# Patient Record
Sex: FEMALE | Race: WHITE | NOT HISPANIC OR LATINO | Employment: OTHER | URBAN - NONMETROPOLITAN AREA
[De-identification: names, ages, dates, MRNs, and addresses within clinical notes are randomized per-mention and may not be internally consistent; named-entity substitution may affect disease eponyms.]

---

## 2017-01-03 ENCOUNTER — OFFICE VISIT (OUTPATIENT)
Dept: MEDICAL GROUP | Facility: PHYSICIAN GROUP | Age: 72
End: 2017-01-03
Payer: MEDICARE

## 2017-01-03 VITALS
DIASTOLIC BLOOD PRESSURE: 70 MMHG | OXYGEN SATURATION: 95 % | RESPIRATION RATE: 14 BRPM | SYSTOLIC BLOOD PRESSURE: 108 MMHG | HEART RATE: 80 BPM | TEMPERATURE: 98.4 F | WEIGHT: 127 LBS | BODY MASS INDEX: 23.98 KG/M2 | HEIGHT: 61 IN

## 2017-01-03 DIAGNOSIS — M51.37 DEGENERATION OF LUMBAR OR LUMBOSACRAL INTERVERTEBRAL DISC: ICD-10-CM

## 2017-01-03 DIAGNOSIS — E03.9 HYPOTHYROIDISM, UNSPECIFIED TYPE: ICD-10-CM

## 2017-01-03 PROCEDURE — 99213 OFFICE O/P EST LOW 20 MIN: CPT | Performed by: NURSE PRACTITIONER

## 2017-01-03 RX ORDER — INFLUENZA A VIRUS A/MICHIGAN/45/2015 X-275 (H1N1) ANTIGEN (FORMALDEHYDE INACTIVATED), INFLUENZA A VIRUS A/SINGAPORE/INFIMH-16-0019/2016 IVR-186 (H3N2) ANTIGEN (FORMALDEHYDE INACTIVATED), AND INFLUENZA B VIRUS B/MARYLAND/15/2016 BX-69A (A B/COLORADO/6/2017-LIKE VIRUS) ANTIGEN (FORMALDEHYDE INACTIVATED) 60; 60; 60 UG/.5ML; UG/.5ML; UG/.5ML
INJECTION, SUSPENSION INTRAMUSCULAR
COMMUNITY
Start: 2016-10-11 | End: 2018-07-25

## 2017-01-03 NOTE — PROGRESS NOTES
Chief Complaint   Patient presents with   • Results     labs       HISTORY OF PRESENT ILLNESS: Patient is a 71 y.o. female  Patient, who presents today to discuss:    Patient is here for follow-up, doesn't really remember why. Recently had her labs done in October that showed euthyroid. Today was just to catch up and review some of her concerns. She will however get lab work done in May 2017 and will follow up. Patient is extremely fit tries to get 10,000 steps and almost every day despite her orthopedic pain and problems. She also tries to eat healthy and maintain a decent weight.      Degeneration of lumbar or lumbosacral intervertebral disc  Patient continues to see ortho for pain relief. Having some difficulty with trigger point injections  Patient takes tramadol for pain as needed.  No falls. Patient continues to use fit bit.     Hypothyroidism  Patient is currently on 112 mcg daily. Feels good on this level. Will retest.         Allergies:Codeine and Vicodin    Current Outpatient Prescriptions Ordered in Liquid Grids   Medication Sig Dispense Refill   • omeprazole (PRILOSEC) 40 MG delayed-release capsule TAKE ONE CAPSULE BY MOUTH ONCE DAILY 90 Cap 0   • estradiol (ESTRACE) 1 MG Tab Take 1 Tab by mouth every day. 90 Tab 3   • levothyroxine (SYNTHROID) 112 MCG Tab Take 1 Tab by mouth Every morning on an empty stomach. 90 Tab 3   • hydrocodone-acetaminophen (NORCO) 5-325 MG Tab per tablet      • tramadol (ULTRAM) 50 MG Tab      • ibuprofen (MOTRIN) 800 MG Tab Take 1 Tab by mouth every 8 hours as needed for Mild Pain. 90 Tab 0   • amitriptyline (ELAVIL) 50 MG TABS Take 50 mg by mouth every evening.     • FLUZONE HIGH-DOSE 0.5 ML Suspension Prefilled Syringe injection      • cyclobenzaprine (FLEXERIL) 10 MG Tab Take 1 Tab by mouth 3 times a day as needed. 90 Tab 1   • PREVNAR 13 syringe      • cyclobenzaprine (FLEXERIL) 10 MG Tab TAKE ONE TABLET BY MOUTH THREE TIMES DAILY AS NEEDED FOR MILD PAIN OR MUSCLE SPASM 90 Tab 0    • erythromycin 5 MG/GM Ointment      • Bioflavonoid Products (VITAMIN C CR) 1000-100 MG Tab CR Take  by mouth 2 Times a Day.     • meclizine (ANTIVERT) 12.5 MG Tab      • levothyroxine (SYNTHROID) 125 MCG Tab Take 1 Tab by mouth every day. 90 Tab 3   • hydrocodone-acetaminophen (VICODIN) 5-500 MG TABS Take 1-2 Tabs by mouth every four hours as needed.     • Multiple Vitamins-Minerals (HAIR/SKIN/NAILS PO) Take 1 Tab by mouth every day.     • Calcium Carbonate-Vitamin D ([employer] CALCIUM/VIT D SOFT CHEWS PO) Take 2 Tabs by mouth every day.       No current [facility]-ordered facility-administered medications on file.       Past Medical History   Diagnosis Date   • Ear problems as child had surgery   • Postcoital bleeding      improved   • Tobacco use disorder    • Degeneration of lumbar or lumbosacral intervertebral disc      Dr. Cisneros   • Screening mammogram  11/2008=normal   • Periodic pelvic examination due   • Menopause 10/14/2009   • Ulcer of the stomach and intestine hx.     stable   • S/P colonoscopy 12/2011     repeat in 5 years- had polyp, initial 2005.    • Colon polyp 12/2011   • Hemorrhoid      internal   • Arthritis      hips, back and shoulders   • History of carpal tunnel repair 7/31/2013     Dr. Villa repaired both.   • Osteoarthritis of multiple joints 6/13/2014   • Rash 12/29/2014     Started about a week ago when she got news about surgery High stress Nothing new Back, stomach, chest, arms Itch No pain   • Unspecified cataract      bilateral IOL   • Unspecified hypothyroidism    • Graves' disease with exophthalmos 6/13/2014     Being followed by Dr. Han San   • Pain in joint, pelvic region and thigh 2015     bursitis and arthritis in hips-takes ibuprofen and darvacet   • Pain in joint, shoulder region 2015       Social History   Substance Use Topics   • Smoking status: Former Smoker -- 1.00 packs/day for 50 years     Types: Cigarettes     Quit date: 01/09/2014   • Smokeless tobacco: Never  "Used      Comment: doesn't think she will quit   • Alcohol Use: 0.6 oz/week     1 Glasses of wine per week      Comment:      0-1 per week                                     E cig use       Family Status   Relation Status Death Age   • Mother  69 yo     had a hx. of stomach problems   • Father       commited suicide   • Sister Alive      3 sisters with thyroid problems   • Sister Alive      has a hx. of breast cyst     Family History   Problem Relation Age of Onset   • Cancer Neg Hx    • Diabetes Neg Hx    • Hypertension Father    • Heart Disease Father      MI age 49 yo       ROS: As documented in my HPI      Exam:  Blood pressure 108/70, pulse 80, temperature 36.9 °C (98.4 °F), resp. rate 14, height 1.549 m (5' 0.98\"), weight 57.607 kg (127 lb), SpO2 95 %.  General:  Well nourished, well developed female in NAD  Head: No lesions, Non tender scalp  Neck: Supple. Symmetric Thyroid visualized during exam.   Pulmonary:  Normal effort. No rales, ronchi, or wheezing.  Cardiovascular: Regular rate and rhythm without murmur.   Extremities: no clubbing, cyanosis, or edema.  Psych: Alert and oriented x3. Normal mood and affect.   Neurological: No focal deficits    Please note that this dictation was created using voice recognition software. I have made every reasonable attempt to correct obvious errors, but I expect that there are errors of grammar and possibly content that I did not discover before finalizing the note.    Assessment/Plan:  1. Degeneration of lumbar or lumbosacral intervertebral disc  COMP METABOLIC PANEL    LIPID PANEL    TSH+FREE T4    VITAMIN D,25 HYDROXY   2. Hypothyroidism, unspecified type  COMP METABOLIC PANEL    LIPID PANEL    TSH+FREE T4    VITAMIN D,25 HYDROXY        Recheck labs in May 2017 and follow up.     "

## 2017-01-03 NOTE — MR AVS SNAPSHOT
"        Makenna Davis Palomo   1/3/2017 1:00 PM   Office Visit   MRN: [id number]    Department:  [facility]   Dept Phone:  [phone]    Description:  Female : 1945   Provider:  EDUARDO Alcantara           Reason for Visit     Results labs      Allergies as of 1/3/2017     Allergen Noted Reactions    Codeine 10/09/2009   Vomiting    Vicodin [Hydrocodone-Acetaminophen] 10/09/2009   Vomiting    OK if takes with phenergan       You were diagnosed with     Degeneration of lumbar or lumbosacral intervertebral disc   [722.52.ICD-9-CM]       Hypothyroidism, unspecified type   [6484723]         Vital Signs     Blood Pressure Pulse Temperature Respirations Height Weight    108/70 mmHg 80 36.9 °C (98.4 °F) 14 1.549 m (5' 0.98\") 57.607 kg (127 lb)    Body Mass Index Oxygen Saturation Smoking Status             24.01 kg/m2 95% Former Smoker         Basic Information     Date Of Birth Sex Race Ethnicity Preferred Language    1945 Female White Non- English      Problem List              ICD-10-CM Priority Class Noted - Resolved    Degeneration of lumbar or lumbosacral intervertebral disc M51.37 High  10/9/2009 - Present    Hypothyroidism E03.9 High  10/9/2009 - Present    Pain in joint, shoulder region M25.519 High  10/9/2009 - Present    History of peptic ulcer Z87.11 Low  2009 - Present    GERD (gastroesophageal reflux disease) K21.9 Low  2009 - Present    Hyperlipidemia E78.5 High  2011 - Present    Post menopausal syndrome Z78.0 Low  3/8/2012 - Present    Osteopenia M85.80 Low  2012 - Present    Osteoarthritis of multiple joints M15.9 High  2014 - Present    Graves' disease with exophthalmos E05.00 High  2014 - Present    Senile nuclear sclerosis H25.10   3/3/2015 - Present    Other and combined forms of senile cataract H25.89 Low  3/17/2015 - Present    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 High  2015 - Present    Abnormal WBC count D72.9 Low  2015 - Present   " Dermatochalasis of both upper eyelids H02.831, H02.834 Low  4/20/2016 - Present      Health Maintenance        Date Due Completion Dates    MAMMOGRAM 10/14/2016 10/14/2015, 9/9/2014, 7/31/2013 (Declined), 7/28/2011, 1/12/2010, 1/12/2010, 10/28/2008, 10/28/2008    Override on 7/31/2013: Patient Declined    IMM PNEUMOCOCCAL 65+ (ADULT) LOW/MEDIUM RISK SERIES (2 of 2 - PPSV23) 5/18/2017 5/18/2016    COLONOSCOPY 3/7/2020 3/7/2010 (Prv Comp)    Override on 3/7/2010: Previously completed (normal)    BONE DENSITY 10/14/2020 10/14/2015, 9/15/2011    IMM DTaP/Tdap/Td Vaccine (2 - Td) 7/31/2023 7/31/2013            Current Immunizations     13-VALENT PCV PREVNAR 5/18/2016    Influenza TIV (IM) 11/13/2013  4:34 PM    Influenza Vaccine Adult HD 10/12/2016, 9/23/2015    Influenza Vaccine Quad Inj (Preserved) 11/13/2014, 11/13/2014, 9/27/2012    SHINGLES VACCINE 8/4/2011    Tdap Vaccine 7/31/2013 11:04 AM      Below and/or attached are the medications your provider expects you to take. Review all of your home medications and newly ordered medications with your provider and/or pharmacist. Follow medication instructions as directed by your provider and/or pharmacist. Please keep your medication list with you and share with your provider. Update the information when medications are discontinued, doses are changed, or new medications (including over-the-counter products) are added; and carry medication information at all times in the event of emergency situations     Allergies:  CODEINE - Vomiting     VICODIN - Vomiting               Medications  Valid as of: January 03, 2017 -  1:48 PM    Generic Name Brand Name Tablet Size Instructions for use    Amitriptyline HCl (Tab) ELAVIL 50 MG Take 50 mg by mouth every evening.        Bioflavonoid Products (Tab CR) Vitamin C CR 1000-100 MG Take  by mouth 2 Times a Day.        Calcium Carbonate-Vitamin D   Take 2 Tabs by mouth every day.        Cyclobenzaprine HCl (Tab) FLEXERIL 10 MG TAKE ONE  TABLET BY MOUTH THREE TIMES DAILY AS NEEDED FOR MILD PAIN OR MUSCLE SPASM        Cyclobenzaprine HCl (Tab) FLEXERIL 10 MG Take 1 Tab by mouth 3 times a day as needed.        Erythromycin (Ointment) erythromycin 5 MG/GM         Estradiol (Tab) ESTRACE 1 MG Take 1 Tab by mouth every day.        Hydrocodone-Acetaminophen (Tab) VICODIN 5-500 MG Take 1-2 Tabs by mouth every four hours as needed.        Hydrocodone-Acetaminophen (Tab) NORCO 5-325 MG         Ibuprofen (Tab) MOTRIN 800 MG Take 1 Tab by mouth every 8 hours as needed for Mild Pain.        Influenza Vac Split High-Dose (Suspension Prefilled Syringe) FLUZONE HIGH-DOSE 0.5 ML         Levothyroxine Sodium (Tab) SYNTHROID 125 MCG Take 1 Tab by mouth every day.        Levothyroxine Sodium (Tab) SYNTHROID 112 MCG Take 1 Tab by mouth Every morning on an empty stomach.        Meclizine HCl (Tab) ANTIVERT 12.5 MG         Multiple Vitamins-Minerals   Take 1 Tab by mouth every day.        Omeprazole (CAPSULE DELAYED RELEASE) PRILOSEC 40 MG TAKE ONE CAPSULE BY MOUTH ONCE DAILY        Pneumococcal 13-Adelaide Conj Vacc (Suspension) PREVNAR 13          TraMADol HCl (Tab) ULTRAM 50 MG         .                 Medicines prescribed today were sent to:     [facility] [address] [place] [id number]    Phone: [phone] Fax: [phone]    Open 24 Hours?: No      Medication refill instructions:       If your prescription bottle indicates you have medication refills left, it is not necessary to call your provider’s office. Please contact your pharmacy and they will refill your medication.    If your prescription bottle indicates you do not have any refills left, you may request refills at any time through one of the following ways: The online [employer] system (except Urgent Care), by calling your provider’s office, or by asking your pharmacy to contact your provider’s office with a refill request. Medication refills  are processed only during regular business hours and may not be available until the next business day. Your provider may request additional information or to have a follow-up visit with you prior to refilling your medication.   *Please Note: Medication refills are assigned a new Rx number when refilled electronically. Your pharmacy may indicate that no refills were authorized even though a new prescription for the same medication is available at the pharmacy. Please request the medicine by name with the pharmacy before contacting your provider for a refill.        Your To Do List     Future Labs/Procedures Complete By Expires    COMP METABOLIC PANEL  As directed 1/3/2018    VITAMIN D,25 HYDROXY  As directed 1/3/2018      Other Notes About Your Plan     Ortho - trigger pointe injections. tramadol           MyChart Access Code: Activation code not generated  Current MyChart Status: Active

## 2017-01-03 NOTE — ASSESSMENT & PLAN NOTE
Patient continues to see ortho for pain relief. Having some difficulty with trigger point injections  Patient takes tramadol for pain as needed.  No falls. Patient continues to use fit bit.

## 2017-02-23 RX ORDER — CYCLOBENZAPRINE HCL 10 MG
TABLET ORAL
Qty: 90 TAB | Refills: 1 | Status: SHIPPED | OUTPATIENT
Start: 2017-02-23 | End: 2017-10-24 | Stop reason: SDUPTHER

## 2017-03-13 RX ORDER — OMEPRAZOLE 40 MG/1
CAPSULE, DELAYED RELEASE ORAL
Qty: 90 CAP | Refills: 0 | Status: SHIPPED | OUTPATIENT
Start: 2017-03-13 | End: 2017-06-18 | Stop reason: SDUPTHER

## 2017-03-14 ENCOUNTER — APPOINTMENT (OUTPATIENT)
Dept: RADIOLOGY | Facility: IMAGING CENTER | Age: 72
End: 2017-03-14
Attending: PHYSICIAN ASSISTANT
Payer: MEDICARE

## 2017-03-14 ENCOUNTER — OFFICE VISIT (OUTPATIENT)
Dept: URGENT CARE | Facility: PHYSICIAN GROUP | Age: 72
End: 2017-03-14
Payer: MEDICARE

## 2017-03-14 VITALS
TEMPERATURE: 99.6 F | BODY MASS INDEX: 24.55 KG/M2 | DIASTOLIC BLOOD PRESSURE: 74 MMHG | SYSTOLIC BLOOD PRESSURE: 112 MMHG | RESPIRATION RATE: 16 BRPM | HEIGHT: 61 IN | HEART RATE: 92 BPM | WEIGHT: 130 LBS | OXYGEN SATURATION: 96 %

## 2017-03-14 DIAGNOSIS — M25.561 ACUTE PAIN OF RIGHT KNEE: ICD-10-CM

## 2017-03-14 DIAGNOSIS — S80.01XA CONTUSION OF RIGHT KNEE, INITIAL ENCOUNTER: ICD-10-CM

## 2017-03-14 PROCEDURE — 1036F TOBACCO NON-USER: CPT | Performed by: PHYSICIAN ASSISTANT

## 2017-03-14 PROCEDURE — G8420 CALC BMI NORM PARAMETERS: HCPCS | Performed by: PHYSICIAN ASSISTANT

## 2017-03-14 PROCEDURE — 73562 X-RAY EXAM OF KNEE 3: CPT | Mod: TC,RT | Performed by: PHYSICIAN ASSISTANT

## 2017-03-14 PROCEDURE — 3014F SCREEN MAMMO DOC REV: CPT | Performed by: PHYSICIAN ASSISTANT

## 2017-03-14 PROCEDURE — G8432 DEP SCR NOT DOC, RNG: HCPCS | Performed by: PHYSICIAN ASSISTANT

## 2017-03-14 PROCEDURE — 4040F PNEUMOC VAC/ADMIN/RCVD: CPT | Performed by: PHYSICIAN ASSISTANT

## 2017-03-14 PROCEDURE — 1101F PT FALLS ASSESS-DOCD LE1/YR: CPT | Performed by: PHYSICIAN ASSISTANT

## 2017-03-14 PROCEDURE — 99214 OFFICE O/P EST MOD 30 MIN: CPT | Performed by: PHYSICIAN ASSISTANT

## 2017-03-14 PROCEDURE — 3017F COLORECTAL CA SCREEN DOC REV: CPT | Performed by: PHYSICIAN ASSISTANT

## 2017-03-14 PROCEDURE — G8482 FLU IMMUNIZE ORDER/ADMIN: HCPCS | Performed by: PHYSICIAN ASSISTANT

## 2017-03-14 RX ORDER — TRAMADOL HYDROCHLORIDE 50 MG/1
50-100 TABLET ORAL EVERY 6 HOURS PRN
Qty: 20 TAB | Refills: 0 | Status: SHIPPED | OUTPATIENT
Start: 2017-03-14 | End: 2017-10-12

## 2017-03-14 NOTE — MR AVS SNAPSHOT
"        Makenna Singletaryto   3/14/2017 3:10 PM   Office Visit   MRN: [id number]    Department:  [facility]   Dept Phone:  [phone]    Description:  Female : 1945   Provider:  Piero Castillo PA-C           Reason for Visit     Knee Pain fell on R knee on , now has shooting pain up the leg      Allergies as of 3/14/2017     Allergen Noted Reactions    Codeine 10/09/2009   Vomiting    Vicodin [Hydrocodone-Acetaminophen] 10/09/2009   Vomiting    OK if takes with phenergan       You were diagnosed with     Contusion of right knee, initial encounter   [[id number]]       Acute pain of right knee   [[id number]]         Vital Signs     Blood Pressure Pulse Temperature Respirations Height Weight    112/74 mmHg 92 37.6 °C (99.6 °F) 16 1.549 m (5' 1\") 58.968 kg (130 lb)    Body Mass Index Oxygen Saturation Breastfeeding? Smoking Status          24.58 kg/m2 96% No Former Smoker        Basic Information     Date Of Birth Sex Race Ethnicity Preferred Language    1945 Female White Non- English      Problem List              ICD-10-CM Priority Class Noted - Resolved    Degeneration of lumbar or lumbosacral intervertebral disc M51.37 High  10/9/2009 - Present    Hypothyroidism E03.9 High  10/9/2009 - Present    Pain in joint, shoulder region M25.519 High  10/9/2009 - Present    History of peptic ulcer Z87.11 Low  2009 - Present    GERD (gastroesophageal reflux disease) K21.9 Low  2009 - Present    Hyperlipidemia E78.5 High  2011 - Present    Post menopausal syndrome Z78.0 Low  3/8/2012 - Present    Osteopenia M85.80 Low  2012 - Present    Osteoarthritis of multiple joints M15.9 High  2014 - Present    Graves' disease with exophthalmos E05.00 High  2014 - Present    Senile nuclear sclerosis H25.10   3/3/2015 - Present    Other and combined forms of senile cataract H25.89 Low  3/17/2015 - Present    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 High  2015 - Present    Abnormal WBC count D72.9 " Low  8/21/2015 - Present    Dermatochalasis of both upper eyelids H02.831, H02.834 Low  4/20/2016 - Present      Health Maintenance        Date Due Completion Dates    MAMMOGRAM 10/14/2016 10/14/2015, 9/9/2014, 7/31/2013 (Declined), 7/28/2011, 1/12/2010, 1/12/2010, 10/28/2008, 10/28/2008    Override on 7/31/2013: Patient Declined    IMM PNEUMOCOCCAL 65+ (ADULT) LOW/MEDIUM RISK SERIES (2 of 2 - PPSV23) 5/18/2017 5/18/2016    COLONOSCOPY 3/7/2020 3/7/2010 (Prv Comp)    Override on 3/7/2010: Previously completed (normal)    BONE DENSITY 10/14/2020 10/14/2015, 9/15/2011    IMM DTaP/Tdap/Td Vaccine (2 - Td) 7/31/2023 7/31/2013            Current Immunizations     13-VALENT PCV PREVNAR 5/18/2016    Influenza TIV (IM) 11/13/2013  4:34 PM    Influenza Vaccine Adult HD 10/12/2016, 9/23/2015    Influenza Vaccine Quad Inj (Preserved) 11/13/2014, 11/13/2014, 9/27/2012    SHINGLES VACCINE 8/4/2011    Tdap Vaccine 7/31/2013 11:04 AM      Below and/or attached are the medications your provider expects you to take. Review all of your home medications and newly ordered medications with your provider and/or pharmacist. Follow medication instructions as directed by your provider and/or pharmacist. Please keep your medication list with you and share with your provider. Update the information when medications are discontinued, doses are changed, or new medications (including over-the-counter products) are added; and carry medication information at all times in the event of emergency situations     Allergies:  CODEINE - Vomiting     VICODIN - Vomiting               Medications  Valid as of: March 14, 2017 -  4:47 PM    Generic Name Brand Name Tablet Size Instructions for use    Amitriptyline HCl (Tab) ELAVIL 50 MG Take 50 mg by mouth every evening.        Bioflavonoid Products (Tab CR) Vitamin C CR 1000-100 MG Take  by mouth 2 Times a Day.        Calcium Carbonate-Vitamin D   Take 2 Tabs by mouth every day.        Cyclobenzaprine HCl  (Tab) FLEXERIL 10 MG TAKE ONE TABLET BY MOUTH THREE TIMES DAILY AS NEEDED FOR MILD PAIN OR MUSCLE SPASM        Cyclobenzaprine HCl (Tab) FLEXERIL 10 MG TAKE ONE TABLET BY MOUTH THREE TIMES DAILY AS NEEDED        Diclofenac Sodium (Gel) Diclofenac Sodium 1 % Apply 4 g to skin as directed 4 times a day.        Erythromycin (Ointment) erythromycin 5 MG/GM         Estradiol (Tab) ESTRACE 1 MG Take 1 Tab by mouth every day.        Hydrocodone-Acetaminophen (Tab) VICODIN 5-500 MG Take 1-2 Tabs by mouth every four hours as needed.        Hydrocodone-Acetaminophen (Tab) NORCO 5-325 MG         Ibuprofen (Tab) MOTRIN 800 MG Take 1 Tab by mouth every 8 hours as needed for Mild Pain.        Influenza Vac Split High-Dose (Suspension Prefilled Syringe) FLUZONE HIGH-DOSE 0.5 ML         Levothyroxine Sodium (Tab) SYNTHROID 125 MCG Take 1 Tab by mouth every day.        Levothyroxine Sodium (Tab) SYNTHROID 112 MCG Take 1 Tab by mouth Every morning on an empty stomach.        Meclizine HCl (Tab) ANTIVERT 12.5 MG         Multiple Vitamins-Minerals   Take 1 Tab by mouth every day.        Omeprazole (CAPSULE DELAYED RELEASE) PRILOSEC 40 MG TAKE ONE CAPSULE BY MOUTH ONCE DAILY        Pneumococcal 13-Adelaide Conj Vacc (Suspension) PREVNAR 13          TraMADol HCl (Tab) ULTRAM 50 MG         TraMADol HCl (Tab) ULTRAM 50 MG Take 1-2 Tabs by mouth every 6 hours as needed.        .                 Medicines prescribed today were sent to:     [facility] [address] [place] [address] [id number] [id number] [facility] [address]    Phone: [phone] Fax: [phone]    Open 24 Hours?: No      Medication refill instructions:       If your prescription bottle indicates you have medication refills left, it is not necessary to call your provider’s office. Please contact your pharmacy and they will refill your medication.    If your prescription bottle indicates you do not have any refills left, you may request refills at any time  through one of the following ways: The online [employer] system (except Urgent Care), by calling your provider’s office, or by asking your pharmacy to contact your provider’s office with a refill request. Medication refills are processed only during regular business hours and may not be available until the next business day. Your provider may request additional information or to have a follow-up visit with you prior to refilling your medication.   *Please Note: Medication refills are assigned a new Rx number when refilled electronically. Your pharmacy may indicate that no refills were authorized even though a new prescription for the same medication is available at the pharmacy. Please request the medicine by name with the pharmacy before contacting your provider for a refill.        Your To Do List     Future Labs/Procedures Complete By Expires    DX-KNEE 3 VIEWS RIGHT  As directed 3/14/2018      Other Notes About Your Plan     Ortho - trigger pointe injections. Tramadol  Epidural           Gr[employer]per[employer] Access Code: Activation code not generated  Current C2FO Status: Active

## 2017-03-14 NOTE — PROGRESS NOTES
Chief Complaint   Patient presents with   • Knee Pain     fell on R knee on 2/22, now has shooting pain up the leg       HISTORY OF PRESENT ILLNESS: Patient is a 71 y.o. female who presents today because she has reviewed pain. 3 weeks ago, she fell forward, landing squarely on her right knee and has had pain ever since then. It is a little bit better in the morning, but by evening. She has pain that radiates medially and distally from her lower patellar area. Denies any distal paresthesias. She has tried over-the-counter ibuprofen, Tylenol, different rubs and ointment without any improvement.    Patient Active Problem List    Diagnosis Date Noted   • Graves' ophthalmopathy 07/09/2015     Priority: High   • Osteoarthritis of multiple joints 06/13/2014     Priority: High   • Graves' disease with exophthalmos 06/13/2014     Priority: High   • Hyperlipidemia 06/22/2011     Priority: High   • Degeneration of lumbar or lumbosacral intervertebral disc 10/09/2009     Priority: High   • Hypothyroidism 10/09/2009     Priority: High   • Pain in joint, shoulder region 10/09/2009     Priority: High   • Dermatochalasis of both upper eyelids 04/20/2016     Priority: Low   • Abnormal WBC count 08/21/2015     Priority: Low   • Other and combined forms of senile cataract 03/17/2015     Priority: Low   • Osteopenia 05/09/2012     Priority: Low   • Post menopausal syndrome 03/08/2012     Priority: Low   • History of peptic ulcer 11/04/2009     Priority: Low   • GERD (gastroesophageal reflux disease) 11/04/2009     Priority: Low   • Senile nuclear sclerosis 03/03/2015       Allergies:Codeine and Vicodin    Current Outpatient Prescriptions Ordered in [facility]   Medication Sig Dispense Refill   • tramadol (ULTRAM) 50 MG Tab Take 1-2 Tabs by mouth every 6 hours as needed. 20 Tab 0   • Diclofenac Sodium (VOLTAREN) 1 % Gel Apply 4 g to skin as directed 4 times a day. 100 g 0   • omeprazole (PRILOSEC) 40 MG delayed-release capsule TAKE ONE  CAPSULE BY MOUTH ONCE DAILY 90 Cap 0   • estradiol (ESTRACE) 1 MG Tab Take 1 Tab by mouth every day. 90 Tab 3   • levothyroxine (SYNTHROID) 112 MCG Tab Take 1 Tab by mouth Every morning on an empty stomach. 90 Tab 3   • cyclobenzaprine (FLEXERIL) 10 MG Tab TAKE ONE TABLET BY MOUTH THREE TIMES DAILY AS NEEDED FOR MILD PAIN OR MUSCLE SPASM 90 Tab 0   • hydrocodone-acetaminophen (NORCO) 5-325 MG Tab per tablet      • tramadol (ULTRAM) 50 MG Tab      • Bioflavonoid Products (VITAMIN C CR) 1000-100 MG Tab CR Take  by mouth 2 Times a Day.     • ibuprofen (MOTRIN) 800 MG Tab Take 1 Tab by mouth every 8 hours as needed for Mild Pain. 90 Tab 0   • Multiple Vitamins-Minerals (HAIR/SKIN/NAILS PO) Take 1 Tab by mouth every day.     • Calcium Carbonate-Vitamin D (CVS CALCIUM/VIT D SOFT CHEWS PO) Take 2 Tabs by mouth every day.     • amitriptyline (ELAVIL) 50 MG TABS Take 50 mg by mouth every evening.     • cyclobenzaprine (FLEXERIL) 10 MG Tab TAKE ONE TABLET BY MOUTH THREE TIMES DAILY AS NEEDED 90 Tab 1   • FLUZONE HIGH-DOSE 0.5 ML Suspension Prefilled Syringe injection      • PREVNAR 13 syringe      • erythromycin 5 MG/GM Ointment      • meclizine (ANTIVERT) 12.5 MG Tab      • levothyroxine (SYNTHROID) 125 MCG Tab Take 1 Tab by mouth every day. 90 Tab 3   • hydrocodone-acetaminophen (VICODIN) 5-500 MG TABS Take 1-2 Tabs by mouth every four hours as needed.       No current Epic-ordered facility-administered medications on file.       Past Medical History   Diagnosis Date   • Ear problems as child had surgery   • Postcoital bleeding      improved   • Tobacco use disorder    • Degeneration of lumbar or lumbosacral intervertebral disc      Dr. Cisneros   • Screening mammogram  11/2008=normal   • Periodic pelvic examination due   • Menopause 10/14/2009   • Ulcer of the stomach and intestine hx.     stable   • S/P colonoscopy 12/2011     repeat in 5 years- had polyp, initial 2005.    • Colon polyp 12/2011   • Hemorrhoid       internal   • Arthritis      hips, back and shoulders   • History of carpal tunnel repair 2013     Dr. Villa repaired both.   • Osteoarthritis of multiple joints 2014   • Rash 2014     Started about a week ago when she got news about surgery High stress Nothing new Back, stomach, chest, arms Itch No pain   • Unspecified cataract      bilateral IOL   • Unspecified hypothyroidism    • Graves' disease with exophthalmos 2014     Being followed by Dr. Han San   • Pain in joint, pelvic region and thigh 2015     bursitis and arthritis in hips-takes ibuprofen and darvacet   • Pain in joint, shoulder region 2015       Social History   Substance Use Topics   • Smoking status: Former Smoker -- 1.00 packs/day for 50 years     Types: Cigarettes     Quit date: 2014   • Smokeless tobacco: Never Used      Comment: doesn't think she will quit   • Alcohol Use: 0.6 oz/week     1 Glasses of wine per week      Comment:      0-1 per week                                     E cig use       Family Status   Relation Status Death Age   • Mother  69 yo     had a hx. of stomach problems   • Father       commited suicide   • Sister Alive      3 sisters with thyroid problems   • Sister Alive      has a hx. of breast cyst     Family History   Problem Relation Age of Onset   • Cancer Neg Hx    • Diabetes Neg Hx    • Hypertension Father    • Heart Disease Father      MI age 47 yo       ROS:  Review of Systems   Constitutional: Negative for fever, chills, weight loss and malaise/fatigue.   HENT: Negative for ear pain, nosebleeds, congestion, sore throat and neck pain.    Eyes: Negative for blurred vision.   Respiratory: Negative for cough, sputum production, shortness of breath and wheezing.    Cardiovascular: Negative for chest pain, palpitations, orthopnea and leg swelling.   Gastrointestinal: Negative for heartburn, nausea, vomiting and abdominal pain.       Exam:  Blood pressure 112/74,  "pulse 92, temperature 37.6 °C (99.6 °F), resp. rate 16, height 1.549 m (5' 1\"), weight 58.968 kg (130 lb), SpO2 96 %, not currently breastfeeding.  General:  Well nourished, well developed female in NAD  Head:Normocephalic, atraumatic  Eyes: PERRLA, EOM within normal limits, no conjunctival injection, no scleral icterus, visual fields and acuity grossly intact.  Extremities: no clubbing, cyanosis, or edema. Right knee is without any visual deformity, erythema, edema or ecchymosis. She has tenderness to palpation to the anterior joint line./Infrapatellar tendon area, the popliteal space, as well as the superior portion of the patella. Distally she has good range of motion, strength, circulation and sensation.    Radiologist interprets Right knee x-ray:      No evidence of fracture or dislocation.         Please note that this dictation was created using voice recognition software. I have made every reasonable attempt to correct obvious errors, but I expect that there are errors of grammar and possibly content that I did not discover before finalizing the note.    Assessment/Plan:  1. Contusion of right knee, initial encounter  DX-KNEE 3 VIEWS RIGHT   2. Acute pain of right knee  tramadol (ULTRAM) 50 MG Tab    Diclofenac Sodium (VOLTAREN) 1 % Gel       Followup with primary care in the next 7-10 days if not significantly improving, return to the urgent care or go to the emergency room sooner for any worsening of symptoms.         "

## 2017-03-28 ENCOUNTER — PATIENT MESSAGE (OUTPATIENT)
Dept: MEDICAL GROUP | Facility: PHYSICIAN GROUP | Age: 72
End: 2017-03-28

## 2017-03-28 DIAGNOSIS — E03.9 HYPOTHYROIDISM, UNSPECIFIED TYPE: ICD-10-CM

## 2017-04-05 ENCOUNTER — HOSPITAL ENCOUNTER (OUTPATIENT)
Dept: LAB | Facility: MEDICAL CENTER | Age: 72
End: 2017-04-05
Attending: NURSE PRACTITIONER
Payer: MEDICARE

## 2017-04-05 DIAGNOSIS — E03.9 HYPOTHYROIDISM, UNSPECIFIED TYPE: ICD-10-CM

## 2017-04-05 DIAGNOSIS — M51.37 DEGENERATION OF LUMBAR OR LUMBOSACRAL INTERVERTEBRAL DISC: ICD-10-CM

## 2017-04-05 LAB
25(OH)D3 SERPL-MCNC: 49 NG/ML (ref 30–100)
ALBUMIN SERPL BCP-MCNC: 3.8 G/DL (ref 3.2–4.9)
ALBUMIN/GLOB SERPL: 1.7 G/DL
ALP SERPL-CCNC: 64 U/L (ref 30–99)
ALT SERPL-CCNC: 22 U/L (ref 2–50)
ANION GAP SERPL CALC-SCNC: 10 MMOL/L (ref 0–11.9)
AST SERPL-CCNC: 25 U/L (ref 12–45)
BILIRUB SERPL-MCNC: 0.3 MG/DL (ref 0.1–1.5)
BUN SERPL-MCNC: 11 MG/DL (ref 8–22)
CALCIUM SERPL-MCNC: 8.6 MG/DL (ref 8.5–10.5)
CHLORIDE SERPL-SCNC: 101 MMOL/L (ref 96–112)
CHOLEST SERPL-MCNC: 242 MG/DL (ref 100–199)
CO2 SERPL-SCNC: 27 MMOL/L (ref 20–33)
CREAT SERPL-MCNC: 0.9 MG/DL (ref 0.5–1.4)
GFR SERPL CREATININE-BSD FRML MDRD: >60 ML/MIN/1.73 M 2
GLOBULIN SER CALC-MCNC: 2.2 G/DL (ref 1.9–3.5)
GLUCOSE SERPL-MCNC: 85 MG/DL (ref 65–99)
HDLC SERPL-MCNC: 53 MG/DL
LDLC SERPL CALC-MCNC: 150 MG/DL
POTASSIUM SERPL-SCNC: 3.7 MMOL/L (ref 3.6–5.5)
PROT SERPL-MCNC: 6 G/DL (ref 6–8.2)
SODIUM SERPL-SCNC: 138 MMOL/L (ref 135–145)
T4 FREE SERPL-MCNC: 1.03 NG/DL (ref 0.53–1.43)
TRIGL SERPL-MCNC: 197 MG/DL (ref 0–149)
TSH SERPL DL<=0.005 MIU/L-ACNC: 2.51 UIU/ML (ref 0.3–3.7)

## 2017-04-05 PROCEDURE — 82306 VITAMIN D 25 HYDROXY: CPT

## 2017-04-05 PROCEDURE — 84439 ASSAY OF FREE THYROXINE: CPT

## 2017-04-05 PROCEDURE — 36415 COLL VENOUS BLD VENIPUNCTURE: CPT

## 2017-04-05 PROCEDURE — 80053 COMPREHEN METABOLIC PANEL: CPT

## 2017-04-05 PROCEDURE — 80061 LIPID PANEL: CPT

## 2017-04-05 PROCEDURE — 84443 ASSAY THYROID STIM HORMONE: CPT

## 2017-04-21 ENCOUNTER — OFFICE VISIT (OUTPATIENT)
Dept: MEDICAL GROUP | Facility: PHYSICIAN GROUP | Age: 72
End: 2017-04-21
Payer: MEDICARE

## 2017-04-21 VITALS
SYSTOLIC BLOOD PRESSURE: 110 MMHG | RESPIRATION RATE: 16 BRPM | WEIGHT: 130 LBS | TEMPERATURE: 99 F | DIASTOLIC BLOOD PRESSURE: 66 MMHG | OXYGEN SATURATION: 97 % | HEIGHT: 61 IN | HEART RATE: 92 BPM | BODY MASS INDEX: 24.55 KG/M2

## 2017-04-21 DIAGNOSIS — M15.9 PRIMARY OSTEOARTHRITIS INVOLVING MULTIPLE JOINTS: ICD-10-CM

## 2017-04-21 DIAGNOSIS — M25.561 RIGHT KNEE PAIN, UNSPECIFIED CHRONICITY: ICD-10-CM

## 2017-04-21 DIAGNOSIS — E78.2 MIXED HYPERLIPIDEMIA: ICD-10-CM

## 2017-04-21 PROCEDURE — 1101F PT FALLS ASSESS-DOCD LE1/YR: CPT | Performed by: NURSE PRACTITIONER

## 2017-04-21 PROCEDURE — 1036F TOBACCO NON-USER: CPT | Performed by: NURSE PRACTITIONER

## 2017-04-21 PROCEDURE — 4040F PNEUMOC VAC/ADMIN/RCVD: CPT | Performed by: NURSE PRACTITIONER

## 2017-04-21 PROCEDURE — 99214 OFFICE O/P EST MOD 30 MIN: CPT | Performed by: NURSE PRACTITIONER

## 2017-04-21 PROCEDURE — 3014F SCREEN MAMMO DOC REV: CPT | Performed by: NURSE PRACTITIONER

## 2017-04-21 PROCEDURE — G8432 DEP SCR NOT DOC, RNG: HCPCS | Performed by: NURSE PRACTITIONER

## 2017-04-21 PROCEDURE — G8420 CALC BMI NORM PARAMETERS: HCPCS | Performed by: NURSE PRACTITIONER

## 2017-04-21 RX ORDER — LIDOCAINE 50 MG/G
1 PATCH TOPICAL EVERY 24 HOURS
Qty: 10 PATCH | Refills: 3 | Status: SHIPPED | OUTPATIENT
Start: 2017-04-21 | End: 2017-10-12

## 2017-04-21 NOTE — PROGRESS NOTES
Chief Complaint   Patient presents with   • Results     labs       HISTORY OF PRESENT ILLNESS: Patient is a @ age 71 here  today to discuss:    Interval history:     Hospitalizations  NO     Injuries  YES right knee    Illness  NO         Hyperlipidemia  Patient here to discuss labs. Patient does not want to take statins. However she will make some lifestyle changes which include decreasing cholesterol in her diet. This is reviewed which would mean she would decrease animal protein and increased plant protein diet. She could also use various supplements with fish oil, co-Q10, or niacin..    Osteoarthritis of multiple joints  Patient went to ortho - received trigger injection and it went bad. Patient was unhappy with the provider who gave her the injection. She would like to have a prescription for lidocaine dermal patches which she will place on her hip    Right knee pain  Patient fell on to her right knee 2 months ago. Was evaluated at urgent care, x-ray which was negative, and eventually saw her back doctor who examined the right knee and felt that there was no major damage. Patient still has chronic pain some numbness in the lower portion of her patella and is concerned. She will return to her back doctor and take this up with him. I have offered to have her see a different orthopedist who specializes in knees. Currently patient will wait.        Allergies:Codeine and Vicodin    Current Outpatient Prescriptions Ordered in [facility]   Medication Sig Dispense Refill   • lidocaine (LIDODERM) 5 % Patch Apply 1 Patch to skin as directed every 24 hours. 10 Patch 3   • tramadol (ULTRAM) 50 MG Tab Take 1-2 Tabs by mouth every 6 hours as needed. 20 Tab 0   • omeprazole (PRILOSEC) 40 MG delayed-release capsule TAKE ONE CAPSULE BY MOUTH ONCE DAILY 90 Cap 0   • cyclobenzaprine (FLEXERIL) 10 MG Tab TAKE ONE TABLET BY MOUTH THREE TIMES DAILY AS NEEDED 90 Tab 1   • estradiol (ESTRACE) 1 MG Tab Take 1 Tab by mouth every day. 90 Tab 3    • ibuprofen (MOTRIN) 800 MG Tab Take 1 Tab by mouth every 8 hours as needed for Mild Pain. 90 Tab 0   • amitriptyline (ELAVIL) 50 MG TABS Take 50 mg by mouth every evening.     • Diclofenac Sodium (VOLTAREN) 1 % Gel Apply 4 g to skin as directed 4 times a day. 100 g 0   • FLUZONE HIGH-DOSE 0.5 ML Suspension Prefilled Syringe injection      • levothyroxine (SYNTHROID) 112 MCG Tab Take 1 Tab by mouth Every morning on an empty stomach. 90 Tab 3   • PREVNAR 13 syringe      • cyclobenzaprine (FLEXERIL) 10 MG Tab TAKE ONE TABLET BY MOUTH THREE TIMES DAILY AS NEEDED FOR MILD PAIN OR MUSCLE SPASM 90 Tab 0   • erythromycin 5 MG/GM Ointment      • hydrocodone-acetaminophen (NORCO) 5-325 MG Tab per tablet      • tramadol (ULTRAM) 50 MG Tab      • Bioflavonoid Products (VITAMIN C CR) 1000-100 MG Tab CR Take  by mouth 2 Times a Day.     • meclizine (ANTIVERT) 12.5 MG Tab      • levothyroxine (SYNTHROID) 125 MCG Tab Take 1 Tab by mouth every day. 90 Tab 3   • hydrocodone-acetaminophen (VICODIN) 5-500 MG TABS Take 1-2 Tabs by mouth every four hours as needed.     • Multiple Vitamins-Minerals (HAIR/SKIN/NAILS PO) Take 1 Tab by mouth every day.     • Calcium Carbonate-Vitamin D (CVS CALCIUM/VIT D SOFT CHEWS PO) Take 2 Tabs by mouth every day.       No current [facility]-ordered facility-administered medications on file.       Past Medical History   Diagnosis Date   • Ear problems as child had surgery   • Postcoital bleeding      improved   • Tobacco use disorder    • Degeneration of lumbar or lumbosacral intervertebral disc      Dr. Cisneros   • Screening mammogram  11/2008=normal   • Periodic pelvic examination due   • Menopause 10/14/2009   • Ulcer of the stomach and intestine hx.     stable   • S/P colonoscopy 12/2011     repeat in 5 years- had polyp, initial 2005.    • Colon polyp 12/2011   • Hemorrhoid      internal   • Arthritis      hips, back and shoulders   • History of carpal tunnel repair 7/31/2013     Dr. Villa  "repaired both.   • Osteoarthritis of multiple joints 2014   • Rash 2014     Started about a week ago when she got news about surgery High stress Nothing new Back, stomach, chest, arms Itch No pain   • Unspecified cataract      bilateral IOL   • Unspecified hypothyroidism    • Graves' disease with exophthalmos 2014     Being followed by Dr. Han San   • Pain in joint, pelvic region and thigh      bursitis and arthritis in hips-takes ibuprofen and darvacet   • Pain in joint, shoulder region        Social History   Substance Use Topics   • Smoking status: Former Smoker -- 1.00 packs/day for 50 years     Types: Cigarettes     Quit date: 2014   • Smokeless tobacco: Never Used      Comment: doesn't think she will quit   • Alcohol Use: 0.6 oz/week     1 Glasses of wine per week      Comment:      0-1 per week                                     E cig use       Family Status   Relation Status Death Age   • Mother  71 yo     had a hx. of stomach problems   • Father       commited suicide   • Sister Alive      3 sisters with thyroid problems   • Sister Alive      has a hx. of breast cyst     Family History   Problem Relation Age of Onset   • Cancer Neg Hx    • Diabetes Neg Hx    • Hypertension Father    • Heart Disease Father      MI age 47 yo       ROS: As documented in my HPI      Exam:  Blood pressure 110/66, pulse 92, temperature 37.2 °C (99 °F), resp. rate 16, height 1.549 m (5' 1\"), weight 58.968 kg (130 lb), SpO2 97 %.  General:  Well nourished, well developed female in NAD  Head: Nontender scalp. No lesions  Neck: Supple. Symmetric Thyroid palpated. No bruits  Pulmonary:  Normal effort. No rales, ronchi, or wheezing.  Cardiovascular: Regular rate and rhythm without murmur.   Abdomen: Soft nontender, normal bowel sounds  Extremities: normal gait. No swelling or erythema to lower joints  Psych: Alert and oriented x3.  Neurological: No focal deficits    Please note " that this dictation was created using voice recognition software. I have made every reasonable attempt to correct obvious errors, but I expect that there are errors of grammar and possibly content that I did not discover before finalizing the note.    Assessment/Plan:  1. Mixed hyperlipidemia  Not controlled, but patient will try different diet and supplements   2. Primary osteoarthritis involving multiple joints  Continue to see pain management    Current outpatient prescriptions:   •  lidocaine, 1 Patch, Transdermal, Q24HRS       3. Right knee pain, unspecified chronicity  Continues to have pain - recommended specialist. Patient will wait.

## 2017-04-21 NOTE — ASSESSMENT & PLAN NOTE
Patient here to discuss labs. Patient does not want to take statins. However she will make some lifestyle changes which include decreasing cholesterol in her diet. This is reviewed which would mean she would decrease animal protein and increased plant protein diet. She could also use various supplements with fish oil, co-Q10, or niacin..

## 2017-04-21 NOTE — ASSESSMENT & PLAN NOTE
Patient went to ortho - received trigger injection and it went bad. Patient was unhappy with the provider who gave her the injection. She would like to have a prescription for lidocaine dermal patches which she will place on her hip

## 2017-04-21 NOTE — MR AVS SNAPSHOT
"        Makenna Davis Palomo   2017 10:40 AM   Office Visit   MRN: [id number]    Department:  [facility]   Dept Phone:  [phone]    Description:  Female : 1945   Provider:  EDUARDO Alcantara           Reason for Visit     Results labs      Allergies as of 2017     Allergen Noted Reactions    Codeine 10/09/2009   Vomiting    Vicodin [Hydrocodone-Acetaminophen] 10/09/2009   Vomiting    OK if takes with phenergan       You were diagnosed with     Mixed hyperlipidemia   [272.2.ICD-9-CM]       Primary osteoarthritis involving multiple joints   [4740085]         Vital Signs     Blood Pressure Pulse Temperature Respirations Height Weight    110/66 mmHg 92 37.2 °C (99 °F) 16 1.549 m (5' 1\") 58.968 kg (130 lb)    Body Mass Index Oxygen Saturation Smoking Status             24.58 kg/m2 97% Former Smoker         Basic Information     Date Of Birth Sex Race Ethnicity Preferred Language    1945 Female White Non- English      Problem List              ICD-10-CM Priority Class Noted - Resolved    Degeneration of lumbar or lumbosacral intervertebral disc M51.37 High  10/9/2009 - Present    Hypothyroidism E03.9 High  10/9/2009 - Present    Pain in joint, shoulder region M25.519 High  10/9/2009 - Present    History of peptic ulcer Z87.11 Low  2009 - Present    GERD (gastroesophageal reflux disease) K21.9 Low  2009 - Present    Hyperlipidemia E78.5 High  2011 - Present    Post menopausal syndrome Z78.0 Low  3/8/2012 - Present    Osteopenia M85.80 Low  2012 - Present    Osteoarthritis of multiple joints M15.9 High  2014 - Present    Graves' disease with exophthalmos E05.00 High  2014 - Present    Senile nuclear sclerosis H25.10   3/3/2015 - Present    Other and combined forms of senile cataract H25.89 Low  3/17/2015 - Present    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 High  2015 - Present    Abnormal WBC count D72.9 Low  2015 - Present    Dermatochalasis of both " upper eyelids H02.831, H02.834 Low  4/20/2016 - Present      Health Maintenance        Date Due Completion Dates    MAMMOGRAM 10/14/2016 10/14/2015, 9/9/2014, 7/31/2013 (Declined), 7/28/2011, 1/12/2010, 1/12/2010, 10/28/2008, 10/28/2008    Override on 7/31/2013: Patient Declined    COLONOSCOPY 12/5/2016 12/5/2011, 3/7/2010 (Prv Comp)    Override on 3/7/2010: Previously completed (normal)    IMM PNEUMOCOCCAL 65+ (ADULT) LOW/MEDIUM RISK SERIES (2 of 2 - PPSV23) 5/18/2017 5/18/2016    BONE DENSITY 10/14/2020 10/14/2015, 9/15/2011    IMM DTaP/Tdap/Td Vaccine (2 - Td) 7/31/2023 7/31/2013            Current Immunizations     13-VALENT PCV PREVNAR 5/18/2016    Influenza TIV (IM) 11/13/2013  4:34 PM    Influenza Vaccine Adult HD 10/12/2016, 9/23/2015    Influenza Vaccine Quad Inj (Preserved) 11/13/2014, 11/13/2014, 9/27/2012    SHINGLES VACCINE 8/4/2011    Tdap Vaccine 7/31/2013 11:04 AM      Below and/or attached are the medications your provider expects you to take. Review all of your home medications and newly ordered medications with your provider and/or pharmacist. Follow medication instructions as directed by your provider and/or pharmacist. Please keep your medication list with you and share with your provider. Update the information when medications are discontinued, doses are changed, or new medications (including over-the-counter products) are added; and carry medication information at all times in the event of emergency situations     Allergies:  CODEINE - Vomiting     VICODIN - Vomiting               Medications  Valid as of: April 21, 2017 - 11:15 AM    Generic Name Brand Name Tablet Size Instructions for use    Amitriptyline HCl (Tab) ELAVIL 50 MG Take 50 mg by mouth every evening.        Bioflavonoid Products (Tab CR) Vitamin C CR 1000-100 MG Take  by mouth 2 Times a Day.        Calcium Carbonate-Vitamin D   Take 2 Tabs by mouth every day.        Cyclobenzaprine HCl (Tab) FLEXERIL 10 MG TAKE ONE TABLET BY  MOUTH THREE TIMES DAILY AS NEEDED FOR MILD PAIN OR MUSCLE SPASM        Cyclobenzaprine HCl (Tab) FLEXERIL 10 MG TAKE ONE TABLET BY MOUTH THREE TIMES DAILY AS NEEDED        Diclofenac Sodium (Gel) Diclofenac Sodium 1 % Apply 4 g to skin as directed 4 times a day.        Erythromycin (Ointment) erythromycin 5 MG/GM         Estradiol (Tab) ESTRACE 1 MG Take 1 Tab by mouth every day.        Hydrocodone-Acetaminophen (Tab) VICODIN 5-500 MG Take 1-2 Tabs by mouth every four hours as needed.        Hydrocodone-Acetaminophen (Tab) NORCO 5-325 MG         Ibuprofen (Tab) MOTRIN 800 MG Take 1 Tab by mouth every 8 hours as needed for Mild Pain.        Influenza Vac Split High-Dose (Suspension Prefilled Syringe) FLUZONE HIGH-DOSE 0.5 ML         Levothyroxine Sodium (Tab) SYNTHROID 125 MCG Take 1 Tab by mouth every day.        Levothyroxine Sodium (Tab) SYNTHROID 112 MCG Take 1 Tab by mouth Every morning on an empty stomach.        Lidocaine (Patch) LIDODERM 5 % Apply 1 Patch to skin as directed every 24 hours.        Meclizine HCl (Tab) ANTIVERT 12.5 MG         Multiple Vitamins-Minerals   Take 1 Tab by mouth every day.        Omeprazole (CAPSULE DELAYED RELEASE) PRILOSEC 40 MG TAKE ONE CAPSULE BY MOUTH ONCE DAILY        Pneumococcal 13-Adelaide Conj Vacc (Suspension) PREVNAR 13          TraMADol HCl (Tab) ULTRAM 50 MG         TraMADol HCl (Tab) ULTRAM 50 MG Take 1-2 Tabs by mouth every 6 hours as needed.        .                 Medicines prescribed today were sent to:     [facility] [address] [place] [id number]    Phone: [phone] Fax: [phone]    Open 24 Hours?: No      Medication refill instructions:       If your prescription bottle indicates you have medication refills left, it is not necessary to call your provider’s office. Please contact your pharmacy and they will refill your medication.    If your prescription bottle indicates you do not have any  refills left, you may request refills at any time through one of the following ways: The online [employer] system (except Urgent Care), by calling your provider’s office, or by asking your pharmacy to contact your provider’s office with a refill request. Medication refills are processed only during regular business hours and may not be available until the next business day. Your provider may request additional information or to have a follow-up visit with you prior to refilling your medication.   *Please Note: Medication refills are assigned a new Rx number when refilled electronically. Your pharmacy may indicate that no refills were authorized even though a new prescription for the same medication is available at the pharmacy. Please request the medicine by name with the pharmacy before contacting your provider for a refill.        Other Notes About Your Plan     Ortho - trigger pointe injections. Tramadol  Epidural           MyChart Access Code: Activation code not generated  Current [employer] Status: Active

## 2017-04-21 NOTE — ASSESSMENT & PLAN NOTE
Patient fell on to her right knee 2 months ago. Was evaluated at urgent care, x-ray which was negative, and eventually saw her back doctor who examined the right knee and felt that there was no major damage. Patient still has chronic pain some numbness in the lower portion of her patella and is concerned. She will return to her back doctor and take this up with him. I have offered to have her see a different orthopedist who specializes in knees. Currently patient will wait.

## 2017-04-25 ENCOUNTER — TELEPHONE (OUTPATIENT)
Dept: MEDICAL GROUP | Facility: PHYSICIAN GROUP | Age: 72
End: 2017-04-25

## 2017-04-25 DIAGNOSIS — M25.561 RIGHT KNEE PAIN, UNSPECIFIED CHRONICITY: ICD-10-CM

## 2017-06-18 RX ORDER — OMEPRAZOLE 40 MG/1
CAPSULE, DELAYED RELEASE ORAL
Qty: 90 CAP | Refills: 1 | Status: SHIPPED | OUTPATIENT
Start: 2017-06-18 | End: 2017-12-18 | Stop reason: SDUPTHER

## 2017-06-22 ENCOUNTER — HOSPITAL ENCOUNTER (OUTPATIENT)
Facility: MEDICAL CENTER | Age: 72
End: 2017-06-22
Attending: FAMILY MEDICINE
Payer: MEDICARE

## 2017-06-22 ENCOUNTER — OFFICE VISIT (OUTPATIENT)
Dept: URGENT CARE | Facility: PHYSICIAN GROUP | Age: 72
End: 2017-06-22
Payer: MEDICARE

## 2017-06-22 VITALS
DIASTOLIC BLOOD PRESSURE: 84 MMHG | OXYGEN SATURATION: 98 % | TEMPERATURE: 96.6 F | WEIGHT: 128 LBS | RESPIRATION RATE: 16 BRPM | BODY MASS INDEX: 24.2 KG/M2 | SYSTOLIC BLOOD PRESSURE: 112 MMHG | HEART RATE: 100 BPM

## 2017-06-22 DIAGNOSIS — R30.0 DYSURIA: ICD-10-CM

## 2017-06-22 LAB
APPEARANCE UR: CLEAR
BILIRUB UR STRIP-MCNC: NORMAL MG/DL
COLOR UR AUTO: YELLOW
GLUCOSE UR STRIP.AUTO-MCNC: NORMAL MG/DL
KETONES UR STRIP.AUTO-MCNC: NORMAL MG/DL
LEUKOCYTE ESTERASE UR QL STRIP.AUTO: NORMAL
NITRITE UR QL STRIP.AUTO: NORMAL
PH UR STRIP.AUTO: 7.5 [PH] (ref 5–8)
PROT UR QL STRIP: NORMAL MG/DL
RBC UR QL AUTO: NORMAL
SP GR UR STRIP.AUTO: 1
UROBILINOGEN UR STRIP-MCNC: NORMAL MG/DL

## 2017-06-22 PROCEDURE — 87086 URINE CULTURE/COLONY COUNT: CPT

## 2017-06-22 PROCEDURE — 99214 OFFICE O/P EST MOD 30 MIN: CPT | Performed by: FAMILY MEDICINE

## 2017-06-22 PROCEDURE — 81002 URINALYSIS NONAUTO W/O SCOPE: CPT | Performed by: FAMILY MEDICINE

## 2017-06-22 PROCEDURE — 87186 SC STD MICRODIL/AGAR DIL: CPT

## 2017-06-22 PROCEDURE — 87077 CULTURE AEROBIC IDENTIFY: CPT

## 2017-06-22 RX ORDER — NITROFURANTOIN 25; 75 MG/1; MG/1
100 CAPSULE ORAL 2 TIMES DAILY
Qty: 10 CAP | Refills: 0 | Status: SHIPPED | OUTPATIENT
Start: 2017-06-22 | End: 2017-06-27

## 2017-06-22 RX ORDER — PHENAZOPYRIDINE HYDROCHLORIDE 200 MG/1
200 TABLET, FILM COATED ORAL EVERY 12 HOURS PRN
Qty: 6 TAB | Refills: 0 | Status: SHIPPED | OUTPATIENT
Start: 2017-06-22 | End: 2018-06-13

## 2017-06-22 ASSESSMENT — ENCOUNTER SYMPTOMS
DIZZINESS: 0
FOCAL WEAKNESS: 0
ABDOMINAL PAIN: 0
NAUSEA: 0
CHILLS: 0
VOMITING: 0
FEVER: 0

## 2017-06-22 NOTE — PROGRESS NOTES
Subjective:      Makenna Culver is a 71 y.o. female who presents with Dysuria    Chief Complaint   Patient presents with   • Dysuria        - This is a very pleasant 71 y.o. female with complaints of urinary freq burning x 4 days. No NVFC          ALLERGIES:  Codeine and Vicodin     PMH:  Past Medical History   Diagnosis Date   • Ear problems as child had surgery   • Postcoital bleeding      improved   • Tobacco use disorder    • Degeneration of lumbar or lumbosacral intervertebral disc      Dr. Cisneros   • Screening mammogram  11/2008=normal   • Periodic pelvic examination due   • Menopause 10/14/2009   • Ulcer of the stomach and intestine hx.     stable   • S/P colonoscopy 12/2011     repeat in 5 years- had polyp, initial 2005.    • Colon polyp 12/2011   • Hemorrhoid      internal   • Arthritis      hips, back and shoulders   • History of carpal tunnel repair 7/31/2013     Dr. Villa repaired both.   • Osteoarthritis of multiple joints 6/13/2014   • Rash 12/29/2014     Started about a week ago when she got news about surgery High stress Nothing new Back, stomach, chest, arms Itch No pain   • Unspecified cataract      bilateral IOL   • Unspecified hypothyroidism    • Graves' disease with exophthalmos 6/13/2014     Being followed by Dr. Han San   • Pain in joint, pelvic region and thigh 2015     bursitis and arthritis in hips-takes ibuprofen and darvacet   • Pain in joint, shoulder region 2015        MEDS:    Current outpatient prescriptions:   •  nitrofurantoin monohydr macro (MACROBID) 100 MG Cap, Take 1 Cap by mouth 2 times a day for 5 days., Disp: 10 Cap, Rfl: 0  •  phenazopyridine (PYRIDIUM) 200 MG Tab, Take 1 Tab by mouth every 12 hours as needed., Disp: 6 Tab, Rfl: 0  •  omeprazole (PRILOSEC) 40 MG delayed-release capsule, TAKE ONE CAPSULE BY MOUTH ONCE DAILY, Disp: 90 Cap, Rfl: 1  •  lidocaine (LIDODERM) 5 % Patch, Apply 1 Patch to skin as directed every 24 hours., Disp: 10 Patch, Rfl: 3  •   tramadol (ULTRAM) 50 MG Tab, Take 1-2 Tabs by mouth every 6 hours as needed., Disp: 20 Tab, Rfl: 0  •  Diclofenac Sodium (VOLTAREN) 1 % Gel, Apply 4 g to skin as directed 4 times a day., Disp: 100 g, Rfl: 0  •  cyclobenzaprine (FLEXERIL) 10 MG Tab, TAKE ONE TABLET BY MOUTH THREE TIMES DAILY AS NEEDED, Disp: 90 Tab, Rfl: 1  •  estradiol (ESTRACE) 1 MG Tab, Take 1 Tab by mouth every day., Disp: 90 Tab, Rfl: 3  •  levothyroxine (SYNTHROID) 112 MCG Tab, Take 1 Tab by mouth Every morning on an empty stomach., Disp: 90 Tab, Rfl: 3  •  cyclobenzaprine (FLEXERIL) 10 MG Tab, TAKE ONE TABLET BY MOUTH THREE TIMES DAILY AS NEEDED FOR MILD PAIN OR MUSCLE SPASM, Disp: 90 Tab, Rfl: 0  •  tramadol (ULTRAM) 50 MG Tab, , Disp: , Rfl:   •  Bioflavonoid Products (VITAMIN C CR) 1000-100 MG Tab CR, Take  by mouth 2 Times a Day., Disp: , Rfl:   •  ibuprofen (MOTRIN) 800 MG Tab, Take 1 Tab by mouth every 8 hours as needed for Mild Pain., Disp: 90 Tab, Rfl: 0  •  Multiple Vitamins-Minerals (HAIR/SKIN/NAILS PO), Take 1 Tab by mouth every day., Disp: , Rfl:   •  Calcium Carbonate-Vitamin D ([employer] CALCIUM/VIT D SOFT CHEWS PO), Take 2 Tabs by mouth every day., Disp: , Rfl:   •  amitriptyline (ELAVIL) 50 MG TABS, Take 50 mg by mouth every evening., Disp: , Rfl:   •  FLUZONE HIGH-DOSE 0.5 ML Suspension Prefilled Syringe injection, , Disp: , Rfl:   •  PREVNAR 13 syringe, , Disp: , Rfl:   •  erythromycin 5 MG/GM Ointment, , Disp: , Rfl:   •  hydrocodone-acetaminophen (NORCO) 5-325 MG Tab per tablet, , Disp: , Rfl:   •  meclizine (ANTIVERT) 12.5 MG Tab, , Disp: , Rfl:   •  levothyroxine (SYNTHROID) 125 MCG Tab, Take 1 Tab by mouth every day., Disp: 90 Tab, Rfl: 3  •  hydrocodone-acetaminophen (VICODIN) 5-500 MG TABS, Take 1-2 Tabs by mouth every four hours as needed., Disp: , Rfl:     ** I have documented what I find to be significant in regards to past medical, social, family and surgical history  in my HPI or under PMH/PSH/FH review section,  otherwise it is contributory **             Dysuria   Associated symptoms include frequency. Pertinent negatives include no chills, nausea or vomiting.       Review of Systems   Constitutional: Negative for fever and chills.   Gastrointestinal: Negative for nausea, vomiting and abdominal pain.   Genitourinary: Positive for dysuria and frequency.   Neurological: Negative for dizziness and focal weakness.          Objective:     /84 mmHg  Pulse 100  Temp(Src) 35.9 °C (96.6 °F)  Resp 16  Wt 58.06 kg (128 lb)  SpO2 98%     Physical Exam   Constitutional: She appears well-developed. No distress.   HENT:   Head: Normocephalic and atraumatic.   Eyes: Conjunctivae are normal.   Neck: Neck supple.   Cardiovascular: Regular rhythm.    No murmur heard.  Pulmonary/Chest: Effort normal. No respiratory distress.   Abdominal: Soft. There is no tenderness. There is no rebound and no guarding.   Neurological: She is alert. She exhibits normal muscle tone.   Skin: Skin is warm and dry.   Psychiatric: She has a normal mood and affect. Judgment normal.   Nursing note and vitals reviewed.              Assessment/Plan:         1. Dysuria  POCT Urinalysis    URINE CULTURE(NEW)    nitrofurantoin monohydr macro (MACROBID) 100 MG Cap    phenazopyridine (PYRIDIUM) 200 MG Tab             Dx & d/c instructions discussed w/ patient and/or family members. Follow up w/ Prvt Dr or here in 3-4 days if not getting better, sooner if needed,  ER if worse and UC/PCP unavailable.        Possible side effects (i.e. Rash, GI upset/constipation, sedation, elevation of BP or sugars) of any medications given discussed.

## 2017-06-22 NOTE — MR AVS SNAPSHOT
Makenna Davis Palomo   2017 10:20 AM   Office Visit   MRN: [id number]    Department:  [facility]   Dept Phone:  [phone]    Description:  Female : 1945   Provider:  Leland Whittaker M.D.           Reason for Visit     Dysuria           Allergies as of 2017     Allergen Noted Reactions    Codeine 10/09/2009   Vomiting    Vicodin [Hydrocodone-Acetaminophen] 10/09/2009   Vomiting    OK if takes with phenergan       You were diagnosed with     Dysuria   [788.1.ICD-9-CM]         Vital Signs     Blood Pressure Pulse Temperature Respirations Weight Oxygen Saturation    112/84 mmHg 100 35.9 °C (96.6 °F) 16 58.06 kg (128 lb) 98%    Smoking Status                   Former Smoker           Basic Information     Date Of Birth Sex Race Ethnicity Preferred Language    1945 Female White Non- English      Problem List              ICD-10-CM Priority Class Noted - Resolved    Degeneration of lumbar or lumbosacral intervertebral disc M51.37 High  10/9/2009 - Present    Hypothyroidism E03.9 High  10/9/2009 - Present    Pain in joint, shoulder region M25.519 High  10/9/2009 - Present    History of peptic ulcer Z87.11 Low  2009 - Present    GERD (gastroesophageal reflux disease) K21.9 Low  2009 - Present    Hyperlipidemia E78.5 High  2011 - Present    Post menopausal syndrome N95.1 Low  3/8/2012 - Present    Osteopenia M85.80 Low  2012 - Present    Osteoarthritis of multiple joints M15.9 High  2014 - Present    Graves' disease with exophthalmos E05.00 High  2014 - Present    Senile nuclear sclerosis H25.10   3/3/2015 - Present    Other and combined forms of senile cataract H25.89 Low  3/17/2015 - Present    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 High  2015 - Present    Abnormal WBC count D72.9 Low  2015 - Present    Dermatochalasis of both upper eyelids H02.831, H02.834 Low  2016 - Present    Right knee pain M25.561   2017 - Present      Health  Maintenance        Date Due Completion Dates    MAMMOGRAM 10/14/2016 10/14/2015, 9/9/2014, 7/31/2013 (Declined), 7/28/2011, 1/12/2010, 1/12/2010, 10/28/2008, 10/28/2008    Override on 7/31/2013: Patient Declined    COLONOSCOPY 12/5/2016 12/5/2011, 3/7/2010 (Prv Comp)    Override on 3/7/2010: Previously completed (normal)    IMM PNEUMOCOCCAL 65+ (ADULT) LOW/MEDIUM RISK SERIES (2 of 2 - PPSV23) 5/18/2017 5/18/2016    BONE DENSITY 10/14/2020 10/14/2015, 9/15/2011    IMM DTaP/Tdap/Td Vaccine (2 - Td) 7/31/2023 7/31/2013            Results     POCT Urinalysis      Component Value Standard Range & Units    POC Color YELLOW Negative    POC Appearance CLEAR Negative    POC Leukocyte Esterase LARGE Negative    POC Nitrites NEG Negative    POC Urobiligen NEG Negative (0.2) mg/dL    POC Protein NEG Negative mg/dL    POC Urine PH 7.5 5.0 - 8.0    POC Blood TRACE Negative    POC Specific Gravity 1.005 <1.005 - >1.030    POC Ketones NEG Negative mg/dL    POC Biliruben NEG Negative mg/dL    POC Glucose NEG Negative mg/dL                        Current Immunizations     13-VALENT PCV PREVNAR 5/18/2016    Influenza TIV (IM) 11/13/2013  4:34 PM    Influenza Vaccine Adult HD 10/12/2016, 9/23/2015    Influenza Vaccine Quad Inj (Preserved) 11/13/2014, 11/13/2014, 9/27/2012    SHINGLES VACCINE 8/4/2011    Tdap Vaccine 7/31/2013 11:04 AM      Below and/or attached are the medications your provider expects you to take. Review all of your home medications and newly ordered medications with your provider and/or pharmacist. Follow medication instructions as directed by your provider and/or pharmacist. Please keep your medication list with you and share with your provider. Update the information when medications are discontinued, doses are changed, or new medications (including over-the-counter products) are added; and carry medication information at all times in the event of emergency situations     Allergies:  CODEINE - Vomiting     VICODIN  - Vomiting               Medications  Valid as of: June 22, 2017 - 11:52 AM    Generic Name Brand Name Tablet Size Instructions for use    Amitriptyline HCl (Tab) ELAVIL 50 MG Take 50 mg by mouth every evening.        Bioflavonoid Products (Tab CR) Vitamin C CR 1000-100 MG Take  by mouth 2 Times a Day.        Calcium Carbonate-Vitamin D   Take 2 Tabs by mouth every day.        Cyclobenzaprine HCl (Tab) FLEXERIL 10 MG TAKE ONE TABLET BY MOUTH THREE TIMES DAILY AS NEEDED FOR MILD PAIN OR MUSCLE SPASM        Cyclobenzaprine HCl (Tab) FLEXERIL 10 MG TAKE ONE TABLET BY MOUTH THREE TIMES DAILY AS NEEDED        Diclofenac Sodium (Gel) Diclofenac Sodium 1 % Apply 4 g to skin as directed 4 times a day.        Erythromycin (Ointment) erythromycin 5 MG/GM         Estradiol (Tab) ESTRACE 1 MG Take 1 Tab by mouth every day.        Hydrocodone-Acetaminophen (Tab) VICODIN 5-500 MG Take 1-2 Tabs by mouth every four hours as needed.        Hydrocodone-Acetaminophen (Tab) NORCO 5-325 MG         Ibuprofen (Tab) MOTRIN 800 MG Take 1 Tab by mouth every 8 hours as needed for Mild Pain.        Influenza Vac Split High-Dose (Suspension Prefilled Syringe) FLUZONE HIGH-DOSE 0.5 ML         Levothyroxine Sodium (Tab) SYNTHROID 125 MCG Take 1 Tab by mouth every day.        Levothyroxine Sodium (Tab) SYNTHROID 112 MCG Take 1 Tab by mouth Every morning on an empty stomach.        Lidocaine (Patch) LIDODERM 5 % Apply 1 Patch to skin as directed every 24 hours.        Meclizine HCl (Tab) ANTIVERT 12.5 MG         Multiple Vitamins-Minerals   Take 1 Tab by mouth every day.        Nitrofurantoin Monohyd Macro (Cap) MACROBID 100 MG Take 1 Cap by mouth 2 times a day for 5 days.        Omeprazole (CAPSULE DELAYED RELEASE) PRILOSEC 40 MG TAKE ONE CAPSULE BY MOUTH ONCE DAILY        Phenazopyridine HCl (Tab) PYRIDIUM 200 MG Take 1 Tab by mouth every 12 hours as needed.        Pneumococcal 13-Adelaide Conj Vacc (Suspension) PREVNAR 13          TraMADol HCl (Tab)  ULTRAM 50 MG         TraMADol HCl (Tab) ULTRAM 50 MG Take 1-2 Tabs by mouth every 6 hours as needed.        .                 Medicines prescribed today were sent to:     [facility] [place], [place] - [address]    [address] [place] [address]    Phone: [phone] Fax: [phone]    Open 24 Hours?: No      Medication refill instructions:       If your prescription bottle indicates you have medication refills left, it is not necessary to call your provider’s office. Please contact your pharmacy and they will refill your medication.    If your prescription bottle indicates you do not have any refills left, you may request refills at any time through one of the following ways: The online [employer] system (except Urgent Care), by calling your provider’s office, or by asking your pharmacy to contact your provider’s office with a refill request. Medication refills are processed only during regular business hours and may not be available until the next business day. Your provider may request additional information or to have a follow-up visit with you prior to refilling your medication.   *Please Note: Medication refills are assigned a new Rx number when refilled electronically. Your pharmacy may indicate that no refills were authorized even though a new prescription for the same medication is available at the pharmacy. Please request the medicine by name with the pharmacy before contacting your provider for a refill.        Your To Do List     Future Labs/Procedures Complete By Expires    URINE CULTURE(NEW)  As directed 6/22/2018      Other Notes About Your Plan     Ortho - trigger pointe injections. Tramadol  Epidural           [employer] Access Code: Activation code not generated  Current [employer] Status: Active

## 2017-06-24 LAB
BACTERIA UR CULT: ABNORMAL
SIGNIFICANT IND 70042: ABNORMAL
SOURCE SOURCE: ABNORMAL

## 2017-09-26 DIAGNOSIS — E03.9 HYPOTHYROIDISM: ICD-10-CM

## 2017-09-27 NOTE — TELEPHONE ENCOUNTER
Was the patient seen in the last year in this department? Yes     Does patient have an active prescription for medications requested? No     Received Request Via: Patient      Pt met protocol?: Yes, OV and TSH chekced 4/17 (within range)

## 2017-09-28 RX ORDER — LEVOTHYROXINE SODIUM 112 UG/1
TABLET ORAL
Qty: 90 TAB | Refills: 1 | Status: SHIPPED | OUTPATIENT
Start: 2017-09-28 | End: 2018-04-20

## 2017-10-05 ENCOUNTER — TELEPHONE (OUTPATIENT)
Dept: MEDICAL GROUP | Facility: PHYSICIAN GROUP | Age: 72
End: 2017-10-05

## 2017-10-05 DIAGNOSIS — Z79.890 POSTMENOPAUSAL HRT (HORMONE REPLACEMENT THERAPY): ICD-10-CM

## 2017-10-05 RX ORDER — ESTRADIOL 1 MG/1
1 TABLET ORAL DAILY
Qty: 90 TAB | Refills: 0 | Status: SHIPPED | OUTPATIENT
Start: 2017-10-05 | End: 2017-12-30 | Stop reason: SDUPTHER

## 2017-10-05 NOTE — TELEPHONE ENCOUNTER
Refilled x 3 months. Please advise patient to schedule follow-up for future fills. Pt is also due for a mammogram.

## 2017-10-10 ENCOUNTER — PATIENT MESSAGE (OUTPATIENT)
Dept: MEDICAL GROUP | Facility: CLINIC | Age: 72
End: 2017-10-10

## 2017-10-10 DIAGNOSIS — Z12.11 ENCOUNTER FOR FECAL IMMUNOCHEMICAL TEST SCREENING: ICD-10-CM

## 2017-10-10 NOTE — TELEPHONE ENCOUNTER
From: Makenna Culver  To: Antonia Au  Sent: 10/9/2017 8:11 PM PDT  Subject: RE:Medication Refill    thank you very much, can i do a colon kit that you mail in? makenna culver  ----- Message -----  From: Antonia Au  Sent: 10/9/2017 2:34 PM PDT  To: Makenna Culver  Subject: Medication Refill  Makenna,    Your Estradiol has been filled and sent to your pharmacy. Refilled for 3 months. You will need to have a follow up appointment for any additional refills. To schedule an appointment please call [phone].    It also shows that your are due for a mammogram, would you like to have an order for this?    Thank you,  Antonia

## 2017-10-10 NOTE — TELEPHONE ENCOUNTER
Maria Elena,    Patient would like to do a fit test. She is due for a screening.    Please place order and patient will come grab stuff from clinic.     Thank you,  Antonia

## 2017-10-11 ENCOUNTER — PATIENT MESSAGE (OUTPATIENT)
Dept: MEDICAL GROUP | Facility: PHYSICIAN GROUP | Age: 72
End: 2017-10-11

## 2017-10-11 DIAGNOSIS — Z12.39 SCREENING FOR BREAST CANCER: ICD-10-CM

## 2017-10-11 NOTE — TELEPHONE ENCOUNTER
From: Makenna Culver  To: EDUARDO Alcantara  Sent: 10/10/2017 8:49 PM PDT  Subject: RE:Medication Refill    do i  the kit and order sheet from you or pharmacy? yes on the mammogram , have them call me for mckinleytDick gardner and i will get our shots this week end at [facility].  ----- Message -----  From: EDUARDO Alcantara  Sent: 10/10/2017 4:27 PM PDT  To: Makenna Culver  Subject: RE:Medication Refill    Order is placed. You'll need to  the kitand the order sheet. .      ----- Message -----   From: Makenna Culver   Sent: 10/9/2017 8:11 PM PDT   To: Antonia Au  Subject: RE:Medication Refill    thank you very much, can i do a colon kit that you mail in? makenna cuvler  ----- Message -----  From: Antonia Au  Sent: 10/9/2017 2:34 PM PDT  To: Makenna Culver  Subject: Medication Refill  Makenna,    Your Estradiol has been filled and sent to your pharmacy. Refilled for 3 months. You will need to have a follow up appointment for any additional refills. To schedule an appointment please call [phone].    It also shows that your are due for a mammogram, would you like to have an order for this?    Thank you,  Antonia

## 2017-10-16 ENCOUNTER — HOSPITAL ENCOUNTER (OUTPATIENT)
Facility: MEDICAL CENTER | Age: 72
End: 2017-10-16
Attending: NURSE PRACTITIONER
Payer: MEDICARE

## 2017-10-16 PROCEDURE — 82274 ASSAY TEST FOR BLOOD FECAL: CPT

## 2017-10-17 DIAGNOSIS — Z12.11 ENCOUNTER FOR FECAL IMMUNOCHEMICAL TEST SCREENING: ICD-10-CM

## 2017-10-17 LAB — HEMOCCULT STL QL IA: NEGATIVE

## 2017-10-23 ENCOUNTER — HOSPITAL ENCOUNTER (OUTPATIENT)
Dept: LAB | Facility: MEDICAL CENTER | Age: 72
End: 2017-10-23
Attending: PSYCHIATRY & NEUROLOGY
Payer: MEDICARE

## 2017-10-23 LAB
ALBUMIN SERPL BCP-MCNC: 3.7 G/DL (ref 3.2–4.9)
ALBUMIN/GLOB SERPL: 1.3 G/DL
ALP SERPL-CCNC: 78 U/L (ref 30–99)
ALT SERPL-CCNC: 18 U/L (ref 2–50)
ANION GAP SERPL CALC-SCNC: 7 MMOL/L (ref 0–11.9)
AST SERPL-CCNC: 21 U/L (ref 12–45)
BILIRUB SERPL-MCNC: 0.3 MG/DL (ref 0.1–1.5)
BUN SERPL-MCNC: 6 MG/DL (ref 8–22)
CALCIUM SERPL-MCNC: 9 MG/DL (ref 8.5–10.5)
CHLORIDE SERPL-SCNC: 102 MMOL/L (ref 96–112)
CO2 SERPL-SCNC: 26 MMOL/L (ref 20–33)
CREAT SERPL-MCNC: 0.63 MG/DL (ref 0.5–1.4)
GFR SERPL CREATININE-BSD FRML MDRD: >60 ML/MIN/1.73 M 2
GLOBULIN SER CALC-MCNC: 2.8 G/DL (ref 1.9–3.5)
GLUCOSE SERPL-MCNC: 93 MG/DL (ref 65–99)
POTASSIUM SERPL-SCNC: 4.2 MMOL/L (ref 3.6–5.5)
PROT SERPL-MCNC: 6.5 G/DL (ref 6–8.2)
SODIUM SERPL-SCNC: 135 MMOL/L (ref 135–145)

## 2017-10-23 PROCEDURE — 80053 COMPREHEN METABOLIC PANEL: CPT

## 2017-10-23 PROCEDURE — 36415 COLL VENOUS BLD VENIPUNCTURE: CPT

## 2017-10-29 RX ORDER — CYCLOBENZAPRINE HCL 10 MG
TABLET ORAL
Qty: 90 TAB | Refills: 0 | Status: SHIPPED | OUTPATIENT
Start: 2017-10-29 | End: 2017-12-31 | Stop reason: SDUPTHER

## 2017-10-31 ENCOUNTER — HOSPITAL ENCOUNTER (OUTPATIENT)
Dept: RADIOLOGY | Facility: MEDICAL CENTER | Age: 72
End: 2017-10-31
Attending: NURSE PRACTITIONER
Payer: MEDICARE

## 2017-10-31 DIAGNOSIS — Z12.39 SCREENING FOR BREAST CANCER: ICD-10-CM

## 2017-10-31 PROCEDURE — 77063 BREAST TOMOSYNTHESIS BI: CPT

## 2017-11-02 ENCOUNTER — PATIENT MESSAGE (OUTPATIENT)
Dept: MEDICAL GROUP | Facility: PHYSICIAN GROUP | Age: 72
End: 2017-11-02

## 2017-11-06 ENCOUNTER — PATIENT MESSAGE (OUTPATIENT)
Dept: MEDICAL GROUP | Facility: PHYSICIAN GROUP | Age: 72
End: 2017-11-06

## 2017-11-06 ENCOUNTER — HOSPITAL ENCOUNTER (OUTPATIENT)
Dept: CARDIOLOGY | Facility: MEDICAL CENTER | Age: 72
End: 2017-11-06
Attending: PSYCHIATRY & NEUROLOGY
Payer: MEDICARE

## 2017-11-06 LAB — EKG IMPRESSION: NORMAL

## 2017-11-06 PROCEDURE — 93041 RHYTHM ECG TRACING: CPT | Mod: XU

## 2017-11-06 PROCEDURE — 93010 ELECTROCARDIOGRAM REPORT: CPT | Performed by: INTERNAL MEDICINE

## 2017-11-06 PROCEDURE — 93005 ELECTROCARDIOGRAM TRACING: CPT | Performed by: NURSE PRACTITIONER

## 2017-11-08 ENCOUNTER — PATIENT MESSAGE (OUTPATIENT)
Dept: MEDICAL GROUP | Facility: PHYSICIAN GROUP | Age: 72
End: 2017-11-08

## 2017-11-09 ENCOUNTER — PATIENT MESSAGE (OUTPATIENT)
Dept: MEDICAL GROUP | Facility: PHYSICIAN GROUP | Age: 72
End: 2017-11-09

## 2017-11-09 NOTE — TELEPHONE ENCOUNTER
From: Makenna Culver  To: EDUARDO Alcantara  Sent: 11/8/2017 7:51 PM PST  Subject: Non-Urgent Medical Question    i noticed that you do my TSH, free thyroxine, lipid profile, and comp metabolic panel every 6 months , do you think i need it again ? want to keep up on them please amadeo lion

## 2017-11-10 ENCOUNTER — PATIENT MESSAGE (OUTPATIENT)
Dept: MEDICAL GROUP | Facility: PHYSICIAN GROUP | Age: 72
End: 2017-11-10

## 2017-11-10 NOTE — TELEPHONE ENCOUNTER
From: Makenna Culver  To: EDUARDO Alcantara  Sent: 11/9/2017 7:25 PM PST  Subject: Non-Urgent Medical Question    that is great. amadeo on mikes prescription the ENANAPRIL is the one that we are paying for triple from the other prescription , can you check if we can get him the same kind of prescription but under a different tier. should be paying $16.00 for 90 pills not $35.00 can you please check into it for us. amadeo culver  ----- Message -----  From: EDUARDO Alcantara  Sent: 11/9/2017 1:01 PM PST  To: Makenna Culver  Subject: RE: Non-Urgent Medical Question  I think as long as you are stable for some time. You can recheck this once a year.  Maria Elena Hewitt      ----- Message -----   From: Makenna Culver   Sent: 11/8/2017 7:51 PM PST   To: EDUARDO Alcantara  Subject: Non-Urgent Medical Question    i noticed that you do my TSH, free thyroxine, lipid profile, and comp metabolic panel every 6 months , do you think i need it again ? want to keep up on them please amadeo lion

## 2017-11-13 NOTE — TELEPHONE ENCOUNTER
From: Makenna Culver  To: EDUARDO Alcantara  Sent: 11/10/2017 7:51 PM PST  Subject: Non-Urgent Medical Question    he takes enalapril 20mg, lisinopril 20 mg, and levothyroxin 20mg, after he finishes his new refilled enalapril then you can change his lisinopril 2o mg to take two a day. oh he takes baby asprin too! he doesn't take hydrouril at all. so on his new prescription you can state to take 2 a day and we can get rid of enalapril altogether . thank you so much makenna culver  ----- Message -----  From: EDUARDO Alcantara  Sent: 11/10/2017 8:00 AM PST  To: Makenna Culver  Subject: RE: Non-Urgent Medical Question  I will try to address this and be very specific.  VASOTEC which is enalapril is the same class as Lisinopril.  Basically he can take (2) Lisinopril and achieve the same outcome.   I need to know if he takes a third - Hydrouril?    ----- Message -----   From: Makenna Culver   Sent: 11/9/2017 7:25 PM PST   To: EDUARDO Alcantara  Subject: Non-Urgent Medical Question    that is great. ty on mikes prescription the ENANAPRIL is the one that we are paying for triple from the other prescription , can you check if we can get him the same kind of prescription but under a different tier. should be paying $16.00 for 90 pills not $35.00 can you please check into it for us. amadeo culver  ----- Message -----  From: EDUARDO Alcantara  Sent: 11/9/2017 1:01 PM PST  To: Makenna Culver  Subject: RE: Non-Urgent Medical Question  I think as long as you are stable for some time. You can recheck this once a year.  Maria Elena Hewitt      ----- Message -----   From: Makenna Culver   Sent: 11/8/2017 7:51 PM PST   To: Maria Elena A Hewitt, A.P.N.  Subject: Non-Urgent Medical Question    i noticed that you do my TSH, free thyroxine, lipid profile, and comp metabolic panel every 6 months , do you think i need it again ? want to keep up on them please amadeo lion

## 2017-11-17 ENCOUNTER — HOSPITAL ENCOUNTER (OUTPATIENT)
Dept: RADIOLOGY | Facility: MEDICAL CENTER | Age: 72
End: 2017-11-17
Attending: PSYCHIATRY & NEUROLOGY
Payer: MEDICARE

## 2017-11-17 VITALS
DIASTOLIC BLOOD PRESSURE: 68 MMHG | HEIGHT: 61 IN | OXYGEN SATURATION: 97 % | HEART RATE: 82 BPM | WEIGHT: 127 LBS | TEMPERATURE: 97.3 F | RESPIRATION RATE: 16 BRPM | SYSTOLIC BLOOD PRESSURE: 104 MMHG | BODY MASS INDEX: 23.98 KG/M2

## 2017-11-17 DIAGNOSIS — H46.9 OPTIC NEUROPATHY: ICD-10-CM

## 2017-11-17 PROCEDURE — 700117 HCHG RX CONTRAST REV CODE 255: Performed by: PSYCHIATRY & NEUROLOGY

## 2017-11-17 PROCEDURE — 700111 HCHG RX REV CODE 636 W/ 250 OVERRIDE (IP)

## 2017-11-17 PROCEDURE — A9579 GAD-BASE MR CONTRAST NOS,1ML: HCPCS | Performed by: PSYCHIATRY & NEUROLOGY

## 2017-11-17 PROCEDURE — 70543 MRI ORBT/FAC/NCK W/O &W/DYE: CPT

## 2017-11-17 RX ORDER — MIDAZOLAM HYDROCHLORIDE 1 MG/ML
INJECTION INTRAMUSCULAR; INTRAVENOUS
Status: DISPENSED
Start: 2017-11-17 | End: 2017-11-18

## 2017-11-17 RX ADMIN — GADODIAMIDE 12 ML: 287 INJECTION INTRAVENOUS at 14:47

## 2017-11-17 NOTE — DISCHARGE INSTRUCTIONS
MRI ADULT DISCHARGE INSTRUCTIONS    You have been medicated today for your scan. Please follow the instructions below to ensure your safe recovery. If you have any questions or problems, feel free to call us at [phone] or [phone].     1.   Have someone stay with you to assist you as needed.    2.   Do not drive or operate any mechanical devices.    3.   Do not perform any activity that requires concentration. Make no major decisions over the next 24 hours.     4.   Be careful changing positions from laying down to sitting or standing, as you may become dizzy.     5.   Do not drink alcohol for 48 hours.    6.   There are no restrictions for eating your normal meals. Drink fluids.    7.   You may continue your usual medications for pain, tranquilizers, muscle relaxants or sedatives when awake.     8.   Tomorrow, you may continue your normal daily activities.     9.   Pressure dressing on 10 - 15 minutes. If swelling or bleeding occurs when removed, continue placing direct pressure on injection site for another 5 minutes, or until bleeding stops.     Midazolam (VERSED)  What is this medicine?  You were given MIDAZOLAM (ANTONIO wong) for your procedure today. This medication is a benzodiazepine. It is used to cause relaxation or sleep before surgery and to block the memory of the procedure.  This medicine may be used for other purposes; ask your health care provider or pharmacist if you have questions.  What side effects may I notice from receiving this medicine?  Side effects that you should report to your doctor or health care professional as soon as possible:  • allergic reactions like skin rash, itching or hives, swelling of the face, lips, or tongue  • breathing problems  • confusion  • dizziness or lightheadedness  • fast, irregular heartbeat  • halluninations during recovery  • numbness or tingling in the hands or feet  • pain, redness, or swelling at site where injected  • seizures  Side effects that usually  do not require medical attention (report to your doctor or health care professional if they continue or are bothersome):  • coughing  • headache  • hiccups  • involuntary eye and muscle movements  • loss of memory of events just before, during, and after use  • nausea, vomiting  • speech problems  • tiredness  • trouble sleeping or nightmares  This list may not describe all possible side effects. Call your doctor for medical advice about side effects. You may report side effects to FDA at 2-902-TVU-6144.      I have been informed of and understand the above discharge instructions.

## 2017-12-19 RX ORDER — OMEPRAZOLE 40 MG/1
CAPSULE, DELAYED RELEASE ORAL
Qty: 90 CAP | Refills: 1 | Status: SHIPPED | OUTPATIENT
Start: 2017-12-19 | End: 2018-06-19 | Stop reason: SDUPTHER

## 2017-12-19 NOTE — TELEPHONE ENCOUNTER
Was the patient seen in the last year in this department? Yes     Does patient have an active prescription for medications requested? No     Received Request Via: Pharmacy      Pt met protocol?: Yes, OV 4/17

## 2018-01-29 DIAGNOSIS — Z79.890 POSTMENOPAUSAL HRT (HORMONE REPLACEMENT THERAPY): ICD-10-CM

## 2018-01-30 NOTE — TELEPHONE ENCOUNTER
Was the patient seen in the last year in this department? Yes     Does patient have an active prescription for medications requested? No     Received Request Via: Pharmacy      Pt met protocol?: Yes, OV 4/17,  requested at last refill (1/2/18) pt to make appt for future fills, has not done so.

## 2018-01-31 ENCOUNTER — PATIENT OUTREACH (OUTPATIENT)
Dept: HEALTH INFORMATION MANAGEMENT | Facility: OTHER | Age: 73
End: 2018-01-31

## 2018-01-31 RX ORDER — ESTRADIOL 1 MG/1
TABLET ORAL
Refills: 0 | OUTPATIENT
Start: 2018-01-31

## 2018-01-31 NOTE — PROGRESS NOTES
Outcome: Left Message    Please transfer to Patient Outreach Team at [phone] when patient returns call.        Attempt # 1

## 2018-02-02 ENCOUNTER — OFFICE VISIT (OUTPATIENT)
Dept: MEDICAL GROUP | Facility: PHYSICIAN GROUP | Age: 73
End: 2018-02-02
Payer: MEDICARE

## 2018-02-02 VITALS
OXYGEN SATURATION: 99 % | DIASTOLIC BLOOD PRESSURE: 72 MMHG | SYSTOLIC BLOOD PRESSURE: 118 MMHG | HEART RATE: 95 BPM | RESPIRATION RATE: 16 BRPM | HEIGHT: 61 IN | BODY MASS INDEX: 25.11 KG/M2 | WEIGHT: 133 LBS

## 2018-02-02 DIAGNOSIS — Z79.890 POSTMENOPAUSAL HRT (HORMONE REPLACEMENT THERAPY): ICD-10-CM

## 2018-02-02 DIAGNOSIS — E03.9 HYPOTHYROIDISM, UNSPECIFIED TYPE: ICD-10-CM

## 2018-02-02 DIAGNOSIS — M15.9 PRIMARY OSTEOARTHRITIS INVOLVING MULTIPLE JOINTS: ICD-10-CM

## 2018-02-02 DIAGNOSIS — N95.1 POST MENOPAUSAL SYNDROME: ICD-10-CM

## 2018-02-02 DIAGNOSIS — K21.9 GASTROESOPHAGEAL REFLUX DISEASE WITHOUT ESOPHAGITIS: ICD-10-CM

## 2018-02-02 DIAGNOSIS — E78.2 MIXED HYPERLIPIDEMIA: ICD-10-CM

## 2018-02-02 PROCEDURE — 99214 OFFICE O/P EST MOD 30 MIN: CPT | Performed by: NURSE PRACTITIONER

## 2018-02-02 RX ORDER — TRAMADOL HYDROCHLORIDE 50 MG/1
50 TABLET ORAL EVERY 6 HOURS PRN
COMMUNITY
Start: 2018-01-19 | End: 2021-07-29

## 2018-02-02 RX ORDER — PROMETHAZINE HYDROCHLORIDE 12.5 MG/1
TABLET ORAL
COMMUNITY
Start: 2018-01-19 | End: 2018-07-25

## 2018-02-02 RX ORDER — ESTRADIOL 1 MG/1
TABLET ORAL
Qty: 90 TAB | Refills: 3 | Status: SHIPPED | OUTPATIENT
Start: 2018-02-02 | End: 2018-06-18 | Stop reason: SDUPTHER

## 2018-02-02 ASSESSMENT — PATIENT HEALTH QUESTIONNAIRE - PHQ9: CLINICAL INTERPRETATION OF PHQ2 SCORE: 0

## 2018-02-02 NOTE — PROGRESS NOTES
Chief Complaint   Patient presents with   • Thyroid Problem   • Medication Refill       HISTORY OF PRESENT ILLNESS: Patient is a @ age 72 here  today to discuss her HRT prescription and gaining weight.     Interval history:     Hospitalizations  No     Injuries  No     Illness  No         Hyperlipidemia  Patient had lipid panel last year. She does not take Statin. - She takes supplements.   Results for ALBINO FENTON (MRN [id number]) as of 2/2/2018 07:34   Ref. Range 4/6/2016 10:05 4/5/2017 09:50   Cholesterol,Tot Latest Ref Range: 100 - 199 mg/dL  242 (H)   Triglycerides Latest Ref Range: 0 - 149 mg/dL  197 (H)   HDL Latest Ref Range: >=40 mg/dL  53   LDL Latest Ref Range: <100 mg/dL  150 (H)       Hypothyroidism  Patient is gaining weight despite every thing is normally the same. She works out everyday. Her diet is unchanged and quite healthy    Osteoarthritis of multiple joints  Patient continue to have soreness to the hip joints. Exercises and ice packs. No interventions.     Post menopausal syndrome  Patient continues to take estradiol daily. This medication is prescribed for hot flashes and mood. Patient understands that the HRT increases her risk for MI, stroke, clots and breast cancer. She is willing to take on these risks despite . The quality of life is affected.     GERD (gastroesophageal reflux disease)  Patient continues to take her prilosec.         Allergies:Codeine; Morphine; and Vicodin [hydrocodone-acetaminophen]    Current Outpatient Prescriptions Ordered in [facility]   Medication Sig Dispense Refill   • tramadol (ULTRAM) 50 MG Tab      • estradiol (ESTRACE) 1 MG Tab TAKE ONE TABLET BY MOUTH ONCE DAILY 90 Tab 3   • cyclobenzaprine (FLEXERIL) 10 MG Tab TAKE ONE TABLET BY MOUTH THREE TIMES DAILY AS NEEDED 90 Tab 0   • omeprazole (PRILOSEC) 40 MG delayed-release capsule TAKE ONE CAPSULE BY MOUTH ONCE DAILY 90 Cap 1   • levothyroxine (SYNTHROID) 112 MCG Tab TAKE ONE TABLET BY MOUTH ONCE DAILY IN THE  MORNING ON  AN  EMPTY  STOMACH 90 Tab 1   • Bioflavonoid Products (VITAMIN C CR) 1000-100 MG Tab CR Take  by mouth 2 Times a Day.     • meclizine (ANTIVERT) 12.5 MG Tab      • ibuprofen (MOTRIN) 800 MG Tab Take 1 Tab by mouth every 8 hours as needed for Mild Pain. 90 Tab 0   • Multiple Vitamins-Minerals (HAIR/SKIN/NAILS PO) Take 1 Tab by mouth every day.     • Calcium Carbonate-Vitamin D ([employer] CALCIUM/VIT D SOFT CHEWS PO) Take 2 Tabs by mouth every day.     • amitriptyline (ELAVIL) 50 MG TABS Take 50 mg by mouth every evening.     • promethazine (PHENERGAN) 12.5 MG tablet      • phenazopyridine (PYRIDIUM) 200 MG Tab Take 1 Tab by mouth every 12 hours as needed. 6 Tab 0   • FLUZONE HIGH-DOSE 0.5 ML Suspension Prefilled Syringe injection      • PREVNAR 13 syringe      • erythromycin 5 MG/GM Ointment        No current Epic-ordered facility-administered medications on file.        Past Medical History:   Diagnosis Date   • Arthritis     hips, back and shoulders   • Colon polyp 12/2011   • Degeneration of lumbar or lumbosacral intervertebral disc     Dr. Cisneros   • Ear problems as child had surgery   • Graves' disease with exophthalmos 6/13/2014    Being followed by Dr. Han San   • Hemorrhoid     internal   • History of carpal tunnel repair 7/31/2013    Dr. Villa repaired both.   • Menopause 10/14/2009   • Osteoarthritis of multiple joints 6/13/2014   • Pain in joint, pelvic region and thigh 2015    bursitis and arthritis in hips-takes ibuprofen and darvacet   • Pain in joint, shoulder region 2015   • Periodic pelvic examination due   • Postcoital bleeding     improved   • Rash 12/29/2014    Started about a week ago when she got news about surgery High stress Nothing new Back, stomach, chest, arms Itch No pain   • S/P colonoscopy 12/2011    repeat in 5 years- had polyp, initial 2005.    • Screening mammogram  11/2008=normal   • Tobacco use disorder    • Ulcer of the stomach and intestine hx.    stable   •  "Unspecified cataract     bilateral IOL   • Unspecified hypothyroidism        Social History   Substance Use Topics   • Smoking status: Former Smoker     Packs/day: 1.00     Years: 50.00     Types: Cigarettes     Quit date: 2014   • Smokeless tobacco: Never Used      Comment: doesn't think she will quit   • Alcohol use 0.6 oz/week     1 Glasses of wine per week      Comment:      0-1 per week                                     E cig use       Family Status   Relation Status   • Mother  at age 71 yo    had a hx. of stomach problems   • Father     commited suicide   • Sister Alive    3 sisters with thyroid problems   • Sister Alive    has a hx. of breast cyst   • Neg Hx      Family History   Problem Relation Age of Onset   • Hypertension Father    • Heart Disease Father      MI age 49 yo   • Cancer Neg Hx    • Diabetes Neg Hx        ROS: As documented in my HPI      Exam:  Blood pressure 118/72, pulse 95, resp. rate 16, height 1.549 m (5' 1\"), weight 60.3 kg (133 lb), SpO2 99 %.  General:  Well nourished, well developed female in NAD  Head: Nontender scalp. No lesions  Neck: Supple.   Pulmonary:  Normal effort. No rales, ronchi, or wheezing.  Cardiovascular: Regular rate and rhythm without murmur.   Abdomen: Soft nontender, normal bowel sounds. No masses > liver border normal   Extremities: no clubbing, cyanosis, or edema.  Psych: Alert and oriented x3.    Neurological: No focal deficits    Please note that this dictation was created using voice recognition software. I have made every reasonable attempt to correct obvious errors, but I expect that there are errors of grammar and possibly content that I did not discover before finalizing the note.    Assessment/Plan:  1. Mixed hyperlipidemia  TSH+FREE T4    COMP METABOLIC PANEL    LIPID PROFILE    VITAMIN D,25 HYDROXY    CBC WITH DIFFERENTIAL   2. Hypothyroidism, unspecified type  TSH+FREE T4    COMP METABOLIC PANEL    LIPID PROFILE    VITAMIN D,25 " HYDROXY    CBC WITH DIFFERENTIAL   3. Primary osteoarthritis involving multiple joints  TSH+FREE T4    COMP METABOLIC PANEL    LIPID PROFILE    VITAMIN D,25 HYDROXY    CBC WITH DIFFERENTIAL   4. Post menopausal syndrome  TSH+FREE T4    COMP METABOLIC PANEL    LIPID PROFILE    VITAMIN D,25 HYDROXY    CBC WITH DIFFERENTIAL   5. Gastroesophageal reflux disease without esophagitis  TSH+FREE T4    COMP METABOLIC PANEL    LIPID PROFILE    VITAMIN D,25 HYDROXY    CBC WITH DIFFERENTIAL   6. Postmenopausal HRT (hormone replacement therapy)  estradiol (ESTRACE) 1 MG Tab          Patient to return in 3-6 months. Her mammogram is UTD.

## 2018-02-02 NOTE — ASSESSMENT & PLAN NOTE
Patient had lipid panel last year. She does not take Statin.   Results for ALBINO FENTON (MRN [id number]) as of 2/2/2018 07:34   Ref. Range 4/6/2016 10:05 4/5/2017 09:50   Cholesterol,Tot Latest Ref Range: 100 - 199 mg/dL  242 (H)   Triglycerides Latest Ref Range: 0 - 149 mg/dL  197 (H)   HDL Latest Ref Range: >=40 mg/dL  53   LDL Latest Ref Range: <100 mg/dL  150 (H)

## 2018-02-07 ENCOUNTER — HOSPITAL ENCOUNTER (OUTPATIENT)
Dept: LAB | Facility: MEDICAL CENTER | Age: 73
End: 2018-02-07
Attending: NURSE PRACTITIONER
Payer: MEDICARE

## 2018-02-07 DIAGNOSIS — M15.9 PRIMARY OSTEOARTHRITIS INVOLVING MULTIPLE JOINTS: ICD-10-CM

## 2018-02-07 DIAGNOSIS — E03.9 HYPOTHYROIDISM, UNSPECIFIED TYPE: ICD-10-CM

## 2018-02-07 DIAGNOSIS — N95.1 POST MENOPAUSAL SYNDROME: ICD-10-CM

## 2018-02-07 DIAGNOSIS — K21.9 GASTROESOPHAGEAL REFLUX DISEASE WITHOUT ESOPHAGITIS: ICD-10-CM

## 2018-02-07 DIAGNOSIS — E78.2 MIXED HYPERLIPIDEMIA: ICD-10-CM

## 2018-02-07 LAB
25(OH)D3 SERPL-MCNC: 28 NG/ML (ref 30–100)
ALBUMIN SERPL BCP-MCNC: 4.2 G/DL (ref 3.2–4.9)
ALBUMIN/GLOB SERPL: 1.6 G/DL
ALP SERPL-CCNC: 64 U/L (ref 30–99)
ALT SERPL-CCNC: 23 U/L (ref 2–50)
ANION GAP SERPL CALC-SCNC: 8 MMOL/L (ref 0–11.9)
AST SERPL-CCNC: 22 U/L (ref 12–45)
BASOPHILS # BLD AUTO: 0.8 % (ref 0–1.8)
BASOPHILS # BLD: 0.04 K/UL (ref 0–0.12)
BILIRUB SERPL-MCNC: 0.3 MG/DL (ref 0.1–1.5)
BUN SERPL-MCNC: 11 MG/DL (ref 8–22)
CALCIUM SERPL-MCNC: 9 MG/DL (ref 8.5–10.5)
CHLORIDE SERPL-SCNC: 101 MMOL/L (ref 96–112)
CHOLEST SERPL-MCNC: 280 MG/DL (ref 100–199)
CO2 SERPL-SCNC: 27 MMOL/L (ref 20–33)
CREAT SERPL-MCNC: 0.8 MG/DL (ref 0.5–1.4)
EOSINOPHIL # BLD AUTO: 0.09 K/UL (ref 0–0.51)
EOSINOPHIL NFR BLD: 1.8 % (ref 0–6.9)
ERYTHROCYTE [DISTWIDTH] IN BLOOD BY AUTOMATED COUNT: 44.3 FL (ref 35.9–50)
GLOBULIN SER CALC-MCNC: 2.6 G/DL (ref 1.9–3.5)
GLUCOSE SERPL-MCNC: 87 MG/DL (ref 65–99)
HCT VFR BLD AUTO: 39.1 % (ref 37–47)
HDLC SERPL-MCNC: 66 MG/DL
HGB BLD-MCNC: 12.5 G/DL (ref 12–16)
IMM GRANULOCYTES # BLD AUTO: 0.01 K/UL (ref 0–0.11)
IMM GRANULOCYTES NFR BLD AUTO: 0.2 % (ref 0–0.9)
LDLC SERPL CALC-MCNC: 153 MG/DL
LYMPHOCYTES # BLD AUTO: 1.61 K/UL (ref 1–4.8)
LYMPHOCYTES NFR BLD: 31.8 % (ref 22–41)
MCH RBC QN AUTO: 28.8 PG (ref 27–33)
MCHC RBC AUTO-ENTMCNC: 32 G/DL (ref 33.6–35)
MCV RBC AUTO: 90.1 FL (ref 81.4–97.8)
MONOCYTES # BLD AUTO: 0.41 K/UL (ref 0–0.85)
MONOCYTES NFR BLD AUTO: 8.1 % (ref 0–13.4)
NEUTROPHILS # BLD AUTO: 2.91 K/UL (ref 2–7.15)
NEUTROPHILS NFR BLD: 57.3 % (ref 44–72)
NRBC # BLD AUTO: 0 K/UL
NRBC BLD-RTO: 0 /100 WBC
PLATELET # BLD AUTO: 397 K/UL (ref 164–446)
PMV BLD AUTO: 9.4 FL (ref 9–12.9)
POTASSIUM SERPL-SCNC: 3.9 MMOL/L (ref 3.6–5.5)
PROT SERPL-MCNC: 6.8 G/DL (ref 6–8.2)
RBC # BLD AUTO: 4.34 M/UL (ref 4.2–5.4)
SODIUM SERPL-SCNC: 136 MMOL/L (ref 135–145)
T4 FREE SERPL-MCNC: 0.84 NG/DL (ref 0.53–1.43)
TRIGL SERPL-MCNC: 306 MG/DL (ref 0–149)
TSH SERPL DL<=0.005 MIU/L-ACNC: 0.62 UIU/ML (ref 0.38–5.33)
WBC # BLD AUTO: 5.1 K/UL (ref 4.8–10.8)

## 2018-02-07 PROCEDURE — 80061 LIPID PANEL: CPT

## 2018-02-07 PROCEDURE — 85025 COMPLETE CBC W/AUTO DIFF WBC: CPT

## 2018-02-07 PROCEDURE — 80053 COMPREHEN METABOLIC PANEL: CPT

## 2018-02-07 PROCEDURE — 36415 COLL VENOUS BLD VENIPUNCTURE: CPT

## 2018-02-07 PROCEDURE — 82306 VITAMIN D 25 HYDROXY: CPT

## 2018-02-07 PROCEDURE — 84443 ASSAY THYROID STIM HORMONE: CPT

## 2018-02-07 PROCEDURE — 84439 ASSAY OF FREE THYROXINE: CPT

## 2018-02-14 ENCOUNTER — TELEPHONE (OUTPATIENT)
Dept: MEDICAL GROUP | Facility: PHYSICIAN GROUP | Age: 73
End: 2018-02-14

## 2018-02-14 ENCOUNTER — PATIENT MESSAGE (OUTPATIENT)
Dept: MEDICAL GROUP | Facility: PHYSICIAN GROUP | Age: 73
End: 2018-02-14

## 2018-02-14 DIAGNOSIS — E03.9 HYPOTHYROIDISM, UNSPECIFIED TYPE: ICD-10-CM

## 2018-02-14 RX ORDER — LEVOTHYROXINE SODIUM 0.12 MG/1
125 TABLET ORAL
Qty: 60 TAB | Refills: 0 | Status: SHIPPED | OUTPATIENT
Start: 2018-02-14 | End: 2018-03-07 | Stop reason: SDUPTHER

## 2018-02-15 NOTE — TELEPHONE ENCOUNTER
"From: Makenna Culver  To: EDUARDO Alcantara  Sent: 2/14/2018 2:33 PM PST  Subject: RE:thyroid    then can we lower it and see what happens, only for a couple months? I will work on my diet and intake of calories, thank you so much for replying . Makenna culver  ----- Message -----  From: EDUARDO Alcantara  Sent: 2/14/2018 1:11 PM PST  To: Makenna Culver  Subject: thyroid  PLEASE DISREGARD THE LAST MESSAGE!!  I have made a mistake. When a individual thyroid or TSH is low, this is a inverse relationship. This means that your thyroid is almost overstimulated with thyroid it does not mean that the thyroid is \"low\" and would need more thyroid hormone. You are slowly entering a level that is close to hyper thyroidism. In other words if the patient had a TSH of over 4 in the thyroid is star and needs thyroid medication.    Furthermore, giving you more thyroid medicine could affect her heart, heart rate, circulation.    Please forgive my mistake for suggesting that you would need more thyroid I had to reread your levels. If you are highly concerned I can send you to an endocrinologist for further information.  "

## 2018-03-24 ENCOUNTER — PATIENT OUTREACH (OUTPATIENT)
Dept: HEALTH INFORMATION MANAGEMENT | Facility: OTHER | Age: 73
End: 2018-03-24

## 2018-03-24 NOTE — PROGRESS NOTES
Attempt #:  [facility] Verified: yes  Verify PCP: yes    Communication Preference Obtained: yes     Review Care Team: no // Pt was busy    Annual Wellness Visit Scheduling  1. Scheduling Status:Scheduled   Care Gap Scheduling (Attempt to Schedule EACH Overdue Care Gap!)  Health Maintenance Due   Topic Date Due   • Annual Wellness Visit  08/31/2017       MyChart Activation: already active

## 2018-04-02 RX ORDER — LEVOTHYROXINE SODIUM 112 UG/1
TABLET ORAL
Refills: 1 | OUTPATIENT
Start: 2018-04-02

## 2018-04-19 RX ORDER — GABAPENTIN 100 MG/1
CAPSULE ORAL
COMMUNITY
Start: 2018-02-20 | End: 2018-06-18 | Stop reason: SDUPTHER

## 2018-04-19 NOTE — ASSESSMENT & PLAN NOTE
Patient has a history of hyperlipidemia. Patient has been reluctant to go on statin.  Cholesterol,Tot 280   100 - 199 mg/dL Final   Triglycerides 306   0 - 149 mg/dL Final   HDL 66  >=40 mg/dL Final

## 2018-04-19 NOTE — ASSESSMENT & PLAN NOTE
Patient has identified known arthritis to multiple joints. She manages with gabapentin. And supplements as well as anti-inflammatories.

## 2018-04-19 NOTE — ASSESSMENT & PLAN NOTE
Patient has menopausal syndrome with hot flashes. And currently takes estradiol 1 mg. Last mammogram: October 2017.

## 2018-04-20 ENCOUNTER — OFFICE VISIT (OUTPATIENT)
Dept: MEDICAL GROUP | Facility: PHYSICIAN GROUP | Age: 73
End: 2018-04-20
Payer: MEDICARE

## 2018-04-20 VITALS
DIASTOLIC BLOOD PRESSURE: 74 MMHG | HEART RATE: 76 BPM | OXYGEN SATURATION: 97 % | SYSTOLIC BLOOD PRESSURE: 120 MMHG | BODY MASS INDEX: 23.98 KG/M2 | WEIGHT: 127 LBS | HEIGHT: 61 IN | RESPIRATION RATE: 16 BRPM | TEMPERATURE: 98 F

## 2018-04-20 DIAGNOSIS — M85.89 OSTEOPENIA OF MULTIPLE SITES: ICD-10-CM

## 2018-04-20 DIAGNOSIS — E05.00 GRAVES' DISEASE WITH EXOPHTHALMOS: ICD-10-CM

## 2018-04-20 DIAGNOSIS — K21.9 GASTROESOPHAGEAL REFLUX DISEASE WITHOUT ESOPHAGITIS: ICD-10-CM

## 2018-04-20 DIAGNOSIS — N95.1 POST MENOPAUSAL SYNDROME: ICD-10-CM

## 2018-04-20 DIAGNOSIS — M15.9 PRIMARY OSTEOARTHRITIS INVOLVING MULTIPLE JOINTS: ICD-10-CM

## 2018-04-20 DIAGNOSIS — E78.2 MIXED HYPERLIPIDEMIA: ICD-10-CM

## 2018-04-20 DIAGNOSIS — D72.9 ABNORMAL WBC COUNT: ICD-10-CM

## 2018-04-20 PROCEDURE — G0439 PPPS, SUBSEQ VISIT: HCPCS | Performed by: NURSE PRACTITIONER

## 2018-04-20 ASSESSMENT — ACTIVITIES OF DAILY LIVING (ADL): BATHING_REQUIRES_ASSISTANCE: 0

## 2018-04-20 ASSESSMENT — PATIENT HEALTH QUESTIONNAIRE - PHQ9: CLINICAL INTERPRETATION OF PHQ2 SCORE: 0

## 2018-04-20 NOTE — PROGRESS NOTES
Chief Complaint   Patient presents with   • Annual Exam     Annual wellness          HPI:  Makenna Culver is a 72 y.o. here for Medicare Annual Wellness Visit     Osteoarthritis of multiple joints  Patient has identified known arthritis to multiple joints. She manages with gabapentin. And supplements as well as anti-inflammatories.    Graves' disease with exophthalmos  Patient continues to have hypothyroidism with exophthalmos. Planning to see neuro ophthalmologist in June    Hyperlipidemia  Patient has a history of hyperlipidemia. Patient has been reluctant to go on statin.  Cholesterol,Tot 280   100 - 199 mg/dL Final   Triglycerides 306   0 - 149 mg/dL Final   HDL 66  >=40 mg/dL Final               Post menopausal syndrome  Patient has menopausal syndrome with hot flashes. And currently takes estradiol 1 mg. Last mammogram: October 2017.    Abnormal WBC count  Patient recently had CBC which showed a normal white count.    GERD (gastroesophageal reflux disease)  Patient has a chronic control condition of GERD. She however takes omeprazole 40 mg daily.    Osteopenia  Patient known to have osteopenia identified by DEXA scan in 2015.        Patient Active Problem List    Diagnosis Date Noted   • Graves' ophthalmopathy 07/09/2015     Priority: High   • Osteoarthritis of multiple joints 06/13/2014     Priority: High   • Graves' disease with exophthalmos 06/13/2014     Priority: High   • Hyperlipidemia 06/22/2011     Priority: High   • Degeneration of lumbar or lumbosacral intervertebral disc 10/09/2009     Priority: High   • Hypothyroidism 10/09/2009     Priority: High   • Pain in joint, shoulder region 10/09/2009     Priority: High   • Dermatochalasis of both upper eyelids 04/20/2016     Priority: Low   • Abnormal WBC count 08/21/2015     Priority: Low   • Other and combined forms of senile cataract 03/17/2015     Priority: Low   • Osteopenia 05/09/2012     Priority: Low   • Post menopausal syndrome 03/08/2012      Priority: Low   • History of peptic ulcer 11/04/2009     Priority: Low   • GERD (gastroesophageal reflux disease) 11/04/2009     Priority: Low   • Right knee pain 04/21/2017   • Senile nuclear sclerosis 03/03/2015       Current Outpatient Prescriptions   Medication Sig Dispense Refill   • gabapentin (NEURONTIN) 100 MG Cap      • cyclobenzaprine (FLEXERIL) 10 MG Tab Take 1 Tab by mouth 3 times a day as needed. 90 Tab 0   • levothyroxine (SYNTHROID) 125 MCG Tab Take 1 Tab by mouth every day. 90 Tab 0   • promethazine (PHENERGAN) 12.5 MG tablet      • tramadol (ULTRAM) 50 MG Tab      • estradiol (ESTRACE) 1 MG Tab TAKE ONE TABLET BY MOUTH ONCE DAILY 90 Tab 3   • omeprazole (PRILOSEC) 40 MG delayed-release capsule TAKE ONE CAPSULE BY MOUTH ONCE DAILY 90 Cap 1   • levothyroxine (SYNTHROID) 112 MCG Tab TAKE ONE TABLET BY MOUTH ONCE DAILY IN THE MORNING ON  AN  EMPTY  STOMACH 90 Tab 1   • phenazopyridine (PYRIDIUM) 200 MG Tab Take 1 Tab by mouth every 12 hours as needed. 6 Tab 0   • FLUZONE HIGH-DOSE 0.5 ML Suspension Prefilled Syringe injection      • PREVNAR 13 syringe      • erythromycin 5 MG/GM Ointment      • Bioflavonoid Products (VITAMIN C CR) 1000-100 MG Tab CR Take  by mouth 2 Times a Day.     • meclizine (ANTIVERT) 12.5 MG Tab      • ibuprofen (MOTRIN) 800 MG Tab Take 1 Tab by mouth every 8 hours as needed for Mild Pain. 90 Tab 0   • Multiple Vitamins-Minerals (HAIR/SKIN/NAILS PO) Take 1 Tab by mouth every day.     • Calcium Carbonate-Vitamin D ([employer]S CALCIUM/VIT D SOFT CHEWS PO) Take 2 Tabs by mouth every day.     • amitriptyline (ELAVIL) 50 MG TABS Take 50 mg by mouth every evening.       No current facility-administered medications for this visit.             Current supplements as per medication list.       Allergies: Codeine; Morphine; and Vicodin [hydrocodone-acetaminophen]    Current social contact/activities: None    Patient's perception of their health: good    She  reports that she quit smoking about  4 years ago. Her smoking use included Cigarettes. She has a 50.00 pack-year smoking history. She has never used smokeless tobacco. She reports that she drinks about 0.6 oz of alcohol per week . She reports that she does not use drugs.  Counseling given: Not Answered      DPA/Advanced Directive:  Patient does not have an Advanced Directive.  A packet and workshop information was given on Advanced Directives.    ROS:    Gait: Uses no assistive device  Ostomy: No  Other tubes: No  Amputations: No  Chronic oxygen use: No  Last eye exam: 2017  Wears hearing aids: No   : Denies any urinary leakage during the last 6 months    Screening:    Depression Screening    Little interest or pleasure in doing things?  0 - not at all  Feeling down, depressed , or hopeless? 0 - not at all  Patient Health Questionnaire Score: 0     If depressive symptoms identified deferred to follow up visit unless specifically addressed in assessment and plan.    Interpretation of PHQ-9 Total Score   Score Severity   1-4 No Depression   5-9 Mild Depression   10-14 Moderate Depression   15-19 Moderately Severe Depression   20-27 Severe Depression    Screening for Cognitive Impairment    Three Minute Recall (apple, watch, avril)  3/3    Draw clock face with all 12 numbers set to the hand to show 10 minutes past 11 o'clock  1    Cognitive concerns identified deferred for follow up unless specifically addressed in assessment and plan.    Fall Risk Assessment    Has the patient had two or more falls in the last year or any fall with injury in the last year?  No    Safety Assessment    Throw rugs on floor.  No  Handrails on all stairs.  No  Good lighting in all hallways.  Yes  Difficulty hearing.  No  Patient counseled about all safety risks that were identified.    Functional Assessment ADLs    Are there any barriers preventing you from cooking for yourself or meeting nutritional needs?  No.    Are there any barriers preventing you from driving safely  or obtaining transportation?  No.    Are there any barriers preventing you from using a telephone or calling for help?  No.    Are there any barriers preventing you from shopping?  No.    Are there any barriers preventing you from taking care of your own finances?  No.    Are there any barriers preventing you from managing your medications?  No.    Are there any barriers preventing you from showering, bathing or dressing yourself?  No.    Are currently engaging any exercise or physical activity?  Yes.  Exercise, Gardening     Health Maintenance Summary                Annual Wellness Visit Overdue 8/31/2017      Done 8/30/2016 Visit Dx: Medicare annual wellness visit, subsequent     Patient has more history with this topic...    COLON CANCER SCREENING ANNUAL FIT Next Due 10/17/2018      Done 10/17/2017      Patient has more history with this topic...    IMM PNEUMOCOCCAL 65+ (ADULT) LOW/MEDIUM RISK SERIES Next Due 10/20/2018      Done 10/20/2017 Imm Admin: Pneumococcal Conjugate Vaccine (Prevnar/PCV-13)     Patient has more history with this topic...    MAMMOGRAM Next Due 10/31/2018      Done 10/31/2017 MA-MAMMO SCREENING BILAT W/TOMOSYNTHESIS W/CAD     Patient has more history with this topic...    BONE DENSITY Next Due 10/14/2020      Done 10/14/2015 DS-BONE DENSITY STUDY (DEXA)     Patient has more history with this topic...    IMM DTaP/Tdap/Td Vaccine Next Due 7/31/2023      Done 7/31/2013 Imm Admin: Tdap Vaccine          Patient Care Team:  EDUARDO Alcantara as PCP - General (Family Medicine)  Mahesh De Leon M.D. as Consulting Physician (Phys Med and Rehab)  Han San D.O. as Consulting Physician (Neurology)        Social History   Substance Use Topics   • Smoking status: Former Smoker     Packs/day: 1.00     Years: 50.00     Types: Cigarettes     Quit date: 1/9/2014   • Smokeless tobacco: Never Used      Comment: use E cig   • Alcohol use 0.6 oz/week     1 Glasses of wine per week      Comment:       0-1 per month                            Family History   Problem Relation Age of Onset   • Hypertension Father    • Heart Disease Father      MI age 47 yo   • Cancer Neg Hx    • Diabetes Neg Hx      She  has a past medical history of Arthritis; Colon polyp (12/2011); Degeneration of lumbar or lumbosacral intervertebral disc; Ear problems (as child had surgery); Graves' disease with exophthalmos (6/13/2014); Hemorrhoid; History of carpal tunnel repair (7/31/2013); Menopause (10/14/2009); Osteoarthritis of multiple joints (6/13/2014); Pain in joint, pelvic region and thigh (2015); Pain in joint, shoulder region (2015); Periodic pelvic examination (due); Postcoital bleeding; Rash (12/29/2014); S/P colonoscopy (12/2011); Screening mammogram ( 11/2008=normal); Tobacco use disorder; Ulcer of the stomach and intestine (hx.); Unspecified cataract; and Unspecified hypothyroidism.   Past Surgical History:   Procedure Laterality Date   • BLEPHAROPLASTY Bilateral 4/20/2016    Procedure: BLEPHAROPLASTY - UPPER;  Surgeon: Moshe Sena M.D.;  Location: SURGERY [facility];  Service:    • RECTUS REPAIR Bilateral 7/9/2015    Procedure: RECTUS REPAIR FOR LT SUPERIOR RECESSION & RT INFERIOR RECESSION ;  Surgeon: Leila Villegas M.D.;  Location: SURGERY SAME DAY [facility];  Service:    • CATARACT PHACO WITH IOL  3/17/2015    Performed by Derrick Gupta M.D. at [facility]   • CATARACT PHACO WITH IOL  3/3/2015    Performed by Derrick Gupta M.D. at [facility]   • TENSILON TEST  11/20/2014    Performed by Maynor San D.O. at ENDOSCOPY [facility] ORS   • CARPAL TUNNEL ENDOSCOPIC  2/8/2013    Performed by Pancho Palomino M.D. at SURGERY SAME DAY [facility] ORS   • CARPAL TUNNEL ENDOSCOPIC  11/17/2012    Performed by Pancoh Palomino M.D. at SURGERY [facility] ORS   • ABDOMINAL HYSTERECTOMY TOTAL  partial    fibroid tumors, has ovary and tube left   • OTHER SURGICAL PROCEDURE   "ear surgery as child   • THYROIDECTOMY  thyroid ablation with iodine therapy   • TONSILLECTOMY AND ADENOIDECTOMY         Exam:   Blood pressure 120/74, pulse 76, temperature 36.7 °C (98 °F), resp. rate 16, height 1.549 m (5' 1\"), weight 57.6 kg (127 lb), SpO2 97 %. Body mass index is 24 kg/m².    Hearing good.    Dentition upper dentures  Alert, oriented in no acute distress.  Eye contact is good, speech goal directed, affect calm    Assessment and Plan. The following treatment and monitoring plan is recommended:    1. Primary osteoarthritis involving multiple joints     2. Graves' disease with exophthalmos     3. Mixed hyperlipidemia     4. Post menopausal syndrome     5. Abnormal WBC count     6. Gastroesophageal reflux disease without esophagitis     7. Osteopenia of multiple sites           Services suggested: No services needed at this time  Health Care Screening: Age-appropriate preventive services recommended by USPTF and ACIP covered by Medicare were discussed today. Services ordered if indicated and agreed upon by the patient.  Referrals offered: Community-based lifestyle interventions to reduce health risks and promote self-management and wellness, fall prevention, nutrition, physical activity, tobacco-use cessation, weight loss, and mental health services as per orders if indicated.    Discussion today about general wellness and lifestyle habits:    · Prevent falls and reduce trip hazards; Cautioned about securing or removing rugs.  · Have a working fire alarm and carbon monoxide detector;   · Engage in regular physical activity and social activities         "

## 2018-05-01 ENCOUNTER — PATIENT MESSAGE (OUTPATIENT)
Dept: MEDICAL GROUP | Facility: PHYSICIAN GROUP | Age: 73
End: 2018-05-01

## 2018-05-01 DIAGNOSIS — E03.9 HYPOTHYROIDISM, UNSPECIFIED TYPE: ICD-10-CM

## 2018-05-03 ENCOUNTER — PATIENT MESSAGE (OUTPATIENT)
Dept: MEDICAL GROUP | Facility: PHYSICIAN GROUP | Age: 73
End: 2018-05-03

## 2018-05-03 RX ORDER — LEVOTHYROXINE SODIUM 112 UG/1
112 TABLET ORAL
Qty: 30 TAB | Refills: 3 | Status: SHIPPED | OUTPATIENT
Start: 2018-05-03 | End: 2018-06-05 | Stop reason: SDUPTHER

## 2018-05-04 NOTE — TELEPHONE ENCOUNTER
From: Makenna Ghotra  To: EDUARDO Alcantara  Sent: 5/3/2018 9:40 PM PDT  Subject: Prescription Question    I was on 110 you said you made a mistake o saving you would put me on 125. but that is what you refilled when I call in my 110 there wasn't any refills left. but you refill the wrong one, they refilled 125 and you said you didn't want me on them. please read your message you sent me, you said you would keep me on 110 and not the 125. when I had the blood test I was on 110 not 125. I have been on 125's for only one month and they refilled it again . so I need a new prescription called in for m 110. will be home on the 10th of this month, i'm in [place]. will p/u them the 11th of may. thank you for the response , Makenna   ----- Message -----  From: EDUARDO Alcantara  Sent: 5/3/2018 1:12 PM PDT  To: Makenna Ghotra  Subject: RE: Prescription Question  I can change you back, but you need to recheck this level in two months. Your last TSH was in February and normal level on 125 mcg.  I will send new Rx and lab request. You will need to follow up with new provider.  Maria Elena Hewitt      ----- Message -----   From: Makenna Ghotra   Sent: 5/1/2018 6:55 PM PDT   To: EDUARDO Alcantara  Subject: Prescription Question    can you change me back to my 110 thyroid medication . the 125 are making me hair fall out a lot, and skin is very dry. will have to start them the 11th, will be gone from the 3rd to the 10th. can I break the 125's down to 110's? thank you very much. if this is someone else, please look up my THS test and see my results. thank you Makenna ghotra. will take my laptop with me on vacation.

## 2018-05-05 ENCOUNTER — PATIENT MESSAGE (OUTPATIENT)
Dept: MEDICAL GROUP | Facility: PHYSICIAN GROUP | Age: 73
End: 2018-05-05

## 2018-05-07 NOTE — TELEPHONE ENCOUNTER
From: Makenna Culver  To: EDUARDO Alcantara  Sent: 5/5/2018 8:24 PM PDT  Subject: Prescription Question    can you change me back, but need to have to fill a prescription for 112 please. will be home the 10th from [place]. thank you so much. can you put in for a blood test in a couple of months then? [facility] and i do appreciate you always getting back to me makenna   ----- Message -----  From: EDUARDO Alcantaar  Sent: 5/4/2018 10:36 AM PDT  To: Makenna Culver  Subject: RE: Prescription Question  Makenna,  You were on 112 mcg last fall. I refilled this RX.   Such are Emails ! Again, I am so sorry at the confusion.   In reviewing your medications. You were on 100 mcg and then 112 mcg. Unfortunately, the 125 mcg most recently.  Take care.  Maria Elena     ----- Message -----   From: Makenna Cluver   Sent: 5/3/2018 9:40 PM PDT   To: EDUARDO Alcantara  Subject: Prescription Question    I was on 110 you said you made a mistake o saving you would put me on 125. but that is what you refilled when I call in my 110 there wasn't any refills left. but you refill the wrong one, they refilled 125 and you said you didn't want me on them. please read your message you sent me, you said you would keep me on 110 and not the 125. when I had the blood test I was on 110 not 125. I have been on 125's for only one month and they refilled it again . so I need a new prescription called in for m 110. will be home on the 10th of this month, i'm in [place]. will p/u them the 11th of may. thank you for the response , Makenna   ----- Message -----  From: EDUARDO Alcantara  Sent: 5/3/2018 1:12 PM PDT  To: Makenna Culver  Subject: RE: Prescription Question  I can change you back, but you need to recheck this level in two months. Your last TSH was in February and normal level on 125 mcg.  I will send new Rx and lab request. You will need to follow up with new provider.  Maria Elena Hewitt      ----- Message -----   From: Makenna Culver   Sent:  5/1/2018 6:55 PM PDT   To: EDUARDO Alcantara  Subject: Prescription Question    can you change me back to my 110 thyroid medication . the 125 are making me hair fall out a lot, and skin is very dry. will have to start them the 11th, will be gone from the 3rd to the 10th. can I break the 125's down to 110's? thank you very much. if this is someone else, please look up my THS test and see my results. thank you Makenna ghotra. will take my laptop with me on vacation.

## 2018-05-15 ENCOUNTER — PATIENT MESSAGE (OUTPATIENT)
Dept: MEDICAL GROUP | Facility: PHYSICIAN GROUP | Age: 73
End: 2018-05-15

## 2018-05-16 ENCOUNTER — PATIENT MESSAGE (OUTPATIENT)
Dept: MEDICAL GROUP | Facility: PHYSICIAN GROUP | Age: 73
End: 2018-05-16

## 2018-05-17 ENCOUNTER — PATIENT MESSAGE (OUTPATIENT)
Dept: MEDICAL GROUP | Facility: PHYSICIAN GROUP | Age: 73
End: 2018-05-17

## 2018-05-17 NOTE — TELEPHONE ENCOUNTER
From: Makenna Culver  To: EDUARDO Alcantara  Sent: 5/16/2018 7:36 PM PDT  Subject: Prescription Question    i will let you know if it starts up again. how do i request a new pcp or can you assign a new dr. there for us. if so, please do that for us. i called in [facility] prescription for [id number] and received only one time a day. you put him on twice a day. it was only filled for one a day. you took him off the other pill because it was spendy. it has worked for him with no changes. i need for you to fix this prescription to read twice a day. and need a refill for 90 more pills for the three month. right now he has only 90 pills for 3 months. [facility] for all your help and let me know if you can sign someone to us or if i have to call and request one.   ----- Message -----  From: EDUARDO Alcantara  Sent: 5/16/2018 9:50 AM PDT  To: Makenna Culver  Subject: RE: Prescription Question  This is concerning, and if it starts up again, you need to see someone.  Don't forget to obtain a new PCP for your care.  Soon, I will be cut off from [employer] emails.  Maria Elena    ----- Message -----   From: Makenna Culver   Sent: 5/15/2018 6:31 PM PDT   To: EDUARDO Alcantara  Subject: Prescription Question    I picked up my thyroid meds tyvm. when I was on vacation I had a problem. my left foot swelled up really bad, couldn't see my ankle at all, after I went to bed it started to go down, when I got up it started to do it again. I was sitting on high chairs and walking a lot. dr. fajardo told me to let you know. I drank a lot of water and took diurex max water pills and they didn't do a thing. after I got home that swelling has almost gone away. amadeo culver

## 2018-05-17 NOTE — TELEPHONE ENCOUNTER
From: Makenna Culver  To: EDUARDO Alcantara  Sent: 5/17/2018 12:26 PM PDT  Subject: Prescription Question    LISINOPRIL 20MG by mouth twice a day . will call and sent up PCP  ----- Message -----  From: EDUARDO Alcantara  Sent: 5/17/2018 9:07 AM PDT  To: Makenna Culver  Subject: RE: Prescription Question  I need the name of the RX for Get - exactly as I don't have reference numbers just to be sure.    ----- Message -----   From: Makenna Culver   Sent: 5/16/2018 7:36 PM PDT   To: EDUARDO Alcantara  Subject: Prescription Question    i will let you know if it starts up again. how do i request a new pcp or can you assign a new dr. there for us. if so, please do that for us. i called in [place] prescription for [id number] and received only one time a day. you put him on twice a day. it was only filled for one a day. you took him off the other pill because it was spendy. it has worked for him with no changes. i need for you to fix this prescription to read twice a day. and need a refill for 90 more pills for the three month. right now he has only 90 pills for 3 months. [facility] for all your help and let me know if you can sign someone to us or if i have to call and request one.   ----- Message -----  From: EDUARDO Alcantara  Sent: 5/16/2018 9:50 AM PDT  To: Makenna Culver  Subject: RE: Prescription Question  This is concerning, and if it starts up again, you need to see someone.  Don't forget to obtain a new PCP for your care.  Soon, I will be cut off from [employer] emails.  Maria Elena    ----- Message -----   From: Makenna Culver   Sent: 5/15/2018 6:31 PM PDT   To: EDUARDO Alcantara  Subject: Prescription Question    I picked up my thyroid meds [facility]. when I was on vacation I had a problem. my left foot swelled up really bad, couldn't see my ankle at all, after I went to bed it started to go down, when I got up it started to do it again. I was sitting on high chairs and walking a lot. dr. fajardo told me to let  you know. I drank a lot of water and took diurex max water pills and they didn't do a thing. after I got home that swelling has almost gone away. amadeo ghotra

## 2018-06-05 DIAGNOSIS — E03.9 ACQUIRED HYPOTHYROIDISM: ICD-10-CM

## 2018-06-05 DIAGNOSIS — E03.9 HYPOTHYROIDISM, UNSPECIFIED TYPE: ICD-10-CM

## 2018-06-05 RX ORDER — LEVOTHYROXINE SODIUM 112 UG/1
112 TABLET ORAL
Qty: 90 TAB | Refills: 3 | Status: SHIPPED | OUTPATIENT
Start: 2018-06-05 | End: 2019-05-23 | Stop reason: SDUPTHER

## 2018-06-13 ENCOUNTER — HOSPITAL ENCOUNTER (OUTPATIENT)
Dept: LAB | Facility: MEDICAL CENTER | Age: 73
End: 2018-06-13
Attending: PSYCHIATRY & NEUROLOGY
Payer: MEDICARE

## 2018-06-13 ENCOUNTER — HOSPITAL ENCOUNTER (OUTPATIENT)
Dept: LAB | Facility: MEDICAL CENTER | Age: 73
End: 2018-06-13
Attending: FAMILY MEDICINE
Payer: MEDICARE

## 2018-06-13 ENCOUNTER — OFFICE VISIT (OUTPATIENT)
Dept: URGENT CARE | Facility: PHYSICIAN GROUP | Age: 73
End: 2018-06-13
Payer: MEDICARE

## 2018-06-13 VITALS
WEIGHT: 127 LBS | BODY MASS INDEX: 23.98 KG/M2 | HEART RATE: 86 BPM | DIASTOLIC BLOOD PRESSURE: 80 MMHG | RESPIRATION RATE: 18 BRPM | TEMPERATURE: 98.7 F | SYSTOLIC BLOOD PRESSURE: 130 MMHG | HEIGHT: 61 IN | OXYGEN SATURATION: 90 %

## 2018-06-13 DIAGNOSIS — E03.9 ACQUIRED HYPOTHYROIDISM: ICD-10-CM

## 2018-06-13 DIAGNOSIS — J22 ACUTE RESPIRATORY INFECTION: ICD-10-CM

## 2018-06-13 LAB
T4 FREE SERPL-MCNC: 1.29 NG/DL (ref 0.53–1.43)
TSH SERPL DL<=0.005 MIU/L-ACNC: 0.05 UIU/ML (ref 0.38–5.33)

## 2018-06-13 PROCEDURE — 84439 ASSAY OF FREE THYROXINE: CPT

## 2018-06-13 PROCEDURE — 83519 RIA NONANTIBODY: CPT

## 2018-06-13 PROCEDURE — 83516 IMMUNOASSAY NONANTIBODY: CPT | Mod: XU

## 2018-06-13 PROCEDURE — 99214 OFFICE O/P EST MOD 30 MIN: CPT | Performed by: FAMILY MEDICINE

## 2018-06-13 PROCEDURE — 36415 COLL VENOUS BLD VENIPUNCTURE: CPT

## 2018-06-13 PROCEDURE — 84443 ASSAY THYROID STIM HORMONE: CPT

## 2018-06-13 RX ORDER — DOXYCYCLINE HYCLATE 100 MG/1
CAPSULE ORAL
Qty: 20 CAP | Refills: 0 | Status: SHIPPED | OUTPATIENT
Start: 2018-06-13 | End: 2018-06-21

## 2018-06-13 RX ORDER — PREDNISONE 20 MG/1
TABLET ORAL
Qty: 10 TAB | Refills: 0 | Status: SHIPPED | OUTPATIENT
Start: 2018-06-13 | End: 2018-07-25

## 2018-06-13 NOTE — PROGRESS NOTES
Chief Complaint:    Chief Complaint   Patient presents with   • Pharyngitis     SOB, Cough, Difficulty breathing when laying down, loss ofvoice, throat hurts when coughing       History of Present Illness:    This is a new problem. Symptoms since yesterday. She has nasal congestion, lower sore throat and feels like it is swollen in lower throat causing difficulty breathing, and cough productive of purulent mucus. No fever.  has had similar symptoms x 4 days and not getting better (not being treated with antibiotic). Her grandchild is coming to visit soon. Doxycycline and Prednisone have worked well/been tolerated in past for similar previous symptoms.      Review of Systems:    Constitutional: Negative for fever, chills, and diaphoresis.   Eyes: Negative for change in vision, photophobia, pain, redness, and discharge.  ENT: See HPI.    Respiratory: See HPI.   Cardiovascular: Negative for chest pain, palpitations, orthopnea, claudication, leg swelling, and PND.   Gastrointestinal: Negative for abdominal pain, nausea, vomiting, diarrhea, constipation, blood in stool, and melena.   Genitourinary: Negative for dysuria, urinary urgency, urinary frequency, hematuria, and flank pain.   Musculoskeletal: No new symptoms.  Skin: Negative for rash and itching.   Neurological: Negative for dizziness, tingling, tremors, sensory change, speech change, focal weakness, seizures, loss of consciousness, and headaches.   Endo: Hypothyroid, on medication.   Heme: Does not bruise/bleed easily.   Psychiatric/Behavioral: Negative for depression, suicidal ideas, hallucinations, memory loss and substance abuse. The patient is not nervous/anxious and does not have insomnia.        Past Medical History:    Past Medical History:   Diagnosis Date   • Arthritis     hips, back and shoulders   • Colon polyp 12/2011   • Degeneration of lumbar or lumbosacral intervertebral disc     Dr. Cisneros   • Ear problems as child had surgery   •  Graves' disease with exophthalmos 6/13/2014    Being followed by Dr. Han San   • Hemorrhoid     internal   • History of carpal tunnel repair 7/31/2013    Dr. Villa repaired both.   • Menopause 10/14/2009   • Osteoarthritis of multiple joints 6/13/2014   • Pain in joint, pelvic region and thigh 2015    bursitis and arthritis in hips-takes ibuprofen and darvacet   • Pain in joint, shoulder region 2015   • Periodic pelvic examination due   • Postcoital bleeding     improved   • Rash 12/29/2014    Started about a week ago when she got news about surgery High stress Nothing new Back, stomach, chest, arms Itch No pain   • S/P colonoscopy 12/2011    repeat in 5 years- had polyp, initial 2005.    • Screening mammogram  11/2008=normal   • Tobacco use disorder    • Ulcer of the stomach and intestine hx.    stable   • Unspecified cataract     bilateral IOL   • Unspecified hypothyroidism      Past Surgical History:    Past Surgical History:   Procedure Laterality Date   • BLEPHAROPLASTY Bilateral 4/20/2016    Procedure: BLEPHAROPLASTY - UPPER;  Surgeon: Moshe Sena M.D.;  Location: SURGERY [facility] ORS;  Service:    • RECTUS REPAIR Bilateral 7/9/2015    Procedure: RECTUS REPAIR FOR LT SUPERIOR RECESSION & RT INFERIOR RECESSION ;  Surgeon: Leila Villegas M.D.;  Location: SURGERY SAME DAY [facility] ORS;  Service:    • CATARACT PHACO WITH IOL  3/17/2015    Performed by Derrick Gupta M.D. at SURGERY [facility] ORS   • CATARACT PHACO WITH IOL  3/3/2015    Performed by Derrick Gupta M.D. at [facility] ORS   • TENSILON TEST  11/20/2014    Performed by Maynor San D.O. at ENDOSCOPY [facility] ORS   • CARPAL TUNNEL ENDOSCOPIC  2/8/2013    Performed by Pancho Palomino M.D. at SURGERY SAME DAY [facility] ORS   • CARPAL TUNNEL ENDOSCOPIC  11/17/2012    Performed by Pancho Palomino M.D. at SURGERY [facility] ORS   • ABDOMINAL HYSTERECTOMY TOTAL  partial    fibroid tumors, has  ovary and tube left   • OTHER SURGICAL PROCEDURE  ear surgery as child   • THYROIDECTOMY  thyroid ablation with iodine therapy   • TONSILLECTOMY AND ADENOIDECTOMY       Social History:    Social History     Social History   • Marital status:      Spouse name: N/A   • Number of children: N/A   • Years of education: N/A     Occupational History   • on social security disability for her back Other     Social History Main Topics   • Smoking status: Former Smoker     Packs/day: 1.00     Years: 50.00     Types: Cigarettes     Quit date: 1/9/2014   • Smokeless tobacco: Never Used      Comment: use E cig   • Alcohol use 0.6 oz/week     1 Glasses of wine per week      Comment:      0-1 per month                          • Drug use: No   • Sexual activity: Yes     Partners: Male      Comment:      Other Topics Concern   • Not on file     Social History Narrative    - 3 children     Family History:    Family History   Problem Relation Age of Onset   • Hypertension Father    • Heart Disease Father      MI age 47 yo   • Cancer Neg Hx    • Diabetes Neg Hx        Medications:    Current Outpatient Prescriptions on File Prior to Visit   Medication Sig Dispense Refill   • gabapentin (NEURONTIN) 100 MG Cap      • cyclobenzaprine (FLEXERIL) 10 MG Tab Take 1 Tab by mouth 3 times a day as needed. 90 Tab 0   • tramadol (ULTRAM) 50 MG Tab      • estradiol (ESTRACE) 1 MG Tab TAKE ONE TABLET BY MOUTH ONCE DAILY 90 Tab 3   • omeprazole (PRILOSEC) 40 MG delayed-release capsule TAKE ONE CAPSULE BY MOUTH ONCE DAILY 90 Cap 1   • Bioflavonoid Products (VITAMIN C CR) 1000-100 MG Tab CR Take  by mouth 2 Times a Day.     • ibuprofen (MOTRIN) 800 MG Tab Take 1 Tab by mouth every 8 hours as needed for Mild Pain. 90 Tab 0   • Calcium Carbonate-Vitamin D ([employer] CALCIUM/VIT D SOFT CHEWS PO) Take 2 Tabs by mouth every day.     • levothyroxine (SYNTHROID) 112 MCG Tab Take 1 Tab by mouth Every morning on an empty stomach. 90 Tab 3   •  "promethazine (PHENERGAN) 12.5 MG tablet      • FLUZONE HIGH-DOSE 0.5 ML Suspension Prefilled Syringe injection      • PREVNAR 13 syringe      • meclizine (ANTIVERT) 12.5 MG Tab      • Multiple Vitamins-Minerals (HAIR/SKIN/NAILS PO) Take 1 Tab by mouth every day.       No current facility-administered medications on file prior to visit.      Allergies:    Allergies   Allergen Reactions   • Codeine Vomiting   • Morphine    • Vicodin [Hydrocodone-Acetaminophen] Vomiting     OK if takes with phenergan        Vitals:    Vitals:    06/13/18 0927   BP: 130/80   Pulse: 86   Resp: 18   Temp: 37.1 °C (98.7 °F)   SpO2: 90%   Weight: 57.6 kg (127 lb)   Height: 1.549 m (5' 1\")       Physical Exam:    Constitutional: Vital signs reviewed. Appears well-developed and well-nourished. Hoarse. No acute distress.   Eyes: Sclera white, conjunctivae clear.   ENT: External ears normal. External auditory canals normal without discharge. TMs translucent and non-bulging. Hearing normal. Nasal mucosa pink. Lips/teeth are normal. Oral mucosa pink and moist. Posterior pharynx: WNL. Tender to palpation lower throat midline region.  Neck: Neck supple.   Cardiovascular: Regular rate and rhythm. No murmur.  Pulmonary/Chest: Respirations non-labored. Clear to auscultation bilaterally.  Lymph: Cervical nodes without tenderness or enlargement.  Musculoskeletal: Normal gait. No muscular atrophy or weakness.  Neurological: Alert and oriented to person, place, and time. Muscle tone normal. Coordination normal.   Skin: No rashes or lesions. Warm, dry, normal turgor.  Psychiatric: Normal mood and affect. Behavior is normal. Judgment and thought content normal.       Assessment / Plan:    1. Acute respiratory infection  - predniSONE (DELTASONE) 20 MG Tab; 2 TABS ONCE A DAY X 5 DAYS. TAKE WITH FOOD.  Dispense: 10 Tab; Refill: 0  - doxycycline (VIBRAMYCIN) 100 MG Cap; 1 CAP TWICE A DAY X 7-10 DAYS.  Dispense: 20 Cap; Refill: 0      Discussed with her DDX and " management options.    Agreeable to medications prescribed.    Follow-up with PCP or urgent care if getting worse or not better with above.

## 2018-06-15 LAB — ACHR BIND AB SER-SCNC: 0 NMOL/L (ref 0–0.4)

## 2018-06-16 LAB — ACHR BLOCK AB/ACHR TOTAL SFR SER: 0 % (ref 0–26)

## 2018-06-18 DIAGNOSIS — Z79.890 POSTMENOPAUSAL HRT (HORMONE REPLACEMENT THERAPY): ICD-10-CM

## 2018-06-18 NOTE — TELEPHONE ENCOUNTER
Was the patient seen in the last year in this department? Yes     Does patient have an active prescription for medications requested? No     Received Request Via: Pharmacy      Pt met protocol?: Yes, OV 4/18

## 2018-06-19 RX ORDER — GABAPENTIN 100 MG/1
100 CAPSULE ORAL 3 TIMES DAILY
Qty: 90 CAP | Refills: 11 | Status: SHIPPED | OUTPATIENT
Start: 2018-06-19 | End: 2018-06-21

## 2018-06-19 RX ORDER — CYCLOBENZAPRINE HCL 10 MG
10 TABLET ORAL 3 TIMES DAILY PRN
Qty: 90 TAB | Refills: 0 | Status: SHIPPED | OUTPATIENT
Start: 2018-06-19 | End: 2019-02-08 | Stop reason: SDUPTHER

## 2018-06-19 RX ORDER — ESTRADIOL 1 MG/1
TABLET ORAL
Qty: 90 TAB | Refills: 0 | Status: SHIPPED | OUTPATIENT
Start: 2018-06-19 | End: 2019-04-12

## 2018-06-19 NOTE — TELEPHONE ENCOUNTER
Dr Laboy- Gabapentin from pt's med list has not directions listed. Please refill as you see fit.  -Has upcoming appt w/ PCP. Will send 3 months of fills to pharmacy.

## 2018-06-20 RX ORDER — OMEPRAZOLE 40 MG/1
CAPSULE, DELAYED RELEASE ORAL
Qty: 90 CAP | Refills: 0 | Status: SHIPPED | OUTPATIENT
Start: 2018-06-20 | End: 2018-09-19 | Stop reason: SDUPTHER

## 2018-06-20 NOTE — TELEPHONE ENCOUNTER
Was the patient seen in the last year in this department? Yes     Does patient have an active prescription for medications requested? No     Received Request Via: Patient      Pt met protocol?: Yes, last ov 4/18

## 2018-06-21 ENCOUNTER — TELEPHONE (OUTPATIENT)
Dept: MEDICAL GROUP | Facility: PHYSICIAN GROUP | Age: 73
End: 2018-06-21

## 2018-06-21 DIAGNOSIS — R79.89 ABNORMAL TSH: ICD-10-CM

## 2018-07-25 ENCOUNTER — OFFICE VISIT (OUTPATIENT)
Dept: MEDICAL GROUP | Facility: PHYSICIAN GROUP | Age: 73
End: 2018-07-25
Payer: MEDICARE

## 2018-07-25 VITALS
TEMPERATURE: 98.3 F | HEIGHT: 61 IN | OXYGEN SATURATION: 97 % | BODY MASS INDEX: 23.79 KG/M2 | HEART RATE: 76 BPM | DIASTOLIC BLOOD PRESSURE: 74 MMHG | SYSTOLIC BLOOD PRESSURE: 126 MMHG | WEIGHT: 126 LBS | RESPIRATION RATE: 16 BRPM

## 2018-07-25 DIAGNOSIS — E03.9 HYPOTHYROIDISM, UNSPECIFIED TYPE: ICD-10-CM

## 2018-07-25 DIAGNOSIS — M46.1 SACROILIITIS, NOT ELSEWHERE CLASSIFIED (HCC): ICD-10-CM

## 2018-07-25 DIAGNOSIS — E78.2 MIXED HYPERLIPIDEMIA: ICD-10-CM

## 2018-07-25 PROCEDURE — 99214 OFFICE O/P EST MOD 30 MIN: CPT | Performed by: FAMILY MEDICINE

## 2018-07-25 RX ORDER — AMITRIPTYLINE HYDROCHLORIDE 50 MG/1
50 TABLET, FILM COATED ORAL
COMMUNITY
End: 2019-11-27

## 2018-07-25 RX ORDER — LANOLIN ALCOHOL/MO/W.PET/CERES
1000 CREAM (GRAM) TOPICAL EVERY MORNING
COMMUNITY
End: 2019-04-15

## 2018-07-25 ASSESSMENT — PATIENT HEALTH QUESTIONNAIRE - PHQ9: CLINICAL INTERPRETATION OF PHQ2 SCORE: 0

## 2018-07-25 NOTE — ASSESSMENT & PLAN NOTE
Chronic condition, has chronic bilateral hip pain, been treated by ortho with injections in the past.   No complaints of pain today.

## 2018-07-25 NOTE — ASSESSMENT & PLAN NOTE
Results for ALBINO FENTON (MRN [id number]) as of 7/25/2018 08:52   Ref. Range 2/7/2018 09:10 6/13/2018 09:56   TSH Latest Ref Range: 0.380 - 5.330 uIU/mL 0.620 0.050 (L)   Free T-4 Latest Ref Range: 0.53 - 1.43 ng/dL 0.84 1.29   Is on levothyroxine 112 mcg

## 2018-07-25 NOTE — ASSESSMENT & PLAN NOTE
Results for ALBINO FENTON (MRN [id number]) as of 7/25/2018 08:52   Ref. Range 2/7/2018 09:10   Cholesterol,Tot Latest Ref Range: 100 - 199 mg/dL 280 (H)   Triglycerides Latest Ref Range: 0 - 149 mg/dL 306 (H)   HDL Latest Ref Range: >=40 mg/dL 66   LDL Latest Ref Range: <100 mg/dL 153 (H)   Reluctant to start statin. Is a light smoker, father had heart attack. I explained that with risk factors best to start on a statin, she wants to repeat blood tests and if still not improving, then start statin.

## 2018-07-25 NOTE — PROGRESS NOTES
Subjective:   Albino Culver is a 73 y.o. female here today for evaluation and management of:     Hyperlipidemia  Results for ALBINO CULVER (MRN [id number]) as of 7/25/2018 08:52   Ref. Range 2/7/2018 09:10   Cholesterol,Tot Latest Ref Range: 100 - 199 mg/dL 280 (H)   Triglycerides Latest Ref Range: 0 - 149 mg/dL 306 (H)   HDL Latest Ref Range: >=40 mg/dL 66   LDL Latest Ref Range: <100 mg/dL 153 (H)   Reluctant to start statin. Is a light smoker, father had heart attack. I explained that with risk factors best to start on a statin, she wants to repeat blood tests and if still not improving, then start statin.     Hypothyroidism  Results for ALBINO CULVER (MRN [id number]) as of 7/25/2018 08:52   Ref. Range 2/7/2018 09:10 6/13/2018 09:56   TSH Latest Ref Range: 0.380 - 5.330 uIU/mL 0.620 0.050 (L)   Free T-4 Latest Ref Range: 0.53 - 1.43 ng/dL 0.84 1.29   Is on levothyroxine 112 mcg         Current medicines (including changes today)  Current Outpatient Prescriptions   Medication Sig Dispense Refill   • Cyanocobalamin (VITAMIN B-12) 1000 MCG Tab Take 1,000 mcg by mouth every day.     • amitriptyline (ELAVIL) 50 MG Tab Take 50 mg by mouth every evening.     • Cholecalciferol 4000 units Cap Take  by mouth.     • omeprazole (PRILOSEC) 40 MG delayed-release capsule TAKE ONE CAPSULE BY MOUTH ONCE DAILY 90 Cap 0   • estradiol (ESTRACE) 1 MG Tab TAKE ONE TABLET BY MOUTH ONCE DAILY 90 Tab 0   • cyclobenzaprine (FLEXERIL) 10 MG Tab Take 1 Tab by mouth 3 times a day as needed. 90 Tab 0   • levothyroxine (SYNTHROID) 112 MCG Tab Take 1 Tab by mouth Every morning on an empty stomach. 90 Tab 3   • tramadol (ULTRAM) 50 MG Tab      • ibuprofen (MOTRIN) 800 MG Tab Take 1 Tab by mouth every 8 hours as needed for Mild Pain. 90 Tab 0   • Calcium Carbonate-Vitamin D ([employer]S CALCIUM/VIT D SOFT CHEWS PO) Take 2 Tabs by mouth every day.     • meclizine (ANTIVERT) 12.5 MG Tab      • Multiple Vitamins-Minerals (HAIR/SKIN/NAILS PO) Take 1 Tab by  "mouth every day.       No current facility-administered medications for this visit.      She  has a past medical history of Arthritis; Colon polyp (12/2011); Degeneration of lumbar or lumbosacral intervertebral disc; Ear problems (as child had surgery); Graves' disease with exophthalmos (6/13/2014); Hemorrhoid; History of carpal tunnel repair (7/31/2013); Menopause (10/14/2009); Osteoarthritis of multiple joints (6/13/2014); Pain in joint, pelvic region and thigh (2015); Pain in joint, shoulder region (2015); Periodic pelvic examination (due); Postcoital bleeding; Rash (12/29/2014); S/P colonoscopy (12/2011); Screening mammogram ( 11/2008=normal); Tobacco use disorder; Ulcer of the stomach and intestine (hx.); Unspecified cataract; and Unspecified hypothyroidism.    ROS  No chest pain, no shortness of breath, no abdominal pain       Objective:     Blood pressure 126/74, pulse 76, temperature 36.8 °C (98.3 °F), resp. rate 16, height 1.549 m (5' 1\"), weight 57.2 kg (126 lb), SpO2 97 %. Body mass index is 23.81 kg/m².   Physical Exam:  Constitutional: Alert, no distress.  Skin: Warm, dry, good turgor, no rashes in visible areas.  Eye: Equal, round and reactive, conjunctiva clear, lids normal.  ENMT: Lips without lesions, good dentition, oropharynx clear.  Neck: Trachea midline, no masses, no thyromegaly. No cervical or supraclavicular lymphadenopathy  Respiratory: Unlabored respiratory effort, lungs clear to auscultation, no wheezes, no ronchi.  Cardiovascular: Normal S1, S2, no murmur, no edema.  Abdomen: Soft, non-tender, no masses, no hepatosplenomegaly.  Psych: Alert and oriented x3, normal affect and mood.        Assessment and Plan:   The following treatment plan was discussed    1. Mixed hyperlipidemia  Recheck labs, if elevated start statin  - LIPID PROFILE; Future    2. Hypothyroidism, unspecified type  Recheck labs. Continue current dose of levothyroxine.         Followup: No Follow-up on file.         "

## 2018-08-15 ENCOUNTER — OFFICE VISIT (OUTPATIENT)
Dept: URGENT CARE | Facility: PHYSICIAN GROUP | Age: 73
End: 2018-08-15
Payer: MEDICARE

## 2018-08-15 VITALS
DIASTOLIC BLOOD PRESSURE: 70 MMHG | SYSTOLIC BLOOD PRESSURE: 130 MMHG | HEART RATE: 90 BPM | WEIGHT: 128.8 LBS | OXYGEN SATURATION: 93 % | HEIGHT: 61 IN | TEMPERATURE: 98.8 F | RESPIRATION RATE: 16 BRPM | BODY MASS INDEX: 24.32 KG/M2

## 2018-08-15 DIAGNOSIS — S39.012A ACUTE MYOFASCIAL STRAIN OF LUMBAR REGION, INITIAL ENCOUNTER: ICD-10-CM

## 2018-08-15 PROCEDURE — 99214 OFFICE O/P EST MOD 30 MIN: CPT | Performed by: PHYSICIAN ASSISTANT

## 2018-08-15 RX ORDER — TRAMADOL HYDROCHLORIDE 50 MG/1
50 TABLET ORAL EVERY 8 HOURS PRN
Qty: 30 TAB | Refills: 0 | Status: SHIPPED | OUTPATIENT
Start: 2018-08-15 | End: 2018-08-25

## 2018-08-15 RX ORDER — TIZANIDINE 4 MG/1
4 TABLET ORAL EVERY 6 HOURS PRN
Qty: 30 TAB | Refills: 3 | Status: SHIPPED | OUTPATIENT
Start: 2018-08-15 | End: 2019-04-12

## 2018-08-15 NOTE — PROGRESS NOTES
Chief Complaint   Patient presents with   • UTI   • Abdominal Pain     rt lower       HISTORY OF PRESENT ILLNESS: Patient is a 73 y.o. female who presents today for the following:    Patient comes in for evaluation of right-sided low back pain that radiates around to the right lower quadrant of her abdomen.  She has a history of the same but she is unable to control this pain with her tramadol.  She uses tramadol intermittently as needed for pain but typically only uses one half tab.  She states that this has not helped but she has not taken a full tab because it makes her fall asleep.  She has Flexeril at home which has not helped.  Ibuprofen 800 also has not diminished her pain.  She denies radiating pain down the leg, saddle anesthesia, bowel/bladder incontinence, and lower extremity weakness.  She has worsening pain with any right lower extremity movement, flexion at the waist, or trying to rollover in bed.  She has follow-up with her back specialist next week for an injection.    Patient Active Problem List    Diagnosis Date Noted   • Graves' ophthalmopathy 07/09/2015     Priority: High   • Osteoarthritis of multiple joints 06/13/2014     Priority: High   • Graves' disease with exophthalmos 06/13/2014     Priority: High   • Hyperlipidemia 06/22/2011     Priority: High   • Degeneration of lumbar or lumbosacral intervertebral disc 10/09/2009     Priority: High   • Hypothyroidism 10/09/2009     Priority: High   • Pain in joint, shoulder region 10/09/2009     Priority: High   • Dermatochalasis of both upper eyelids 04/20/2016     Priority: Low   • Abnormal WBC count 08/21/2015     Priority: Low   • Other and combined forms of senile cataract 03/17/2015     Priority: Low   • Osteopenia 05/09/2012     Priority: Low   • Post menopausal syndrome 03/08/2012     Priority: Low   • History of peptic ulcer 11/04/2009     Priority: Low   • GERD (gastroesophageal reflux disease) 11/04/2009     Priority: Low   • Sacroiliitis,  not elsewhere classified (HCC) 07/25/2018   • Right knee pain 04/21/2017   • Senile nuclear sclerosis 03/03/2015       Allergies:Codeine; Morphine; and Vicodin [hydrocodone-acetaminophen]    Current Outpatient Prescriptions Ordered in [facility]   Medication Sig Dispense Refill   • tramadol (ULTRAM) 50 MG Tab Take 1 Tab by mouth every 8 hours as needed for Mild Pain for up to 10 days. 30 Tab 0   • tizanidine (ZANAFLEX) 4 MG Tab Take 1 Tab by mouth every 6 hours as needed (pain). Do not use with flexeril 30 Tab 3   • Cyanocobalamin (VITAMIN B-12) 1000 MCG Tab Take 1,000 mcg by mouth every day.     • amitriptyline (ELAVIL) 50 MG Tab Take 50 mg by mouth every evening.     • Cholecalciferol 4000 units Cap Take  by mouth.     • omeprazole (PRILOSEC) 40 MG delayed-release capsule TAKE ONE CAPSULE BY MOUTH ONCE DAILY 90 Cap 0   • estradiol (ESTRACE) 1 MG Tab TAKE ONE TABLET BY MOUTH ONCE DAILY 90 Tab 0   • cyclobenzaprine (FLEXERIL) 10 MG Tab Take 1 Tab by mouth 3 times a day as needed. 90 Tab 0   • levothyroxine (SYNTHROID) 112 MCG Tab Take 1 Tab by mouth Every morning on an empty stomach. 90 Tab 3   • tramadol (ULTRAM) 50 MG Tab      • ibuprofen (MOTRIN) 800 MG Tab Take 1 Tab by mouth every 8 hours as needed for Mild Pain. 90 Tab 0   • Calcium Carbonate-Vitamin D ([employer] CALCIUM/VIT D SOFT CHEWS PO) Take 2 Tabs by mouth every day.     • meclizine (ANTIVERT) 12.5 MG Tab      • Multiple Vitamins-Minerals (HAIR/SKIN/NAILS PO) Take 1 Tab by mouth every day.       No current [facility]-ordered facility-administered medications on file.        Past Medical History:   Diagnosis Date   • Arthritis     hips, back and shoulders   • Colon polyp 12/2011   • Degeneration of lumbar or lumbosacral intervertebral disc     Dr. Cisneros   • Ear problems as child had surgery   • Graves' disease with exophthalmos 6/13/2014    Being followed by Dr. Han San   • Hemorrhoid     internal   • History of carpal tunnel repair 7/31/2013    Dr. Villa  repaired both.   • Menopause 10/14/2009   • Osteoarthritis of multiple joints 2014   • Pain in joint, pelvic region and thigh     bursitis and arthritis in hips-takes ibuprofen and darvacet   • Pain in joint, shoulder region    • Periodic pelvic examination due   • Postcoital bleeding     improved   • Rash 2014    Started about a week ago when she got news about surgery High stress Nothing new Back, stomach, chest, arms Itch No pain   • S/P colonoscopy 2011    repeat in 5 years- had polyp, initial .    • Screening mammogram  2008=normal   • Tobacco use disorder    • Ulcer of the stomach and intestine hx.    stable   • Unspecified cataract     bilateral IOL   • Unspecified hypothyroidism        Social History   Substance Use Topics   • Smoking status: Former Smoker     Packs/day: 1.00     Years: 50.00     Types: Cigarettes     Quit date: 2014   • Smokeless tobacco: Never Used      Comment: use E cig   • Alcohol use 0.6 oz/week     1 Glasses of wine per week      Comment:      0-1 per month                              Family Status   Relation Status   • Mo  at age 69 yo        had a hx. of stomach problems   • Fa         commited suicide   • Sis Alive        3 sisters with thyroid problems   • Sis Alive        has a hx. of breast cyst   • Neg Hx (Not Specified)     Family History   Problem Relation Age of Onset   • Hypertension Father    • Heart Disease Father         MI age 47 yo   • Cancer Neg Hx    • Diabetes Neg Hx        Review of Systems:    Constitutional ROS: No unexpected change in weight, No weakness, No fatigue  Eye ROS: No recent significant change in vision, No eye pain, redness, discharge  Ear ROS: No drainage, No tinnitus or vertigo, No recent change in hearing  Mouth/Throat ROS: No teeth or gum problems, No bleeding gums, No tongue complaints  Neck ROS: No swollen glands, No significant pain in neck  Pulmonary ROS: No chronic cough, sputum, or  "hemoptysis, No dyspnea on exertion, No wheezing  Cardiovascular ROS: No diaphoresis, No edema, No palpitations  Gastrointestinal ROS: No change in bowel habits, No significant change in appetite, No nausea, vomiting, diarrhea, or constipation  Hematologic/Lymphatic ROS: No chills, No night sweats, No weight loss  Skin/Integumentary ROS: No edema, No evidence of rash, No itching      Exam:  Blood pressure 130/70, pulse 90, temperature 37.1 °C (98.8 °F), resp. rate 16, height 1.549 m (5' 1\"), weight 58.4 kg (128 lb 12.8 oz), SpO2 93 %.  General: Well developed, well nourished. No distress.  HEENT: Head is grossly normal.  Pulmonary: Unlabored respiratory effort. Lungs clear to auscultation, no wheezes, no rhonchi.  Cardiovascular: Regular rate and rhythm without murmur.  Pedal pulses are strong and equal bilaterally.  Abdomen: Soft, nondistended.  No right lower quadrant tenderness noted on palpation.  Back: No localized tenderness noted but patient points to the right lower back with any movement.  Decreased range of motion in all planes due to pain.  Extremities: No motor or sensory deficit noted.  Prepatellar DTRs are strong and equal bilaterally.  Worsening right low back pain with any flexion or extension of the right hip.  Skin: Warm, dry, good turgor. No rashes in visible areas.   Psych: Normal mood. Alert and oriented x3. Judgment and insight is normal.    Assessment/Plan:  Refill tramadol.  Will change Flexeril to tizanidine to see if this offers additional relief.  Continue heat and over-the-counter muscle rubs.  Follow-up for worsening or persistent symptoms.  Follow-up with her back specialist next week as scheduled.    Prescription Monitoring Program report was reviewed. No evidence of medication abuse or misuse. Discussed the Controlled Substance Use Informed Consent which includes risks and benefits, proper use, storage and disposal, refills, minors, opioids and narcotics, and alternatives. I have " assessed the patient’s risk for abuse, dependency, and addiction using the validated Opioid Risk Tool available at https://www.mdcalc.com/baobww-ogxn-biut-ort-narcotic-abuse. All questions answered.   1. Acute myofascial strain of lumbar region, initial encounter  tramadol (ULTRAM) 50 MG Tab    CONSENT FOR OPIATE PRESCRIPTION    tizanidine (ZANAFLEX) 4 MG Tab

## 2018-09-18 ENCOUNTER — HOSPITAL ENCOUNTER (OUTPATIENT)
Dept: LAB | Facility: MEDICAL CENTER | Age: 73
End: 2018-09-18
Attending: FAMILY MEDICINE
Payer: MEDICARE

## 2018-09-18 DIAGNOSIS — R79.89 ABNORMAL TSH: ICD-10-CM

## 2018-09-18 LAB
T4 FREE SERPL-MCNC: 1.11 NG/DL (ref 0.53–1.43)
THYROPEROXIDASE AB SERPL-ACNC: 1.3 IU/ML (ref 0–9)
TSH SERPL DL<=0.005 MIU/L-ACNC: 0.05 UIU/ML (ref 0.38–5.33)

## 2018-09-18 PROCEDURE — 86800 THYROGLOBULIN ANTIBODY: CPT

## 2018-09-18 PROCEDURE — 84443 ASSAY THYROID STIM HORMONE: CPT

## 2018-09-18 PROCEDURE — 84439 ASSAY OF FREE THYROXINE: CPT

## 2018-09-18 PROCEDURE — 36415 COLL VENOUS BLD VENIPUNCTURE: CPT

## 2018-09-18 PROCEDURE — 86376 MICROSOMAL ANTIBODY EACH: CPT

## 2018-09-18 PROCEDURE — 80061 LIPID PANEL: CPT

## 2018-09-19 LAB
CHOLEST SERPL-MCNC: 267 MG/DL (ref 100–199)
FASTING STATUS PATIENT QL REPORTED: NORMAL
HDLC SERPL-MCNC: 58 MG/DL
LDLC SERPL CALC-MCNC: 130 MG/DL
TRIGL SERPL-MCNC: 395 MG/DL (ref 0–149)

## 2018-09-19 NOTE — PROGRESS NOTES
Makenna,  Your cholesterol labs have improved! Your triglycerides are high though (free fats) try to avoid sugary and processed foods, eat more vegetables, some fruit.  Your Thyroid labs are stable.   Frances Laboy M.D.

## 2018-09-20 LAB — THYROGLOB AB SERPL-ACNC: <0.9 IU/ML (ref 0–4)

## 2018-09-21 RX ORDER — OMEPRAZOLE 40 MG/1
CAPSULE, DELAYED RELEASE ORAL
Qty: 90 CAP | Refills: 1 | Status: SHIPPED | OUTPATIENT
Start: 2018-09-21 | End: 2019-03-19 | Stop reason: SDUPTHER

## 2018-10-08 ENCOUNTER — HOSPITAL ENCOUNTER (OUTPATIENT)
Dept: RADIOLOGY | Facility: MEDICAL CENTER | Age: 73
End: 2018-10-08
Attending: PHYSICAL MEDICINE & REHABILITATION
Payer: MEDICARE

## 2018-10-08 VITALS
DIASTOLIC BLOOD PRESSURE: 61 MMHG | HEART RATE: 75 BPM | SYSTOLIC BLOOD PRESSURE: 115 MMHG | RESPIRATION RATE: 18 BRPM | OXYGEN SATURATION: 96 % | TEMPERATURE: 98.2 F | BODY MASS INDEX: 23.41 KG/M2 | HEIGHT: 61 IN | WEIGHT: 124 LBS

## 2018-10-08 DIAGNOSIS — M25.551 RIGHT HIP PAIN: ICD-10-CM

## 2018-10-08 PROCEDURE — 99153 MOD SED SAME PHYS/QHP EA: CPT

## 2018-10-08 PROCEDURE — 73721 MRI JNT OF LWR EXTRE W/O DYE: CPT | Mod: RT

## 2018-10-08 RX ORDER — SODIUM CHLORIDE, SODIUM LACTATE, POTASSIUM CHLORIDE, CALCIUM CHLORIDE 600; 310; 30; 20 MG/100ML; MG/100ML; MG/100ML; MG/100ML
INJECTION, SOLUTION INTRAVENOUS CONTINUOUS
Status: DISCONTINUED | OUTPATIENT
Start: 2018-10-08 | End: 2018-10-09 | Stop reason: HOSPADM

## 2018-10-08 RX ORDER — MIDAZOLAM HYDROCHLORIDE 1 MG/ML
INJECTION INTRAMUSCULAR; INTRAVENOUS
Status: DISPENSED
Start: 2018-10-08 | End: 2018-10-08

## 2018-10-08 RX ORDER — ONDANSETRON 2 MG/ML
4 INJECTION INTRAMUSCULAR; INTRAVENOUS
Status: DISCONTINUED | OUTPATIENT
Start: 2018-10-08 | End: 2018-10-09 | Stop reason: HOSPADM

## 2018-10-08 ASSESSMENT — PAIN SCALES - GENERAL
PAINLEVEL_OUTOF10: 0

## 2018-12-13 ENCOUNTER — HOSPITAL ENCOUNTER (OUTPATIENT)
Facility: MEDICAL CENTER | Age: 73
End: 2018-12-13
Payer: MEDICARE

## 2018-12-13 PROCEDURE — 82274 ASSAY TEST FOR BLOOD FECAL: CPT

## 2018-12-18 LAB — HEMOCCULT STL QL IA: NEGATIVE

## 2019-01-16 ENCOUNTER — OFFICE VISIT (OUTPATIENT)
Dept: MEDICAL GROUP | Facility: PHYSICIAN GROUP | Age: 74
End: 2019-01-16
Payer: MEDICARE

## 2019-01-16 VITALS
TEMPERATURE: 97.8 F | WEIGHT: 130 LBS | HEIGHT: 61 IN | HEART RATE: 62 BPM | RESPIRATION RATE: 12 BRPM | BODY MASS INDEX: 24.55 KG/M2 | SYSTOLIC BLOOD PRESSURE: 114 MMHG | OXYGEN SATURATION: 93 % | DIASTOLIC BLOOD PRESSURE: 68 MMHG

## 2019-01-16 DIAGNOSIS — Z12.39 BREAST CANCER SCREENING: ICD-10-CM

## 2019-01-16 DIAGNOSIS — M46.1 SACROILIITIS, NOT ELSEWHERE CLASSIFIED (HCC): ICD-10-CM

## 2019-01-16 DIAGNOSIS — Z23 NEED FOR VACCINATION: ICD-10-CM

## 2019-01-16 DIAGNOSIS — E78.2 MIXED HYPERLIPIDEMIA: ICD-10-CM

## 2019-01-16 DIAGNOSIS — E03.9 HYPOTHYROIDISM, UNSPECIFIED TYPE: ICD-10-CM

## 2019-01-16 PROCEDURE — 99214 OFFICE O/P EST MOD 30 MIN: CPT | Mod: 25 | Performed by: FAMILY MEDICINE

## 2019-01-16 PROCEDURE — G0009 ADMIN PNEUMOCOCCAL VACCINE: HCPCS | Performed by: FAMILY MEDICINE

## 2019-01-16 PROCEDURE — 90732 PPSV23 VACC 2 YRS+ SUBQ/IM: CPT | Performed by: FAMILY MEDICINE

## 2019-01-16 RX ORDER — BENZONATATE 100 MG/1
100 CAPSULE ORAL 3 TIMES DAILY PRN
Qty: 60 CAP | Refills: 0 | Status: SHIPPED | OUTPATIENT
Start: 2019-01-16 | End: 2019-04-12

## 2019-01-16 RX ORDER — INFLUENZA A VIRUS A/MICHIGAN/45/2015 X-275 (H1N1) ANTIGEN (FORMALDEHYDE INACTIVATED), INFLUENZA A VIRUS A/SINGAPORE/INFIMH-16-0019/2016 IVR-186 (H3N2) ANTIGEN (FORMALDEHYDE INACTIVATED), AND INFLUENZA B VIRUS B/MARYLAND/15/2016 BX-69A (A B/COLORADO/6/2017-LIKE VIRUS) ANTIGEN (FORMALDEHYDE INACTIVATED) 60; 60; 60 UG/.5ML; UG/.5ML; UG/.5ML
INJECTION, SUSPENSION INTRAMUSCULAR
COMMUNITY
Start: 2018-12-26 | End: 2019-04-12

## 2019-01-16 NOTE — ASSESSMENT & PLAN NOTE
Chronic condition, worse due to scoliosis. She has epidural injections done which help with this.

## 2019-01-16 NOTE — ASSESSMENT & PLAN NOTE
tsh still slightly suppressed. Pt's symptoms though are weight gain, dry skin, hair loss, she has no diarrhea or palpitations  Advised she can skip one day of the week of levothyroxine 112 mcg current dose and continue it remaining 6 days of the week.   Will recheck TSH and T4 in 3 months.

## 2019-01-16 NOTE — PROGRESS NOTES
Subjective:   Makenna Culver is a 73 y.o. female here today for evaluation and management of:     Sacroiliitis, not elsewhere classified (HCC)  Chronic condition, worse due to scoliosis. She has epidural injections done which help with this.       Hypothyroidism  tsh still slightly suppressed. Pt's symptoms though are weight gain, dry skin, hair loss, she has no diarrhea or palpitations  Advised she can skip one day of the week of levothyroxine 112 mcg current dose and continue it remaining 6 days of the week.   Will recheck TSH and T4 in 3 months.     Hyperlipidemia  Declines statin, LDL has improved.   TG elevated so advised on diet changes.   Recheck labs in 6 months.          Current medicines (including changes today)  Current Outpatient Prescriptions   Medication Sig Dispense Refill   • benzonatate (TESSALON) 100 MG Cap Take 1 Cap by mouth 3 times a day as needed for Cough. 60 Cap 0   • omeprazole (PRILOSEC) 40 MG delayed-release capsule TAKE 1 CAPSULE BY MOUTH ONCE DAILY 90 Cap 1   • tizanidine (ZANAFLEX) 4 MG Tab Take 1 Tab by mouth every 6 hours as needed (pain). Do not use with flexeril 30 Tab 3   • Cyanocobalamin (VITAMIN B-12) 1000 MCG Tab Take 1,000 mcg by mouth every day.     • amitriptyline (ELAVIL) 50 MG Tab Take 50 mg by mouth every evening.     • Cholecalciferol 4000 units Cap Take  by mouth.     • estradiol (ESTRACE) 1 MG Tab TAKE ONE TABLET BY MOUTH ONCE DAILY 90 Tab 0   • cyclobenzaprine (FLEXERIL) 10 MG Tab Take 1 Tab by mouth 3 times a day as needed. 90 Tab 0   • levothyroxine (SYNTHROID) 112 MCG Tab Take 1 Tab by mouth Every morning on an empty stomach. 90 Tab 3   • tramadol (ULTRAM) 50 MG Tab      • meclizine (ANTIVERT) 12.5 MG Tab      • ibuprofen (MOTRIN) 800 MG Tab Take 1 Tab by mouth every 8 hours as needed for Mild Pain. 90 Tab 0   • Calcium Carbonate-Vitamin D ([employer]S CALCIUM/VIT D SOFT CHEWS PO) Take 2 Tabs by mouth every day.     • FLUZONE HIGH-DOSE 0.5 ML Suspension Prefilled Syringe  "injection      • Multiple Vitamins-Minerals (HAIR/SKIN/NAILS PO) Take 1 Tab by mouth every day.       No current facility-administered medications for this visit.      She  has a past medical history of Arthritis; Colon polyp (12/2011); Degeneration of lumbar or lumbosacral intervertebral disc; Ear problems (as child had surgery); Graves' disease with exophthalmos (6/13/2014); Hemorrhoid; History of carpal tunnel repair (7/31/2013); Menopause (10/14/2009); Osteoarthritis of multiple joints (6/13/2014); Pain in joint, pelvic region and thigh (2015); Pain in joint, shoulder region (2015); Periodic pelvic examination (due); Postcoital bleeding; Rash (12/29/2014); S/P colonoscopy (12/2011); Screening mammogram ( 11/2008=normal); Tobacco use disorder; Ulcer of the stomach and intestine (hx.); Unspecified cataract; and Unspecified hypothyroidism.    ROS  No chest pain, no shortness of breath, no abdominal pain       Objective:     Blood pressure 114/68, pulse 62, temperature 36.6 °C (97.8 °F), temperature source Temporal, resp. rate 12, height 1.549 m (5' 1\"), weight 59 kg (130 lb), SpO2 93 %, not currently breastfeeding. Body mass index is 24.56 kg/m².   Physical Exam:  Constitutional: Alert, no distress.  Skin: Warm, dry, good turgor, no rashes in visible areas.  Eye: Equal, round and reactive, conjunctiva clear, lids normal.  ENMT: Lips without lesions, good dentition, oropharynx clear.  Neck: Trachea midline, no masses, no thyromegaly. No cervical or supraclavicular lymphadenopathy  Respiratory: Unlabored respiratory effort, lungs clear to auscultation, no wheezes, no ronchi.  Cardiovascular: Normal S1, S2, no murmur, no edema.  Abdomen: Soft, non-tender, no masses, no hepatosplenomegaly.  Psych: Alert and oriented x3, normal affect and mood.        Assessment and Plan:   The following treatment plan was discussed    1. Sacroiliitis, not elsewhere classified (HCC)  No acute changes.       2. Hypothyroidism, " unspecified type  Advised to reduce dose: will skip one day of the week.   Recheck labs in 3-6 months.   - TSH WITH REFLEX TO FT4; Future    3. Mixed hyperlipidemia  Improving  Declines statin  Recheck labs.   - Lipid Profile; Future    4. Need for vaccination  - PneumoVax PPV23 =>1yo    5. Breast cancer screening  - MA-SCREEN MAMMO W/CAD-BILAT; Future      Followup: Return in about 6 months (around 7/16/2019) for lab follow up thyroid and cholesterol.

## 2019-01-23 ENCOUNTER — HOSPITAL ENCOUNTER (OUTPATIENT)
Facility: MEDICAL CENTER | Age: 74
End: 2019-01-23
Attending: OPHTHALMOLOGY | Admitting: OPHTHALMOLOGY
Payer: MEDICARE

## 2019-02-02 NOTE — ASSESSMENT & PLAN NOTE
Declines statin, LDL has improved.   TG elevated so advised on diet changes.   Recheck labs in 6 months.    <<-----Click on this checkbox to enter Post-Op Dx

## 2019-02-06 ENCOUNTER — TELEPHONE (OUTPATIENT)
Dept: MEDICAL GROUP | Facility: PHYSICIAN GROUP | Age: 74
End: 2019-02-06

## 2019-02-07 RX ORDER — CYCLOBENZAPRINE HCL 10 MG
10 TABLET ORAL 3 TIMES DAILY PRN
Qty: 90 TAB | Refills: 0 | Status: CANCELLED | OUTPATIENT
Start: 2019-02-07

## 2019-02-08 ENCOUNTER — TELEPHONE (OUTPATIENT)
Dept: MEDICAL GROUP | Facility: PHYSICIAN GROUP | Age: 74
End: 2019-02-08

## 2019-02-08 RX ORDER — CYCLOBENZAPRINE HCL 10 MG
10 TABLET ORAL 3 TIMES DAILY PRN
Qty: 90 TAB | Refills: 0 | Status: SHIPPED | OUTPATIENT
Start: 2019-02-08 | End: 2019-04-15

## 2019-02-08 NOTE — TELEPHONE ENCOUNTER
From: Makenna Culver  Sent: 2/6/2019 6:54 PM PST  Subject: Medication Renewal Request    Makenna Culver would like a refill of the following medications:     cyclobenzaprine (FLEXERIL) 10 MG Tab [Frances Laboy M.D.]   Patient Comment: i asked for a refill and it wasn't called in.     Preferred pharmacy: [facility] [address] [place] [address]      
Last seen by PCP 1/19. Will send 1 month fill to pharmacy.  
Last seen by PCP 1/19. Will send 1 month fill to pharmacy.  
Phone Number Called: [phone]     Message: patient notified    Left Message for patient to call back: N\A        
Was the patient seen in the last year in this department? Yes    Does patient have an active prescription for medications requested? No     Received Request Via: Pharmacy      Pt met protocol?: Yes    LAST OV 01/16/2019     
Was the patient seen in the last year in this department? Yes    Does patient have an active prescription for medications requested? Yes    Received Request Via: Patient    
Why can't this rx be renewed?  
Endometriosis  Stage 4  Hashimoto's disease    Hypothyroid    Migraine

## 2019-02-08 NOTE — TELEPHONE ENCOUNTER
----- Message from Makenna Culver sent at 2/7/2019  7:10 PM PST -----  Regarding: Non-Urgent Medical Question  Contact: [phone]  why is my cyclobenzaprine not renewed? you told me not to take it with my other flexall and i don't. it is a little strong and it works when i need it.  makenna culver

## 2019-03-13 ENCOUNTER — HOSPITAL ENCOUNTER (OUTPATIENT)
Dept: RADIOLOGY | Facility: MEDICAL CENTER | Age: 74
End: 2019-03-13
Attending: FAMILY MEDICINE
Payer: MEDICARE

## 2019-03-13 DIAGNOSIS — Z12.39 BREAST CANCER SCREENING: ICD-10-CM

## 2019-03-13 PROCEDURE — 77063 BREAST TOMOSYNTHESIS BI: CPT

## 2019-03-20 RX ORDER — OMEPRAZOLE 40 MG/1
CAPSULE, DELAYED RELEASE ORAL
Qty: 90 CAP | Refills: 1 | Status: SHIPPED | OUTPATIENT
Start: 2019-03-20 | End: 2019-04-12

## 2019-03-20 NOTE — TELEPHONE ENCOUNTER
Was the patient seen in the last year in this department? Yes    Does patient have an active prescription for medications requested? No    Received Request Via: Pharmacy    Pt met protocol?: Yes     Last OV 01/2019

## 2019-04-03 ENCOUNTER — HOSPITAL ENCOUNTER (OUTPATIENT)
Dept: LAB | Facility: MEDICAL CENTER | Age: 74
End: 2019-04-03
Attending: FAMILY MEDICINE
Payer: MEDICARE

## 2019-04-03 DIAGNOSIS — E03.9 HYPOTHYROIDISM, UNSPECIFIED TYPE: ICD-10-CM

## 2019-04-03 DIAGNOSIS — E78.2 MIXED HYPERLIPIDEMIA: ICD-10-CM

## 2019-04-03 LAB
CHOLEST SERPL-MCNC: 257 MG/DL (ref 100–199)
FASTING STATUS PATIENT QL REPORTED: NORMAL
HDLC SERPL-MCNC: 75 MG/DL
LDLC SERPL CALC-MCNC: 148 MG/DL
T4 FREE SERPL-MCNC: 1.03 NG/DL (ref 0.53–1.43)
TRIGL SERPL-MCNC: 169 MG/DL (ref 0–149)
TSH SERPL DL<=0.005 MIU/L-ACNC: 0.17 UIU/ML (ref 0.38–5.33)

## 2019-04-03 PROCEDURE — 36415 COLL VENOUS BLD VENIPUNCTURE: CPT

## 2019-04-03 PROCEDURE — 84439 ASSAY OF FREE THYROXINE: CPT

## 2019-04-03 PROCEDURE — 80061 LIPID PANEL: CPT

## 2019-04-03 PROCEDURE — 84443 ASSAY THYROID STIM HORMONE: CPT

## 2019-04-12 ENCOUNTER — APPOINTMENT (OUTPATIENT)
Dept: ADMISSIONS | Facility: MEDICAL CENTER | Age: 74
End: 2019-04-12
Attending: OPHTHALMOLOGY
Payer: MEDICARE

## 2019-04-12 ENCOUNTER — HOSPITAL ENCOUNTER (OUTPATIENT)
Dept: LAB | Facility: MEDICAL CENTER | Age: 74
End: 2019-04-12
Attending: OPHTHALMOLOGY
Payer: MEDICARE

## 2019-04-12 DIAGNOSIS — Z01.812 PRE-OPERATIVE LABORATORY EXAMINATION: ICD-10-CM

## 2019-04-12 LAB
ANION GAP SERPL CALC-SCNC: 10 MMOL/L (ref 0–11.9)
BASOPHILS # BLD AUTO: 0.3 % (ref 0–1.8)
BASOPHILS # BLD: 0.02 K/UL (ref 0–0.12)
BUN SERPL-MCNC: 15 MG/DL (ref 8–22)
CALCIUM SERPL-MCNC: 8.8 MG/DL (ref 8.5–10.5)
CHLORIDE SERPL-SCNC: 101 MMOL/L (ref 96–112)
CO2 SERPL-SCNC: 25 MMOL/L (ref 20–33)
CREAT SERPL-MCNC: 0.92 MG/DL (ref 0.5–1.4)
EOSINOPHIL # BLD AUTO: 0.02 K/UL (ref 0–0.51)
EOSINOPHIL NFR BLD: 0.3 % (ref 0–6.9)
ERYTHROCYTE [DISTWIDTH] IN BLOOD BY AUTOMATED COUNT: 46.5 FL (ref 35.9–50)
GLUCOSE SERPL-MCNC: 104 MG/DL (ref 65–99)
HCT VFR BLD AUTO: 39.8 % (ref 37–47)
HGB BLD-MCNC: 12.8 G/DL (ref 12–16)
IMM GRANULOCYTES # BLD AUTO: 0.01 K/UL (ref 0–0.11)
IMM GRANULOCYTES NFR BLD AUTO: 0.1 % (ref 0–0.9)
LYMPHOCYTES # BLD AUTO: 2.55 K/UL (ref 1–4.8)
LYMPHOCYTES NFR BLD: 34 % (ref 22–41)
MCH RBC QN AUTO: 28.8 PG (ref 27–33)
MCHC RBC AUTO-ENTMCNC: 32.2 G/DL (ref 33.6–35)
MCV RBC AUTO: 89.6 FL (ref 81.4–97.8)
MONOCYTES # BLD AUTO: 0.53 K/UL (ref 0–0.85)
MONOCYTES NFR BLD AUTO: 7.1 % (ref 0–13.4)
NEUTROPHILS # BLD AUTO: 4.37 K/UL (ref 2–7.15)
NEUTROPHILS NFR BLD: 58.2 % (ref 44–72)
NRBC # BLD AUTO: 0 K/UL
NRBC BLD-RTO: 0 /100 WBC
PLATELET # BLD AUTO: 510 K/UL (ref 164–446)
PMV BLD AUTO: 9.6 FL (ref 9–12.9)
POTASSIUM SERPL-SCNC: 3.9 MMOL/L (ref 3.6–5.5)
RBC # BLD AUTO: 4.44 M/UL (ref 4.2–5.4)
SODIUM SERPL-SCNC: 136 MMOL/L (ref 135–145)
WBC # BLD AUTO: 7.5 K/UL (ref 4.8–10.8)

## 2019-04-12 PROCEDURE — 80048 BASIC METABOLIC PNL TOTAL CA: CPT

## 2019-04-12 PROCEDURE — 85025 COMPLETE CBC W/AUTO DIFF WBC: CPT

## 2019-04-12 PROCEDURE — 36415 COLL VENOUS BLD VENIPUNCTURE: CPT

## 2019-04-12 RX ORDER — ESTRADIOL 1 MG/1
1 TABLET ORAL EVERY MORNING
Status: ON HOLD | COMMUNITY
End: 2019-04-16 | Stop reason: HOSPADM

## 2019-04-12 RX ORDER — OMEPRAZOLE 40 MG/1
40 CAPSULE, DELAYED RELEASE ORAL
Status: ON HOLD | COMMUNITY
End: 2019-04-16 | Stop reason: HOSPADM

## 2019-04-15 ENCOUNTER — APPOINTMENT (OUTPATIENT)
Dept: RADIOLOGY | Facility: MEDICAL CENTER | Age: 74
DRG: 872 | End: 2019-04-15
Attending: EMERGENCY MEDICINE
Payer: MEDICARE

## 2019-04-15 ENCOUNTER — HOSPITAL ENCOUNTER (INPATIENT)
Facility: MEDICAL CENTER | Age: 74
LOS: 2 days | DRG: 872 | End: 2019-04-17
Attending: EMERGENCY MEDICINE | Admitting: HOSPITALIST
Payer: MEDICARE

## 2019-04-15 DIAGNOSIS — K52.9 COLITIS: ICD-10-CM

## 2019-04-15 DIAGNOSIS — R91.1 PULMONARY NODULE: ICD-10-CM

## 2019-04-15 PROBLEM — K76.9 LIVER LESION: Status: ACTIVE | Noted: 2019-04-15

## 2019-04-15 PROBLEM — A41.9 SEPSIS (HCC): Status: ACTIVE | Noted: 2019-04-15

## 2019-04-15 PROBLEM — E87.1 HYPONATREMIA: Status: ACTIVE | Noted: 2019-04-15

## 2019-04-15 LAB
ALBUMIN SERPL BCP-MCNC: 4.5 G/DL (ref 3.2–4.9)
ALBUMIN/GLOB SERPL: 2.3 G/DL
ALP SERPL-CCNC: 80 U/L (ref 30–99)
ALT SERPL-CCNC: 18 U/L (ref 2–50)
ANION GAP SERPL CALC-SCNC: 12 MMOL/L (ref 0–11.9)
APPEARANCE UR: ABNORMAL
APTT PPP: 25.8 SEC (ref 24.7–36)
AST SERPL-CCNC: 19 U/L (ref 12–45)
BACTERIA #/AREA URNS HPF: ABNORMAL /HPF
BASOPHILS # BLD AUTO: 0.2 % (ref 0–1.8)
BASOPHILS # BLD AUTO: 0.4 % (ref 0–1.8)
BASOPHILS # BLD: 0.03 K/UL (ref 0–0.12)
BASOPHILS # BLD: 0.08 K/UL (ref 0–0.12)
BILIRUB SERPL-MCNC: 0.5 MG/DL (ref 0.1–1.5)
BILIRUB UR QL STRIP.AUTO: ABNORMAL
BUN SERPL-MCNC: 14 MG/DL (ref 8–22)
CALCIUM SERPL-MCNC: 9.3 MG/DL (ref 8.5–10.5)
CHLORIDE SERPL-SCNC: 99 MMOL/L (ref 96–112)
CO2 SERPL-SCNC: 22 MMOL/L (ref 20–33)
COLOR UR: ABNORMAL
CREAT SERPL-MCNC: 0.84 MG/DL (ref 0.5–1.4)
EOSINOPHIL # BLD AUTO: 0 K/UL (ref 0–0.51)
EOSINOPHIL # BLD AUTO: 0 K/UL (ref 0–0.51)
EOSINOPHIL NFR BLD: 0 % (ref 0–6.9)
EOSINOPHIL NFR BLD: 0 % (ref 0–6.9)
EPI CELLS #/AREA URNS HPF: ABNORMAL /HPF
ERYTHROCYTE [DISTWIDTH] IN BLOOD BY AUTOMATED COUNT: 45.2 FL (ref 35.9–50)
ERYTHROCYTE [DISTWIDTH] IN BLOOD BY AUTOMATED COUNT: 45.8 FL (ref 35.9–50)
GLOBULIN SER CALC-MCNC: 2 G/DL (ref 1.9–3.5)
GLUCOSE SERPL-MCNC: 144 MG/DL (ref 65–99)
GLUCOSE UR STRIP.AUTO-MCNC: NEGATIVE MG/DL
HCT VFR BLD AUTO: 38.2 % (ref 37–47)
HCT VFR BLD AUTO: 40.7 % (ref 37–47)
HGB BLD-MCNC: 12.5 G/DL (ref 12–16)
HGB BLD-MCNC: 13.5 G/DL (ref 12–16)
IMM GRANULOCYTES # BLD AUTO: 0.04 K/UL (ref 0–0.11)
IMM GRANULOCYTES # BLD AUTO: 0.07 K/UL (ref 0–0.11)
IMM GRANULOCYTES NFR BLD AUTO: 0.3 % (ref 0–0.9)
IMM GRANULOCYTES NFR BLD AUTO: 0.4 % (ref 0–0.9)
INR PPP: 0.98 (ref 0.87–1.13)
KETONES UR STRIP.AUTO-MCNC: 15 MG/DL
LEUKOCYTE ESTERASE UR QL STRIP.AUTO: ABNORMAL
LIPASE SERPL-CCNC: 11 U/L (ref 11–82)
LYMPHOCYTES # BLD AUTO: 0.9 K/UL (ref 1–4.8)
LYMPHOCYTES # BLD AUTO: 1.07 K/UL (ref 1–4.8)
LYMPHOCYTES NFR BLD: 4.8 % (ref 22–41)
LYMPHOCYTES NFR BLD: 7.4 % (ref 22–41)
MCH RBC QN AUTO: 28.9 PG (ref 27–33)
MCH RBC QN AUTO: 29.1 PG (ref 27–33)
MCHC RBC AUTO-ENTMCNC: 32.7 G/DL (ref 33.6–35)
MCHC RBC AUTO-ENTMCNC: 33.2 G/DL (ref 33.6–35)
MCV RBC AUTO: 87.7 FL (ref 81.4–97.8)
MCV RBC AUTO: 88.4 FL (ref 81.4–97.8)
MICRO URNS: ABNORMAL
MONOCYTES # BLD AUTO: 0.5 K/UL (ref 0–0.85)
MONOCYTES # BLD AUTO: 0.72 K/UL (ref 0–0.85)
MONOCYTES NFR BLD AUTO: 3.5 % (ref 0–13.4)
MONOCYTES NFR BLD AUTO: 3.8 % (ref 0–13.4)
NEUTROPHILS # BLD AUTO: 12.76 K/UL (ref 2–7.15)
NEUTROPHILS # BLD AUTO: 16.95 K/UL (ref 2–7.15)
NEUTROPHILS NFR BLD: 88.6 % (ref 44–72)
NEUTROPHILS NFR BLD: 90.6 % (ref 44–72)
NITRITE UR QL STRIP.AUTO: NEGATIVE
NRBC # BLD AUTO: 0 K/UL
NRBC # BLD AUTO: 0 K/UL
NRBC BLD-RTO: 0 /100 WBC
NRBC BLD-RTO: 0 /100 WBC
PH UR STRIP.AUTO: 5.5 [PH]
PLATELET # BLD AUTO: 419 K/UL (ref 164–446)
PLATELET # BLD AUTO: 480 K/UL (ref 164–446)
PMV BLD AUTO: 8.5 FL (ref 9–12.9)
PMV BLD AUTO: 8.7 FL (ref 9–12.9)
POTASSIUM SERPL-SCNC: 4 MMOL/L (ref 3.6–5.5)
PROT SERPL-MCNC: 6.5 G/DL (ref 6–8.2)
PROT UR QL STRIP: NEGATIVE MG/DL
PROTHROMBIN TIME: 13.1 SEC (ref 12–14.6)
RBC # BLD AUTO: 4.32 M/UL (ref 4.2–5.4)
RBC # BLD AUTO: 4.64 M/UL (ref 4.2–5.4)
RBC # URNS HPF: ABNORMAL /HPF
RBC UR QL AUTO: NEGATIVE
SODIUM SERPL-SCNC: 133 MMOL/L (ref 135–145)
SP GR UR STRIP.AUTO: 1.02
UROBILINOGEN UR STRIP.AUTO-MCNC: 1 MG/DL
WBC # BLD AUTO: 14.4 K/UL (ref 4.8–10.8)
WBC # BLD AUTO: 18.7 K/UL (ref 4.8–10.8)
WBC #/AREA URNS HPF: ABNORMAL /HPF

## 2019-04-15 PROCEDURE — 74177 CT ABD & PELVIS W/CONTRAST: CPT

## 2019-04-15 PROCEDURE — 85025 COMPLETE CBC W/AUTO DIFF WBC: CPT | Mod: 91

## 2019-04-15 PROCEDURE — 700105 HCHG RX REV CODE 258: Performed by: HOSPITALIST

## 2019-04-15 PROCEDURE — 81001 URINALYSIS AUTO W/SCOPE: CPT

## 2019-04-15 PROCEDURE — 700117 HCHG RX CONTRAST REV CODE 255: Performed by: EMERGENCY MEDICINE

## 2019-04-15 PROCEDURE — 36415 COLL VENOUS BLD VENIPUNCTURE: CPT

## 2019-04-15 PROCEDURE — 85610 PROTHROMBIN TIME: CPT

## 2019-04-15 PROCEDURE — 83690 ASSAY OF LIPASE: CPT

## 2019-04-15 PROCEDURE — 700101 HCHG RX REV CODE 250: Performed by: EMERGENCY MEDICINE

## 2019-04-15 PROCEDURE — 700111 HCHG RX REV CODE 636 W/ 250 OVERRIDE (IP): Performed by: EMERGENCY MEDICINE

## 2019-04-15 PROCEDURE — 96375 TX/PRO/DX INJ NEW DRUG ADDON: CPT

## 2019-04-15 PROCEDURE — 87040 BLOOD CULTURE FOR BACTERIA: CPT

## 2019-04-15 PROCEDURE — 85730 THROMBOPLASTIN TIME PARTIAL: CPT

## 2019-04-15 PROCEDURE — 99223 1ST HOSP IP/OBS HIGH 75: CPT | Performed by: HOSPITALIST

## 2019-04-15 PROCEDURE — 83605 ASSAY OF LACTIC ACID: CPT

## 2019-04-15 PROCEDURE — 84145 PROCALCITONIN (PCT): CPT

## 2019-04-15 PROCEDURE — 99285 EMERGENCY DEPT VISIT HI MDM: CPT

## 2019-04-15 PROCEDURE — 96365 THER/PROPH/DIAG IV INF INIT: CPT

## 2019-04-15 PROCEDURE — 96367 TX/PROPH/DG ADDL SEQ IV INF: CPT

## 2019-04-15 PROCEDURE — 80053 COMPREHEN METABOLIC PANEL: CPT | Mod: 91

## 2019-04-15 PROCEDURE — 700105 HCHG RX REV CODE 258: Performed by: EMERGENCY MEDICINE

## 2019-04-15 PROCEDURE — 770006 HCHG ROOM/CARE - MED/SURG/GYN SEMI*

## 2019-04-15 RX ORDER — POLYETHYLENE GLYCOL 3350 17 G/17G
1 POWDER, FOR SOLUTION ORAL
Status: DISCONTINUED | OUTPATIENT
Start: 2019-04-15 | End: 2019-04-17 | Stop reason: HOSPADM

## 2019-04-15 RX ORDER — ONDANSETRON 2 MG/ML
4 INJECTION INTRAMUSCULAR; INTRAVENOUS EVERY 4 HOURS PRN
Status: DISCONTINUED | OUTPATIENT
Start: 2019-04-15 | End: 2019-04-17 | Stop reason: HOSPADM

## 2019-04-15 RX ORDER — AMOXICILLIN 250 MG
2 CAPSULE ORAL 2 TIMES DAILY
Status: DISCONTINUED | OUTPATIENT
Start: 2019-04-15 | End: 2019-04-17 | Stop reason: HOSPADM

## 2019-04-15 RX ORDER — AMITRIPTYLINE HYDROCHLORIDE 25 MG/1
50 TABLET, FILM COATED ORAL
Status: DISCONTINUED | OUTPATIENT
Start: 2019-04-15 | End: 2019-04-17 | Stop reason: HOSPADM

## 2019-04-15 RX ORDER — SODIUM CHLORIDE 9 MG/ML
500 INJECTION, SOLUTION INTRAVENOUS
Status: DISCONTINUED | OUTPATIENT
Start: 2019-04-15 | End: 2019-04-17 | Stop reason: HOSPADM

## 2019-04-15 RX ORDER — SODIUM CHLORIDE 9 MG/ML
INJECTION, SOLUTION INTRAVENOUS CONTINUOUS
Status: DISCONTINUED | OUTPATIENT
Start: 2019-04-15 | End: 2019-04-17 | Stop reason: HOSPADM

## 2019-04-15 RX ORDER — OMEPRAZOLE 20 MG/1
40 CAPSULE, DELAYED RELEASE ORAL
Status: DISCONTINUED | OUTPATIENT
Start: 2019-04-15 | End: 2019-04-17 | Stop reason: HOSPADM

## 2019-04-15 RX ORDER — SODIUM CHLORIDE 9 MG/ML
30 INJECTION, SOLUTION INTRAVENOUS
Status: DISCONTINUED | OUTPATIENT
Start: 2019-04-15 | End: 2019-04-17 | Stop reason: HOSPADM

## 2019-04-15 RX ORDER — BISACODYL 10 MG
10 SUPPOSITORY, RECTAL RECTAL
Status: DISCONTINUED | OUTPATIENT
Start: 2019-04-15 | End: 2019-04-17 | Stop reason: HOSPADM

## 2019-04-15 RX ORDER — ONDANSETRON 4 MG/1
4 TABLET, ORALLY DISINTEGRATING ORAL EVERY 4 HOURS PRN
Status: DISCONTINUED | OUTPATIENT
Start: 2019-04-15 | End: 2019-04-17 | Stop reason: HOSPADM

## 2019-04-15 RX ORDER — SODIUM CHLORIDE 9 MG/ML
1000 INJECTION, SOLUTION INTRAVENOUS
Status: COMPLETED | OUTPATIENT
Start: 2019-04-15 | End: 2019-04-15

## 2019-04-15 RX ORDER — LEVOTHYROXINE SODIUM 112 UG/1
112 TABLET ORAL
Status: DISCONTINUED | OUTPATIENT
Start: 2019-04-16 | End: 2019-04-17 | Stop reason: HOSPADM

## 2019-04-15 RX ORDER — CYCLOBENZAPRINE HCL 10 MG
10 TABLET ORAL
COMMUNITY
End: 2019-05-17

## 2019-04-15 RX ORDER — OXYCODONE HYDROCHLORIDE 10 MG/1
10 TABLET ORAL
Status: DISCONTINUED | OUTPATIENT
Start: 2019-04-15 | End: 2019-04-17 | Stop reason: HOSPADM

## 2019-04-15 RX ORDER — OXYCODONE HYDROCHLORIDE 5 MG/1
5 TABLET ORAL
Status: DISCONTINUED | OUTPATIENT
Start: 2019-04-15 | End: 2019-04-17 | Stop reason: HOSPADM

## 2019-04-15 RX ORDER — MORPHINE SULFATE 4 MG/ML
4 INJECTION, SOLUTION INTRAMUSCULAR; INTRAVENOUS
Status: DISCONTINUED | OUTPATIENT
Start: 2019-04-15 | End: 2019-04-17 | Stop reason: HOSPADM

## 2019-04-15 RX ORDER — ONDANSETRON 2 MG/ML
4 INJECTION INTRAMUSCULAR; INTRAVENOUS ONCE
Status: COMPLETED | OUTPATIENT
Start: 2019-04-15 | End: 2019-04-15

## 2019-04-15 RX ADMIN — SODIUM CHLORIDE 1000 ML: 9 INJECTION, SOLUTION INTRAVENOUS at 23:13

## 2019-04-15 RX ADMIN — IOHEXOL 80 ML: 350 INJECTION, SOLUTION INTRAVENOUS at 21:56

## 2019-04-15 RX ADMIN — METRONIDAZOLE 500 MG: 500 INJECTION, SOLUTION INTRAVENOUS at 23:49

## 2019-04-15 RX ADMIN — SODIUM CHLORIDE: 9 INJECTION, SOLUTION INTRAVENOUS at 23:00

## 2019-04-15 RX ADMIN — ONDANSETRON 4 MG: 2 SOLUTION INTRAMUSCULAR; INTRAVENOUS at 21:41

## 2019-04-15 RX ADMIN — FENTANYL CITRATE 25 MCG: 50 INJECTION, SOLUTION INTRAMUSCULAR; INTRAVENOUS at 21:41

## 2019-04-15 RX ADMIN — CEFTRIAXONE SODIUM 2 G: 2 INJECTION, POWDER, FOR SOLUTION INTRAMUSCULAR; INTRAVENOUS at 23:13

## 2019-04-15 ASSESSMENT — ENCOUNTER SYMPTOMS
ABDOMINAL PAIN: 1
FEVER: 0
SHORTNESS OF BREATH: 0
FOCAL WEAKNESS: 0
HALLUCINATIONS: 0
SENSORY CHANGE: 0
DEPRESSION: 0
COUGH: 0
HEMOPTYSIS: 0
NAUSEA: 0
CHILLS: 0
DIZZINESS: 0
EYE DISCHARGE: 0
DOUBLE VISION: 0
SPEECH CHANGE: 0
VOMITING: 0
BRUISES/BLEEDS EASILY: 0
MYALGIAS: 0
FLANK PAIN: 0
DIARRHEA: 1
HEARTBURN: 0
BLURRED VISION: 0
WEAKNESS: 1
PALPITATIONS: 0

## 2019-04-15 ASSESSMENT — LIFESTYLE VARIABLES: SUBSTANCE_ABUSE: 0

## 2019-04-16 ENCOUNTER — APPOINTMENT (OUTPATIENT)
Dept: RADIOLOGY | Facility: MEDICAL CENTER | Age: 74
DRG: 872 | End: 2019-04-16
Attending: INTERNAL MEDICINE
Payer: MEDICARE

## 2019-04-16 LAB
ALBUMIN SERPL BCP-MCNC: 4.1 G/DL (ref 3.2–4.9)
ALBUMIN/GLOB SERPL: 2.2 G/DL
ALP SERPL-CCNC: 70 U/L (ref 30–99)
ALT SERPL-CCNC: 17 U/L (ref 2–50)
ANION GAP SERPL CALC-SCNC: 11 MMOL/L (ref 0–11.9)
AST SERPL-CCNC: 21 U/L (ref 12–45)
BILIRUB SERPL-MCNC: 0.5 MG/DL (ref 0.1–1.5)
BUN SERPL-MCNC: 12 MG/DL (ref 8–22)
CALCIUM SERPL-MCNC: 9 MG/DL (ref 8.5–10.5)
CHLORIDE SERPL-SCNC: 98 MMOL/L (ref 96–112)
CO2 SERPL-SCNC: 21 MMOL/L (ref 20–33)
CREAT SERPL-MCNC: 0.72 MG/DL (ref 0.5–1.4)
GLOBULIN SER CALC-MCNC: 1.9 G/DL (ref 1.9–3.5)
GLUCOSE SERPL-MCNC: 129 MG/DL (ref 65–99)
LACTATE BLD-SCNC: 1.1 MMOL/L (ref 0.5–2)
LACTATE BLD-SCNC: 1.7 MMOL/L (ref 0.5–2)
POTASSIUM SERPL-SCNC: 4.6 MMOL/L (ref 3.6–5.5)
PROCALCITONIN SERPL-MCNC: <0.05 NG/ML
PROT SERPL-MCNC: 6 G/DL (ref 6–8.2)
SODIUM SERPL-SCNC: 130 MMOL/L (ref 135–145)

## 2019-04-16 PROCEDURE — 700101 HCHG RX REV CODE 250: Performed by: HOSPITALIST

## 2019-04-16 PROCEDURE — 770006 HCHG ROOM/CARE - MED/SURG/GYN SEMI*

## 2019-04-16 PROCEDURE — 302135 SEQUENTIAL COMPRESSION MACHINE: Performed by: EMERGENCY MEDICINE

## 2019-04-16 PROCEDURE — 700105 HCHG RX REV CODE 258: Performed by: INTERNAL MEDICINE

## 2019-04-16 PROCEDURE — 700111 HCHG RX REV CODE 636 W/ 250 OVERRIDE (IP): Performed by: FAMILY MEDICINE

## 2019-04-16 PROCEDURE — 700111 HCHG RX REV CODE 636 W/ 250 OVERRIDE (IP): Performed by: INTERNAL MEDICINE

## 2019-04-16 PROCEDURE — 700102 HCHG RX REV CODE 250 W/ 637 OVERRIDE(OP): Performed by: HOSPITALIST

## 2019-04-16 PROCEDURE — 700102 HCHG RX REV CODE 250 W/ 637 OVERRIDE(OP): Performed by: INTERNAL MEDICINE

## 2019-04-16 PROCEDURE — A9270 NON-COVERED ITEM OR SERVICE: HCPCS | Performed by: INTERNAL MEDICINE

## 2019-04-16 PROCEDURE — 99233 SBSQ HOSP IP/OBS HIGH 50: CPT | Performed by: INTERNAL MEDICINE

## 2019-04-16 PROCEDURE — 700105 HCHG RX REV CODE 258: Performed by: HOSPITALIST

## 2019-04-16 PROCEDURE — 83605 ASSAY OF LACTIC ACID: CPT

## 2019-04-16 PROCEDURE — A9270 NON-COVERED ITEM OR SERVICE: HCPCS | Performed by: HOSPITALIST

## 2019-04-16 PROCEDURE — 36415 COLL VENOUS BLD VENIPUNCTURE: CPT

## 2019-04-16 PROCEDURE — 700111 HCHG RX REV CODE 636 W/ 250 OVERRIDE (IP): Performed by: HOSPITALIST

## 2019-04-16 RX ORDER — METRONIDAZOLE 500 MG/1
500 TABLET ORAL EVERY 8 HOURS
Status: DISCONTINUED | OUTPATIENT
Start: 2019-04-16 | End: 2019-04-17 | Stop reason: HOSPADM

## 2019-04-16 RX ORDER — LORAZEPAM 2 MG/ML
1 INJECTION INTRAMUSCULAR EVERY 4 HOURS PRN
Status: DISCONTINUED | OUTPATIENT
Start: 2019-04-16 | End: 2019-04-17 | Stop reason: HOSPADM

## 2019-04-16 RX ADMIN — OMEPRAZOLE 40 MG: 20 CAPSULE, DELAYED RELEASE ORAL at 22:27

## 2019-04-16 RX ADMIN — AMITRIPTYLINE HYDROCHLORIDE 50 MG: 25 TABLET, FILM COATED ORAL at 22:27

## 2019-04-16 RX ADMIN — LEVOTHYROXINE SODIUM 112 MCG: 112 TABLET ORAL at 05:21

## 2019-04-16 RX ADMIN — PROCHLORPERAZINE EDISYLATE 10 MG: 5 INJECTION INTRAMUSCULAR; INTRAVENOUS at 20:40

## 2019-04-16 RX ADMIN — OXYCODONE HYDROCHLORIDE 10 MG: 10 TABLET ORAL at 02:00

## 2019-04-16 RX ADMIN — METRONIDAZOLE 500 MG: 500 TABLET, FILM COATED ORAL at 22:27

## 2019-04-16 RX ADMIN — SENNOSIDES, DOCUSATE SODIUM 2 TABLET: 50; 8.6 TABLET, FILM COATED ORAL at 17:37

## 2019-04-16 RX ADMIN — LORAZEPAM 1 MG: 2 INJECTION INTRAMUSCULAR; INTRAVENOUS at 10:11

## 2019-04-16 RX ADMIN — METRONIDAZOLE 500 MG: 500 INJECTION, SOLUTION INTRAVENOUS at 05:21

## 2019-04-16 RX ADMIN — SODIUM CHLORIDE: 9 INJECTION, SOLUTION INTRAVENOUS at 02:01

## 2019-04-16 RX ADMIN — CEFTRIAXONE SODIUM 1 G: 1 INJECTION, POWDER, FOR SOLUTION INTRAMUSCULAR; INTRAVENOUS at 17:36

## 2019-04-16 RX ADMIN — ONDANSETRON 4 MG: 2 INJECTION INTRAMUSCULAR; INTRAVENOUS at 02:00

## 2019-04-16 RX ADMIN — AMITRIPTYLINE HYDROCHLORIDE 50 MG: 25 TABLET, FILM COATED ORAL at 02:00

## 2019-04-16 RX ADMIN — OXYCODONE HYDROCHLORIDE 10 MG: 10 TABLET ORAL at 09:05

## 2019-04-16 RX ADMIN — ONDANSETRON 4 MG: 2 INJECTION INTRAMUSCULAR; INTRAVENOUS at 08:54

## 2019-04-16 RX ADMIN — METRONIDAZOLE 500 MG: 500 TABLET, FILM COATED ORAL at 13:39

## 2019-04-16 RX ADMIN — SENNOSIDES, DOCUSATE SODIUM 2 TABLET: 50; 8.6 TABLET, FILM COATED ORAL at 05:21

## 2019-04-16 RX ADMIN — OMEPRAZOLE 40 MG: 20 CAPSULE, DELAYED RELEASE ORAL at 02:00

## 2019-04-16 RX ADMIN — SODIUM CHLORIDE: 9 INJECTION, SOLUTION INTRAVENOUS at 17:36

## 2019-04-16 RX ADMIN — OXYCODONE HYDROCHLORIDE 10 MG: 10 TABLET ORAL at 05:21

## 2019-04-16 ASSESSMENT — ENCOUNTER SYMPTOMS
PALPITATIONS: 0
DIARRHEA: 0
LOSS OF CONSCIOUSNESS: 0
ABDOMINAL PAIN: 1
SENSORY CHANGE: 0
CONSTIPATION: 0
DIAPHORESIS: 0
BACK PAIN: 0
FLANK PAIN: 0
SHORTNESS OF BREATH: 0
SPEECH CHANGE: 0
DIZZINESS: 0
FOCAL WEAKNESS: 0
FEVER: 0
WEAKNESS: 1
NERVOUS/ANXIOUS: 0
NAUSEA: 0
CHILLS: 0
MYALGIAS: 0
HEADACHES: 0
VOMITING: 0
MEMORY LOSS: 0
DEPRESSION: 0
COUGH: 0

## 2019-04-16 ASSESSMENT — COGNITIVE AND FUNCTIONAL STATUS - GENERAL
MOBILITY SCORE: 24
SUGGESTED CMS G CODE MODIFIER MOBILITY: CH
DAILY ACTIVITIY SCORE: 24
SUGGESTED CMS G CODE MODIFIER DAILY ACTIVITY: CH

## 2019-04-16 ASSESSMENT — PATIENT HEALTH QUESTIONNAIRE - PHQ9
2. FEELING DOWN, DEPRESSED, IRRITABLE, OR HOPELESS: NOT AT ALL
SUM OF ALL RESPONSES TO PHQ9 QUESTIONS 1 AND 2: 0
1. LITTLE INTEREST OR PLEASURE IN DOING THINGS: NOT AT ALL

## 2019-04-16 ASSESSMENT — LIFESTYLE VARIABLES
EVER_SMOKED: NEVER
ALCOHOL_USE: NO

## 2019-04-16 NOTE — ED PROVIDER NOTES
ED Provider Note    CHIEF COMPLAINT  Chief Complaint   Patient presents with   • LLQ Pain     and nausea   • Diarrhea     many times this afternoon. She states she has noticed some dark red blood with her more recent stools.        HPI  Makenna Culver is a 73 y.o. female here for evaluation of left upper and left lower abdominal pain.  The patient states that she started to have this pain that started gradually earlier today, and states that it progressed in the evening.  She has had intermittent pain, that is sharp, and associated with some cramping.  She has no fever and no vomiting, but does, some nausea.  The pain does not radiate to her back, or her chest.  Patient denies chest pain or shortness of breath.  She has no headache, and no neck pain.  The patient denies any dysuria, urgency or frequency, and she has no blood in her stool.  Nothing alleviates or exacerbates her symptoms, and she is not taking anything for the pain prior to arrival.    PAST MEDICAL HISTORY   has a past medical history of Arthritis; Colon polyp (12/2011); Degeneration of lumbar or lumbosacral intervertebral disc; Dental disorder; Ear problems (as child had surgery); Graves' disease with exophthalmos (6/13/2014); Hemorrhoid; High cholesterol; History of carpal tunnel repair (7/31/2013); Menopause (10/14/2009); Osteoarthritis of multiple joints (6/13/2014); Pain in joint, pelvic region and thigh (2015); Pain in joint, shoulder region (2015); Periodic pelvic examination (due); Postcoital bleeding; Rash (12/29/2014); S/P colonoscopy (12/2011); Screening mammogram ( 11/2008=normal); Tobacco use disorder; Ulcer of the stomach and intestine (hx.); Unspecified cataract; and Unspecified hypothyroidism.    SOCIAL HISTORY  Social History     Social History Main Topics   • Smoking status: Former Smoker     Packs/day: 1.00     Years: 50.00     Types: Cigarettes     Quit date: 1/9/2013   • Smokeless tobacco: Never Used      Comment: use E cig   •  Alcohol use 0.6 oz/week     1 Glasses of wine per week      Comment:      0-1 per month                          • Drug use: No   • Sexual activity: Yes     Partners: Male      Comment:        Family History  No bleeding disorder    SURGICAL HISTORY   has a past surgical history that includes thyroidectomy (thyroid ablation with iodine therapy); other surgical procedure; abdominal hysterectomy total (partial); tonsillectomy and adenoidectomy; carpal tunnel endoscopic (11/17/2012); carpal tunnel endoscopic (2/8/2013); tensilon test (11/20/2014); cataract phaco with iol (3/3/2015); cataract phaco with iol (3/17/2015); rectus repair (Bilateral, 7/9/2015); and blepharoplasty (Bilateral, 4/20/2016).    CURRENT MEDICATIONS  Home Medications    **Home medications have not yet been reviewed for this encounter**         ALLERGIES  Allergies   Allergen Reactions   • Codeine Vomiting   • Morphine      Chills    • Vicodin [Hydrocodone-Acetaminophen] Vomiting     OK if takes with phenergan        REVIEW OF SYSTEMS  See HPI for further details. Review of systems as above, otherwise all other systems are negative.     PHYSICAL EXAM  Constitutional: Well developed, well nourished. No acute distress.  HEENT: Normocephalic, atraumatic. Posterior pharynx clear and moist.  Eyes:  EOMI. Normal sclera.  Neck: Supple, Full range of motion, nontender.  Chest/Pulmonary: clear to ausculation. Symmetrical expansion.   Cardio: Regular rate and rhythm with no murmur.   Abdomen: Soft, nontender. No peritoneal signs. No guarding. No palpable masses.  Back: No CVA tenderness, nontender midline, no step offs.  Musculoskeletal: No deformity, no edema, neurovascular intact.   Neuro: Clear speech, appropriate, cooperative, cranial nerves II-XII grossly intact.  Psych: Normal mood and affect    PROCEDURES     MEDICAL RECORD  I have reviewed patient's medical record and pertinent results are listed above.    COURSE & MEDICAL DECISION MAKING  I  have reviewed any medical record information, laboratory studies and radiographic results as noted above.    Results for orders placed or performed during the hospital encounter of 04/15/19   CBC WITH DIFFERENTIAL   Result Value Ref Range    WBC 18.7 (H) 4.8 - 10.8 K/uL    RBC 4.64 4.20 - 5.40 M/uL    Hemoglobin 13.5 12.0 - 16.0 g/dL    Hematocrit 40.7 37.0 - 47.0 %    MCV 87.7 81.4 - 97.8 fL    MCH 29.1 27.0 - 33.0 pg    MCHC 33.2 (L) 33.6 - 35.0 g/dL    RDW 45.2 35.9 - 50.0 fL    Platelet Count 480 (H) 164 - 446 K/uL    MPV 8.5 (L) 9.0 - 12.9 fL    Neutrophils-Polys 90.60 (H) 44.00 - 72.00 %    Lymphocytes 4.80 (L) 22.00 - 41.00 %    Monocytes 3.80 0.00 - 13.40 %    Eosinophils 0.00 0.00 - 6.90 %    Basophils 0.40 0.00 - 1.80 %    Immature Granulocytes 0.40 0.00 - 0.90 %    Nucleated RBC 0.00 /100 WBC    Neutrophils (Absolute) 16.95 (H) 2.00 - 7.15 K/uL    Lymphs (Absolute) 0.90 (L) 1.00 - 4.80 K/uL    Monos (Absolute) 0.72 0.00 - 0.85 K/uL    Eos (Absolute) 0.00 0.00 - 0.51 K/uL    Baso (Absolute) 0.08 0.00 - 0.12 K/uL    Immature Granulocytes (abs) 0.07 0.00 - 0.11 K/uL    NRBC (Absolute) 0.00 K/uL   COMP METABOLIC PANEL   Result Value Ref Range    Sodium 133 (L) 135 - 145 mmol/L    Potassium 4.0 3.6 - 5.5 mmol/L    Chloride 99 96 - 112 mmol/L    Co2 22 20 - 33 mmol/L    Anion Gap 12.0 (H) 0.0 - 11.9    Glucose 144 (H) 65 - 99 mg/dL    Bun 14 8 - 22 mg/dL    Creatinine 0.84 0.50 - 1.40 mg/dL    Calcium 9.3 8.5 - 10.5 mg/dL    AST(SGOT) 19 12 - 45 U/L    ALT(SGPT) 18 2 - 50 U/L    Alkaline Phosphatase 80 30 - 99 U/L    Total Bilirubin 0.5 0.1 - 1.5 mg/dL    Albumin 4.5 3.2 - 4.9 g/dL    Total Protein 6.5 6.0 - 8.2 g/dL    Globulin 2.0 1.9 - 3.5 g/dL    A-G Ratio 2.3 g/dL   LIPASE   Result Value Ref Range    Lipase 11 11 - 82 U/L   URINALYSIS,CULTURE IF INDICATED   Result Value Ref Range    Micro Urine Req Microscopic    ESTIMATED GFR   Result Value Ref Range    GFR If African American >60 >60 mL/min/1.73 m 2     GFR If Non African American >60 >60 mL/min/1.73 m 2      CT-ABDOMEN-PELVIS WITH   Final Result         1.  Colonic wall thickening predominately from the splenic flexure extending distally, appearance most compatible with segmental colitis.   2.  Fluid-filled small bowel loops with reactive mucosal pattern, appearance suggests component of ileus and/or enteritis.   3.  Diverticulosis, component of diverticulitis at the sigmoid colon not excluded.   4.  Scattered subcentimeter low-density hepatic lesions, indeterminate, but could represent small cysts or hemangiomas. Consider other hepatic lesion with additional workup as clinically appropriate.   5.  2 mm right lower lobe pulmonary nodule, see nodule follow-up recommendations below.      Low Risk: No routine follow-up      High Risk: Optional CT at 12 months      Comments: Nodules less than 6 mm do not require routine follow-up, but certain patients at high risk with suspicious nodule morphology, upper lobe location, or both may warrant 12-month follow-up.      Low Risk - Minimal or absent history of smoking and of other known risk factors.      High Risk - History of smoking or of other known risk factors.      Note: These recommendations do not apply to lung cancer screening, patients with immunosuppression, or patients with known primary cancer.      Fleischner Society 2017 Guidelines for Management of Incidentally Detected Pulmonary Nodules in Adults             10:31 PM  The pt will be admitted to Dr. Hu, in guarded condition.  She is agreeable to stay in the hospital for iv abx.      I discussed with the patient the pulmonary nodule that is noted on her CT scan, and for her to have close follow-up for this.  She agrees to do so.  Her  is present.    FINAL IMPRESSION  Colitis   Pulmonary nodule      Electronically signed by: Milton Wright, 4/15/2019 10:22 PM

## 2019-04-16 NOTE — ASSESSMENT & PLAN NOTE
This is sepsis (without associated acute organ dysfunction).   Meets criteria with intrabd source  Iv abx   Follow cultures  -lactic wnl  procal neg  descilate therapy as appropriate

## 2019-04-16 NOTE — H&P
Hospital Medicine History & Physical Note    Date of Service  4/15/2019    Primary Care Physician  Frances Laboy M.D.    Consultants  none    Code Status  full    Chief Complaint  Diarrhea     History of Presenting Illness  73 y.o. female who presented 4/15/2019 with past medical history of hypothyroidism hyperlipidemia who presents with left lower quadrant abdominal pain.  Patient developed left lower quadrant abdominal pain that is gradually increasing in intensity with associated cramping.  This pain does not radiate.  She has had associated diarrhea multiple loose stools throughout the day today.  She states that she had a [employer] Mc[employer]in from [employer] and think that this may have caused her symptoms.  In the emergency department she is found to have evidence of colitis and will be admitted to the hospital for further management.    Review of Systems  Review of Systems   Constitutional: Negative for chills and fever.   HENT: Negative for congestion, hearing loss and tinnitus.    Eyes: Negative for blurred vision, double vision and discharge.   Respiratory: Negative for cough, hemoptysis and shortness of breath.    Cardiovascular: Negative for chest pain, palpitations and leg swelling.   Gastrointestinal: Positive for abdominal pain and diarrhea. Negative for heartburn, nausea and vomiting.   Genitourinary: Negative for dysuria and flank pain.   Musculoskeletal: Negative for joint pain and myalgias.   Skin: Negative for rash.   Neurological: Positive for weakness. Negative for dizziness, sensory change, speech change and focal weakness.   Endo/Heme/Allergies: Negative for environmental allergies. Does not bruise/bleed easily.   Psychiatric/Behavioral: Negative for depression, hallucinations and substance abuse.       Past Medical History   has a past medical history of Arthritis; Colon polyp (12/2011); Degeneration of lumbar or lumbosacral intervertebral disc; Dental disorder; Ear problems (as child had  surgery); Graves' disease with exophthalmos (6/13/2014); Hemorrhoid; High cholesterol; History of carpal tunnel repair (7/31/2013); Menopause (10/14/2009); Osteoarthritis of multiple joints (6/13/2014); Pain in joint, pelvic region and thigh (2015); Pain in joint, shoulder region (2015); Periodic pelvic examination (due); Postcoital bleeding; Rash (12/29/2014); S/P colonoscopy (12/2011); Screening mammogram ( 11/2008=normal); Tobacco use disorder; Ulcer of the stomach and intestine (hx.); Unspecified cataract; and Unspecified hypothyroidism.    Surgical History   has a past surgical history that includes thyroidectomy (thyroid ablation with iodine therapy); other surgical procedure; abdominal hysterectomy total (partial); tonsillectomy and adenoidectomy; carpal tunnel endoscopic (11/17/2012); carpal tunnel endoscopic (2/8/2013); tensilon test (11/20/2014); cataract phaco with iol (3/3/2015); cataract phaco with iol (3/17/2015); rectus repair (Bilateral, 7/9/2015); and blepharoplasty (Bilateral, 4/20/2016).     Family History  family history includes Heart Disease in her father; Hypertension in her father.     Social History   reports that she quit smoking about 6 years ago. Her smoking use included Cigarettes. She has a 50.00 pack-year smoking history. She has never used smokeless tobacco. She reports that she drinks about 0.6 oz of alcohol per week . She reports that she does not use drugs.    Allergies  Allergies   Allergen Reactions   • Codeine Vomiting   • Morphine      Chills    • Vicodin [Hydrocodone-Acetaminophen] Vomiting     OK if takes with phenergan        Medications  Prior to Admission Medications   Prescriptions Last Dose Informant Patient Reported? Taking?   amitriptyline (ELAVIL) 50 MG Tab 4/14/2019 at 2100 Patient Yes No   Sig: Take 50 mg by mouth every bedtime.   cyclobenzaprine (FLEXERIL) 10 MG Tab 4/14/2019 at 2100 Patient Yes Yes   Sig: Take 10 mg by mouth every bedtime.   estradiol (ESTRACE) 1  MG Tab 4/15/2019 at 0900 Patient Yes No   Sig: Take 1 mg by mouth every morning.   levothyroxine (SYNTHROID) 112 MCG Tab 4/15/2019 at 0900 Patient No No   Sig: Take 1 Tab by mouth Every morning on an empty stomach.   omeprazole (PRILOSEC) 40 MG delayed-release capsule 4/14/2019 at 2100 Patient Yes No   Sig: Take 40 mg by mouth every bedtime.   tramadol (ULTRAM) 50 MG Tab 4/14/2019 at 2100 Patient Yes No   Sig: Take 50 mg by mouth every 6 hours as needed for Mild Pain.      Facility-Administered Medications: None       Physical Exam  Pulse:  [107] 107  Resp:  [20] 20  BP: (110)/(71) 110/71  SpO2:  [95 %] 95 %    Physical Exam   Constitutional: She is oriented to person, place, and time. She appears well-developed and well-nourished. No distress.   HENT:   Head: Normocephalic and atraumatic.   Eyes: Pupils are equal, round, and reactive to light. Conjunctivae and EOM are normal.   Neck: Normal range of motion. Neck supple. No JVD present.   Cardiovascular: Regular rhythm, normal heart sounds and intact distal pulses.    No murmur heard.  Tachycardia    Pulmonary/Chest: Effort normal and breath sounds normal. No respiratory distress. She has no wheezes.   Abdominal: Soft. Bowel sounds are normal. She exhibits no distension. There is tenderness.   LLQ ttp    Musculoskeletal: Normal range of motion. She exhibits no edema.   Neurological: She is alert and oriented to person, place, and time. She exhibits normal muscle tone.   Skin: Skin is warm and dry.   Psychiatric: She has a normal mood and affect. Her behavior is normal. Judgment and thought content normal.   Nursing note and vitals reviewed.      Laboratory:  Recent Labs      04/15/19   1856   WBC  18.7*   RBC  4.64   HEMOGLOBIN  13.5   HEMATOCRIT  40.7   MCV  87.7   MCH  29.1   MCHC  33.2*   RDW  45.2   PLATELETCT  480*   MPV  8.5*     Recent Labs      04/15/19   1856   SODIUM  133*   POTASSIUM  4.0   CHLORIDE  99   CO2  22   GLUCOSE  144*   BUN  14   CREATININE   0.84   CALCIUM  9.3     Recent Labs      04/15/19   1856   ALTSGPT  18   ASTSGOT  19   ALKPHOSPHAT  80   TBILIRUBIN  0.5   LIPASE  11   GLUCOSE  144*                 No results for input(s): TROPONINI in the last 72 hours.    Urinalysis:    No results found     Imaging:  CT-ABDOMEN-PELVIS WITH   Final Result         1.  Colonic wall thickening predominately from the splenic flexure extending distally, appearance most compatible with segmental colitis.   2.  Fluid-filled small bowel loops with reactive mucosal pattern, appearance suggests component of ileus and/or enteritis.   3.  Diverticulosis, component of diverticulitis at the sigmoid colon not excluded.   4.  Scattered subcentimeter low-density hepatic lesions, indeterminate, but could represent small cysts or hemangiomas. Consider other hepatic lesion with additional workup as clinically appropriate.   5.  2 mm right lower lobe pulmonary nodule, see nodule follow-up recommendations below.      Low Risk: No routine follow-up      High Risk: Optional CT at 12 months      Comments: Nodules less than 6 mm do not require routine follow-up, but certain patients at high risk with suspicious nodule morphology, upper lobe location, or both may warrant 12-month follow-up.      Low Risk - Minimal or absent history of smoking and of other known risk factors.      High Risk - History of smoking or of other known risk factors.      Note: These recommendations do not apply to lung cancer screening, patients with immunosuppression, or patients with known primary cancer.      Fleischner Society 2017 Guidelines for Management of Incidentally Detected Pulmonary Nodules in Adults               Assessment/Plan:  I anticipate this patient will require at least two midnights for appropriate medical management, necessitating inpatient admission.    * Colitis   Assessment & Plan    With evidence of enteritis and possible diverticulitis on CT scan   Cont with IV abx   Stool culture/wbc    IVF, anti emetics, advance diet as tolerated      Sepsis (HCC)   Assessment & Plan    This is sepsis (without associated acute organ dysfunction).   Meets criteria with intrabd source  Iv abx   Follow cultures  Trend lactic   procal   descilate therapy as appropriate     Hyperlipidemia- (present on admission)   Assessment & Plan    Not on medication, making lifestyle modifications      Hypothyroidism- (present on admission)   Assessment & Plan    Resume levothyroxine      Liver lesion   Assessment & Plan    Noted on ct scan with no evidence of elevation in lfts  Will check mri for evaluation        Pulmonary nodule   Assessment & Plan    Needs outpatient follow up      Hyponatremia   Assessment & Plan    Hypovolemic   Cont with IVF and monitor      GERD (gastroesophageal reflux disease)- (present on admission)   Assessment & Plan    Resume omeprazole          VTE prophylaxis: heparin

## 2019-04-16 NOTE — ED NOTES
"Pt arrived to [facility] from waiting room with a multitude of complaints, including but not limited to, abdominal, sneezing, diarrhea, nausea, \"ovarian pain\" etc. Blood drawn in triage, neuro intact. Chart up for ERP to see   "

## 2019-04-16 NOTE — PROGRESS NOTES
Pt back to floor from MRI. Pt was given ativan prior. Pt was falling asleep during MRI and was unable to hold her breath. Unable to complete MRI, pt sent back to floor. Updated MD.

## 2019-04-16 NOTE — PROGRESS NOTES
Assumed care of pt, report given.  A+O x 4, VSS, and RA.   Pt on clear liquid diet; no red dye. Tolerating well. Denies N/V at this time.   Pt awaiting MRI; screening complete and in chart.   Pt ambulates self up to restroom; tolerates well.  Pt updated on POC and all questions answered at this time.  Bed in lowest position, call light within reach, and no current needs.

## 2019-04-16 NOTE — ED TRIAGE NOTES
Chief Complaint   Patient presents with   • LLQ Pain     and nausea   • Diarrhea     many times this afternoon. She states she has noticed some dark red blood with her more recent stools.      Ambulatory to triage for above. Tachycardic otherwise VSS. Explained triage process, to waiting room. Asked to inform RN if questions or concerns arise.

## 2019-04-16 NOTE — ED NOTES
Lab called to run the lactic acid, per lab only one tech running lactics and abgs, will begin running it soon

## 2019-04-16 NOTE — PROGRESS NOTES
A&Ox4.   Pt states pain 6/10 mid left abdomen, medicated per MAR.   Tolerating clear liquid- no reds diet, denies n/v. + bowel sounds, + flatus, LBM 4/14.  Saturating >90% on 1L.   Pt ambulates independently.   Skin tear on right shin with bandage.   Awaiting diagnostic test, MRI.   Updated on plan of care. Safety education provided. Bed locked in low. Call light within reach. Rounding in place.

## 2019-04-16 NOTE — ASSESSMENT & PLAN NOTE
With evidence of enteritis and possible diverticulitis on CT scan   Cont with IV abx   Stool culture/wbc   IVF, anti emetics, advance diet as tolerated

## 2019-04-16 NOTE — CARE PLAN
"Problem: Safety  Goal: Will remain free from injury  Outcome: PROGRESSING AS EXPECTED  Proper fall precautions in place. Call light within reach and encouraged to use. Hourly rounding in practice.     Problem: Pain Management  Goal: Pain level will decrease to patient's comfort goal  Outcome: PROGRESSING AS EXPECTED  Pt reports nausea when taking anything with \"codine\". PRN oxy given with IV zofran per pt request.       "

## 2019-04-16 NOTE — ED NOTES
Med rec updated and complete.  Allergies reviewed.  Pt denies oral antibiotic use in last 30 days.

## 2019-04-16 NOTE — PROGRESS NOTES
[facility] Medicine Daily Progress Note    Date of Service  4/16/2019    Chief Complaint  73 y.o. female admitted 4/15/2019 with colitis/diarrhea on IV antibiotics and fluids.    Hospital Course    73 y.o. female admitted 4/15/2019 with colitis/diarrhea on IV antibiotics and fluids.      Interval Problem Update  Improving abdominal pain  Plan for MRI, however patient reports need for sedation due to extreme anxiety  Was given Ativan, unable to follow directions including holding breath  Given patient's improvement, advised outpatient follow-up regarding hepatic lesions  Patient's  at bedside, plan of care reviewed    Consultants/Specialty  None    Code Status  Full    Disposition  TBD    Review of Systems  Review of Systems   Constitutional: Negative for chills, diaphoresis, fever and malaise/fatigue.   HENT: Negative for congestion and hearing loss.    Respiratory: Negative for cough and shortness of breath.    Cardiovascular: Negative for chest pain, palpitations and leg swelling.   Gastrointestinal: Positive for abdominal pain. Negative for constipation, diarrhea, nausea and vomiting.   Genitourinary: Negative for dysuria and flank pain.   Musculoskeletal: Negative for back pain, joint pain and myalgias.   Neurological: Positive for weakness. Negative for dizziness, sensory change, speech change, focal weakness, loss of consciousness and headaches.   Psychiatric/Behavioral: Negative for depression and memory loss. The patient is not nervous/anxious.         Physical Exam  Temp:  [36.1 °C (97 °F)-36.6 °C (97.8 °F)] 36.6 °C (97.8 °F)  Pulse:  [] 84  Resp:  [17-20] 18  BP: (103-132)/(68-78) 103/74  SpO2:  [91 %-99 %] 95 %    Physical Exam   Constitutional: She is oriented to person, place, and time. She appears well-nourished. No distress.   HENT:   Head: Normocephalic and atraumatic.   Nose: Nose normal.   Eyes: Pupils are equal, round, and reactive to light. EOM are normal. Right eye exhibits no  discharge. Left eye exhibits no discharge.   Neck: Neck supple. No thyromegaly present.   Cardiovascular: Normal rate and intact distal pulses.    No murmur heard.  Pulmonary/Chest: Effort normal and breath sounds normal. No respiratory distress. She has no wheezes. She has no rales.   Abdominal: Soft. Bowel sounds are normal. She exhibits no distension and no mass. There is no tenderness.   Musculoskeletal: She exhibits no edema or tenderness.   Neurological: She is alert and oriented to person, place, and time. No cranial nerve deficit. Coordination normal.   Skin: Skin is warm and dry. No rash noted. She is not diaphoretic. No erythema.   Psychiatric: She has a normal mood and affect. Her behavior is normal. Judgment and thought content normal.   Nursing note and vitals reviewed.      Fluids    Intake/Output Summary (Last 24 hours) at 04/16/19 1704  Last data filed at 04/16/19 1600   Gross per 24 hour   Intake             2280 ml   Output                0 ml   Net             2280 ml       Laboratory  Recent Labs      04/15/19   1856  04/15/19   2307   WBC  18.7*  14.4*   RBC  4.64  4.32   HEMOGLOBIN  13.5  12.5   HEMATOCRIT  40.7  38.2   MCV  87.7  88.4   MCH  29.1  28.9   MCHC  33.2*  32.7*   RDW  45.2  45.8   PLATELETCT  480*  419   MPV  8.5*  8.7*     Recent Labs      04/15/19   1856  04/15/19   2307   SODIUM  133*  130*   POTASSIUM  4.0  4.6   CHLORIDE  99  98   CO2  22  21   GLUCOSE  144*  129*   BUN  14  12   CREATININE  0.84  0.72   CALCIUM  9.3  9.0     Recent Labs      04/15/19   2316   APTT  25.8   INR  0.98               Imaging  CT-ABDOMEN-PELVIS WITH   Final Result         1.  Colonic wall thickening predominately from the splenic flexure extending distally, appearance most compatible with segmental colitis.   2.  Fluid-filled small bowel loops with reactive mucosal pattern, appearance suggests component of ileus and/or enteritis.   3.  Diverticulosis, component of diverticulitis at the sigmoid  colon not excluded.   4.  Scattered subcentimeter low-density hepatic lesions, indeterminate, but could represent small cysts or hemangiomas. Consider other hepatic lesion with additional workup as clinically appropriate.   5.  2 mm right lower lobe pulmonary nodule, see nodule follow-up recommendations below.      Low Risk: No routine follow-up      High Risk: Optional CT at 12 months      Comments: Nodules less than 6 mm do not require routine follow-up, but certain patients at high risk with suspicious nodule morphology, upper lobe location, or both may warrant 12-month follow-up.      Low Risk - Minimal or absent history of smoking and of other known risk factors.      High Risk - History of smoking or of other known risk factors.      Note: These recommendations do not apply to lung cancer screening, patients with immunosuppression, or patients with known primary cancer.      Fleischner Society 2017 Guidelines for Management of Incidentally Detected Pulmonary Nodules in Adults              Assessment/Plan  * Colitis   Assessment & Plan    With evidence of enteritis and possible diverticulitis on CT scan   Cont with IV abx   Stool culture/wbc   IVF, anti emetics, advance diet as tolerated      Sepsis (HCC)   Assessment & Plan    This is sepsis (without associated acute organ dysfunction).   Meets criteria with intrabd source  Iv abx   Follow cultures  -lactic wnl  procal neg  descilate therapy as appropriate     Hyperlipidemia- (present on admission)   Assessment & Plan    Not on medication, making lifestyle modifications      Hypothyroidism- (present on admission)   Assessment & Plan    Resume levothyroxine      Liver lesion   Assessment & Plan    Noted on ct scan with no evidence of elevation in lfts  Will check mri for evaluation        Pulmonary nodule   Assessment & Plan    Needs outpatient follow up      Hyponatremia   Assessment & Plan    Hypovolemic   Cont with IVF and monitor      GERD (gastroesophageal  reflux disease)- (present on admission)   Assessment & Plan    Resume omeprazole           VTE prophylaxis: SCD

## 2019-04-16 NOTE — PROGRESS NOTES
2 RN skin check complete.   Pt has right shin skin tear from PTA event. CDI with no drainage. Bandaid in place per pt request.   All bony prominences intact with no signs of skin breakdown.

## 2019-04-17 VITALS
SYSTOLIC BLOOD PRESSURE: 106 MMHG | BODY MASS INDEX: 24.67 KG/M2 | TEMPERATURE: 98.1 F | HEIGHT: 60 IN | HEART RATE: 89 BPM | OXYGEN SATURATION: 92 % | RESPIRATION RATE: 18 BRPM | DIASTOLIC BLOOD PRESSURE: 65 MMHG | WEIGHT: 125.66 LBS

## 2019-04-17 LAB
ALBUMIN SERPL BCP-MCNC: 3 G/DL (ref 3.2–4.9)
ALBUMIN/GLOB SERPL: 1.7 G/DL
ALP SERPL-CCNC: 58 U/L (ref 30–99)
ALT SERPL-CCNC: 12 U/L (ref 2–50)
ANION GAP SERPL CALC-SCNC: 7 MMOL/L (ref 0–11.9)
AST SERPL-CCNC: 14 U/L (ref 12–45)
BASOPHILS # BLD AUTO: 0.2 % (ref 0–1.8)
BASOPHILS # BLD: 0.01 K/UL (ref 0–0.12)
BILIRUB SERPL-MCNC: 0.3 MG/DL (ref 0.1–1.5)
BUN SERPL-MCNC: 5 MG/DL (ref 8–22)
CALCIUM SERPL-MCNC: 7.5 MG/DL (ref 8.5–10.5)
CHLORIDE SERPL-SCNC: 106 MMOL/L (ref 96–112)
CO2 SERPL-SCNC: 23 MMOL/L (ref 20–33)
CREAT SERPL-MCNC: 0.64 MG/DL (ref 0.5–1.4)
EOSINOPHIL # BLD AUTO: 0.08 K/UL (ref 0–0.51)
EOSINOPHIL NFR BLD: 1.4 % (ref 0–6.9)
ERYTHROCYTE [DISTWIDTH] IN BLOOD BY AUTOMATED COUNT: 49.9 FL (ref 35.9–50)
GLOBULIN SER CALC-MCNC: 1.8 G/DL (ref 1.9–3.5)
GLUCOSE SERPL-MCNC: 98 MG/DL (ref 65–99)
HCT VFR BLD AUTO: 32.8 % (ref 37–47)
HGB BLD-MCNC: 10.3 G/DL (ref 12–16)
IMM GRANULOCYTES # BLD AUTO: 0.03 K/UL (ref 0–0.11)
IMM GRANULOCYTES NFR BLD AUTO: 0.5 % (ref 0–0.9)
LYMPHOCYTES # BLD AUTO: 1.43 K/UL (ref 1–4.8)
LYMPHOCYTES NFR BLD: 24.8 % (ref 22–41)
MCH RBC QN AUTO: 28.9 PG (ref 27–33)
MCHC RBC AUTO-ENTMCNC: 31.4 G/DL (ref 33.6–35)
MCV RBC AUTO: 91.9 FL (ref 81.4–97.8)
MONOCYTES # BLD AUTO: 0.33 K/UL (ref 0–0.85)
MONOCYTES NFR BLD AUTO: 5.7 % (ref 0–13.4)
NEUTROPHILS # BLD AUTO: 3.88 K/UL (ref 2–7.15)
NEUTROPHILS NFR BLD: 67.4 % (ref 44–72)
NRBC # BLD AUTO: 0 K/UL
NRBC BLD-RTO: 0 /100 WBC
PLATELET # BLD AUTO: 316 K/UL (ref 164–446)
PMV BLD AUTO: 8.8 FL (ref 9–12.9)
POTASSIUM SERPL-SCNC: 3.8 MMOL/L (ref 3.6–5.5)
PROT SERPL-MCNC: 4.8 G/DL (ref 6–8.2)
RBC # BLD AUTO: 3.57 M/UL (ref 4.2–5.4)
SODIUM SERPL-SCNC: 136 MMOL/L (ref 135–145)
WBC # BLD AUTO: 5.8 K/UL (ref 4.8–10.8)

## 2019-04-17 PROCEDURE — 700102 HCHG RX REV CODE 250 W/ 637 OVERRIDE(OP): Performed by: HOSPITALIST

## 2019-04-17 PROCEDURE — 700105 HCHG RX REV CODE 258: Performed by: HOSPITALIST

## 2019-04-17 PROCEDURE — 700102 HCHG RX REV CODE 250 W/ 637 OVERRIDE(OP): Performed by: INTERNAL MEDICINE

## 2019-04-17 PROCEDURE — 99239 HOSP IP/OBS DSCHRG MGMT >30: CPT | Performed by: INTERNAL MEDICINE

## 2019-04-17 PROCEDURE — A9270 NON-COVERED ITEM OR SERVICE: HCPCS | Performed by: INTERNAL MEDICINE

## 2019-04-17 PROCEDURE — 36415 COLL VENOUS BLD VENIPUNCTURE: CPT

## 2019-04-17 PROCEDURE — 85025 COMPLETE CBC W/AUTO DIFF WBC: CPT

## 2019-04-17 PROCEDURE — A9270 NON-COVERED ITEM OR SERVICE: HCPCS | Performed by: HOSPITALIST

## 2019-04-17 PROCEDURE — 80053 COMPREHEN METABOLIC PANEL: CPT

## 2019-04-17 RX ORDER — METRONIDAZOLE 500 MG/1
500 TABLET ORAL EVERY 8 HOURS
Qty: 21 TAB | Refills: 0 | Status: SHIPPED | OUTPATIENT
Start: 2019-04-17 | End: 2019-04-24

## 2019-04-17 RX ORDER — CIPROFLOXACIN 500 MG/1
500 TABLET, FILM COATED ORAL 2 TIMES DAILY
Qty: 14 TAB | Refills: 0 | Status: SHIPPED | OUTPATIENT
Start: 2019-04-17 | End: 2019-04-24

## 2019-04-17 RX ADMIN — SENNOSIDES, DOCUSATE SODIUM 2 TABLET: 50; 8.6 TABLET, FILM COATED ORAL at 05:03

## 2019-04-17 RX ADMIN — METRONIDAZOLE 500 MG: 500 TABLET, FILM COATED ORAL at 16:08

## 2019-04-17 RX ADMIN — METRONIDAZOLE 500 MG: 500 TABLET, FILM COATED ORAL at 05:03

## 2019-04-17 RX ADMIN — SODIUM CHLORIDE: 9 INJECTION, SOLUTION INTRAVENOUS at 05:03

## 2019-04-17 RX ADMIN — LEVOTHYROXINE SODIUM 112 MCG: 112 TABLET ORAL at 05:03

## 2019-04-17 NOTE — DISCHARGE INSTRUCTIONS
"Discharge Instructions    Discharged to home by car with relative. Discharged via wheelchair, hospital escort: Yes.  Special equipment needed: Not Applicable    Be sure to schedule a follow-up appointment with your primary care doctor or any specialists as instructed.     Discharge Plan:   F/u with pcp/dc clinic in 1 week   Cipro/flagyl x 7  Diet Plan: (P) Discussed  Activity Level: (P) Discussed  Confirmed Follow up Appointment: (P) Patient to Call and Schedule Appointment  Confirmed Symptoms Management: (P) Discussed  Medication Reconciliation Updated: (P) Yes  Pneumococcal Vaccine Administered/Refused: Not given - Patient refused pneumococcal vaccine (stated previously had \"I am all done with that\")  Influenza Vaccine Indication: Not indicated: Previously immunized this influenza season and > 8 years of age    I understand that a diet low in cholesterol, fat, and sodium is recommended for good health. Unless I have been given specific instructions below for another diet, I accept this instruction as my diet prescription.   Other diet: As tolerated     Special Instructions: Sepsis, Adult  Sepsis is a serious infection of your blood or tissues that affects your whole body. The infection that causes sepsis may be bacterial, viral, fungal, or parasitic. Sepsis may be life threatening. Sepsis can cause your blood pressure to drop. This may result in shock. Shock causes your central nervous system and your organs to stop working correctly.  What increases the risk?  Sepsis can happen in anyone, but it is more likely to happen in people who have weakened immune systems.  What are the signs or symptoms?  Symptoms of sepsis can include:  · Fever or low body temperature (hypothermia).  · Rapid breathing (hyperventilation).  · Chills.  · Rapid heartbeat (tachycardia).  · Confusion or light-headedness.  · Trouble breathing.  · Urinating much less than usual.  · Cool, clammy skin or red, flushed skin.  · Other problems with " the heart, kidneys, or brain.  How is this diagnosed?  Your health care provider will likely do tests to look for an infection, to see if the infection has spread to your blood, and to see how serious your condition is. Tests can include:  · Blood tests, including cultures of your blood.  · Cultures of other fluids from your body, such as:  ¨ Urine.  ¨ Pus from wounds.  ¨ Mucus coughed up from your lungs.  · Urine tests other than cultures.  · X-ray exams or other imaging tests.  How is this treated?  Treatment will begin with elimination of the source of infection. If your sepsis is likely caused by a bacterial or fungal infection, you will be given antibiotic or antifungal medicines.  You may also receive:  · Oxygen.  · Fluids through an IV tube.  · Medicines to increase your blood pressure.  · A machine to clean your blood (dialysis) if your kidneys fail.  · A machine to help you breathe if your lungs fail.  Get help right away if:  You get an infection or develop any of the signs and symptoms of sepsis after surgery or a hospitalization.  This information is not intended to replace advice given to you by your health care provider. Make sure you discuss any questions you have with your health care provider.  Document Released: 09/15/2004 Document Revised: 05/25/2017 Document Reviewed: 08/25/2014  [employer] Interactive Patient Education © 2017 [employer]      · Is patient discharged on Warfarin / Coumadin?   No     Depression / Suicide Risk    As you are discharged from this [facility] facility, it is important to learn how to keep safe from harming yourself.    Recognize the warning signs:  · Abrupt changes in personality, positive or negative- including increase in energy   · Giving away possessions  · Change in eating patterns- significant weight changes-  positive or negative  · Change in sleeping patterns- unable to sleep or sleeping all the time   · Unwillingness or inability to  communicate  · Depression  · Unusual sadness, discouragement and loneliness  · Talk of wanting to die  · Neglect of personal appearance   · Rebelliousness- reckless behavior  · Withdrawal from people/activities they love  · Confusion- inability to concentrate     If you or a loved one observes any of these behaviors or has concerns about self-harm, here's what you can do:  · Talk about it- your feelings and reasons for harming yourself  · Remove any means that you might use to hurt yourself (examples: pills, rope, extension cords, firearm)  · Get professional help from the community (Mental Health, Substance Abuse, psychological counseling)  · Do not be alone:Call your Safe Contact- someone whom you trust who will be there for you.  · Call your local CRISIS HOTLINE 465-4212 or 505-886-7329  · Call your local Children's Mobile Crisis Response Team [place] (644) 994-8545 or www.D[employer]eli  · Call the toll free National Suicide Prevention Hotlines   · National Suicide Prevention Lifeline 851-781-IKUG (4616)  · [employer] Line Network 800-SUICIDE (414-3830)      Colitis  Introduction  Colitis is inflammation of the colon. Colitis may last a short time (acute) or it may last a long time (chronic).  What are the causes?  This condition may be caused by:  · Viruses.  · Bacteria.  · Reactions to medicine.  · Certain autoimmune diseases, such as Crohn disease or ulcerative colitis.  What are the signs or symptoms?  Symptoms of this condition include:  · Diarrhea.  · Passing bloody or tarry stool.  · Pain.  · Fever.  · Vomiting.  · Tiredness (fatigue).  · Weight loss.  · Bloating.  · Sudden increase in abdominal pain.  · Having fewer bowel movements than usual.  How is this diagnosed?  This condition is diagnosed with a stool test or a blood test. You may also have other tests, including X-rays, a CT scan, or a colonoscopy.  How is this treated?  Treatment may include:  · Resting the bowel. This involves not  eating or drinking for a period of time.  · Fluids that are given through an IV tube.  · Medicine for pain and diarrhea.  · Antibiotic medicines.  · Cortisone medicines.  · Surgery.  Follow these instructions at home:  Eating and drinking  · Follow instructions from your health care provider about eating or drinking restrictions.  · Drink enough fluid to keep your urine clear or pale yellow.  · Work with a dietitian to determine which foods cause your condition to flare up.  · Avoid foods that cause flare-ups.  · Eat a well-balanced diet.  Medicines  · Take over-the-counter and prescription medicines only as told by your health care provider.  · If you were prescribed an antibiotic medicine, take it as told by your health care provider. Do not stop taking the antibiotic even if you start to feel better.  General instructions  · Keep all follow-up visits as told by your health care provider. This is important.  Contact a health care provider if:  · Your symptoms do not go away.  · You develop new symptoms.  Get help right away if:  · You have a fever that does not go away with treatment.  · You develop chills.  · You have extreme weakness, fainting, or dehydration.  · You have repeated vomiting.  · You develop severe pain in your abdomen.  · You pass bloody or tarry stool.  This information is not intended to replace advice given to you by your health care provider. Make sure you discuss any questions you have with your health care provider.  Document Released: 01/25/2006 Document Revised: 05/25/2017 Document Reviewed: 04/11/2016  © 2017 [employer]      Diverticulitis  Diverticulitis is when small pockets that have formed in your colon (large intestine) become infected or swollen.  Follow these instructions at home:  · Follow your doctor's instructions.  · Follow a special diet if told by your doctor.  · When you feel better, your doctor may tell you to change your diet. You may be told to eat a lot of fiber. Fruits  and vegetables are good sources of fiber. Fiber makes it easier to poop (have bowel movements).  · Take supplements or probiotics as told by your doctor.  · Only take medicines as told by your doctor.  · Keep all follow-up visits with your doctor.  Contact a doctor if:  · Your pain does not get better.  · You have a hard time eating food.  · You are not pooping like normal.  Get help right away if:  · Your pain gets worse.  · Your problems do not get better.  · Your problems suddenly get worse.  · You have a fever.  · You keep throwing up (vomiting).  · You have bloody or black, tarry poop (stool).  This information is not intended to replace advice given to you by your health care provider. Make sure you discuss any questions you have with your health care provider.  Document Released: 06/05/2009 Document Revised: 05/25/2017 Document Reviewed: 11/12/2014  [employer] Interactive Patient Education © 2017 [employer]

## 2019-04-17 NOTE — CARE PLAN
Problem: Safety  Goal: Will remain free from injury  Outcome: PROGRESSING AS EXPECTED  Patient free from accidental injury at this time. Safety precautions in place. Hourly rounding in place.     Problem: Pain Management  Goal: Pain level will decrease to patient's comfort goal  Outcome: PROGRESSING AS EXPECTED  Patient denying pain at this time. Patient encouraged to call if pain worsens. Hourly rounding in place.

## 2019-04-17 NOTE — CARE PLAN
Problem: Communication  Goal: The ability to communicate needs accurately and effectively will improve    Intervention: Educate patient and significant other/support system about the plan of care, procedures, treatments, medications and allow for questions  Plan of care discussed with patient      Problem: Infection  Goal: Will remain free from infection    Intervention: Assess signs and symptoms of infection  Monitoring labs, vitals and pt for signs of infection.

## 2019-04-17 NOTE — DISCHARGE PLANNING
Transitional Care Navigator:    Per chart review patient has been identified as a candidate for HH based on her medical history and LACE+ of 67 indicating a high risk of readmission. Please consider a referral to HH prior to a DC of home.

## 2019-04-17 NOTE — DISCHARGE SUMMARY
Discharge Summary    CHIEF COMPLAINT ON ADMISSION  Chief Complaint   Patient presents with   • LLQ Pain     and nausea   • Diarrhea     many times this afternoon. She states she has noticed some dark red blood with her more recent stools.        Reason for Admission  Left lower abd pain     Admission Date  4/15/2019    CODE STATUS  Full Code    HPI & HOSPITAL COURSE  This is a 73 y.o. female here with colitis/diarrhea on IV antibiotics and fluids. CT with hepatic lesions, attempted MRI to further delineate, however pt anxious and was given ativan for the study.  Unfortunately, she is unable to complete study.  Due to near resolution of symptoms on antibiotics, will transition to oral antibiotics to complete course of treatment.    Pt now tolerating diet, min abdominal soreness.    Therefore, she is discharged in good and stable condition to home with close outpatient follow-up.    The patient met 2-midnight criteria for an inpatient stay at the time of discharge.    Discharge Date  4/17    FOLLOW UP ITEMS POST DISCHARGE  Cipro/flagyl x 7 days  Pcp/dc clinic in 1 week    DISCHARGE DIAGNOSES  Principal Problem:    Colitis POA: Unknown  Active Problems:    Hypothyroidism POA: Yes      Overview: Stable. Continue current medication Recent- slight over stimulation. Will       monitor and recheck.          Hyperlipidemia POA: Yes      Overview: Declines medication. Making lifestyle changes.    Sepsis (HCC) POA: Unknown    GERD (gastroesophageal reflux disease) POA: Yes      Overview: Controlled with prilosec daily.    Hyponatremia POA: Unknown    Pulmonary nodule POA: Unknown    Liver lesion POA: Unknown  Resolved Problems:    * No resolved hospital problems. *      FOLLOW UP  Future Appointments  Date Time Provider Department Center   7/23/2019 11:00 AM Frances Laboy M.D. G ROBIN Laboy M.D.  [address] [place] [id number] [phone]    In 1 week  Follow up appointment        MEDICATIONS ON DISCHARGE     Medication List      START taking these medications      Instructions   ciprofloxacin 500 MG Tabs  Commonly known as:  CIPRO   Take 1 Tab by mouth 2 times a day for 7 days.  Dose:  500 mg     metroNIDAZOLE 500 MG Tabs  Commonly known as:  FLAGYL   Take 1 Tab by mouth every 8 hours for 7 days.  Dose:  500 mg        CONTINUE taking these medications      Instructions   amitriptyline 50 MG Tabs  Commonly known as:  ELAVIL   Take 50 mg by mouth every bedtime.  Dose:  50 mg     cyclobenzaprine 10 MG Tabs  Commonly known as:  FLEXERIL   Take 10 mg by mouth every bedtime.  Dose:  10 mg     levothyroxine 112 MCG Tabs  Commonly known as:  SYNTHROID   Take 1 Tab by mouth Every morning on an empty stomach.  Dose:  112 mcg     tramadol 50 MG Tabs  Commonly known as:  ULTRAM   Take 50 mg by mouth every 6 hours as needed for Mild Pain.  Dose:  50 mg            Allergies  Allergies   Allergen Reactions   • Codeine Vomiting   • Morphine      Chills    • Vicodin [Hydrocodone-Acetaminophen] Vomiting     OK if takes with phenergan        DIET  Orders Placed This Encounter   Procedures   • Diet Order Full Liquid     Standing Status:   Standing     Number of Occurrences:   1     Order Specific Question:   Diet:     Answer:   Full Liquid [11]       ACTIVITY  As tolerated.  Weight bearing as tolerated    CONSULTATIONS  none    PROCEDURES  none    LABORATORY  Lab Results   Component Value Date    SODIUM 136 04/17/2019    POTASSIUM 3.8 04/17/2019    CHLORIDE 106 04/17/2019    CO2 23 04/17/2019    GLUCOSE 98 04/17/2019    BUN 5 (L) 04/17/2019    CREATININE 0.64 04/17/2019    CREATININE 0.85 11/10/2010    GLOMRATE >59 11/10/2010        Lab Results   Component Value Date    WBC 5.8 04/17/2019    WBC 4.4 11/10/2010    HEMOGLOBIN 10.3 (L) 04/17/2019    HEMATOCRIT 32.8 (L) 04/17/2019    PLATELETCT 316 04/17/2019        Total time of the discharge process exceeds 40 minutes.

## 2019-04-17 NOTE — PROGRESS NOTES
Patient meeting discharge qualifications. Patient ambulating, tolerated full liquid diet, and denies pain.

## 2019-04-17 NOTE — PROGRESS NOTES
A&Ox4.   VSS.   Pt states pain 3/10 left lower abdomen. Declines interventions.   Pt was nauseas overnight, denies n/v this morning.  Clear liquid diet, decreased appetite. Pt encourage to increase fluids.   + bowel sounds, + flatus, LBM 4/17; small, soft stool; observed scant dark red blood in patients stool, scant bright red blood on toilet paper.  Saturating >90% on 1L.  Pt ambulates SBA.   Baseline pain in patients left leg from previous fall at home.   Skin tear on right leg with bandage, prior to admission. Dressing CDI.    Updated on plan of care. Safety education provided. Bed locked in low. Call light within reach.

## 2019-04-17 NOTE — PROGRESS NOTES
Pt discharged to home per physician order. Discharged with spouse via ambulation. Pt refused escort. Educated on follow up appointments, home medicaitons, prescriptions, home care. Discussed PO antibiotics. Pt informed of several reasons to return to ED. All questions answered. Belongings with pt on discharge.

## 2019-04-17 NOTE — PROGRESS NOTES
Bedside report received.  Assessment complete.  A&O x 4. Patient calls appropriately.  Patient ambulatory with SB assist.   Patient has 0/10 pain.   Pt. Had a few bouts of emesis, MD phoned, per telephone with read back medication was ordered and given by this RN.  + void, - flatus, PTA BM.  Patient denies SOB.  SCD's refused after education.  Review plan with of care with patient. Call light and personal belongings with in reach. Hourly rounding in place. All needs met at this time.

## 2019-04-18 ENCOUNTER — PATIENT OUTREACH (OUTPATIENT)
Dept: HEALTH INFORMATION MANAGEMENT | Facility: OTHER | Age: 74
End: 2019-04-18

## 2019-04-18 NOTE — PROGRESS NOTES
Outbound call to Makenna for post discharge medication review. SCP post discharge med rec completed. Interaction noted between tramadol and amitriptyline for possibility of seizures and increased risk of serotonin syndrome.  Reviewed signs/symptoms of serotonin syndrome and to seek medical attention if these occur. Patient cuts tramadol in half and denies adverse effects. Patient denies barriers to accessing medications or trouble with adherence. She does report having dry mouth which could be attributed to amitriptyline and cyclobenzaprine. Patient will discuss with provider and may consider using OTC products. Patient does report some nausea with ciprofloxacin. Advised to administer medication with meals to minimize GI upset.     Susannah Novak, PharmD

## 2019-04-19 ENCOUNTER — PATIENT OUTREACH (OUTPATIENT)
Dept: HEALTH INFORMATION MANAGEMENT | Facility: OTHER | Age: 74
End: 2019-04-19

## 2019-04-21 LAB
BACTERIA BLD CULT: NORMAL
BACTERIA BLD CULT: NORMAL
SIGNIFICANT IND 70042: NORMAL
SIGNIFICANT IND 70042: NORMAL
SITE SITE: NORMAL
SITE SITE: NORMAL
SOURCE SOURCE: NORMAL
SOURCE SOURCE: NORMAL

## 2019-04-25 DIAGNOSIS — Z79.890 POSTMENOPAUSAL HRT (HORMONE REPLACEMENT THERAPY): ICD-10-CM

## 2019-04-25 NOTE — TELEPHONE ENCOUNTER
Was the patient seen in the last year in this department? Yes    Does patient have an active prescription for medications requested? No     Received Request Via: Pharmacy      Pt met protocol?: Yes, OV 1/19, d/c'd 4/19 (d/c'd from hospital)

## 2019-04-26 ENCOUNTER — OFFICE VISIT (OUTPATIENT)
Dept: MEDICAL GROUP | Facility: PHYSICIAN GROUP | Age: 74
End: 2019-04-26
Payer: MEDICARE

## 2019-04-26 VITALS
BODY MASS INDEX: 24.54 KG/M2 | RESPIRATION RATE: 12 BRPM | HEIGHT: 60 IN | TEMPERATURE: 99.5 F | WEIGHT: 125 LBS | DIASTOLIC BLOOD PRESSURE: 68 MMHG | HEART RATE: 99 BPM | SYSTOLIC BLOOD PRESSURE: 110 MMHG | OXYGEN SATURATION: 98 %

## 2019-04-26 DIAGNOSIS — E87.1 HYPONATREMIA: ICD-10-CM

## 2019-04-26 DIAGNOSIS — D64.9 ANEMIA, UNSPECIFIED TYPE: ICD-10-CM

## 2019-04-26 DIAGNOSIS — Z09 HOSPITAL DISCHARGE FOLLOW-UP: ICD-10-CM

## 2019-04-26 DIAGNOSIS — K76.9 LIVER LESION: ICD-10-CM

## 2019-04-26 DIAGNOSIS — R91.1 PULMONARY NODULE: ICD-10-CM

## 2019-04-26 PROCEDURE — 99214 OFFICE O/P EST MOD 30 MIN: CPT | Performed by: NURSE PRACTITIONER

## 2019-04-26 ASSESSMENT — PAIN SCALES - GENERAL: PAINLEVEL: NO PAIN

## 2019-04-26 NOTE — ASSESSMENT & PLAN NOTE
"Patient was admitted to [facility] from 4/15 to 2019 for abdominal pain and diarrhea.  I have reviewed discharge summary.  CT scan showed colitis.  Patient was treated with IV antibiotics and discharged home with oral Cipro and Flagyl.  Patient reports she finished antibiotics yesterday.  Since hospital discharge, patient denies diarrhea, blood in stool, abdominal pain, lightheadedness.  She does report stools are still dark.  CBC while in hospital showed anemia.  Will get updated CBC and CMP in about 4 weeks.  ER precautions reviewed with patient.    CT scan of abdomen had incidental findings of 2 mm lung nodule and hepatic lesions.    Apparently an MRI was attempted, but patient was too anxious even with oral Ativan.  Patient reports in the past she has needed anesthesia to \"put her out\" or have an open MRI.  I will order an open MRI of abdomen with contrast.  Recommendations for lung nodules are to repeat CT if patient has a history of smoking or other known risk factors.  Patient reports she has a 50-year history of smoking, though she quit in the last few years.  Patient will review with her primary care provider at next appointment.  I cannot order CT for a year from now as order will .  "

## 2019-04-26 NOTE — PROGRESS NOTES
"  CC: Hospital discharge follow-up    HISTORY OF THE PRESENT ILLNESS: Patient is a 73 y.o. female. This pleasant patient is here today for evaluation management of the following health problems.  Patient is new to me.  Her primary care provider is Dr. Laboy.        Hospital discharge follow-up  Patient was admitted to [facility] from 4/15 to 2019 for abdominal pain and diarrhea.  I have reviewed discharge summary.  CT scan showed colitis.  Patient was treated with IV antibiotics and discharged home with oral Cipro and Flagyl.  Patient reports she finished antibiotics yesterday.  Since hospital discharge, patient denies diarrhea, blood in stool, abdominal pain, lightheadedness.  She does report stools are still dark.  CBC while in hospital showed anemia.  Will get updated CBC and CMP in about 4 weeks.  ER precautions reviewed with patient.    CT scan of abdomen had incidental findings of 2 mm lung nodule and hepatic lesions.    Apparently an MRI was attempted, but patient was too anxious even with oral Ativan.  Patient reports in the past she has needed anesthesia to \"put her out\" or have an open MRI.  I will order an open MRI of abdomen with contrast.  Recommendations for lung nodules are to repeat CT if patient has a history of smoking or other known risk factors.  Patient reports she has a 50-year history of smoking, though she quit in the last few years.  Patient will review with her primary care provider at next appointment.  I cannot order CT for a year from now as order will .    Liver lesion  Liver lesions up to 7 mm incidentally found in CT of abdomen during recent hospitalization.  Patient was unable to have closed MRI due to anxiety.  Will order open MRI of abdomen.  Patient denies abdominal pain.  Liver function tests are normal.    Pulmonary nodule  Pulmonary nodule less than 6 mm incidentally found on CT during recent hospitalization.  Recommendation is to have follow-up CT in one year " if patient is at high risk.  Patient has a history of smoking for 50 years, though she has quit in the last few years.  Patient will need to have repeat CT in 1 year.  She denies shortness of breath, coughing.  Patient will follow up with primary care provider.      Allergies: Codeine; Morphine; and Vicodin [hydrocodone-acetaminophen]    Current Outpatient Prescriptions Ordered in [facility]   Medication Sig Dispense Refill   • cyclobenzaprine (FLEXERIL) 10 MG Tab Take 10 mg by mouth every bedtime.     • amitriptyline (ELAVIL) 50 MG Tab Take 50 mg by mouth every bedtime.     • levothyroxine (SYNTHROID) 112 MCG Tab Take 1 Tab by mouth Every morning on an empty stomach. 90 Tab 3   • tramadol (ULTRAM) 50 MG Tab Take 50 mg by mouth every 6 hours as needed for Mild Pain.       No current Epic-ordered facility-administered medications on file.        Past Medical History:   Diagnosis Date   • Arthritis     hips, back and shoulders (osteoarthritis)    • Colon polyp 12/2011   • Degeneration of lumbar or lumbosacral intervertebral disc     Dr. Cisneros   • Dental disorder     dentures    • Ear problems as child had surgery   • Graves' disease with exophthalmos 6/13/2014    Being followed by Dr. Han San   • Hemorrhoid     internal   • High cholesterol     not medicated at this time    • History of carpal tunnel repair 7/31/2013    Dr. Villa repaired both.   • Menopause 10/14/2009   • Osteoarthritis of multiple joints 6/13/2014   • Pain in joint, pelvic region and thigh 2015    bursitis and arthritis in hips-takes ibuprofen and darvacet   • Pain in joint, shoulder region 2015   • Periodic pelvic examination due   • Postcoital bleeding     improved   • Rash 12/29/2014    Started about a week ago when she got news about surgery High stress Nothing new Back, stomach, chest, arms Itch No pain   • S/P colonoscopy 12/2011    repeat in 5 years- had polyp, initial 2005.    • Screening mammogram  11/2008=normal   • Tobacco use  disorder    • Ulcer of the stomach and intestine hx.    stable   • Unspecified cataract     bilateral IOL   • Unspecified hypothyroidism        Past Surgical History:   Procedure Laterality Date   • BLEPHAROPLASTY Bilateral 4/20/2016    Procedure: BLEPHAROPLASTY - UPPER;  Surgeon: Moshe Sena M.D.;  Location: SURGERY [facility];  Service:    • RECTUS REPAIR Bilateral 7/9/2015    Procedure: RECTUS REPAIR FOR LT SUPERIOR RECESSION & RT INFERIOR RECESSION ;  Surgeon: Leila Villegas M.D.;  Location: SURGERY SAME DAY [facility];  Service:    • CATARACT PHACO WITH IOL  3/17/2015    Performed by Derrick Gupta M.D. at [facility]   • CATARACT PHACO WITH IOL  3/3/2015    Performed by Derrick Gupta M.D. at [facility]   • TENSILON TEST  11/20/2014    Performed by Maynor San D.O. at ENDOSCOPY [facility]   • CARPAL TUNNEL ENDOSCOPIC  2/8/2013    Performed by Pancho Palomino M.D. at SURGERY SAME DAY [facility] ORS   • CARPAL TUNNEL ENDOSCOPIC  11/17/2012    Performed by Pancho Palomino M.D. at SURGERY [facility] ORS   • ABDOMINAL HYSTERECTOMY TOTAL  partial    fibroid tumors, has ovary and tube left   • OTHER SURGICAL PROCEDURE      ear surgery as child   • THYROIDECTOMY  thyroid ablation with iodine therapy   • TONSILLECTOMY AND ADENOIDECTOMY         Social History   Substance Use Topics   • Smoking status: Former Smoker     Packs/day: 1.00     Years: 50.00     Types: Cigarettes     Quit date: 1/9/2013   • Smokeless tobacco: Never Used      Comment: use E cig   • Alcohol use 0.6 oz/week     1 Glasses of wine per week      Comment:      0-1 per month                              Family History   Problem Relation Age of Onset   • Hypertension Father    • Heart Disease Father         MI age 49 yo   • Cancer Neg Hx    • Diabetes Neg Hx        ROS:   As in HPI, otherwise negative for chest pain, dyspnea, abdominal pain, dysuria, blood in stool, fever         Exam:  /68 (BP Location: Left arm, Patient Position: Sitting, BP Cuff Size: Adult)   Pulse 99   Temp 37.5 °C (99.5 °F) (Temporal)   Resp 12   Ht 1.524 m (5')   Wt 56.7 kg (125 lb)   SpO2 98%  Body mass index is 24.41 kg/m².    General: Alert, pleasant, well nourished, well developed female in NAD  HEENT: Normocephalic. Eyes conjunctiva clear lids without ptosis, pupils equal and reactive to light, ears normal shape and contour, canals are clear bilaterally, tympanic membranes are pearly gray with good light reflex, nasal mucosa without erythema and drainage, oropharynx is without erythema, edema or exudates.   Neck: Supple without bruit. Thyroid is not enlarged.  Pulmonary: Clear to ausculation.  Normal effort. No rales, ronchi, or wheezing.  Cardiovascular: Normal rate and rhythm without murmur. Carotid and radial pulses are intact and equal bilaterally.  No lower extremity edema.  Abdomen: Soft, nontender, nondistended. Normal bowel sounds. Liver and spleen are not palpable  Neurologic: Grossly nonfocal  Lymph: No cervical or supraclavicular lymph nodes are palpable  Skin: Warm and dry.  No obvious lesions.  Musculoskeletal: Normal gait.   Psych: Normal mood and affect. Alert and oriented. Judgment and insight is normal.    Please note that this dictation was created using voice recognition software. I have made every reasonable attempt to correct obvious errors, but I expect that there are errors of grammar and possibly content that I did not discover before finalizing the note.      Assessment/Plan  1. Hyponatremia  Hyponatremia on hospital labs.  Will get updated CMP.  - Comp Metabolic Panel; Future  - ESTIMATED GFR; Future    2. Anemia, unspecified type  We will get updated CBC.  - CBC WITHOUT DIFFERENTIAL; Future    3. Hospital discharge follow-up  Improving.  Patient reports symptoms have resolved.  ER precautions reviewed with patient.    4. Liver lesion  Liver lesions found on CT scan.  Further  imaging recommended.  Patient unable to tolerate regular MRI even with oral sedation.  Will order open MRI.  - MR-ABDOMEN-WITH & W/O; Future    5. Pulmonary nodule  Patient at high risk because of history of smoking.  Recommendation is to repeat CT in 1 year.  Unable to order CT for 1 year from now as order will .  Reviewed with patient.  She will review with her primary care provider.    Patient will return to clinic as previously scheduled with primary care provider, Dr. Laboy, on 2019.

## 2019-04-26 NOTE — ASSESSMENT & PLAN NOTE
Liver lesions up to 7 mm incidentally found in CT of abdomen during recent hospitalization.  Patient was unable to have closed MRI due to anxiety.  Will order open MRI of abdomen.  Patient denies abdominal pain.  Liver function tests are normal.

## 2019-04-26 NOTE — ASSESSMENT & PLAN NOTE
Pulmonary nodule less than 6 mm incidentally found on CT during recent hospitalization.  Recommendation is to have follow-up CT in one year if patient is at high risk.  Patient has a history of smoking for 50 years, though she has quit in the last few years.  Patient will need to have repeat CT in 1 year.  She denies shortness of breath, coughing.  Patient will follow up with primary care provider.

## 2019-04-27 RX ORDER — ESTRADIOL 1 MG/1
TABLET ORAL
Qty: 30 TAB | Refills: 0 | Status: SHIPPED | OUTPATIENT
Start: 2019-04-27 | End: 2019-06-21 | Stop reason: SDUPTHER

## 2019-05-04 ENCOUNTER — TELEPHONE (OUTPATIENT)
Dept: MEDICAL GROUP | Facility: PHYSICIAN GROUP | Age: 74
End: 2019-05-04

## 2019-05-04 DIAGNOSIS — K76.9 LIVER DISEASE: ICD-10-CM

## 2019-05-04 DIAGNOSIS — K76.9 LIVER LESION: ICD-10-CM

## 2019-05-04 NOTE — TELEPHONE ENCOUNTER
Received message that patient is requesting sedation during MRI.   new MRI order has been sent to include IV sedation.

## 2019-05-06 RX ORDER — FLUCONAZOLE 150 MG/1
150 TABLET ORAL DAILY
Qty: 3 TAB | Refills: 0 | Status: SHIPPED | OUTPATIENT
Start: 2019-05-06 | End: 2019-05-09

## 2019-05-08 ENCOUNTER — HOSPITAL ENCOUNTER (OUTPATIENT)
Dept: LAB | Facility: MEDICAL CENTER | Age: 74
End: 2019-05-08
Attending: NURSE PRACTITIONER
Payer: MEDICARE

## 2019-05-08 DIAGNOSIS — D64.9 ANEMIA, UNSPECIFIED TYPE: ICD-10-CM

## 2019-05-08 DIAGNOSIS — E87.1 HYPONATREMIA: ICD-10-CM

## 2019-05-08 LAB
ALBUMIN SERPL BCP-MCNC: 3.9 G/DL (ref 3.2–4.9)
ALBUMIN/GLOB SERPL: 1.5 G/DL
ALP SERPL-CCNC: 66 U/L (ref 30–99)
ALT SERPL-CCNC: 21 U/L (ref 2–50)
ANION GAP SERPL CALC-SCNC: 6 MMOL/L (ref 0–11.9)
AST SERPL-CCNC: 23 U/L (ref 12–45)
BILIRUB SERPL-MCNC: 0.3 MG/DL (ref 0.1–1.5)
BUN SERPL-MCNC: 11 MG/DL (ref 8–22)
CALCIUM SERPL-MCNC: 9.3 MG/DL (ref 8.5–10.5)
CHLORIDE SERPL-SCNC: 105 MMOL/L (ref 96–112)
CO2 SERPL-SCNC: 27 MMOL/L (ref 20–33)
CREAT SERPL-MCNC: 0.64 MG/DL (ref 0.5–1.4)
ERYTHROCYTE [DISTWIDTH] IN BLOOD BY AUTOMATED COUNT: 47.3 FL (ref 35.9–50)
GLOBULIN SER CALC-MCNC: 2.6 G/DL (ref 1.9–3.5)
GLUCOSE SERPL-MCNC: 90 MG/DL (ref 65–99)
HCT VFR BLD AUTO: 40.1 % (ref 37–47)
HGB BLD-MCNC: 12.3 G/DL (ref 12–16)
MCH RBC QN AUTO: 28 PG (ref 27–33)
MCHC RBC AUTO-ENTMCNC: 30.7 G/DL (ref 33.6–35)
MCV RBC AUTO: 91.1 FL (ref 81.4–97.8)
PLATELET # BLD AUTO: 446 K/UL (ref 164–446)
PMV BLD AUTO: 9.4 FL (ref 9–12.9)
POTASSIUM SERPL-SCNC: 4 MMOL/L (ref 3.6–5.5)
PROT SERPL-MCNC: 6.5 G/DL (ref 6–8.2)
RBC # BLD AUTO: 4.4 M/UL (ref 4.2–5.4)
SODIUM SERPL-SCNC: 138 MMOL/L (ref 135–145)
WBC # BLD AUTO: 4.4 K/UL (ref 4.8–10.8)

## 2019-05-08 PROCEDURE — 80053 COMPREHEN METABOLIC PANEL: CPT

## 2019-05-08 PROCEDURE — 36415 COLL VENOUS BLD VENIPUNCTURE: CPT

## 2019-05-08 PROCEDURE — 85027 COMPLETE CBC AUTOMATED: CPT

## 2019-05-09 ENCOUNTER — OFFICE VISIT (OUTPATIENT)
Dept: MEDICAL GROUP | Facility: PHYSICIAN GROUP | Age: 74
End: 2019-05-09
Payer: MEDICARE

## 2019-05-09 VITALS
HEART RATE: 99 BPM | OXYGEN SATURATION: 98 % | RESPIRATION RATE: 16 BRPM | BODY MASS INDEX: 24.15 KG/M2 | WEIGHT: 123 LBS | HEIGHT: 60 IN | TEMPERATURE: 98.5 F | DIASTOLIC BLOOD PRESSURE: 70 MMHG | SYSTOLIC BLOOD PRESSURE: 118 MMHG

## 2019-05-09 DIAGNOSIS — L03.116 CELLULITIS OF LEFT LOWER EXTREMITY: ICD-10-CM

## 2019-05-09 DIAGNOSIS — Z01.818 PREOPERATIVE CLEARANCE: ICD-10-CM

## 2019-05-09 DIAGNOSIS — K76.9 LIVER LESION: ICD-10-CM

## 2019-05-09 PROBLEM — L03.119 CELLULITIS OF EXTREMITY: Status: ACTIVE | Noted: 2019-05-09

## 2019-05-09 PROBLEM — E87.1 HYPONATREMIA: Status: RESOLVED | Noted: 2019-04-15 | Resolved: 2019-05-09

## 2019-05-09 PROCEDURE — 93000 ELECTROCARDIOGRAM COMPLETE: CPT | Performed by: FAMILY MEDICINE

## 2019-05-09 PROCEDURE — 99214 OFFICE O/P EST MOD 30 MIN: CPT | Performed by: FAMILY MEDICINE

## 2019-05-09 RX ORDER — SULFAMETHOXAZOLE AND TRIMETHOPRIM 800; 160 MG/1; MG/1
1 TABLET ORAL 2 TIMES DAILY
Qty: 10 TAB | Refills: 0 | Status: SHIPPED | OUTPATIENT
Start: 2019-05-09 | End: 2019-05-14

## 2019-05-09 RX ORDER — OMEPRAZOLE 40 MG/1
CAPSULE, DELAYED RELEASE ORAL
COMMUNITY
Start: 2019-03-20 | End: 2019-05-09

## 2019-05-09 RX ORDER — OMEPRAZOLE 40 MG/1
CAPSULE, DELAYED RELEASE ORAL
Refills: 1 | COMMUNITY
Start: 2019-03-20 | End: 2019-09-18

## 2019-05-09 RX ORDER — ERYTHROMYCIN 5 MG/G
OINTMENT OPHTHALMIC
COMMUNITY
Start: 2019-04-15 | End: 2019-11-27

## 2019-05-09 NOTE — ASSESSMENT & PLAN NOTE
Left foot swelling and pain and redness on top of foot x 3 days. Not been improving.   Will treat as cellulitis advised ice, topical benadryl.   Able to walk on it, limps in the morning, after an hour swelling resolves and she can walk on it again.

## 2019-05-09 NOTE — LETTER
May 9, 2019        Makenna Culver  1945 is my patient in clinic. CBC, CMP, TSH, EKG reviewed in clinic today. She is medically cleared for anesthesia and surgery.     Please contact me with any questions.     Thank you,           Frances Laboy M.D.

## 2019-05-09 NOTE — ASSESSMENT & PLAN NOTE
Patient presents today for preoperative clearance for left eye lid lift for ptosis had right eye lid surgery done in the past with no complications.  CBC CMP reviewed with no concerns.  Last TSH stable with normal T4.  Patient has no history of cardiac events.  Blood pressure is well controlled she is not on oxygen vitals are stable today at clinic visit.   EKG done in clinic.  Medical clearance for surgery is provided today.

## 2019-05-09 NOTE — PROGRESS NOTES
Subjective:   Makenna Culver is a 73 y.o. female here today for evaluation and management of:     Preoperative clearance  Patient presents today for preoperative clearance for left eye lid lift for ptosis had right eye lid surgery done in the past with no complications.  CBC CMP reviewed with no concerns.  Last TSH stable with normal T4.  Patient has no history of cardiac events.  Blood pressure is well controlled she is not on oxygen vitals are stable today at clinic visit.   EKG done in clinic.  Medical clearance for surgery is provided today.    Liver lesion  Repeat MRI scheduled in June to evaluate further the small cysts/hemangioma on the liver seen on CT done when pt had colitis.     Cellulitis of extremity  Left foot swelling and pain and redness on top of foot x 3 days. Not been improving.   Will treat as cellulitis advised ice, topical benadryl.   Able to walk on it, limps in the morning, after an hour swelling resolves and she can walk on it again.      EKG Interpretation   Interpreted by me   Rhythm: normal sinus   Rate: normal   Axis: LAD  Ectopy: none   Conduction: normal   ST Segments: no acute change   T Waves: no acute change   Q Waves: none   Clinical Impression: no acute changes.       Current medicines (including changes today)  Current Outpatient Prescriptions   Medication Sig Dispense Refill   • omeprazole (PRILOSEC) 40 MG delayed-release capsule   1   • Zoster Vac Recomb Adjuvanted (SHINGRIX) 50 MCG/0.5ML Recon Susp 0.5 mL by Intramuscular route Once for 1 dose. 0.5 mL 0   • sulfamethoxazole-trimethoprim (BACTRIM DS) 800-160 MG tablet Take 1 Tab by mouth 2 times a day for 5 days. 10 Tab 0   • estradiol (ESTRACE) 1 MG Tab TAKE 1 TABLET BY MOUTH ONCE DAILY 30 Tab 0   • cyclobenzaprine (FLEXERIL) 10 MG Tab Take 10 mg by mouth every bedtime.     • amitriptyline (ELAVIL) 50 MG Tab Take 50 mg by mouth every bedtime.     • levothyroxine (SYNTHROID) 112 MCG Tab Take 1 Tab by mouth Every morning on an  empty stomach. 90 Tab 3   • tramadol (ULTRAM) 50 MG Tab Take 50 mg by mouth every 6 hours as needed for Mild Pain.     • erythromycin 5 MG/GM Ointment        No current facility-administered medications for this visit.      She  has a past medical history of Arthritis; Colon polyp (12/2011); Degeneration of lumbar or lumbosacral intervertebral disc; Dental disorder; Ear problems (as child had surgery); Graves' disease with exophthalmos (6/13/2014); Hemorrhoid; High cholesterol; History of carpal tunnel repair (7/31/2013); Menopause (10/14/2009); Osteoarthritis of multiple joints (6/13/2014); Pain in joint, pelvic region and thigh (2015); Pain in joint, shoulder region (2015); Periodic pelvic examination (due); Postcoital bleeding; Rash (12/29/2014); S/P colonoscopy (12/2011); Screening mammogram ( 11/2008=normal); Tobacco use disorder; Ulcer of the stomach and intestine (hx.); Unspecified cataract; and Unspecified hypothyroidism.    ROS  No chest pain, no shortness of breath, no abdominal pain       Objective:     /70 (BP Location: Left arm, Patient Position: Sitting, BP Cuff Size: Adult)   Pulse 99   Temp 36.9 °C (98.5 °F) (Temporal)   Resp 16   Ht 1.524 m (5')   Wt 55.8 kg (123 lb)   SpO2 98%  Body mass index is 24.02 kg/m².   Physical Exam:  Constitutional: Alert, no distress.  Skin: Warm, dry, good turgor, no rashes in visible areas.  Eye: Equal, round and reactive, conjunctiva clear, lids normal.  ENMT: Lips without lesions, good dentition, oropharynx clear.  Neck: Trachea midline, no masses, no thyromegaly. No cervical or supraclavicular lymphadenopathy  Respiratory: Unlabored respiratory effort, lungs clear to auscultation, no wheezes, no ronchi.  Cardiovascular: Normal S1, S2, no murmur, no edema.  Abdomen: Soft, non-tender, no masses, no hepatosplenomegaly.  Psych: Alert and oriented x3, normal affect and mood.  Msk: Left foot: Mild edema, tenderness to palpation, mild erythema on dorsum of  foot.      Assessment and Plan:   The following treatment plan was discussed    1. Preoperative clearance  Letter written for patient.   - EKG - Clinic Performed    2. Liver lesion  Follow up with MRI as planned.     3. Cellulitis of left lower extremity  Complete 5 days of bactrim.   She will let me know of mychart       Followup: Return in about 6 months (around 11/9/2019) for left foot swelling.

## 2019-05-09 NOTE — ASSESSMENT & PLAN NOTE
Repeat MRI scheduled in June to evaluate further the small cysts/hemangioma on the liver seen on CT done when pt had colitis.

## 2019-05-17 ENCOUNTER — PATIENT OUTREACH (OUTPATIENT)
Dept: HEALTH INFORMATION MANAGEMENT | Facility: OTHER | Age: 74
End: 2019-05-17

## 2019-05-17 RX ORDER — CYCLOBENZAPRINE HCL 10 MG
TABLET ORAL
Qty: 90 TAB | Refills: 0 | Status: SHIPPED | OUTPATIENT
Start: 2019-05-17 | End: 2019-12-31 | Stop reason: SDUPTHER

## 2019-05-17 NOTE — TELEPHONE ENCOUNTER
*HISTORICAL MEDICATION*  Was the patient seen in the last year in this department? Yes    Does patient have an active prescription for medications requested? No     Received Request Via: Pharmacy      Pt met protocol?: Yes    LAST OV 05/09/2019

## 2019-05-21 NOTE — PROGRESS NOTES
Patient  Makenna Culver was admitted to [facility] on 04/15/2019 for Sepsis and was discharged on 04/17/2019. Patient was instructed to follow up with her Primary Care Physician Dr. Frances Laboy on upon discharge. Patient followed up with her PCP on two occasions on 04/26 and 05/09, both appointments were kept. Patient has 3 future follow up appointments scheduled: 1- Imaging appointment on 06/12 and 2- Primary Care Physician follow up on 07/23 and 11/27. Patient was discharge with a high lace score, therefore, a PPS screening was conducted where the patient was scored at a 80%.

## 2019-05-23 DIAGNOSIS — E03.9 HYPOTHYROIDISM, UNSPECIFIED TYPE: ICD-10-CM

## 2019-05-23 RX ORDER — LEVOTHYROXINE SODIUM 112 UG/1
TABLET ORAL
Qty: 90 TAB | Refills: 0 | Status: SHIPPED | OUTPATIENT
Start: 2019-05-23 | End: 2019-08-22 | Stop reason: SDUPTHER

## 2019-05-23 NOTE — TELEPHONE ENCOUNTER
Was the patient seen in the last year in this department? Yes    Does patient have an active prescription for medications requested? No     Received Request Via: Pharmacy      Pt met protocol?: Yes   Pt last ov 5/19   TSH   Date Value Ref Range Status   04/03/2019 0.170 (L) 0.380 - 5.330 uIU/mL Final     Comment:     Please note new reference ranges effective 12/14/2017 10:00 AM  Pregnant Females, 1st Trimester  0.050-3.700  Pregnant Females, 2nd Trimester  0.310-4.350  Pregnant Females, 3rd Trimester  0.410-5.180

## 2019-06-05 ENCOUNTER — HOSPITAL ENCOUNTER (OUTPATIENT)
Dept: LAB | Facility: MEDICAL CENTER | Age: 74
End: 2019-06-05
Attending: OPHTHALMOLOGY
Payer: MEDICARE

## 2019-06-05 ENCOUNTER — HOSPITAL ENCOUNTER (OUTPATIENT)
Dept: LAB | Facility: MEDICAL CENTER | Age: 74
End: 2019-06-05
Attending: NURSE PRACTITIONER
Payer: MEDICARE

## 2019-06-05 LAB
ANION GAP SERPL CALC-SCNC: 8 MMOL/L (ref 0–11.9)
BASOPHILS # BLD AUTO: 0.8 % (ref 0–1.8)
BASOPHILS # BLD: 0.04 K/UL (ref 0–0.12)
BUN SERPL-MCNC: 10 MG/DL (ref 8–22)
CALCIUM SERPL-MCNC: 9.4 MG/DL (ref 8.5–10.5)
CHLORIDE SERPL-SCNC: 102 MMOL/L (ref 96–112)
CO2 SERPL-SCNC: 27 MMOL/L (ref 20–33)
CREAT SERPL-MCNC: 0.72 MG/DL (ref 0.5–1.4)
EOSINOPHIL # BLD AUTO: 0.11 K/UL (ref 0–0.51)
EOSINOPHIL NFR BLD: 2.1 % (ref 0–6.9)
ERYTHROCYTE [DISTWIDTH] IN BLOOD BY AUTOMATED COUNT: 46.6 FL (ref 35.9–50)
FASTING STATUS PATIENT QL REPORTED: NORMAL
GLUCOSE SERPL-MCNC: 86 MG/DL (ref 65–99)
HCT VFR BLD AUTO: 40.9 % (ref 37–47)
HGB BLD-MCNC: 12.8 G/DL (ref 12–16)
IMM GRANULOCYTES # BLD AUTO: 0.01 K/UL (ref 0–0.11)
IMM GRANULOCYTES NFR BLD AUTO: 0.2 % (ref 0–0.9)
LYMPHOCYTES # BLD AUTO: 1.93 K/UL (ref 1–4.8)
LYMPHOCYTES NFR BLD: 37.2 % (ref 22–41)
MCH RBC QN AUTO: 28.7 PG (ref 27–33)
MCHC RBC AUTO-ENTMCNC: 31.3 G/DL (ref 33.6–35)
MCV RBC AUTO: 91.7 FL (ref 81.4–97.8)
MONOCYTES # BLD AUTO: 0.43 K/UL (ref 0–0.85)
MONOCYTES NFR BLD AUTO: 8.3 % (ref 0–13.4)
NEUTROPHILS # BLD AUTO: 2.67 K/UL (ref 2–7.15)
NEUTROPHILS NFR BLD: 51.4 % (ref 44–72)
NRBC # BLD AUTO: 0 K/UL
NRBC BLD-RTO: 0 /100 WBC
PLATELET # BLD AUTO: 368 K/UL (ref 164–446)
PMV BLD AUTO: 9.2 FL (ref 9–12.9)
POTASSIUM SERPL-SCNC: 4.2 MMOL/L (ref 3.6–5.5)
RBC # BLD AUTO: 4.46 M/UL (ref 4.2–5.4)
SODIUM SERPL-SCNC: 137 MMOL/L (ref 135–145)
WBC # BLD AUTO: 5.2 K/UL (ref 4.8–10.8)

## 2019-06-05 PROCEDURE — 85025 COMPLETE CBC W/AUTO DIFF WBC: CPT

## 2019-06-05 PROCEDURE — 36415 COLL VENOUS BLD VENIPUNCTURE: CPT

## 2019-06-05 PROCEDURE — 80048 BASIC METABOLIC PNL TOTAL CA: CPT

## 2019-06-12 ENCOUNTER — HOSPITAL ENCOUNTER (OUTPATIENT)
Dept: RADIOLOGY | Facility: MEDICAL CENTER | Age: 74
End: 2019-06-12
Attending: NURSE PRACTITIONER
Payer: MEDICARE

## 2019-06-12 VITALS
RESPIRATION RATE: 18 BRPM | WEIGHT: 124.12 LBS | BODY MASS INDEX: 24.37 KG/M2 | DIASTOLIC BLOOD PRESSURE: 71 MMHG | SYSTOLIC BLOOD PRESSURE: 130 MMHG | OXYGEN SATURATION: 97 % | TEMPERATURE: 97.5 F | HEIGHT: 60 IN | HEART RATE: 72 BPM

## 2019-06-12 DIAGNOSIS — K76.9 LIVER LESION: ICD-10-CM

## 2019-06-12 PROCEDURE — 4410588 MR-ABDOMEN-WITH & W/O

## 2019-06-12 PROCEDURE — 700101 HCHG RX REV CODE 250

## 2019-06-12 PROCEDURE — 74183 MRI ABD W/O CNTR FLWD CNTR: CPT

## 2019-06-12 PROCEDURE — A9585 GADOBUTROL INJECTION: HCPCS | Performed by: NURSE PRACTITIONER

## 2019-06-12 PROCEDURE — 700111 HCHG RX REV CODE 636 W/ 250 OVERRIDE (IP)

## 2019-06-12 PROCEDURE — 700117 HCHG RX CONTRAST REV CODE 255: Performed by: NURSE PRACTITIONER

## 2019-06-12 RX ORDER — GADOBUTROL 604.72 MG/ML
6 INJECTION INTRAVENOUS ONCE
Status: COMPLETED | OUTPATIENT
Start: 2019-06-12 | End: 2019-06-12

## 2019-06-12 RX ADMIN — SUGAMMADEX: 100 INJECTION, SOLUTION INTRAVENOUS at 09:00

## 2019-06-12 RX ADMIN — GADOBUTROL 6 ML: 604.72 INJECTION INTRAVENOUS at 08:45

## 2019-06-12 NOTE — DISCHARGE INSTRUCTIONS
MRI ADULT DISCHARGE INSTRUCTIONS    You have been medicated today for your scan. Please follow the instructions below to ensure your safe recovery. If you have any questions or problems, feel free to call us at [phone] or [phone].     1.   Have someone stay with you to assist you as needed.    2.   Do not drive or operate any mechanical devices.    3.   Do not perform any activity that requires concentration. Make no major decisions over the next 24 hours.     4.   Be careful changing positions from laying down to sitting or standing, as you may become dizzy.     5.   Do not drink alcohol for 48 hours.    6.   There are no restrictions for eating your normal meals. Drink fluids.    7.   You may continue your usual medications for pain, tranquilizers, muscle relaxants or sedatives when awake.     8.   Tomorrow, you may continue your normal daily activities.     9.   Pressure dressing on 10 - 15 minutes. If swelling or bleeding occurs when removed, continue placing direct pressure on injection site for another 5 minutes, or until bleeding stops.     I have been informed of and understand the above discharge instructions.

## 2019-06-21 DIAGNOSIS — Z79.890 POSTMENOPAUSAL HRT (HORMONE REPLACEMENT THERAPY): ICD-10-CM

## 2019-06-21 RX ORDER — ESTRADIOL 1 MG/1
TABLET ORAL
Qty: 90 TAB | Refills: 1 | Status: SHIPPED | OUTPATIENT
Start: 2019-06-21 | End: 2019-12-24

## 2019-06-21 NOTE — TELEPHONE ENCOUNTER
Was the patient seen in the last year in this department? Yes    Does patient have an active prescription for medications requested? No     Received Request Via: Pharmacy      Pt met protocol?: Yes, OV last month

## 2019-08-01 ENCOUNTER — OFFICE VISIT (OUTPATIENT)
Dept: URGENT CARE | Facility: PHYSICIAN GROUP | Age: 74
End: 2019-08-01
Payer: MEDICARE

## 2019-08-01 VITALS
SYSTOLIC BLOOD PRESSURE: 132 MMHG | WEIGHT: 126 LBS | DIASTOLIC BLOOD PRESSURE: 76 MMHG | OXYGEN SATURATION: 98 % | HEART RATE: 76 BPM | BODY MASS INDEX: 24.61 KG/M2 | RESPIRATION RATE: 16 BRPM | TEMPERATURE: 97.9 F

## 2019-08-01 DIAGNOSIS — L03.116 CELLULITIS OF LEFT LOWER EXTREMITY: ICD-10-CM

## 2019-08-01 PROCEDURE — 99214 OFFICE O/P EST MOD 30 MIN: CPT | Performed by: FAMILY MEDICINE

## 2019-08-01 RX ORDER — CEPHALEXIN 500 MG/1
500 CAPSULE ORAL 3 TIMES DAILY
Qty: 21 CAP | Refills: 0 | Status: SHIPPED | OUTPATIENT
Start: 2019-08-01 | End: 2019-11-27

## 2019-08-01 ASSESSMENT — ENCOUNTER SYMPTOMS
FEVER: 0
VOMITING: 0
HEADACHES: 0
SHORTNESS OF BREATH: 0

## 2019-08-01 NOTE — PROGRESS NOTES
Subjective:     Makenna Culver is a 74 y.o. female who presents for Fall (2 weeks ago cut up (L) leg, not healing )    HPI  Pt presents for evaluation of a new problem   Pt with a fall 2 weeks ago   Scraped left leg and left arm in the forearm area   No active bleeding or exudate   Starting to get some redness around the lesions which concerned her  Starting to have some pain around the lesions which is constant, worse with touch, and slowly worsening  No systemic symptoms, no fevers, feels fine otherwise  Has been using hydrogen peroxide and Neosporin and does not know if it is helping or not    Review of Systems   Constitutional: Negative for fever.   Respiratory: Negative for shortness of breath.    Cardiovascular: Negative for chest pain.   Gastrointestinal: Negative for vomiting.   Skin: Positive for rash.   Neurological: Negative for headaches.       PMH:  has a past medical history of Arthritis, Colon polyp (12/2011), Degeneration of lumbar or lumbosacral intervertebral disc, Dental disorder, Ear problems (as child had surgery), Graves' disease with exophthalmos (6/13/2014), Hemorrhoid, High cholesterol, History of carpal tunnel repair (7/31/2013), Menopause (10/14/2009), Osteoarthritis of multiple joints (6/13/2014), Pain in joint, pelvic region and thigh (2015), Pain in joint, shoulder region (2015), Periodic pelvic examination (due), Postcoital bleeding, Rash (12/29/2014), S/P colonoscopy (12/2011), Screening mammogram ( 11/2008=normal), Tobacco use disorder, Ulcer of the stomach and intestine (hx.), Unspecified cataract, and Unspecified hypothyroidism.  MEDS:   Current Outpatient Medications:   •  estradiol (ESTRACE) 1 MG Tab, TAKE 1 TABLET BY MOUTH ONCE DAILY, Disp: 90 Tab, Rfl: 1  •  levothyroxine (SYNTHROID) 112 MCG Tab, TAKE 1 TABLET BY MOUTH ONCE DAILY IN THE MORNING ON  AN  EMPTY  STOMACH., Disp: 90 Tab, Rfl: 0  •  cyclobenzaprine (FLEXERIL) 10 MG Tab, TAKE 1 TABLET BY MOUTH THREE TIMES DAILY AS  NEEDED, Disp: 90 Tab, Rfl: 0  •  omeprazole (PRILOSEC) 40 MG delayed-release capsule, , Disp: , Rfl: 1  •  erythromycin 5 MG/GM Ointment, , Disp: , Rfl:   •  amitriptyline (ELAVIL) 50 MG Tab, Take 50 mg by mouth every bedtime., Disp: , Rfl:   •  tramadol (ULTRAM) 50 MG Tab, Take 50 mg by mouth every 6 hours as needed for Mild Pain., Disp: , Rfl:   ALLERGIES:   Allergies   Allergen Reactions   • Codeine Vomiting   • Morphine      Chills    • Vicodin [Hydrocodone-Acetaminophen] Vomiting     OK if takes with phenergan      SURGHX:   Past Surgical History:   Procedure Laterality Date   • BLEPHAROPLASTY Bilateral 4/20/2016    Procedure: BLEPHAROPLASTY - UPPER;  Surgeon: Moshe Sena M.D.;  Location: SURGERY [facility];  Service:    • RECTUS REPAIR Bilateral 7/9/2015    Procedure: RECTUS REPAIR FOR LT SUPERIOR RECESSION & RT INFERIOR RECESSION ;  Surgeon: Leila Villegas M.D.;  Location: SURGERY SAME DAY [facility] ORS;  Service:    • CATARACT PHACO WITH IOL  3/17/2015    Performed by Derrick Gupta M.D. at [facility]   • CATARACT PHACO WITH IOL  3/3/2015    Performed by Derrick Gupta M.D. at [facility]   • TENSILON TEST  11/20/2014    Performed by Maynor San D.O. at ENDOSCOPY [facility] ORS   • CARPAL TUNNEL ENDOSCOPIC  2/8/2013    Performed by Pancho Palomino M.D. at SURGERY SAME DAY [facility] ORS   • CARPAL TUNNEL ENDOSCOPIC  11/17/2012    Performed by Pancho Palomino M.D. at SURGERY [facility] ORS   • ABDOMINAL HYSTERECTOMY TOTAL  partial    fibroid tumors, has ovary and tube left   • OTHER SURGICAL PROCEDURE      ear surgery as child   • THYROIDECTOMY  thyroid ablation with iodine therapy   • TONSILLECTOMY AND ADENOIDECTOMY       SOCHX:  reports that she quit smoking about 6 years ago. Her smoking use included cigarettes. She has a 50.00 pack-year smoking history. She has never used smokeless tobacco. She reports that she drinks about 0.6 oz of alcohol per  week. She reports that she does not use drugs.  FH: Family history was reviewed, not contributing to acute injury     Objective:   /76   Pulse 76   Temp 36.6 °C (97.9 °F)   Resp 16   Wt 57.2 kg (126 lb)   SpO2 98%   BMI 24.61 kg/m²     Physical Exam   Constitutional: She is oriented to person, place, and time. She appears well-developed and well-nourished. No distress.   HENT:   Head: Normocephalic and atraumatic.   Eyes: Conjunctivae and EOM are normal.   Pulmonary/Chest: Effort normal.   Neurological: She is alert and oriented to person, place, and time.   Skin: Skin is warm and dry. She is not diaphoretic.   Left leg with 2 scabbed lesions.  2.5cm x 2cm and 0.5cm x 2cm  Left elbow with 1.5cm x 1.5cm scabbed lesion   No active exudate or bleeding anywhere.  Small amount of erythema surrounding all scabbed lesions with warmth and mild tenderness to palpation   Psychiatric: She has a normal mood and affect. Her behavior is normal. Judgment and thought content normal.       Assessment/Plan:   Assessment    1. Cellulitis of left lower extremity  Patient with 3 scabbed lesions over left elbow and shin.  Has some mild erythema surrounding and tenderness to palpation.  She is developing a mild cellulitis and will treat with short course of Keflex.  Reviewed basic wound care measures and advised to stop using hydrogen peroxide as this will make healing slower.  Patient is agreeable to this and will follow-up as needed.  - cephALEXin (KEFLEX) 500 MG Cap; Take 1 Cap by mouth 3 times a day.  Dispense: 21 Cap; Refill: 0

## 2019-08-22 DIAGNOSIS — E03.9 HYPOTHYROIDISM, UNSPECIFIED TYPE: ICD-10-CM

## 2019-08-22 NOTE — TELEPHONE ENCOUNTER
Was the patient seen in the last year in this department? Yes    Does patient have an active prescription for medications requested? No     Received Request Via: Pharmacy    Pt met protocol?: Yes      Last OV 05/09/19    TSH   Date Value Ref Range Status   04/03/2019 0.170 (L) 0.380 - 5.330 uIU/mL Final     Comment:     Please note new reference ranges effective 12/14/2017 10:00 AM  Pregnant Females, 1st Trimester  0.050-3.700  Pregnant Females, 2nd Trimester  0.310-4.350  Pregnant Females, 3rd Trimester  0.410-5.180

## 2019-08-25 RX ORDER — LEVOTHYROXINE SODIUM 112 UG/1
TABLET ORAL
Qty: 90 TAB | Refills: 0 | Status: SHIPPED | OUTPATIENT
Start: 2019-08-25 | End: 2019-11-18 | Stop reason: SDUPTHER

## 2019-09-18 RX ORDER — OMEPRAZOLE 40 MG/1
CAPSULE, DELAYED RELEASE ORAL
Qty: 90 CAP | Refills: 0 | Status: SHIPPED | OUTPATIENT
Start: 2019-09-18 | End: 2019-12-23

## 2019-09-18 NOTE — TELEPHONE ENCOUNTER
Was the patient seen in the last year in this department? Yes    Does patient have an active prescription for medications requested? No     Received Request Via: Pharmacy      Pt met protocol?: Yes, ov 5/19

## 2019-11-05 ENCOUNTER — PATIENT MESSAGE (OUTPATIENT)
Dept: MEDICAL GROUP | Facility: PHYSICIAN GROUP | Age: 74
End: 2019-11-05

## 2019-11-07 DIAGNOSIS — E03.9 HYPOTHYROIDISM, UNSPECIFIED TYPE: ICD-10-CM

## 2019-11-07 DIAGNOSIS — E55.9 VITAMIN D DEFICIENCY: ICD-10-CM

## 2019-11-07 DIAGNOSIS — E78.2 MIXED HYPERLIPIDEMIA: ICD-10-CM

## 2019-11-08 ENCOUNTER — PATIENT MESSAGE (OUTPATIENT)
Dept: MEDICAL GROUP | Facility: PHYSICIAN GROUP | Age: 74
End: 2019-11-08

## 2019-11-10 NOTE — TELEPHONE ENCOUNTER
From: Makenna Culver  To: EDUARDO Ta  Sent: 11/8/2019 6:56 PM PST  Subject: Non-Urgent Medical Question    tyneetu will do Makenna culver  ----- Message -----  From: EDUARDO Ta  Sent: 11/8/2019 5:38 PM PST  To: Makenna Culver  Subject: RE: Non-Urgent Medical Question  Maylin Laboy is off today, I'm covering some of her messages.  Yes you always need to fast for a lipid panel. Please drink plenty of water before your test though. It's always good to be hydrated.  Thank you   Lakeisha Juan RN, APN      ----- Message -----   From: Makenna Culver   Sent: 11/7/2019 6:25 PM PST   To: Frances Laboy M.D.  Subject: RE: Non-Urgent Medical Question    do we have to fast? tyneetu  ----- Message -----  From: Frances Laboy M.D.  Sent: 11/7/2019 1:54 PM PST  To: Makenna Culver  Subject: RE: Non-Urgent Medical Question  Maylin Mensah,   I reviewed recent lab results for you and Garrett and ordered the required follow up labs.   Frances Laboy M.D.        ----- Message -----   From: Makenna Culver   Sent: 11/5/2019 6:39 PM PST   To: Frances Laboy M.D.  Subject: RE: Non-Urgent Medical Question    garrett and venus have appts the 22 and the 27th of this month, do you want complete blood test done? if so, can you issue them for us? amadeo culver

## 2019-11-18 DIAGNOSIS — E03.9 HYPOTHYROIDISM, UNSPECIFIED TYPE: ICD-10-CM

## 2019-11-19 NOTE — TELEPHONE ENCOUNTER
Was the patient seen in the last year in this department? Yes    Does patient have an active prescription for medications requested? No     Received Request Via: Pharmacy      Pt met protocol?: Yes, OV 5/19, TSH checked 4/19

## 2019-11-20 ENCOUNTER — HOSPITAL ENCOUNTER (OUTPATIENT)
Dept: LAB | Facility: MEDICAL CENTER | Age: 74
End: 2019-11-20
Attending: FAMILY MEDICINE
Payer: MEDICARE

## 2019-11-20 DIAGNOSIS — E55.9 VITAMIN D DEFICIENCY: ICD-10-CM

## 2019-11-20 DIAGNOSIS — E78.2 MIXED HYPERLIPIDEMIA: ICD-10-CM

## 2019-11-20 DIAGNOSIS — E03.9 HYPOTHYROIDISM, UNSPECIFIED TYPE: ICD-10-CM

## 2019-11-20 LAB
25(OH)D3 SERPL-MCNC: 71 NG/ML (ref 30–100)
CHOLEST SERPL-MCNC: 266 MG/DL (ref 100–199)
FASTING STATUS PATIENT QL REPORTED: NORMAL
HDLC SERPL-MCNC: 70 MG/DL
LDLC SERPL CALC-MCNC: 154 MG/DL
T4 FREE SERPL-MCNC: 1.49 NG/DL (ref 0.53–1.43)
TRIGL SERPL-MCNC: 212 MG/DL (ref 0–149)
TSH SERPL DL<=0.005 MIU/L-ACNC: 0.05 UIU/ML (ref 0.38–5.33)

## 2019-11-20 PROCEDURE — 84443 ASSAY THYROID STIM HORMONE: CPT

## 2019-11-20 PROCEDURE — 36415 COLL VENOUS BLD VENIPUNCTURE: CPT

## 2019-11-20 PROCEDURE — 82306 VITAMIN D 25 HYDROXY: CPT

## 2019-11-20 PROCEDURE — 84439 ASSAY OF FREE THYROXINE: CPT

## 2019-11-20 PROCEDURE — 80061 LIPID PANEL: CPT

## 2019-11-20 RX ORDER — LEVOTHYROXINE SODIUM 112 UG/1
TABLET ORAL
Qty: 90 TAB | Refills: 0 | Status: SHIPPED | OUTPATIENT
Start: 2019-11-20 | End: 2019-11-27

## 2019-11-22 ENCOUNTER — APPOINTMENT (OUTPATIENT)
Dept: RADIOLOGY | Facility: IMAGING CENTER | Age: 74
End: 2019-11-22
Attending: FAMILY MEDICINE
Payer: MEDICARE

## 2019-11-22 ENCOUNTER — NON-PROVIDER VISIT (OUTPATIENT)
Dept: MEDICAL GROUP | Facility: PHYSICIAN GROUP | Age: 74
End: 2019-11-22
Payer: MEDICARE

## 2019-11-22 VITALS — WEIGHT: 122 LBS | HEIGHT: 61 IN | BODY MASS INDEX: 23.03 KG/M2

## 2019-11-22 DIAGNOSIS — M25.532 LEFT WRIST PAIN: ICD-10-CM

## 2019-11-22 DIAGNOSIS — Z23 NEED FOR VACCINATION: ICD-10-CM

## 2019-11-22 PROCEDURE — 73110 X-RAY EXAM OF WRIST: CPT | Mod: TC,FY,LT | Performed by: FAMILY MEDICINE

## 2019-11-22 PROCEDURE — G0008 ADMIN INFLUENZA VIRUS VAC: HCPCS | Performed by: FAMILY MEDICINE

## 2019-11-22 PROCEDURE — 90662 IIV NO PRSV INCREASED AG IM: CPT | Performed by: FAMILY MEDICINE

## 2019-11-22 NOTE — RESULT ENCOUNTER NOTE
Makenna,  Your labs show you have too much thyroid supplementation, also cholesterol levels are higher. Vitamin d is back to normal!  We will discuss adjustment of medication and management at your visit. Look forward to seeing you on the 27th.  Frances Laboy M.D.

## 2019-11-27 ENCOUNTER — OFFICE VISIT (OUTPATIENT)
Dept: MEDICAL GROUP | Facility: PHYSICIAN GROUP | Age: 74
End: 2019-11-27
Payer: MEDICARE

## 2019-11-27 ENCOUNTER — PATIENT MESSAGE (OUTPATIENT)
Dept: MEDICAL GROUP | Facility: PHYSICIAN GROUP | Age: 74
End: 2019-11-27

## 2019-11-27 VITALS
OXYGEN SATURATION: 97 % | HEIGHT: 61 IN | BODY MASS INDEX: 23.03 KG/M2 | HEART RATE: 71 BPM | WEIGHT: 122 LBS | DIASTOLIC BLOOD PRESSURE: 64 MMHG | RESPIRATION RATE: 16 BRPM | SYSTOLIC BLOOD PRESSURE: 94 MMHG | TEMPERATURE: 96.9 F

## 2019-11-27 DIAGNOSIS — E78.2 MIXED HYPERLIPIDEMIA: ICD-10-CM

## 2019-11-27 DIAGNOSIS — E03.9 HYPOTHYROIDISM, UNSPECIFIED TYPE: ICD-10-CM

## 2019-11-27 DIAGNOSIS — Z12.11 SCREENING FOR COLORECTAL CANCER: ICD-10-CM

## 2019-11-27 DIAGNOSIS — M25.532 LEFT WRIST PAIN: ICD-10-CM

## 2019-11-27 DIAGNOSIS — Z11.59 NEED FOR HEPATITIS C SCREENING TEST: ICD-10-CM

## 2019-11-27 DIAGNOSIS — Z12.12 SCREENING FOR COLORECTAL CANCER: ICD-10-CM

## 2019-11-27 PROBLEM — L03.119 CELLULITIS OF EXTREMITY: Status: RESOLVED | Noted: 2019-05-09 | Resolved: 2019-11-27

## 2019-11-27 PROBLEM — A41.9 SEPSIS (HCC): Status: RESOLVED | Noted: 2019-04-15 | Resolved: 2019-11-27

## 2019-11-27 PROCEDURE — 99214 OFFICE O/P EST MOD 30 MIN: CPT | Performed by: FAMILY MEDICINE

## 2019-11-27 RX ORDER — ATORVASTATIN CALCIUM 10 MG/1
10 TABLET, FILM COATED ORAL DAILY
Qty: 90 TAB | Refills: 3 | Status: SHIPPED | OUTPATIENT
Start: 2019-11-27 | End: 2019-12-26 | Stop reason: SDUPTHER

## 2019-11-27 RX ORDER — LEVOTHYROXINE SODIUM 0.1 MG/1
100 TABLET ORAL
Qty: 90 TAB | Refills: 3 | Status: SHIPPED
Start: 2019-11-27 | End: 2020-04-14

## 2019-11-27 RX ORDER — ALBUTEROL SULFATE 90 UG/1
2 AEROSOL, METERED RESPIRATORY (INHALATION) EVERY 6 HOURS PRN
Qty: 8.5 G | Refills: 3 | Status: SHIPPED | OUTPATIENT
Start: 2019-11-27 | End: 2020-05-28

## 2019-11-27 ASSESSMENT — PATIENT HEALTH QUESTIONNAIRE - PHQ9: CLINICAL INTERPRETATION OF PHQ2 SCORE: 0

## 2019-11-27 NOTE — PATIENT INSTRUCTIONS
Fasting labs lipid and cmp a week before next appointment in 6 months  Thyroid lab non fasting in 2 months.

## 2019-11-27 NOTE — PROGRESS NOTES
Subjective:   Makenna Culver is a 74 y.o. female here today for evaluation and management of:     Hypothyroidism  tsh suppressed for last few labs but so far T4 was normal. But on most recent lab T4 also elevated. Pt notes dry skin and hair falling out. Current dose is 112mcg, will dec slightly to 100mcg recheck labs in 2 months.     Hyperlipidemia  Persistent elevation of LDL, patient has a graph that shows a  Yo Yo pattern of lows and highs but majority of the time elevated. Does have father and brother with cardiovascular disease, pt has a hx of smoking in the past.   Does not have HTN  Does not currently smoke  Does not have DM    To reduce risk of MI/CVA will start statin. Pt requests low dose. lipitor 10 mg prescribed. Advised to let me know if any muscle pain/weakness or cramps.     Left wrist pain  For a month patient has left wrist pain located just below the base of her thumb. No injury she can recall that caused it. Xray was negative for fracture.   No improvement over time, worse when she uses it to lift or  something. Cannot even lift a plate with food without pain.   Has been using her wrist brace and ice and ibuprofen which help temporarily.   She has an appointment with a hand specialist next month.   On exam:  strength decreased due to pain. ROM intact, circulation and sensation normal. TTP over ventral aspect of radial side of wrist.          Current medicines (including changes today)  Current Outpatient Medications   Medication Sig Dispense Refill   • levothyroxine (SYNTHROID) 100 MCG Tab Take 1 Tab by mouth Every morning on an empty stomach. 90 Tab 3   • albuterol 108 (90 Base) MCG/ACT Aero Soln inhalation aerosol Inhale 2 Puffs by mouth every 6 hours as needed for Shortness of Breath. 8.5 g 3   • atorvastatin (LIPITOR) 10 MG Tab Take 1 Tab by mouth every day. 90 Tab 3   • omeprazole (PRILOSEC) 40 MG delayed-release capsule TAKE 1 CAPSULE BY MOUTH ONCE DAILY 90 Cap 0   • estradiol  "(ESTRACE) 1 MG Tab TAKE 1 TABLET BY MOUTH ONCE DAILY 90 Tab 1   • cyclobenzaprine (FLEXERIL) 10 MG Tab TAKE 1 TABLET BY MOUTH THREE TIMES DAILY AS NEEDED 90 Tab 0   • tramadol (ULTRAM) 50 MG Tab Take 50 mg by mouth every 6 hours as needed for Mild Pain.       No current facility-administered medications for this visit.      She  has a past medical history of Arthritis, Colon polyp (12/2011), Degeneration of lumbar or lumbosacral intervertebral disc, Dental disorder, Ear problems (as child had surgery), Graves' disease with exophthalmos (6/13/2014), Hemorrhoid, High cholesterol, History of carpal tunnel repair (7/31/2013), Menopause (10/14/2009), Osteoarthritis of multiple joints (6/13/2014), Pain in joint, pelvic region and thigh (2015), Pain in joint, shoulder region (2015), Periodic pelvic examination (due), Postcoital bleeding, Rash (12/29/2014), S/P colonoscopy (12/2011), Screening mammogram ( 11/2008=normal), Tobacco use disorder, Ulcer of the stomach and intestine (hx.), Unspecified cataract, and Unspecified hypothyroidism.    ROS  No chest pain, no shortness of breath, no abdominal pain       Objective:     BP (!) 94/64 (BP Location: Left arm, Patient Position: Sitting, BP Cuff Size: Adult)   Pulse 71   Temp 36.1 °C (96.9 °F) (Temporal)   Resp 16   Ht 1.549 m (5' 1\")   Wt 55.3 kg (122 lb)   SpO2 97%  Body mass index is 23.05 kg/m².   Physical Exam:  Constitutional: Alert, no distress.  Skin: Warm, dry, good turgor, no rashes in visible areas.  Eye: Equal, round and reactive, conjunctiva clear, lids normal.  ENMT: Lips without lesions, good dentition, oropharynx clear.  Neck: Trachea midline, no masses, no thyromegaly. No cervical or supraclavicular lymphadenopathy  Respiratory: Unlabored respiratory effort, lungs clear to auscultation, no wheezes, no ronchi.  Cardiovascular: Normal S1, S2, no murmur, no edema.  Abdomen: Soft, non-tender, no masses, no hepatosplenomegaly.  Psych: Alert and oriented x3, " normal affect and mood.        Assessment and Plan:   The following treatment plan was discussed    1. Need for hepatitis C screening test  - HEP C VIRUS ANTIBODY; Future    2. Screening for colorectal cancer  - Occult Blood Feces Immunoassay (FIT); Future    3. Hypothyroidism, unspecified type  Dose decreased to 100mg  - levothyroxine (SYNTHROID) 100 MCG Tab; Take 1 Tab by mouth Every morning on an empty stomach.  Dispense: 90 Tab; Refill: 3  - TSH WITH REFLEX TO FT4; Future    4. Mixed hyperlipidemia  Start lipitor if tolerated can increase to 20 mg  Recheck labs in 6 months.   - Lipid Profile; Future  - Comp Metabolic Panel; Future    5. Left wrist pain  Follow up with orthopedic specialist as scheduled.  Continue with brace when lifting objects.       Followup: Return in about 6 months (around 5/27/2020) for dyslipidemia, hypothyroidism, left wrist pain.

## 2019-11-27 NOTE — ASSESSMENT & PLAN NOTE
Persistent elevation of LDL, patient has a graph that shows a  Yo Yo pattern of lows and highs but majority of the time elevated. Does have father and brother with cardiovascular disease, pt has a hx of smoking in the past.   Does not have HTN  Does not currently smoke  Does not have DM    To reduce risk of MI/CVA will start statin. Pt requests low dose. lipitor 10 mg prescribed. Advised to let me know if any muscle pain/weakness or cramps.

## 2019-11-27 NOTE — ASSESSMENT & PLAN NOTE
For a month patient has left wrist pain located just below the base of her thumb. No injury she can recall that caused it. Xray was negative for fracture.   No improvement over time, worse when she uses it to lift or  something. Cannot even lift a plate with food without pain.   Has been using her wrist brace and ice and ibuprofen which help temporarily.   She has an appointment with a hand specialist next month.   On exam:  strength decreased due to pain. ROM intact, circulation and sensation normal. TTP over ventral aspect of radial side of wrist.

## 2019-11-27 NOTE — ASSESSMENT & PLAN NOTE
tsh suppressed for last few labs but so far T4 was normal. But on most recent lab T4 also elevated. Pt notes dry skin and hair falling out. Current dose is 112mcg, will dec slightly to 100mcg recheck labs in 2 months.

## 2019-12-02 NOTE — TELEPHONE ENCOUNTER
From: Makenna Culver  To: Frances Laboy M.D.  Sent: 11/27/2019 6:57 PM PST  Subject: Non-Urgent Medical Question    did you take care of my lipitor (atorvastatatin ) they said there was a problem with it and made a call to you? i wasn't able to pick it up. whitney lion

## 2019-12-06 ENCOUNTER — TELEPHONE (OUTPATIENT)
Dept: MEDICAL GROUP | Facility: PHYSICIAN GROUP | Age: 74
End: 2019-12-06

## 2019-12-06 NOTE — TELEPHONE ENCOUNTER
MEDICATION PRIOR AUTHORIZATION NEEDED:    1. Name of Medication: lipitor 10mg     2. Requested By (Name of Pharmacy): [facility]     3. Is insurance on file current? yes    4. What is the name & phone number of the 3rd party payor? [employer]    Key [id number]

## 2019-12-18 DIAGNOSIS — R10.9 STOMACH PAIN: ICD-10-CM

## 2019-12-18 DIAGNOSIS — R82.998 DARK URINE: ICD-10-CM

## 2019-12-23 RX ORDER — OMEPRAZOLE 40 MG/1
CAPSULE, DELAYED RELEASE ORAL
Qty: 90 CAP | Refills: 1 | Status: SHIPPED | OUTPATIENT
Start: 2019-12-23 | End: 2020-06-22

## 2019-12-24 DIAGNOSIS — Z79.890 POSTMENOPAUSAL HRT (HORMONE REPLACEMENT THERAPY): ICD-10-CM

## 2019-12-24 RX ORDER — ESTRADIOL 1 MG/1
TABLET ORAL
Qty: 90 TAB | Refills: 1 | Status: SHIPPED | OUTPATIENT
Start: 2019-12-24 | End: 2020-06-22

## 2019-12-26 NOTE — TELEPHONE ENCOUNTER
*100 day supply request*  Was the patient seen in the last year in this department? Yes    Does patient have an active prescription for medications requested? Yes    Received Request Via: Pharmacy    Pt met protocol?: Yes     Last OV 11/27/19    Lab Results   Component Value Date/Time    CHOLSTRLTOT 266 (H) 11/20/2019 0829    TRIGLYCERIDE 212 (H) 11/20/2019 0829    HDL 70 11/20/2019 0829     (H) 11/20/2019 0829

## 2019-12-27 RX ORDER — ATORVASTATIN CALCIUM 10 MG/1
10 TABLET, FILM COATED ORAL DAILY
Qty: 100 TAB | Refills: 1 | Status: SHIPPED | OUTPATIENT
Start: 2019-12-27 | End: 2020-05-28

## 2019-12-31 ENCOUNTER — HOSPITAL ENCOUNTER (OUTPATIENT)
Dept: LAB | Facility: MEDICAL CENTER | Age: 74
End: 2019-12-31
Attending: FAMILY MEDICINE
Payer: MEDICARE

## 2019-12-31 DIAGNOSIS — E03.9 HYPOTHYROIDISM, UNSPECIFIED TYPE: ICD-10-CM

## 2019-12-31 LAB
T4 FREE SERPL-MCNC: 1.34 NG/DL (ref 0.53–1.43)
TSH SERPL DL<=0.005 MIU/L-ACNC: 0.06 UIU/ML (ref 0.38–5.33)

## 2019-12-31 PROCEDURE — 84443 ASSAY THYROID STIM HORMONE: CPT

## 2019-12-31 PROCEDURE — 36415 COLL VENOUS BLD VENIPUNCTURE: CPT

## 2019-12-31 PROCEDURE — 84439 ASSAY OF FREE THYROXINE: CPT

## 2020-01-02 ENCOUNTER — HOSPITAL ENCOUNTER (OUTPATIENT)
Dept: LAB | Facility: MEDICAL CENTER | Age: 75
End: 2020-01-02
Attending: FAMILY MEDICINE
Payer: MEDICARE

## 2020-01-02 DIAGNOSIS — R82.998 DARK URINE: ICD-10-CM

## 2020-01-02 DIAGNOSIS — Z11.59 NEED FOR HEPATITIS C SCREENING TEST: ICD-10-CM

## 2020-01-02 DIAGNOSIS — R10.9 STOMACH PAIN: ICD-10-CM

## 2020-01-02 LAB
ALBUMIN SERPL BCP-MCNC: 4.1 G/DL (ref 3.2–4.9)
ALBUMIN/GLOB SERPL: 1.4 G/DL
ALP SERPL-CCNC: 65 U/L (ref 30–99)
ALT SERPL-CCNC: 17 U/L (ref 2–50)
ANION GAP SERPL CALC-SCNC: 11 MMOL/L (ref 0–11.9)
AST SERPL-CCNC: 19 U/L (ref 12–45)
BILIRUB SERPL-MCNC: 0.4 MG/DL (ref 0.1–1.5)
BUN SERPL-MCNC: 11 MG/DL (ref 8–22)
CALCIUM SERPL-MCNC: 9.4 MG/DL (ref 8.5–10.5)
CHLORIDE SERPL-SCNC: 100 MMOL/L (ref 96–112)
CO2 SERPL-SCNC: 26 MMOL/L (ref 20–33)
CREAT SERPL-MCNC: 0.83 MG/DL (ref 0.5–1.4)
GLOBULIN SER CALC-MCNC: 2.9 G/DL (ref 1.9–3.5)
GLUCOSE SERPL-MCNC: 90 MG/DL (ref 65–99)
HCV AB SER QL: NEGATIVE
POTASSIUM SERPL-SCNC: 4.4 MMOL/L (ref 3.6–5.5)
PROT SERPL-MCNC: 7 G/DL (ref 6–8.2)
SODIUM SERPL-SCNC: 137 MMOL/L (ref 135–145)

## 2020-01-02 PROCEDURE — 80053 COMPREHEN METABOLIC PANEL: CPT

## 2020-01-02 PROCEDURE — 36415 COLL VENOUS BLD VENIPUNCTURE: CPT

## 2020-01-02 PROCEDURE — 86803 HEPATITIS C AB TEST: CPT

## 2020-01-03 RX ORDER — CYCLOBENZAPRINE HCL 10 MG
10 TABLET ORAL 3 TIMES DAILY PRN
Qty: 90 TAB | Refills: 0 | Status: SHIPPED | OUTPATIENT
Start: 2020-01-03 | End: 2020-10-14 | Stop reason: SDUPTHER

## 2020-01-14 ENCOUNTER — HOSPITAL ENCOUNTER (OUTPATIENT)
Facility: MEDICAL CENTER | Age: 75
End: 2020-01-14
Attending: FAMILY MEDICINE
Payer: MEDICARE

## 2020-01-14 PROCEDURE — 82274 ASSAY TEST FOR BLOOD FECAL: CPT

## 2020-01-15 DIAGNOSIS — Z12.11 SCREENING FOR COLORECTAL CANCER: ICD-10-CM

## 2020-01-15 DIAGNOSIS — Z12.12 SCREENING FOR COLORECTAL CANCER: ICD-10-CM

## 2020-01-16 LAB — HEMOCCULT STL QL IA: NEGATIVE

## 2020-03-23 ENCOUNTER — PATIENT MESSAGE (OUTPATIENT)
Dept: MEDICAL GROUP | Facility: PHYSICIAN GROUP | Age: 75
End: 2020-03-23

## 2020-03-24 NOTE — TELEPHONE ENCOUNTER
From: Makenna Culver  To: Frances Laboy M.D.  Sent: 3/23/2020 6:38 PM PDT  Subject: Non-Urgent Medical Question    did you call in the new prescription for aguilera rosuvastatin 10mg? i had to call in the one for 100 since he will be out soon . ty makenna culver

## 2020-03-26 DIAGNOSIS — E03.9 HYPOTHYROIDISM, UNSPECIFIED TYPE: ICD-10-CM

## 2020-04-08 ENCOUNTER — HOSPITAL ENCOUNTER (OUTPATIENT)
Dept: LAB | Facility: MEDICAL CENTER | Age: 75
End: 2020-04-08
Attending: FAMILY MEDICINE
Payer: MEDICARE

## 2020-04-08 DIAGNOSIS — E03.9 HYPOTHYROIDISM, UNSPECIFIED TYPE: ICD-10-CM

## 2020-04-08 DIAGNOSIS — E78.2 MIXED HYPERLIPIDEMIA: ICD-10-CM

## 2020-04-08 LAB
ALBUMIN SERPL BCP-MCNC: 4.3 G/DL (ref 3.2–4.9)
ALBUMIN/GLOB SERPL: 1.9 G/DL
ALP SERPL-CCNC: 67 U/L (ref 30–99)
ALT SERPL-CCNC: 26 U/L (ref 2–50)
ANION GAP SERPL CALC-SCNC: 12 MMOL/L (ref 7–16)
AST SERPL-CCNC: 24 U/L (ref 12–45)
BILIRUB SERPL-MCNC: 0.4 MG/DL (ref 0.1–1.5)
BUN SERPL-MCNC: 10 MG/DL (ref 8–22)
CALCIUM SERPL-MCNC: 9.1 MG/DL (ref 8.5–10.5)
CHLORIDE SERPL-SCNC: 101 MMOL/L (ref 96–112)
CHOLEST SERPL-MCNC: 298 MG/DL (ref 100–199)
CO2 SERPL-SCNC: 24 MMOL/L (ref 20–33)
CREAT SERPL-MCNC: 0.62 MG/DL (ref 0.5–1.4)
FASTING STATUS PATIENT QL REPORTED: NORMAL
GLOBULIN SER CALC-MCNC: 2.3 G/DL (ref 1.9–3.5)
GLUCOSE SERPL-MCNC: 94 MG/DL (ref 65–99)
HDLC SERPL-MCNC: 70 MG/DL
LDLC SERPL CALC-MCNC: 177 MG/DL
POTASSIUM SERPL-SCNC: 4.2 MMOL/L (ref 3.6–5.5)
PROT SERPL-MCNC: 6.6 G/DL (ref 6–8.2)
SODIUM SERPL-SCNC: 137 MMOL/L (ref 135–145)
T4 FREE SERPL-MCNC: 1.32 NG/DL (ref 0.53–1.43)
TRIGL SERPL-MCNC: 255 MG/DL (ref 0–149)
TSH SERPL DL<=0.005 MIU/L-ACNC: 0.07 UIU/ML (ref 0.38–5.33)

## 2020-04-08 PROCEDURE — 36415 COLL VENOUS BLD VENIPUNCTURE: CPT

## 2020-04-08 PROCEDURE — 80061 LIPID PANEL: CPT

## 2020-04-08 PROCEDURE — 80053 COMPREHEN METABOLIC PANEL: CPT

## 2020-04-08 PROCEDURE — 84443 ASSAY THYROID STIM HORMONE: CPT

## 2020-04-08 PROCEDURE — 84439 ASSAY OF FREE THYROXINE: CPT

## 2020-04-09 NOTE — RESULT ENCOUNTER NOTE
Makenna,  Your labs show T4 is normal but TSH slightly suppressed. Your cholesterol levels are higher than last check. Liver, kidney function and blood sugars are normal! Make sure to take your cholesterol medication daily and eat a healthy whole grain diet low in animal products.   Frances Laboy M.D.

## 2020-04-14 ENCOUNTER — TELEMEDICINE (OUTPATIENT)
Dept: MEDICAL GROUP | Facility: PHYSICIAN GROUP | Age: 75
End: 2020-04-14
Payer: MEDICARE

## 2020-04-14 VITALS — BODY MASS INDEX: 23.6 KG/M2 | HEIGHT: 61 IN | RESPIRATION RATE: 14 BRPM | WEIGHT: 125 LBS

## 2020-04-14 DIAGNOSIS — E03.9 HYPOTHYROIDISM, UNSPECIFIED TYPE: ICD-10-CM

## 2020-04-14 DIAGNOSIS — E78.2 MIXED HYPERLIPIDEMIA: ICD-10-CM

## 2020-04-14 PROCEDURE — 99214 OFFICE O/P EST MOD 30 MIN: CPT | Mod: 95,CR | Performed by: FAMILY MEDICINE

## 2020-04-14 RX ORDER — EZETIMIBE 10 MG/1
10 TABLET ORAL DAILY
Qty: 90 TAB | Refills: 3 | Status: SHIPPED | OUTPATIENT
Start: 2020-04-14 | End: 2020-07-01

## 2020-04-14 RX ORDER — LEVOTHYROXINE SODIUM 0.07 MG/1
75 TABLET ORAL
Qty: 90 TAB | Refills: 3 | Status: SHIPPED | OUTPATIENT
Start: 2020-04-14 | End: 2021-03-12

## 2020-04-14 ASSESSMENT — PATIENT HEALTH QUESTIONNAIRE - PHQ9: CLINICAL INTERPRETATION OF PHQ2 SCORE: 0

## 2020-04-14 ASSESSMENT — FIBROSIS 4 INDEX: FIB4 SCORE: 0.92

## 2020-04-29 ENCOUNTER — PATIENT MESSAGE (OUTPATIENT)
Dept: MEDICAL GROUP | Facility: PHYSICIAN GROUP | Age: 75
End: 2020-04-29

## 2020-04-30 NOTE — TELEPHONE ENCOUNTER
From: Makenna Culver  To: Frances Laboy M.D.  Sent: 4/29/2020 6:44 PM PDT  Subject: Non-Urgent Medical Question    i need a sleeping pill , i have tried over the counter and i end up taking 2 and still can't fall asleep. a lot to worries with garrett having skin cancer removed and his blood problem and the meds he is on for that and now the stay at home . If he gets it , it won't be pretty. love makenna

## 2020-05-14 ENCOUNTER — HOSPITAL ENCOUNTER (OUTPATIENT)
Dept: LAB | Facility: MEDICAL CENTER | Age: 75
End: 2020-05-14
Attending: FAMILY MEDICINE
Payer: MEDICARE

## 2020-05-14 DIAGNOSIS — E78.2 MIXED HYPERLIPIDEMIA: ICD-10-CM

## 2020-05-14 DIAGNOSIS — E03.9 HYPOTHYROIDISM, UNSPECIFIED TYPE: ICD-10-CM

## 2020-05-14 LAB
ALBUMIN SERPL BCP-MCNC: 4.2 G/DL (ref 3.2–4.9)
ALBUMIN/GLOB SERPL: 1.7 G/DL
ALP SERPL-CCNC: 69 U/L (ref 30–99)
ALT SERPL-CCNC: 26 U/L (ref 2–50)
ANION GAP SERPL CALC-SCNC: 11 MMOL/L (ref 7–16)
AST SERPL-CCNC: 27 U/L (ref 12–45)
BILIRUB SERPL-MCNC: 0.2 MG/DL (ref 0.1–1.5)
BUN SERPL-MCNC: 10 MG/DL (ref 8–22)
CALCIUM SERPL-MCNC: 9.1 MG/DL (ref 8.5–10.5)
CHLORIDE SERPL-SCNC: 99 MMOL/L (ref 96–112)
CHOLEST SERPL-MCNC: 254 MG/DL (ref 100–199)
CO2 SERPL-SCNC: 26 MMOL/L (ref 20–33)
CREAT SERPL-MCNC: 0.67 MG/DL (ref 0.5–1.4)
FASTING STATUS PATIENT QL REPORTED: NORMAL
GLOBULIN SER CALC-MCNC: 2.5 G/DL (ref 1.9–3.5)
GLUCOSE SERPL-MCNC: 91 MG/DL (ref 65–99)
HDLC SERPL-MCNC: 69 MG/DL
LDLC SERPL CALC-MCNC: 131 MG/DL
POTASSIUM SERPL-SCNC: 3.6 MMOL/L (ref 3.6–5.5)
PROT SERPL-MCNC: 6.7 G/DL (ref 6–8.2)
SODIUM SERPL-SCNC: 136 MMOL/L (ref 135–145)
TRIGL SERPL-MCNC: 268 MG/DL (ref 0–149)

## 2020-05-14 PROCEDURE — 84443 ASSAY THYROID STIM HORMONE: CPT

## 2020-05-14 PROCEDURE — 80061 LIPID PANEL: CPT

## 2020-05-14 PROCEDURE — 80053 COMPREHEN METABOLIC PANEL: CPT

## 2020-05-14 PROCEDURE — 36415 COLL VENOUS BLD VENIPUNCTURE: CPT

## 2020-05-14 PROCEDURE — 84439 ASSAY OF FREE THYROXINE: CPT

## 2020-05-15 ENCOUNTER — TELEPHONE (OUTPATIENT)
Dept: RADIOLOGY | Facility: MEDICAL CENTER | Age: 75
End: 2020-05-15

## 2020-05-15 LAB
T4 FREE SERPL-MCNC: 1.25 NG/DL (ref 0.93–1.7)
TSH SERPL DL<=0.005 MIU/L-ACNC: 0.31 UIU/ML (ref 0.38–5.33)

## 2020-05-19 ENCOUNTER — TELEPHONE (OUTPATIENT)
Dept: RADIOLOGY | Facility: MEDICAL CENTER | Age: 75
End: 2020-05-19
Payer: MEDICARE

## 2020-05-28 ENCOUNTER — TELEPHONE (OUTPATIENT)
Dept: URGENT CARE | Facility: PHYSICIAN GROUP | Age: 75
End: 2020-05-28

## 2020-05-28 ENCOUNTER — OFFICE VISIT (OUTPATIENT)
Dept: MEDICAL GROUP | Facility: PHYSICIAN GROUP | Age: 75
End: 2020-05-28
Payer: MEDICARE

## 2020-05-28 VITALS
HEIGHT: 61 IN | RESPIRATION RATE: 16 BRPM | DIASTOLIC BLOOD PRESSURE: 66 MMHG | OXYGEN SATURATION: 96 % | SYSTOLIC BLOOD PRESSURE: 102 MMHG | WEIGHT: 127 LBS | TEMPERATURE: 98.1 F | HEART RATE: 64 BPM | BODY MASS INDEX: 23.98 KG/M2

## 2020-05-28 DIAGNOSIS — E78.2 MIXED HYPERLIPIDEMIA: ICD-10-CM

## 2020-05-28 DIAGNOSIS — M25.532 LEFT WRIST PAIN: ICD-10-CM

## 2020-05-28 DIAGNOSIS — E03.9 HYPOTHYROIDISM, UNSPECIFIED TYPE: ICD-10-CM

## 2020-05-28 DIAGNOSIS — F51.01 PRIMARY INSOMNIA: ICD-10-CM

## 2020-05-28 DIAGNOSIS — Z91.09 ENVIRONMENTAL ALLERGIES: ICD-10-CM

## 2020-05-28 PROCEDURE — 99214 OFFICE O/P EST MOD 30 MIN: CPT | Performed by: NURSE PRACTITIONER

## 2020-05-28 RX ORDER — ALBUTEROL SULFATE 90 UG/1
2 AEROSOL, METERED RESPIRATORY (INHALATION) EVERY 6 HOURS PRN
Qty: 1 INHALER | Refills: 3 | Status: SHIPPED | OUTPATIENT
Start: 2020-05-28 | End: 2020-06-10

## 2020-05-28 RX ORDER — TRAZODONE HYDROCHLORIDE 50 MG/1
50 TABLET ORAL
Qty: 30 TAB | Refills: 2 | Status: SHIPPED | OUTPATIENT
Start: 2020-05-28 | End: 2020-09-24 | Stop reason: SDUPTHER

## 2020-05-28 RX ORDER — AMITRIPTYLINE HYDROCHLORIDE 10 MG/1
TABLET, FILM COATED ORAL
COMMUNITY
Start: 2020-01-16 | End: 2020-05-28

## 2020-05-28 RX ORDER — AMITRIPTYLINE HYDROCHLORIDE 25 MG/1
TABLET, FILM COATED ORAL
COMMUNITY
Start: 2020-05-05 | End: 2020-05-28

## 2020-05-28 RX ORDER — IBUPROFEN 800 MG/1
800 TABLET ORAL 3 TIMES DAILY PRN
COMMUNITY
Start: 2020-05-05 | End: 2021-07-15

## 2020-05-28 ASSESSMENT — FIBROSIS 4 INDEX: FIB4 SCORE: 1.04

## 2020-05-28 NOTE — TELEPHONE ENCOUNTER
[facility] called stating that they dont supply this inahler Albuterol Sulfate, sensor, (PROAIR DIGIHALER) 108 (90 Base) MCG/ACT AEROSOL POWDER, BREATH ACTIVATED the pt states that she wants the regular inhaler

## 2020-05-28 NOTE — ASSESSMENT & PLAN NOTE
Chronic and stable, requesting labs to be done before she follows up with her regular PCP in the fall, this is been ordered.  Currently on levothyroxine 75 mcg daily.

## 2020-05-28 NOTE — ASSESSMENT & PLAN NOTE
Patient has been using her 's pro-air inhaler secondary environmental allergies, she has been taking her 's pro-air secondary to shortness of breath when she is around dogs, she is requesting her own prescription

## 2020-05-28 NOTE — LETTER
PROCEDURE/SURGERY CLEARANCE FORM      Encounter Date: 5/28/2020    Patient: Makenna Culver  YOB: 1945    provider:  CAMRYN Daley        The above patient is cleared to have the following procedure/surgery: bilateral wrist MRI                                           Additional comments: pt cleared for upcoming MRI, is at low risk in the setting of her co-morbid conditions, ok for sedation                 provider Signature   ALEXANDRO Daley.

## 2020-05-28 NOTE — ASSESSMENT & PLAN NOTE
Patient has had a lot of difficulty with insomnia lately, difficulty falling asleep and staying asleep and has increased stress that is keeping her from sleeping well.  She is requesting a short course of something to help her sleep, she is tried over-the-counter melatonin which does not seem to help.  We will have her try trazodone 50 mg at bedtime.

## 2020-05-28 NOTE — PROGRESS NOTES
Chief Complaint   Patient presents with   • Letter for School/Work     MRI clearance          This is a 74 y.o.female patient that presents today with the following: MRI clearance, medications, labs    Hypothyroidism  Chronic and stable, requesting labs to be done before she follows up with her regular PCP in the fall, this is been ordered.  Currently on levothyroxine 75 mcg daily.    Hyperlipidemia  The 10-year ASCVD risk score (Almond LENNOX Jr., et al., 2013) is: 7.9%  Patient on Zetia and is requesting labs to be done before she follows up with her pa PCP in the fall, this is been ordered.  Discussed the importance of lifestyle modifications.    Left wrist pain  Patient has scheduled MRI of bilateral wrist, she has been told that she needs clearance because she will be under sedation for this.  EKG in office today well within normal limits, letter was provided to clears her for MRI next week.    Environmental allergies  Patient has been using her 's pro-air inhaler secondary environmental allergies, she has been taking her 's pro-air secondary to shortness of breath when she is around dogs, she is requesting her own prescription    Primary insomnia  Patient has had a lot of difficulty with insomnia lately, difficulty falling asleep and staying asleep and has increased stress that is keeping her from sleeping well.  She is requesting a short course of something to help her sleep, she is tried over-the-counter melatonin which does not seem to help.  We will have her try trazodone 50 mg at bedtime.      Hospital Outpatient Visit on 05/14/2020   Component Date Value   • TSH 05/14/2020 0.314*   • Sodium 05/14/2020 136    • Potassium 05/14/2020 3.6    • Chloride 05/14/2020 99    • Co2 05/14/2020 26    • Anion Gap 05/14/2020 11.0    • Glucose 05/14/2020 91    • Bun 05/14/2020 10    • Creatinine 05/14/2020 0.67    • Calcium 05/14/2020 9.1    • AST(SGOT) 05/14/2020 27    • ALT(SGPT) 05/14/2020 26    • Alkaline  Phosphatase 05/14/2020 69    • Total Bilirubin 05/14/2020 0.2    • Albumin 05/14/2020 4.2    • Total Protein 05/14/2020 6.7    • Globulin 05/14/2020 2.5    • A-G Ratio 05/14/2020 1.7    • Cholesterol,Tot 05/14/2020 254*   • Triglycerides 05/14/2020 268*   • HDL 05/14/2020 69    • LDL 05/14/2020 131*   • Fasting Status 05/14/2020 Fasting    • GFR If  05/14/2020 >60    • GFR If Non  Ameri* 05/14/2020 >60    • Free T-4 05/14/2020 1.25          clinical course has been stable    Past Medical History:   Diagnosis Date   • Arthritis     hips, back and shoulders (osteoarthritis)    • Colon polyp 12/2011   • Degeneration of lumbar or lumbosacral intervertebral disc     Dr. Cisneros   • Dental disorder     dentures    • Ear problems as child had surgery   • Graves' disease with exophthalmos 6/13/2014    Being followed by Dr. Han San   • Hemorrhoid     internal   • High cholesterol     not medicated at this time    • History of carpal tunnel repair 7/31/2013    Dr. Villa repaired both.   • Menopause 10/14/2009   • Osteoarthritis of multiple joints 6/13/2014   • Pain in joint, pelvic region and thigh 2015    bursitis and arthritis in hips-takes ibuprofen and darvacet   • Pain in joint, shoulder region 2015   • Periodic pelvic examination due   • Postcoital bleeding     improved   • Rash 12/29/2014    Started about a week ago when she got news about surgery High stress Nothing new Back, stomach, chest, arms Itch No pain   • S/P colonoscopy 12/2011    repeat in 5 years- had polyp, initial 2005.    • Screening mammogram  11/2008=normal   • Tobacco use disorder    • Ulcer of the stomach and intestine hx.    stable   • Unspecified cataract     bilateral IOL   • Unspecified hypothyroidism        Past Surgical History:   Procedure Laterality Date   • BLEPHAROPLASTY Bilateral 4/20/2016    Procedure: BLEPHAROPLASTY - UPPER;  Surgeon: Moshe Sena M.D.;  Location: [facility];   Service:    • RECTUS REPAIR Bilateral 7/9/2015    Procedure: RECTUS REPAIR FOR LT SUPERIOR RECESSION & RT INFERIOR RECESSION ;  Surgeon: Leila Villegas M.D.;  Location: SURGERY SAME DAY [facility];  Service:    • CATARACT PHACO WITH IOL  3/17/2015    Performed by Derrick Gupta M.D. at SURGERY [facility]   • CATARACT PHACO WITH IOL  3/3/2015    Performed by Derrick Gupta M.D. at SURGERY [facility]   • TENSILON TEST  11/20/2014    Performed by Maynor San D.O. at ENDOSCOPY [facility]   • CARPAL TUNNEL ENDOSCOPIC  2/8/2013    Performed by Pancho Palomino M.D. at SURGERY SAME DAY [facility] ORS   • CARPAL TUNNEL ENDOSCOPIC  11/17/2012    Performed by Pancho Palomino M.D. at SURGERY [facility]S   • ABDOMINAL HYSTERECTOMY TOTAL  partial    fibroid tumors, has ovary and tube left   • OTHER SURGICAL PROCEDURE      ear surgery as child   • THYROIDECTOMY  thyroid ablation with iodine therapy   • TONSILLECTOMY AND ADENOIDECTOMY         Family History   Problem Relation Age of Onset   • Hypertension Father    • Heart Disease Father         MI age 47 yo   • Cancer Neg Hx    • Diabetes Neg Hx        Codeine; Morphine; and Vicodin [hydrocodone-acetaminophen]    Current Outpatient Medications Ordered in Epic   Medication Sig Dispense Refill   • traZODone (DESYREL) 50 MG Tab Take 1 Tab by mouth at bedtime as needed for Sleep. 30 Tab 2   • albuterol 108 (90 Base) MCG/ACT Aero Soln inhalation aerosol Inhale 2 Puffs by mouth every 6 hours as needed for Shortness of Breath. 1 Inhaler 3   • ezetimibe (ZETIA) 10 MG Tab Take 1 Tab by mouth every day. 90 Tab 3   • levothyroxine (SYNTHROID) 75 MCG Tab Take 1 Tab by mouth Every morning on an empty stomach. 90 Tab 3   • cyclobenzaprine (FLEXERIL) 10 MG Tab Take 1 Tab by mouth 3 times a day as needed. 90 Tab 0   • estradiol (ESTRACE) 1 MG Tab TAKE 1 TABLET BY MOUTH ONCE DAILY 90 Tab 1   • omeprazole (PRILOSEC) 40 MG delayed-release capsule TAKE 1  "CAPSULE BY MOUTH ONCE DAILY 90 Cap 1   • tramadol (ULTRAM) 50 MG Tab Take 50 mg by mouth every 6 hours as needed for Mild Pain.     • ibuprofen (MOTRIN) 800 MG Tab Take 800 mg by mouth 3 times a day, with meals.       No current Epic-ordered facility-administered medications on file.        Constitutional ROS: No unexpected change in weight, No weakness, No unexplained fevers, sweats, or chills.  Positive for insomnia  Pulmonary ROS: Positive for environmental allergies with shortness of breath  Cardiovascular ROS: No chest pain, No ePositivedema, No palpitations, Positive for hyperlipidemia  Gastrointestinal ROS: No abdominal pain, No nausea, vomiting, diarrhea, or constipation  Musculoskeletal/Extremities ROS: Positive per HPI  Neurologic ROS: Normal development, No seizures, No weakness  Endocrine ROS: Positive per HPI    Physical exam:  /66   Pulse 64   Temp 36.7 °C (98.1 °F) (Temporal)   Resp 16   Ht 1.549 m (5' 1\")   Wt 57.6 kg (127 lb)   SpO2 96%   BMI 24.00 kg/m²   General Appearance: Elderly female, alert, no distress, well-nourished, well-groomed  Skin: Skin color, texture, turgor normal. No rashes or lesions.  Lungs: negative findings: normal respiratory rate and rhythm, lungs clear to auscultation  Heart: negative. RRR without murmur, gallop, or rubs.  No ectopy.  Abdomen: Abdomen soft, non-tender. BS normal. No masses,  No organomegaly  Musculoskeletal: negative findings: no evidence of joint instability, no evidence of muscle atrophy, no deformities present  Neurologic: intact, intact    Medical decision making/discussion:     Makenna was seen today for letter for school/work.    Diagnoses and all orders for this visit:    Primary insomnia  -     traZODone (DESYREL) 50 MG Tab; Take 1 Tab by mouth at bedtime as needed for Sleep.    Environmental allergies  -     Discontinue: Albuterol Sulfate, sensor, (PROAIR DIGIHALER) 108 (90 Base) MCG/ACT AEROSOL POWDER, BREATH ACTIVATED; Inhale 1 Puff by " mouth every 6 hours as needed.    Hypothyroidism, unspecified type  -     TSH; Future    Mixed hyperlipidemia  -     Comp Metabolic Panel; Future  -     Lipid Profile; Future    Left wrist pain    Other orders  -     albuterol 108 (90 Base) MCG/ACT Aero Soln inhalation aerosol; Inhale 2 Puffs by mouth every 6 hours as needed for Shortness of Breath.        Return if symptoms worsen or fail to improve, for Follow-up.        Please note that this dictation was created using voice recognition software. I have made every reasonable attempt to correct obvious errors, but I expect that there are errors of grammar and possibly content that I did not discover before finalizing the note.

## 2020-05-28 NOTE — ASSESSMENT & PLAN NOTE
Patient has scheduled MRI of bilateral wrist, she has been told that she needs clearance because she will be under sedation for this.  EKG in office today well within normal limits, letter was provided to clears her for MRI next week.

## 2020-05-29 ENCOUNTER — PATIENT MESSAGE (OUTPATIENT)
Dept: MEDICAL GROUP | Facility: PHYSICIAN GROUP | Age: 75
End: 2020-05-29

## 2020-06-01 NOTE — TELEPHONE ENCOUNTER
From: Makenna Culver  To: CAMRYN Daley  Sent: 5/29/2020 6:53 PM PDT  Subject: Non-Urgent Medical Question    did you call over the proair inhaler. they didn't call me back. did you get the message about the sleeping pills. [facility] for your help makenna

## 2020-06-06 NOTE — ASSESSMENT & PLAN NOTE
Chronic condition  Uncontrolled even though patient has been working on diet and lifestyle changes.  Myalgia with lipitor  She is open to trying zetia. rx provided   No

## 2020-06-06 NOTE — PROGRESS NOTES
Telemedicine Visit: Established Patient     This encounter was conducted via Zoom .   Verbal consent was obtained. Patient's identity was verified.    Subjective:   CC: dyslipidemia  Makenna Culver is a 74 y.o. female presenting for evaluation and management of:    Medication change for treatment of dyslipidemia.  Hyperlipidemia  Chronic condition  Uncontrolled even though patient has been working on diet and lifestyle changes.  Myalgia with lipitor  She is open to trying zetia. rx provided      ROS   Denies any recent fevers or chills. No nausea or vomiting. No chest pains or shortness of breath.     Allergies   Allergen Reactions   • Codeine Vomiting   • Morphine      Chills    • Vicodin [Hydrocodone-Acetaminophen] Vomiting     OK if takes with phenergan        Current medicines (including changes today)  Current Outpatient Medications   Medication Sig Dispense Refill   • ezetimibe (ZETIA) 10 MG Tab Take 1 Tab by mouth every day. 90 Tab 3   • levothyroxine (SYNTHROID) 75 MCG Tab Take 1 Tab by mouth Every morning on an empty stomach. 90 Tab 3   • cyclobenzaprine (FLEXERIL) 10 MG Tab Take 1 Tab by mouth 3 times a day as needed. 90 Tab 0   • estradiol (ESTRACE) 1 MG Tab TAKE 1 TABLET BY MOUTH ONCE DAILY 90 Tab 1   • omeprazole (PRILOSEC) 40 MG delayed-release capsule TAKE 1 CAPSULE BY MOUTH ONCE DAILY 90 Cap 1   • tramadol (ULTRAM) 50 MG Tab Take 50 mg by mouth every 6 hours as needed for Mild Pain.     • ibuprofen (MOTRIN) 800 MG Tab Take 800 mg by mouth 3 times a day, with meals.     • traZODone (DESYREL) 50 MG Tab Take 1 Tab by mouth at bedtime as needed for Sleep. 30 Tab 2   • albuterol 108 (90 Base) MCG/ACT Aero Soln inhalation aerosol Inhale 2 Puffs by mouth every 6 hours as needed for Shortness of Breath. 1 Inhaler 3     No current facility-administered medications for this visit.        Patient Active Problem List    Diagnosis Date Noted   • Graves' ophthalmopathy 07/09/2015     Priority: High   •  Osteoarthritis of multiple joints 06/13/2014     Priority: High   • Graves' disease with exophthalmos 06/13/2014     Priority: High   • Hyperlipidemia 06/22/2011     Priority: High   • Degeneration of lumbar or lumbosacral intervertebral disc 10/09/2009     Priority: High   • Hypothyroidism 10/09/2009     Priority: High   • Pain in joint, shoulder region 10/09/2009     Priority: High   • Dermatochalasis of both upper eyelids 04/20/2016     Priority: Low   • Other and combined forms of senile cataract 03/17/2015     Priority: Low   • Osteopenia 05/09/2012     Priority: Low   • Post menopausal syndrome 03/08/2012     Priority: Low   • History of peptic ulcer 11/04/2009     Priority: Low   • GERD (gastroesophageal reflux disease) 11/04/2009     Priority: Low   • Environmental allergies 05/28/2020   • Primary insomnia 05/28/2020   • Left wrist pain 11/27/2019   • Preoperative clearance 05/09/2019   • Hospital discharge follow-up 04/26/2019   • Colitis 04/15/2019   • Pulmonary nodule 04/15/2019   • Liver lesion 04/15/2019   • Sacroiliitis, not elsewhere classified (HCC) 07/25/2018   • Right knee pain 04/21/2017   • Senile nuclear sclerosis 03/03/2015       Family History   Problem Relation Age of Onset   • Hypertension Father    • Heart Disease Father         MI age 49 yo   • Cancer Neg Hx    • Diabetes Neg Hx        She  has a past medical history of Arthritis, Colon polyp (12/2011), Degeneration of lumbar or lumbosacral intervertebral disc, Dental disorder, Ear problems (as child had surgery), Graves' disease with exophthalmos (6/13/2014), Hemorrhoid, High cholesterol, History of carpal tunnel repair (7/31/2013), Menopause (10/14/2009), Osteoarthritis of multiple joints (6/13/2014), Pain in joint, pelvic region and thigh (2015), Pain in joint, shoulder region (2015), Periodic pelvic examination (due), Postcoital bleeding, Rash (12/29/2014), S/P colonoscopy (12/2011), Screening mammogram ( 11/2008=normal), Tobacco use  "disorder, Ulcer of the stomach and intestine (hx.), Unspecified cataract, and Unspecified hypothyroidism.  She  has a past surgical history that includes thyroidectomy (thyroid ablation with iodine therapy); other surgical procedure; abdominal hysterectomy total (partial); tonsillectomy and adenoidectomy; carpal tunnel endoscopic (11/17/2012); carpal tunnel endoscopic (2/8/2013); tensilon test (11/20/2014); cataract phaco with iol (3/3/2015); cataract phaco with iol (3/17/2015); rectus repair (Bilateral, 7/9/2015); and blepharoplasty (Bilateral, 4/20/2016).       Objective:   Resp 14   Ht 1.549 m (5' 1\")   Wt 56.7 kg (125 lb)   BMI 23.62 kg/m²     Physical Exam:  Constitutional: Alert, no distress, well-groomed.  Skin: No rashes in visible areas.  Eye: Round. Conjunctiva clear, lids normal. No icterus.   ENMT: Lips pink without lesions, good dentition, moist mucous membranes. Phonation normal.  Neck: No masses, no thyromegaly. Moves freely without pain.  CV: Pulse as reported by patient  Respiratory: Unlabored respiratory effort, no cough or audible wheeze  Psych: Alert and oriented x3, normal affect and mood.       Assessment and Plan:   The following treatment plan was discussed:     1. Mixed hyperlipidemia  - ezetimibe (ZETIA) 10 MG Tab; Take 1 Tab by mouth every day.  Dispense: 90 Tab; Refill: 3  - Lipid Profile; Future  - Comp Metabolic Panel; Future    2. Hypothyroidism, unspecified type  - levothyroxine (SYNTHROID) 75 MCG Tab; Take 1 Tab by mouth Every morning on an empty stomach.  Dispense: 90 Tab; Refill: 3  - TSH WITH REFLEX TO FT4; Future        Follow-up: No follow-ups on file.         "

## 2020-06-10 ENCOUNTER — ANESTHESIA (OUTPATIENT)
Dept: RADIOLOGY | Facility: MEDICAL CENTER | Age: 75
End: 2020-06-10
Payer: MEDICARE

## 2020-06-10 ENCOUNTER — HOSPITAL ENCOUNTER (OUTPATIENT)
Dept: RADIOLOGY | Facility: MEDICAL CENTER | Age: 75
End: 2020-06-10
Attending: ORTHOPAEDIC SURGERY
Payer: MEDICARE

## 2020-06-10 ENCOUNTER — ANESTHESIA EVENT (OUTPATIENT)
Dept: RADIOLOGY | Facility: MEDICAL CENTER | Age: 75
End: 2020-06-10
Payer: MEDICARE

## 2020-06-10 VITALS
WEIGHT: 125.22 LBS | HEART RATE: 100 BPM | BODY MASS INDEX: 24.58 KG/M2 | HEIGHT: 60 IN | RESPIRATION RATE: 12 BRPM | OXYGEN SATURATION: 94 % | TEMPERATURE: 98.1 F

## 2020-06-10 DIAGNOSIS — G56.03 CARPAL TUNNEL SYNDROME, BILATERAL UPPER LIMBS: ICD-10-CM

## 2020-06-10 PROCEDURE — 73221 MRI JOINT UPR EXTREM W/O DYE: CPT | Mod: LT

## 2020-06-10 PROCEDURE — 700111 HCHG RX REV CODE 636 W/ 250 OVERRIDE (IP): Performed by: ANESTHESIOLOGY

## 2020-06-10 PROCEDURE — 700105 HCHG RX REV CODE 258: Performed by: ANESTHESIOLOGY

## 2020-06-10 PROCEDURE — 700101 HCHG RX REV CODE 250: Performed by: ANESTHESIOLOGY

## 2020-06-10 PROCEDURE — 73221 MRI JOINT UPR EXTREM W/O DYE: CPT | Mod: RT

## 2020-06-10 RX ORDER — MEPERIDINE HYDROCHLORIDE 25 MG/ML
12.5 INJECTION INTRAMUSCULAR; INTRAVENOUS; SUBCUTANEOUS
Status: DISCONTINUED | OUTPATIENT
Start: 2020-06-10 | End: 2020-06-11 | Stop reason: HOSPADM

## 2020-06-10 RX ORDER — ALBUTEROL SULFATE 90 UG/1
2 AEROSOL, METERED RESPIRATORY (INHALATION) EVERY 6 HOURS PRN
COMMUNITY
End: 2020-07-30 | Stop reason: SDUPTHER

## 2020-06-10 RX ORDER — PHENYLEPHRINE HYDROCHLORIDE 10 MG/ML
INJECTION, SOLUTION INTRAMUSCULAR; INTRAVENOUS; SUBCUTANEOUS PRN
Status: DISCONTINUED | OUTPATIENT
Start: 2020-06-10 | End: 2020-06-10 | Stop reason: SURG

## 2020-06-10 RX ORDER — LIDOCAINE HYDROCHLORIDE 20 MG/ML
INJECTION, SOLUTION EPIDURAL; INFILTRATION; INTRACAUDAL; PERINEURAL PRN
Status: DISCONTINUED | OUTPATIENT
Start: 2020-06-10 | End: 2020-06-10 | Stop reason: SURG

## 2020-06-10 RX ORDER — HALOPERIDOL 5 MG/ML
1 INJECTION INTRAMUSCULAR
Status: DISCONTINUED | OUTPATIENT
Start: 2020-06-10 | End: 2020-06-11 | Stop reason: HOSPADM

## 2020-06-10 RX ORDER — DIPHENHYDRAMINE HYDROCHLORIDE 50 MG/ML
12.5 INJECTION INTRAMUSCULAR; INTRAVENOUS
Status: DISCONTINUED | OUTPATIENT
Start: 2020-06-10 | End: 2020-06-11 | Stop reason: HOSPADM

## 2020-06-10 RX ORDER — LABETALOL HYDROCHLORIDE 5 MG/ML
5 INJECTION, SOLUTION INTRAVENOUS
Status: DISCONTINUED | OUTPATIENT
Start: 2020-06-10 | End: 2020-06-11 | Stop reason: HOSPADM

## 2020-06-10 RX ORDER — SODIUM CHLORIDE, SODIUM LACTATE, POTASSIUM CHLORIDE, CALCIUM CHLORIDE 600; 310; 30; 20 MG/100ML; MG/100ML; MG/100ML; MG/100ML
INJECTION, SOLUTION INTRAVENOUS CONTINUOUS
Status: DISCONTINUED | OUTPATIENT
Start: 2020-06-10 | End: 2020-06-11 | Stop reason: HOSPADM

## 2020-06-10 RX ORDER — METOPROLOL TARTRATE 1 MG/ML
1 INJECTION, SOLUTION INTRAVENOUS
Status: DISCONTINUED | OUTPATIENT
Start: 2020-06-10 | End: 2020-06-11 | Stop reason: HOSPADM

## 2020-06-10 RX ORDER — HYDRALAZINE HYDROCHLORIDE 20 MG/ML
5 INJECTION INTRAMUSCULAR; INTRAVENOUS
Status: DISCONTINUED | OUTPATIENT
Start: 2020-06-10 | End: 2020-06-11 | Stop reason: HOSPADM

## 2020-06-10 RX ORDER — SODIUM CHLORIDE, SODIUM LACTATE, POTASSIUM CHLORIDE, CALCIUM CHLORIDE 600; 310; 30; 20 MG/100ML; MG/100ML; MG/100ML; MG/100ML
INJECTION, SOLUTION INTRAVENOUS
Status: DISCONTINUED | OUTPATIENT
Start: 2020-06-10 | End: 2020-06-10 | Stop reason: SURG

## 2020-06-10 RX ORDER — ONDANSETRON 2 MG/ML
4 INJECTION INTRAMUSCULAR; INTRAVENOUS
Status: DISCONTINUED | OUTPATIENT
Start: 2020-06-10 | End: 2020-06-11 | Stop reason: HOSPADM

## 2020-06-10 RX ORDER — MIDAZOLAM HYDROCHLORIDE 1 MG/ML
1 INJECTION INTRAMUSCULAR; INTRAVENOUS
Status: DISCONTINUED | OUTPATIENT
Start: 2020-06-10 | End: 2020-06-11 | Stop reason: HOSPADM

## 2020-06-10 RX ORDER — ONDANSETRON 2 MG/ML
INJECTION INTRAMUSCULAR; INTRAVENOUS PRN
Status: DISCONTINUED | OUTPATIENT
Start: 2020-06-10 | End: 2020-06-10 | Stop reason: HOSPADM

## 2020-06-10 RX ADMIN — EPHEDRINE SULFATE 50 MG: 50 INJECTION INTRAMUSCULAR; INTRAVENOUS; SUBCUTANEOUS at 10:09

## 2020-06-10 RX ADMIN — SODIUM CHLORIDE, POTASSIUM CHLORIDE, SODIUM LACTATE AND CALCIUM CHLORIDE: 600; 310; 30; 20 INJECTION, SOLUTION INTRAVENOUS at 09:37

## 2020-06-10 RX ADMIN — PHENYLEPHRINE HYDROCHLORIDE 300 MCG: 10 INJECTION INTRAVENOUS at 10:39

## 2020-06-10 RX ADMIN — PROPOFOL 200 MG: 10 INJECTION, EMULSION INTRAVENOUS at 09:40

## 2020-06-10 RX ADMIN — ONDANSETRON 4 MG: 2 INJECTION INTRAMUSCULAR; INTRAVENOUS at 09:40

## 2020-06-10 RX ADMIN — PHENYLEPHRINE HYDROCHLORIDE 200 MCG: 10 INJECTION INTRAVENOUS at 10:22

## 2020-06-10 RX ADMIN — EPHEDRINE SULFATE 50 MG: 50 INJECTION INTRAMUSCULAR; INTRAVENOUS; SUBCUTANEOUS at 09:41

## 2020-06-10 RX ADMIN — LIDOCAINE HYDROCHLORIDE 40 MG: 20 INJECTION, SOLUTION EPIDURAL; INFILTRATION; INTRACAUDAL at 09:40

## 2020-06-10 ASSESSMENT — PAIN SCALES - GENERAL: PAIN_LEVEL: 0

## 2020-06-10 ASSESSMENT — FIBROSIS 4 INDEX: FIB4 SCORE: 1.04

## 2020-06-10 NOTE — ANESTHESIA TIME REPORT
Anesthesia Start and Stop Event Times     Date Time Event    6/10/2020 0923 Ready for Procedure     0937 Anesthesia Start     1110 Anesthesia Stop        Responsible Staff  06/10/20    Name Role Begin End    Qamar Urena M.D. Anesth 0937 1110        Preop Diagnosis (Free Text):  Pre-op Diagnosis             Preop Diagnosis (Codes):    Post op Diagnosis  Carpal tunnel syndrome      Premium Reason  Non-Premium    Comments:

## 2020-06-10 NOTE — ANESTHESIA PROCEDURE NOTES
Airway    Date/Time: 6/10/2020 9:39 AM  Performed by: Qamar Urena M.D.  Authorized by: Qamar Urena M.D.     Location:  OR  Urgency:  Elective  Indications for Airway Management:  Anesthesia      Spontaneous Ventilation: absent    Sedation Level:  Deep  Preoxygenated: Yes    Final Airway Type:  Supraglottic airway  Final Supraglottic Airway:  Standard LMA    SGA Size:  3  Number of Attempts at Approach:  1

## 2020-06-10 NOTE — DISCHARGE INSTRUCTIONS
MRI ADULT DISCHARGE INSTRUCTIONS    You have been medicated today for your scan. Please follow the instructions below to ensure your safe recovery. If you have any questions or problems, feel free to call us at [phone] or [phone].     1.   Have someone stay with you to assist you as needed.    2.   Do not drive or operate any mechanical devices.    3.   Do not perform any activity that requires concentration. Make no major decisions over the next 24 hours.     4.   Be careful changing positions from laying down to sitting or standing, as you may become dizzy.     5.   Do not drink alcohol for 48 hours.    6.   There are no restrictions for eating your normal meals. Drink fluids.    7.   You may continue your usual medications for pain, tranquilizers, muscle relaxants or sedatives when awake.     8.   Tomorrow, you may continue your normal daily activities.     9.   Pressure dressing on 10 - 15 minutes. If swelling or bleeding occurs when removed, continue placing direct pressure on injection site for another 5 minutes, or until bleeding stops.     I have been informed of and understand the above discharge instructions.

## 2020-06-10 NOTE — ANESTHESIA PREPROCEDURE EVALUATION
Relevant Problems   NEURO   (+) History of peptic ulcer      GI   (+) GERD (gastroesophageal reflux disease)      ENDO   (+) Graves' disease with exophthalmos   (+) Hypothyroidism      Other   (+) Sacroiliitis, not elsewhere classified (HCC)       Physical Exam    Airway   Mallampati: II  TM distance: >3 FB  Neck ROM: full       Cardiovascular - normal exam  Rhythm: regular  Rate: normal  (-) murmur     Dental - normal exam           Pulmonary - normal exam  Breath sounds clear to auscultation     Abdominal    Neurological - normal exam                 Anesthesia Plan    ASA 2       Plan - general       Airway plan will be LMA        Induction: intravenous    Postoperative Plan: Postoperative administration of opioids is intended.    Pertinent diagnostic labs and testing reviewed    Informed Consent:    Anesthetic plan and risks discussed with patient.    Use of blood products discussed with: patient whom consented to blood products.

## 2020-06-10 NOTE — ANESTHESIA POSTPROCEDURE EVALUATION
Patient: Maeknna Culver    Procedure Summary     Date:  06/10/20 [address]    Anesthesia Start:  0937 Anesthesia Stop:      Procedure:  MR-WRIST W/O Diagnosis:       Carpal tunnel syndrome, bilateral upper limbs      (ANEST, 3T/INCLUDE CMC JOINT OF THUMB)    Scheduled Providers:   Responsible Provider:  Qamar Urena M.D.    Anesthesia Type:  general ASA Status:  2          Final Anesthesia Type: general  Last vitals  BP   NIBP: 102/58    Temp   36.7 °C (98.1 °F)    Pulse   Pulse: (!) 106   Resp   12    SpO2   95 %      Anesthesia Post Evaluation    Patient location during evaluation: PACU  Patient participation: complete - patient participated  Level of consciousness: awake and alert  Pain score: 0    Airway patency: patent  Anesthetic complications: no  Cardiovascular status: hemodynamically stable  Respiratory status: acceptable  Hydration status: euvolemic    PONV: none           Nurse Pain Score: 0 (NPRS)

## 2020-06-10 NOTE — PROGRESS NOTES
Patient tolerated snack and water.  Ambulated to restroom c 1 person assist.  DC instructions discussed c responsible adult - spouse.  Our tel number provided for future questions. Patient dc'd in stable condition.

## 2020-06-18 DIAGNOSIS — Z79.890 POSTMENOPAUSAL HRT (HORMONE REPLACEMENT THERAPY): ICD-10-CM

## 2020-06-22 RX ORDER — OMEPRAZOLE 40 MG/1
CAPSULE, DELAYED RELEASE ORAL
Qty: 90 CAP | Refills: 1 | Status: SHIPPED | OUTPATIENT
Start: 2020-06-22 | End: 2020-12-22

## 2020-06-22 RX ORDER — ESTRADIOL 1 MG/1
TABLET ORAL
Qty: 90 TAB | Refills: 1 | Status: SHIPPED | OUTPATIENT
Start: 2020-06-22 | End: 2020-12-17

## 2020-07-01 ENCOUNTER — OFFICE VISIT (OUTPATIENT)
Dept: MEDICAL GROUP | Facility: PHYSICIAN GROUP | Age: 75
End: 2020-07-01
Payer: MEDICARE

## 2020-07-01 VITALS
DIASTOLIC BLOOD PRESSURE: 64 MMHG | OXYGEN SATURATION: 96 % | HEART RATE: 54 BPM | RESPIRATION RATE: 14 BRPM | SYSTOLIC BLOOD PRESSURE: 108 MMHG | TEMPERATURE: 98.9 F | WEIGHT: 125 LBS | BODY MASS INDEX: 23.6 KG/M2 | HEIGHT: 61 IN

## 2020-07-01 DIAGNOSIS — R35.0 URINARY FREQUENCY: ICD-10-CM

## 2020-07-01 LAB
APPEARANCE UR: CLEAR
BILIRUB UR STRIP-MCNC: NORMAL MG/DL
COLOR UR AUTO: YELLOW
GLUCOSE UR STRIP.AUTO-MCNC: NORMAL MG/DL
KETONES UR STRIP.AUTO-MCNC: NORMAL MG/DL
LEUKOCYTE ESTERASE UR QL STRIP.AUTO: NORMAL
NITRITE UR QL STRIP.AUTO: NORMAL
PH UR STRIP.AUTO: 6 [PH] (ref 5–8)
PROT UR QL STRIP: NORMAL MG/DL
RBC UR QL AUTO: NORMAL
SP GR UR STRIP.AUTO: 1.01
UROBILINOGEN UR STRIP-MCNC: 0.2 MG/DL

## 2020-07-01 PROCEDURE — 99214 OFFICE O/P EST MOD 30 MIN: CPT | Performed by: NURSE PRACTITIONER

## 2020-07-01 PROCEDURE — 81002 URINALYSIS NONAUTO W/O SCOPE: CPT | Performed by: NURSE PRACTITIONER

## 2020-07-01 RX ORDER — OXYBUTYNIN CHLORIDE 10 MG/1
10 TABLET, EXTENDED RELEASE ORAL DAILY
Qty: 90 TAB | Refills: 1 | Status: SHIPPED | OUTPATIENT
Start: 2020-07-01 | End: 2020-08-12

## 2020-07-01 ASSESSMENT — FIBROSIS 4 INDEX: FIB4 SCORE: 1.05

## 2020-07-01 ASSESSMENT — PAIN SCALES - GENERAL: PAINLEVEL: 7=MODERATE-SEVERE PAIN

## 2020-07-01 NOTE — PROGRESS NOTES
Chief Complaint   Patient presents with   • Back Pain   • Urinary Frequency         This is a 75 y.o.female patient that presents today with the following: low back pain, urinary frequency    Urinary frequency  Pt has had urinary frequency and urgency of the past couple of weeks  Has associated low back pain, but does report chronic low back pain  Denies dysuria, blood in urine, suprapubic pain, nausea or vomiting  POCT UA negative  Discussed other possible causes, such as overactive bladder  Advised to try developing a voiding schedule, avoid irritating foods and drinks  Will trial oxybutynin, discussed risks, benefits and common AE associated with mediation      Office Visit on 06/05/2020   Component Date Value   • COVID Order Status 06/05/2020 PREAD-Recvd    • 2019-nCoV Source 06/05/2020 NP Swab    • 2019-nCoV RNA Interp 06/05/2020 Not detected          clinical course has been stable    Past Medical History:   Diagnosis Date   • Arthritis     hips, back and shoulders (osteoarthritis)    • Colon polyp 12/2011   • Degeneration of lumbar or lumbosacral intervertebral disc     Dr. Cisneros   • Dental disorder     dentures    • Ear problems as child had surgery   • Graves' disease with exophthalmos 6/13/2014    Being followed by Dr. Han San   • Hemorrhoid     internal   • High cholesterol     not medicated at this time    • History of carpal tunnel repair 7/31/2013    Dr. Villa repaired both.   • Menopause 10/14/2009   • Osteoarthritis of multiple joints 6/13/2014   • Pain in joint, pelvic region and thigh 2015    bursitis and arthritis in hips-takes ibuprofen and darvacet   • Pain in joint, shoulder region 2015   • Periodic pelvic examination due   • Postcoital bleeding     improved   • Rash 12/29/2014    Started about a week ago when she got news about surgery High stress Nothing new Back, stomach, chest, arms Itch No pain   • S/P colonoscopy 12/2011    repeat in 5 years- had polyp, initial 2005.     • Screening mammogram  11/2008=normal   • Tobacco use disorder    • Ulcer of the stomach and intestine hx.    stable   • Unspecified cataract     bilateral IOL   • Unspecified hypothyroidism        Past Surgical History:   Procedure Laterality Date   • BLEPHAROPLASTY Bilateral 4/20/2016    Procedure: BLEPHAROPLASTY - UPPER;  Surgeon: Moshe Sena M.D.;  Location: SURGERY [facility];  Service:    • RECTUS REPAIR Bilateral 7/9/2015    Procedure: RECTUS REPAIR FOR LT SUPERIOR RECESSION & RT INFERIOR RECESSION ;  Surgeon: Leila Villegas M.D.;  Location: SURGERY SAME DAY [facility]S;  Service:    • CATARACT PHACO WITH IOL  3/17/2015    Performed by Derrick Gupta M.D. at [facility]   • CATARACT PHACO WITH IOL  3/3/2015    Performed by Derrick Gupta M.D. at [facility]   • TENSILON TEST  11/20/2014    Performed by Maynor San D.O. at ENDOSCOPY [facility]   • CARPAL TUNNEL ENDOSCOPIC  2/8/2013    Performed by Pancho Palomino M.D. at SURGERY SAME DAY [facility]   • CARPAL TUNNEL ENDOSCOPIC  11/17/2012    Performed by Pancho Palomino M.D. at SURGERY [facility] ORS   • ABDOMINAL HYSTERECTOMY TOTAL  partial    fibroid tumors, has ovary and tube left   • OTHER SURGICAL PROCEDURE      ear surgery as child   • THYROIDECTOMY  thyroid ablation with iodine therapy   • TONSILLECTOMY AND ADENOIDECTOMY         Family History   Problem Relation Age of Onset   • Hypertension Father    • Heart Disease Father         MI age 49 yo   • Cancer Neg Hx    • Diabetes Neg Hx        Codeine; Morphine; and Vicodin [hydrocodone-acetaminophen]    Current Outpatient Medications Ordered in Epic   Medication Sig Dispense Refill   • oxybutynin SR (DITROPAN-XL) 10 MG CR tablet Take 1 Tab by mouth every day. 90 Tab 1   • omeprazole (PRILOSEC) 40 MG delayed-release capsule Take 1 capsule by mouth once daily 90 Cap 1   • estradiol (ESTRACE) 1 MG Tab Take 1 tablet by mouth once daily 90  "Tab 1   • albuterol 108 (90 Base) MCG/ACT Aero Soln inhalation aerosol Inhale 2 Puffs by mouth every 6 hours as needed for Shortness of Breath. PER PATIENT'S MED LIST BROUGHT ON MRI APPT 6.10.20 - \"PRO AIR HFA\"     • ibuprofen (MOTRIN) 800 MG Tab Take 800 mg by mouth 3 times a day as needed. TID PRN, WITH MEALS. PER PATIENT'S MED LIST BROUGHT ON MRI APPT 6.10.20     • traZODone (DESYREL) 50 MG Tab Take 1 Tab by mouth at bedtime as needed for Sleep. 30 Tab 2   • levothyroxine (SYNTHROID) 75 MCG Tab Take 1 Tab by mouth Every morning on an empty stomach. 90 Tab 3   • cyclobenzaprine (FLEXERIL) 10 MG Tab Take 1 Tab by mouth 3 times a day as needed. (Patient taking differently: Take 10 mg by mouth 3 times a day as needed. PER PATIENT'S MED LIST BROUGHT ON MRI APPT 6.10.20) 90 Tab 0   • tramadol (ULTRAM) 50 MG Tab Take 50 mg by mouth every 6 hours as needed for Mild Pain.       No current Epic-ordered facility-administered medications on file.        Constitutional ROS: No unexpected change in weight, No weakness, No unexplained fevers, sweats, or chills  Pulmonary ROS: No chronic cough, sputum, or hemoptysis, No shortness of breath, No recent change in breathing  Cardiovascular ROS: No chest pain  Musculoskeletal/Extremities ROS: positive for low back pain  Neurologic ROS: Normal development, No seizures, No weakness   ROS: positive per HPI    Physical exam:  /64   Pulse (!) 54   Temp 37.2 °C (98.9 °F) (Temporal)   Resp 14   Ht 1.549 m (5' 1\")   Wt 56.7 kg (125 lb)   SpO2 96%   BMI 23.62 kg/m²   General Appearance: very pleasant elderly female, alert, no distress, well groomed  Skin: Skin color, texture, turgor normal. No rashes or lesions.  Back: mild pain to palpation in the LS spine midline  Lungs: negative findings: normal respiratory rate and rhythm, normal effort  Musculoskeletal: negative findings: no evidence of joint instability, no evidence of muscle atrophy, no deformities present  Neurologic: " intact, CN 2-12 grossly intact    Medical decision making/discussion:     Voiding schedule    Oxybutynin daily    Follow up as needed    Makenna was seen today for back pain and urinary frequency.    Diagnoses and all orders for this visit:    Urinary frequency  -     POCT Urinalysis  -     oxybutynin SR (DITROPAN-XL) 10 MG CR tablet; Take 1 Tab by mouth every day.        Return if symptoms worsen or fail to improve, for Follow-up.        Please note that this dictation was created using voice recognition software. I have made every reasonable attempt to correct obvious errors, but I expect that there are errors of grammar and possibly content that I did not discover before finalizing the note.

## 2020-07-02 NOTE — ASSESSMENT & PLAN NOTE
Pt has had urinary frequency and urgency of the past couple of weeks  Has associated low back pain, but does report chronic low back pain  Denies dysuria, blood in urine, suprapubic pain, nausea or vomiting  POCT UA negative  Discussed other possible causes, such as overactive bladder  Advised to try developing a voiding schedule, avoid irritating foods and drinks  Will trial oxybutynin, discussed risks, benefits and common AE associated with mediation

## 2020-07-17 DIAGNOSIS — Z01.84 ENCOUNTER FOR ANTIBODY RESPONSE EXAMINATION: ICD-10-CM

## 2020-07-17 DIAGNOSIS — Z01.818 PREOPERATIVE CLEARANCE: ICD-10-CM

## 2020-07-29 ENCOUNTER — PATIENT MESSAGE (OUTPATIENT)
Dept: MEDICAL GROUP | Facility: PHYSICIAN GROUP | Age: 75
End: 2020-07-29

## 2020-07-31 RX ORDER — ALBUTEROL SULFATE 90 UG/1
2 AEROSOL, METERED RESPIRATORY (INHALATION) EVERY 6 HOURS PRN
Qty: 25.5 G | Refills: 3 | Status: SHIPPED | OUTPATIENT
Start: 2020-07-31 | End: 2020-08-12

## 2020-07-31 RX ORDER — ALBUTEROL SULFATE 90 UG/1
2 AEROSOL, METERED RESPIRATORY (INHALATION) EVERY 6 HOURS PRN
Qty: 8.5 G | Refills: 3 | OUTPATIENT
Start: 2020-07-31

## 2020-08-06 ENCOUNTER — HOSPITAL ENCOUNTER (OUTPATIENT)
Dept: LAB | Facility: MEDICAL CENTER | Age: 75
End: 2020-08-06
Attending: FAMILY MEDICINE
Payer: MEDICARE

## 2020-08-06 ENCOUNTER — HOSPITAL ENCOUNTER (OUTPATIENT)
Dept: LAB | Facility: MEDICAL CENTER | Age: 75
End: 2020-08-06
Attending: NURSE PRACTITIONER
Payer: MEDICARE

## 2020-08-06 DIAGNOSIS — Z01.84 ENCOUNTER FOR ANTIBODY RESPONSE EXAMINATION: ICD-10-CM

## 2020-08-06 DIAGNOSIS — E78.2 MIXED HYPERLIPIDEMIA: ICD-10-CM

## 2020-08-06 DIAGNOSIS — E03.9 HYPOTHYROIDISM, UNSPECIFIED TYPE: ICD-10-CM

## 2020-08-06 DIAGNOSIS — Z01.818 PREOPERATIVE CLEARANCE: ICD-10-CM

## 2020-08-06 LAB
ALBUMIN SERPL BCP-MCNC: 4.1 G/DL (ref 3.2–4.9)
ALBUMIN SERPL BCP-MCNC: 4.2 G/DL (ref 3.2–4.9)
ALBUMIN/GLOB SERPL: 1.6 G/DL
ALBUMIN/GLOB SERPL: 1.7 G/DL
ALP SERPL-CCNC: 75 U/L (ref 30–99)
ALP SERPL-CCNC: 76 U/L (ref 30–99)
ALT SERPL-CCNC: 12 U/L (ref 2–50)
ALT SERPL-CCNC: 16 U/L (ref 2–50)
ANION GAP SERPL CALC-SCNC: 13 MMOL/L (ref 7–16)
ANION GAP SERPL CALC-SCNC: 15 MMOL/L (ref 7–16)
AST SERPL-CCNC: 19 U/L (ref 12–45)
AST SERPL-CCNC: 20 U/L (ref 12–45)
BASOPHILS # BLD AUTO: 0.9 % (ref 0–1.8)
BASOPHILS # BLD: 0.04 K/UL (ref 0–0.12)
BILIRUB SERPL-MCNC: 0.2 MG/DL (ref 0.1–1.5)
BILIRUB SERPL-MCNC: 0.3 MG/DL (ref 0.1–1.5)
BUN SERPL-MCNC: 8 MG/DL (ref 8–22)
BUN SERPL-MCNC: 8 MG/DL (ref 8–22)
CALCIUM SERPL-MCNC: 9.4 MG/DL (ref 8.5–10.5)
CALCIUM SERPL-MCNC: 9.4 MG/DL (ref 8.5–10.5)
CHLORIDE SERPL-SCNC: 96 MMOL/L (ref 96–112)
CHLORIDE SERPL-SCNC: 96 MMOL/L (ref 96–112)
CHOLEST SERPL-MCNC: 284 MG/DL (ref 100–199)
CO2 SERPL-SCNC: 23 MMOL/L (ref 20–33)
CO2 SERPL-SCNC: 25 MMOL/L (ref 20–33)
CREAT SERPL-MCNC: 0.63 MG/DL (ref 0.5–1.4)
CREAT SERPL-MCNC: 0.66 MG/DL (ref 0.5–1.4)
EOSINOPHIL # BLD AUTO: 0.21 K/UL (ref 0–0.51)
EOSINOPHIL NFR BLD: 4.7 % (ref 0–6.9)
ERYTHROCYTE [DISTWIDTH] IN BLOOD BY AUTOMATED COUNT: 44.4 FL (ref 35.9–50)
FASTING STATUS PATIENT QL REPORTED: NORMAL
FASTING STATUS PATIENT QL REPORTED: NORMAL
GLOBULIN SER CALC-MCNC: 2.5 G/DL (ref 1.9–3.5)
GLOBULIN SER CALC-MCNC: 2.6 G/DL (ref 1.9–3.5)
GLUCOSE SERPL-MCNC: 92 MG/DL (ref 65–99)
GLUCOSE SERPL-MCNC: 93 MG/DL (ref 65–99)
HCT VFR BLD AUTO: 41.3 % (ref 37–47)
HDLC SERPL-MCNC: 66 MG/DL
HGB BLD-MCNC: 13.2 G/DL (ref 12–16)
IMM GRANULOCYTES # BLD AUTO: 0.01 K/UL (ref 0–0.11)
IMM GRANULOCYTES NFR BLD AUTO: 0.2 % (ref 0–0.9)
LDLC SERPL CALC-MCNC: 167 MG/DL
LYMPHOCYTES # BLD AUTO: 1.56 K/UL (ref 1–4.8)
LYMPHOCYTES NFR BLD: 35.1 % (ref 22–41)
MCH RBC QN AUTO: 29.9 PG (ref 27–33)
MCHC RBC AUTO-ENTMCNC: 32 G/DL (ref 33.6–35)
MCV RBC AUTO: 93.4 FL (ref 81.4–97.8)
MONOCYTES # BLD AUTO: 0.34 K/UL (ref 0–0.85)
MONOCYTES NFR BLD AUTO: 7.6 % (ref 0–13.4)
NEUTROPHILS # BLD AUTO: 2.29 K/UL (ref 2–7.15)
NEUTROPHILS NFR BLD: 51.5 % (ref 44–72)
NRBC # BLD AUTO: 0 K/UL
NRBC BLD-RTO: 0 /100 WBC
PLATELET # BLD AUTO: 596 K/UL (ref 164–446)
PMV BLD AUTO: 8.8 FL (ref 9–12.9)
POTASSIUM SERPL-SCNC: 4.2 MMOL/L (ref 3.6–5.5)
POTASSIUM SERPL-SCNC: 4.3 MMOL/L (ref 3.6–5.5)
PROT SERPL-MCNC: 6.7 G/DL (ref 6–8.2)
PROT SERPL-MCNC: 6.7 G/DL (ref 6–8.2)
RBC # BLD AUTO: 4.42 M/UL (ref 4.2–5.4)
SARS-COV-2 AB SERPL QL IA: NORMAL
SODIUM SERPL-SCNC: 134 MMOL/L (ref 135–145)
SODIUM SERPL-SCNC: 134 MMOL/L (ref 135–145)
TRIGL SERPL-MCNC: 257 MG/DL (ref 0–149)
TSH SERPL DL<=0.005 MIU/L-ACNC: 0.41 UIU/ML (ref 0.38–5.33)
WBC # BLD AUTO: 4.5 K/UL (ref 4.8–10.8)

## 2020-08-06 PROCEDURE — 36415 COLL VENOUS BLD VENIPUNCTURE: CPT

## 2020-08-06 PROCEDURE — 80061 LIPID PANEL: CPT

## 2020-08-06 PROCEDURE — 86769 SARS-COV-2 COVID-19 ANTIBODY: CPT

## 2020-08-06 PROCEDURE — 84443 ASSAY THYROID STIM HORMONE: CPT

## 2020-08-06 PROCEDURE — 80053 COMPREHEN METABOLIC PANEL: CPT

## 2020-08-06 PROCEDURE — 85025 COMPLETE CBC W/AUTO DIFF WBC: CPT

## 2020-08-06 PROCEDURE — 80053 COMPREHEN METABOLIC PANEL: CPT | Mod: 91

## 2020-08-12 ENCOUNTER — OFFICE VISIT (OUTPATIENT)
Dept: MEDICAL GROUP | Facility: PHYSICIAN GROUP | Age: 75
End: 2020-08-12
Payer: MEDICARE

## 2020-08-12 VITALS
DIASTOLIC BLOOD PRESSURE: 68 MMHG | BODY MASS INDEX: 23.41 KG/M2 | HEIGHT: 61 IN | HEART RATE: 98 BPM | SYSTOLIC BLOOD PRESSURE: 122 MMHG | RESPIRATION RATE: 12 BRPM | WEIGHT: 124 LBS | TEMPERATURE: 99 F | OXYGEN SATURATION: 96 %

## 2020-08-12 DIAGNOSIS — M46.1 SACROILIITIS, NOT ELSEWHERE CLASSIFIED (HCC): ICD-10-CM

## 2020-08-12 DIAGNOSIS — Z01.818 PREOPERATIVE CLEARANCE: ICD-10-CM

## 2020-08-12 PROCEDURE — 99214 OFFICE O/P EST MOD 30 MIN: CPT | Performed by: FAMILY MEDICINE

## 2020-08-12 ASSESSMENT — FIBROSIS 4 INDEX: FIB4 SCORE: 0.61

## 2020-08-12 NOTE — ASSESSMENT & PLAN NOTE
Going to have surgery for carpal tunnel, surgery of thumb joints and tendon repair of left hand.   I reviewed her recent labs normal CMP, EKG in clinic with no acute changes or concerns.   She has no cardiac or respiratory conditions that are unstable.   Vitals are in normal ranges.   She is medically cleared for hand surgery. Letter is provide. We will fax it to [facility].

## 2020-08-12 NOTE — PATIENT INSTRUCTIONS
Talk to Dr. De Leon about left low back pain possible Sacroiliitis can he do an injection for you?

## 2020-08-12 NOTE — LETTER
August 12, 2020    To whom it may concern:     Makenna Culver is my patient in clinic, I reviewed her recent labs normal CMP, EKG in clinic with no acute changes or concerns.   She has no cardiac or respiratory conditions that are unstable.   She is medically cleared for hand surgery.     Please call me with any questions or concerns.     Sincerely,             Frances Laboy M.D.

## 2020-08-12 NOTE — PROGRESS NOTES
Subjective:   Makenna Culver is a 75 y.o. female here today for evaluation and management of:     Preoperative clearance  Going to have surgery for carpal tunnel, surgery of thumb joints and tendon repair of left hand.   I reviewed her recent labs normal CMP, EKG in clinic with no acute changes or concerns.   She has no cardiac or respiratory conditions that are unstable.   Vitals are in normal ranges.   She is medically cleared for hand surgery. Letter is provide. We will fax it to [facility].     Sacroiliitis, not elsewhere classified (HCC)  Chronic intermittent left low back pain does not radiate down her leg, pressure and ice help with the pain, no abdominal pain, does not radiate, feels like a cramp but stays for hours till she massages or ices it and it goes away.   Flexeril and ibuprofen help.   She is treated for chronic back pain by Dr. Crowley and had shots in her back yesterday and gets an epidural once every 3 months.          Current medicines (including changes today)  Current Outpatient Medications   Medication Sig Dispense Refill   • omeprazole (PRILOSEC) 40 MG delayed-release capsule Take 1 capsule by mouth once daily 90 Cap 1   • estradiol (ESTRACE) 1 MG Tab Take 1 tablet by mouth once daily 90 Tab 1   • ibuprofen (MOTRIN) 800 MG Tab Take 800 mg by mouth 3 times a day as needed. TID PRN, WITH MEALS. PER PATIENT'S MED LIST BROUGHT ON MRI APPT 6.10.20     • traZODone (DESYREL) 50 MG Tab Take 1 Tab by mouth at bedtime as needed for Sleep. 30 Tab 2   • levothyroxine (SYNTHROID) 75 MCG Tab Take 1 Tab by mouth Every morning on an empty stomach. 90 Tab 3   • cyclobenzaprine (FLEXERIL) 10 MG Tab Take 1 Tab by mouth 3 times a day as needed. (Patient taking differently: Take 10 mg by mouth 3 times a day as needed. PER PATIENT'S MED LIST BROUGHT ON MRI APPT 6.10.20) 90 Tab 0   • tramadol (ULTRAM) 50 MG Tab Take 50 mg by mouth every 6 hours as needed for Mild Pain.       No current facility-administered medications  "for this visit.      She  has a past medical history of Arthritis, Colon polyp (12/2011), Degeneration of lumbar or lumbosacral intervertebral disc, Dental disorder, Ear problems (as child had surgery), Graves' disease with exophthalmos (6/13/2014), Hemorrhoid, High cholesterol, History of carpal tunnel repair (7/31/2013), Menopause (10/14/2009), Osteoarthritis of multiple joints (6/13/2014), Pain in joint, pelvic region and thigh (2015), Pain in joint, shoulder region (2015), Periodic pelvic examination (due), Postcoital bleeding, Rash (12/29/2014), S/P colonoscopy (12/2011), Screening mammogram ( 11/2008=normal), Tobacco use disorder, Ulcer of the stomach and intestine (hx.), Unspecified cataract, and Unspecified hypothyroidism.    ROS  No chest pain, no shortness of breath, no abdominal pain       Objective:     /68   Pulse 98   Temp 37.2 °C (99 °F) (Temporal)   Resp 12   Ht 1.549 m (5' 1\")   Wt 56.2 kg (124 lb)   SpO2 96%  Body mass index is 23.43 kg/m².   Physical Exam:  Constitutional: Alert, no distress.  Skin: Warm, dry, good turgor, no rashes in visible areas.  Eye: Equal, round and reactive, conjunctiva clear, lids normal.  Neck: Trachea midline  Respiratory: Unlabored respiratory effort  Psych: Alert and oriented x3, normal affect and mood.        Assessment and Plan:   The following treatment plan was discussed    1. Preoperative clearance  Medically cleared.     2. Sacroiliitis, not elsewhere classified (HCC)  Continue ice, NSAID, massage  Follow up with orthopedic back specialist.         Followup: Return in about 3 months (around 11/12/2020) for left low back pain. Please fax clearance letter to [facility] attana Palomino.         "

## 2020-08-12 NOTE — ASSESSMENT & PLAN NOTE
Chronic intermittent left low back pain does not radiate down her leg, pressure and ice help with the pain, no abdominal pain, does not radiate, feels like a cramp but stays for hours till she massages or ices it and it goes away.   Flexeril and ibuprofen help.   She is treated for chronic back pain by Dr. Crowley and had shots in her back yesterday and gets an epidural once every 3 months.

## 2020-08-12 NOTE — RESULT ENCOUNTER NOTE
Makenna,  Your labs look good. Platelets are mildly elevated, we can recheck this. See you at your visit with me this week!  Frances Laboy M.D.

## 2020-08-13 RX ORDER — ATORVASTATIN CALCIUM 10 MG/1
10 TABLET, FILM COATED ORAL
Qty: 50 TAB | Refills: 3 | Status: SHIPPED | OUTPATIENT
Start: 2020-08-13 | End: 2021-08-29

## 2020-08-14 RX ORDER — ALBUTEROL SULFATE 90 UG/1
2 AEROSOL, METERED RESPIRATORY (INHALATION) EVERY 6 HOURS PRN
Qty: 8.5 G | Refills: 3 | Status: SHIPPED | OUTPATIENT
Start: 2020-08-14 | End: 2021-05-24

## 2020-08-14 SDOH — HEALTH STABILITY: MENTAL HEALTH: HOW OFTEN DO YOU HAVE A DRINK CONTAINING ALCOHOL?: 2-4 TIMES A MONTH

## 2020-08-17 ENCOUNTER — OFFICE VISIT (OUTPATIENT)
Dept: ADMISSIONS | Facility: MEDICAL CENTER | Age: 75
End: 2020-08-17
Attending: ORTHOPAEDIC SURGERY
Payer: MEDICARE

## 2020-08-17 DIAGNOSIS — Z01.812 PRE-OPERATIVE LABORATORY EXAMINATION: ICD-10-CM

## 2020-08-17 LAB
COVID ORDER STATUS COVID19: NORMAL
SARS-COV-2 RNA RESP QL NAA+PROBE: NOTDETECTED
SPECIMEN SOURCE: NORMAL

## 2020-08-17 PROCEDURE — U0003 INFECTIOUS AGENT DETECTION BY NUCLEIC ACID (DNA OR RNA); SEVERE ACUTE RESPIRATORY SYNDROME CORONAVIRUS 2 (SARS-COV-2) (CORONAVIRUS DISEASE [COVID-19]), AMPLIFIED PROBE TECHNIQUE, MAKING USE OF HIGH THROUGHPUT TECHNOLOGIES AS DESCRIBED BY CMS-2020-01-R: HCPCS

## 2020-08-20 NOTE — OR NURSING
COVID-19 Pre-surgery screenin. Do you have an undiagnosed respiratory illness or symptoms such as coughing or sneezing?   No    2. Do you have an unexplained fever greater than 100.4 degrees Fahrenheit or 38 degrees Celsius?     No    3. Have you had direct exposure to a patient who tested positive for Covid-19?    No    4. Have you had any loss of your sense of taste or smell? Have you had N/V or sore throat? No    Patient has been informed of visitor policy and asked to wear a mask upon entering the hospital   Yes

## 2020-08-21 ENCOUNTER — HOSPITAL ENCOUNTER (OUTPATIENT)
Facility: MEDICAL CENTER | Age: 75
End: 2020-08-21
Attending: ORTHOPAEDIC SURGERY | Admitting: ORTHOPAEDIC SURGERY
Payer: MEDICARE

## 2020-08-21 ENCOUNTER — ANESTHESIA EVENT (OUTPATIENT)
Dept: SURGERY | Facility: MEDICAL CENTER | Age: 75
End: 2020-08-21
Payer: MEDICARE

## 2020-08-21 ENCOUNTER — ANESTHESIA (OUTPATIENT)
Dept: SURGERY | Facility: MEDICAL CENTER | Age: 75
End: 2020-08-21
Payer: MEDICARE

## 2020-08-21 VITALS
SYSTOLIC BLOOD PRESSURE: 109 MMHG | HEIGHT: 60 IN | RESPIRATION RATE: 14 BRPM | BODY MASS INDEX: 24.24 KG/M2 | OXYGEN SATURATION: 96 % | HEART RATE: 83 BPM | TEMPERATURE: 97.1 F | WEIGHT: 123.46 LBS | DIASTOLIC BLOOD PRESSURE: 66 MMHG

## 2020-08-21 PROCEDURE — 700105 HCHG RX REV CODE 258: Performed by: ORTHOPAEDIC SURGERY

## 2020-08-21 PROCEDURE — 160039 HCHG SURGERY MINUTES - EA ADDL 1 MIN LEVEL 3: Performed by: ORTHOPAEDIC SURGERY

## 2020-08-21 PROCEDURE — A9270 NON-COVERED ITEM OR SERVICE: HCPCS | Performed by: ORTHOPAEDIC SURGERY

## 2020-08-21 PROCEDURE — 160035 HCHG PACU - 1ST 60 MINS PHASE I: Performed by: ORTHOPAEDIC SURGERY

## 2020-08-21 PROCEDURE — 160009 HCHG ANES TIME/MIN: Performed by: ORTHOPAEDIC SURGERY

## 2020-08-21 PROCEDURE — 500881 HCHG PACK, EXTREMITY: Performed by: ORTHOPAEDIC SURGERY

## 2020-08-21 PROCEDURE — 160028 HCHG SURGERY MINUTES - 1ST 30 MINS LEVEL 3: Performed by: ORTHOPAEDIC SURGERY

## 2020-08-21 PROCEDURE — 160025 RECOVERY II MINUTES (STATS): Performed by: ORTHOPAEDIC SURGERY

## 2020-08-21 PROCEDURE — 700111 HCHG RX REV CODE 636 W/ 250 OVERRIDE (IP)

## 2020-08-21 PROCEDURE — 160046 HCHG PACU - 1ST 60 MINS PHASE II: Performed by: ORTHOPAEDIC SURGERY

## 2020-08-21 PROCEDURE — 160002 HCHG RECOVERY MINUTES (STAT): Performed by: ORTHOPAEDIC SURGERY

## 2020-08-21 PROCEDURE — 700102 HCHG RX REV CODE 250 W/ 637 OVERRIDE(OP): Performed by: ORTHOPAEDIC SURGERY

## 2020-08-21 PROCEDURE — 700101 HCHG RX REV CODE 250: Performed by: ORTHOPAEDIC SURGERY

## 2020-08-21 PROCEDURE — 501838 HCHG SUTURE GENERAL: Performed by: ORTHOPAEDIC SURGERY

## 2020-08-21 PROCEDURE — 160048 HCHG OR STATISTICAL LEVEL 1-5: Performed by: ORTHOPAEDIC SURGERY

## 2020-08-21 RX ORDER — BUPIVACAINE HYDROCHLORIDE AND EPINEPHRINE 5; 5 MG/ML; UG/ML
INJECTION, SOLUTION EPIDURAL; INTRACAUDAL; PERINEURAL
Status: DISCONTINUED
Start: 2020-08-21 | End: 2020-08-21 | Stop reason: HOSPADM

## 2020-08-21 RX ORDER — BUPIVACAINE HYDROCHLORIDE AND EPINEPHRINE 5; 5 MG/ML; UG/ML
INJECTION, SOLUTION EPIDURAL; INTRACAUDAL; PERINEURAL
Status: DISCONTINUED | OUTPATIENT
Start: 2020-08-21 | End: 2020-08-21 | Stop reason: HOSPADM

## 2020-08-21 RX ORDER — ONDANSETRON 2 MG/ML
4 INJECTION INTRAMUSCULAR; INTRAVENOUS
Status: DISCONTINUED | OUTPATIENT
Start: 2020-08-21 | End: 2020-08-21 | Stop reason: HOSPADM

## 2020-08-21 RX ORDER — SODIUM CHLORIDE, SODIUM LACTATE, POTASSIUM CHLORIDE, CALCIUM CHLORIDE 600; 310; 30; 20 MG/100ML; MG/100ML; MG/100ML; MG/100ML
INJECTION, SOLUTION INTRAVENOUS CONTINUOUS
Status: DISCONTINUED | OUTPATIENT
Start: 2020-08-21 | End: 2020-08-21 | Stop reason: HOSPADM

## 2020-08-21 RX ORDER — CEFAZOLIN SODIUM 1 G/3ML
INJECTION, POWDER, FOR SOLUTION INTRAMUSCULAR; INTRAVENOUS PRN
Status: DISCONTINUED | OUTPATIENT
Start: 2020-08-21 | End: 2020-08-21 | Stop reason: SURG

## 2020-08-21 RX ORDER — BACITRACIN 50000 [IU]/1
INJECTION, POWDER, FOR SOLUTION INTRAMUSCULAR
Status: DISCONTINUED | OUTPATIENT
Start: 2020-08-21 | End: 2020-08-21 | Stop reason: HOSPADM

## 2020-08-21 RX ORDER — DEXAMETHASONE SODIUM PHOSPHATE 4 MG/ML
INJECTION, SOLUTION INTRA-ARTICULAR; INTRALESIONAL; INTRAMUSCULAR; INTRAVENOUS; SOFT TISSUE PRN
Status: DISCONTINUED | OUTPATIENT
Start: 2020-08-21 | End: 2020-08-21 | Stop reason: SURG

## 2020-08-21 RX ORDER — TRAMADOL HYDROCHLORIDE 50 MG/1
50 TABLET ORAL ONCE
Status: DISCONTINUED | OUTPATIENT
Start: 2020-08-21 | End: 2020-08-21 | Stop reason: HOSPADM

## 2020-08-21 RX ORDER — BACITRACIN 50000 [IU]/1
INJECTION, POWDER, FOR SOLUTION INTRAMUSCULAR
Status: DISCONTINUED
Start: 2020-08-21 | End: 2020-08-21 | Stop reason: HOSPADM

## 2020-08-21 RX ORDER — HALOPERIDOL 5 MG/ML
1 INJECTION INTRAMUSCULAR
Status: DISCONTINUED | OUTPATIENT
Start: 2020-08-21 | End: 2020-08-21 | Stop reason: HOSPADM

## 2020-08-21 RX ORDER — DIPHENHYDRAMINE HYDROCHLORIDE 50 MG/ML
12.5 INJECTION INTRAMUSCULAR; INTRAVENOUS
Status: DISCONTINUED | OUTPATIENT
Start: 2020-08-21 | End: 2020-08-21 | Stop reason: HOSPADM

## 2020-08-21 RX ORDER — ONDANSETRON 2 MG/ML
INJECTION INTRAMUSCULAR; INTRAVENOUS PRN
Status: DISCONTINUED | OUTPATIENT
Start: 2020-08-21 | End: 2020-08-21 | Stop reason: SURG

## 2020-08-21 RX ADMIN — PROPOFOL 200 MG: 10 INJECTION, EMULSION INTRAVENOUS at 07:30

## 2020-08-21 RX ADMIN — SODIUM CHLORIDE, POTASSIUM CHLORIDE, SODIUM LACTATE AND CALCIUM CHLORIDE: 600; 310; 30; 20 INJECTION, SOLUTION INTRAVENOUS at 06:42

## 2020-08-21 RX ADMIN — FENTANYL CITRATE 50 MCG: 50 INJECTION INTRAMUSCULAR; INTRAVENOUS at 08:56

## 2020-08-21 RX ADMIN — ONDANSETRON 8 MG: 2 INJECTION INTRAMUSCULAR; INTRAVENOUS at 07:37

## 2020-08-21 RX ADMIN — DEXAMETHASONE SODIUM PHOSPHATE 8 MG: 4 INJECTION, SOLUTION INTRA-ARTICULAR; INTRALESIONAL; INTRAMUSCULAR; INTRAVENOUS; SOFT TISSUE at 07:30

## 2020-08-21 RX ADMIN — SODIUM CHLORIDE, POTASSIUM CHLORIDE, SODIUM LACTATE AND CALCIUM CHLORIDE: 600; 310; 30; 20 INJECTION, SOLUTION INTRAVENOUS at 07:30

## 2020-08-21 RX ADMIN — CEFAZOLIN 2 G: 330 INJECTION, POWDER, FOR SOLUTION INTRAMUSCULAR; INTRAVENOUS at 07:30

## 2020-08-21 RX ADMIN — FENTANYL CITRATE 100 MCG: 50 INJECTION INTRAMUSCULAR; INTRAVENOUS at 07:30

## 2020-08-21 RX ADMIN — POVIDONE-IODINE 15 ML: 10 SOLUTION TOPICAL at 06:34

## 2020-08-21 ASSESSMENT — FIBROSIS 4 INDEX: FIB4 SCORE: 0.61

## 2020-08-21 NOTE — ANESTHESIA POSTPROCEDURE EVALUATION
Patient: Makenna Culver    Procedure Summary     Date: 08/21/20 Room / Location: [facility] ROOM 21 / SURGERY SAME DAY [facility]    Anesthesia Start: 0730 Anesthesia Stop: 0843    Procedures:       RELEASE, CARPAL TUNNEL (Left Hand)      RELEASE, HAND, FOR DEQUERVAIN'S TENOSYNOVITIS (Left Hand)      ARTHROPLASTY, FINGER- THUMB CARPOMETACARPAL EXCISIONAL (Left Finger)      EXCISION, TRAPEZIUM, WITH LIGAMENT RECONSTRUCTION AND TENDON INTERPOSITION- PARTIAL TRAPEZOID EXCISION FLEXOR CARPI RADIALIS TENDON GRAFT, PLASCENCIA (Left Hand) Diagnosis: (DEQUERVAINS TENOSYNOVITIS)    Surgeon: Pancho Palomino M.D. Responsible Provider: Chaparro Pina M.D.    Anesthesia Type: general ASA Status: 2          Final Anesthesia Type: general  Last vitals  BP   Blood Pressure : 113/65    Temp   36.1 °C (97 °F)    Pulse   Pulse: 82   Resp   14    SpO2   93 %      Anesthesia Post Evaluation    Patient location during evaluation: PACU  Patient participation: complete - patient participated  Level of consciousness: awake and alert    Airway patency: patent  Anesthetic complications: no  Cardiovascular status: hemodynamically stable  Respiratory status: acceptable  Hydration status: euvolemic    PONV: none           Nurse Pain Score: 0 (NPRS)

## 2020-08-21 NOTE — OR SURGEON
Immediate Post OP Note    PreOp Diagnosis: L cts de q  Th c-mc oa    PostOp Diagnosis: same    Procedure(s):  RELEASE, CARPAL TUNNEL - Wound Class: Clean  RELEASE, HAND, FOR DEQUERVAIN'S TENOSYNOVITIS - Wound Class: Clean  ARTHROPLASTY, FINGER- THUMB CARPOMETACARPAL EXCISIONAL - Wound Class: Clean  EXCISION, TRAPEZIUM, WITH LIGAMENT RECONSTRUCTION AND TENDON INTERPOSITION- PARTIAL TRAPEZOID EXCISION FLEXOR CARPI RADIALIS TENDON GRAFT, PLASCENCIA - Wound Class: Clean    Surgeon(s):  Pancho Palomino M.D.    Anesthesiologist/Type of Anesthesia:  Anesthesiologist: Chaparro Pina M.D./General    Surgical Staff:  Circulator: Sydnee Stockton R.N.  Scrub Person: Olinda Nevarez; Felicitas Shafer    Specimens removed if any:  * No specimens in log *    Estimated Blood Loss: 0    Findings: 0    Complications: 0        8/21/2020 8:50 AM Pancho Palomino M.D.

## 2020-08-21 NOTE — OR NURSING
0838 RECEIVED PATIENT FROM OR.  REPORT FROM DR. KATE.  NO AIRWAY IN PLACE.  RESPIRATIONS ARE EVEN AND UNLABORED.  DRESSING TO LEFT HAND AND FOREARM IS CDI.  FINGERS ARE WARM TO TOUCH.  ARM ELEVATED ON 2 PILLOWS AND ICE BAG APPLIED.     0856  MEDICATED WITH IV FENTANYL FOR C/O LEFT HAND PAIN 8.    0909   CHRIS CALLED AND UPDATED.      0930  PHASE 2.    1000  DISCHARGED.  DISCHARGE INSTRUCTIONS GIVEN TO PATIENT AND HER .  A VERBAL UNDERSTANDING OF ALL INSTRUCTIONS WAS STATED.  PATIENT TAKING PO, VOIDING AND AMBULATING WITHOUT DIFFICULTY.  PATIENT STATES SHE IS READY TO GO HOME.  SLING IN PLACE.

## 2020-08-21 NOTE — ANESTHESIA TIME REPORT
Anesthesia Start and Stop Event Times     Date Time Event    8/21/2020 0717 Ready for Procedure     0730 Anesthesia Start     0843 Anesthesia Stop        Responsible Staff  08/21/20    Name Role Begin End    Chaparro Pina M.D. Anesth 0741 0866        Preop Diagnosis (Free Text):  Pre-op Diagnosis     DEQUERVAINS TENOSYNOVITIS        Preop Diagnosis (Codes):    Post op Diagnosis  DeQuervain's disease (tenosynovitis)      Premium Reason  Non-Premium    Comments:

## 2020-08-21 NOTE — ANESTHESIA PREPROCEDURE EVALUATION
Relevant Problems   NEURO   (+) History of peptic ulcer      GI   (+) GERD (gastroesophageal reflux disease)      ENDO   (+) Graves' disease with exophthalmos   (+) Hypothyroidism      Other   (+) Sacroiliitis, not elsewhere classified (HCC)       Physical Exam    Airway   Mallampati: II  TM distance: >3 FB  Neck ROM: full       Cardiovascular - normal exam  Rhythm: regular  Rate: normal  (-) murmur     Dental - normal exam           Pulmonary - normal exam  Breath sounds clear to auscultation     Abdominal    Neurological - normal exam                 Anesthesia Plan    ASA 2       Plan - general       Airway plan will be LMA        Induction: intravenous    Postoperative Plan: Postoperative administration of opioids is intended.    Pertinent diagnostic labs and testing reviewed    Informed Consent:    Anesthetic plan and risks discussed with patient.    Use of blood products discussed with: patient whom consented to blood products.       NO GERD

## 2020-08-21 NOTE — ANESTHESIA PROCEDURE NOTES
Airway    Date/Time: 8/21/2020 7:32 AM  Performed by: Chaparro Pina M.D.  Authorized by: Chaparro Pina M.D.     Location:  OR  Urgency:  Elective  Indications for Airway Management:  Anesthesia      Spontaneous Ventilation: absent    Sedation Level:  Deep  Preoxygenated: Yes    Final Airway Type:  Supraglottic airway  Final Supraglottic Airway:  Standard LMA    SGA Size:  4  Number of Attempts at Approach:  1

## 2020-08-21 NOTE — OP REPORT
DATE OF SERVICE:  08/21/2020    PREOPERATIVE DIAGNOSES:  Left carpal tunnel syndrome, left de Quervain's   disease, left thumb basilar joint arthritis.      POSTOPERATIVE DIAGNOSES:  Left carpal tunnel syndrome, left de Quervain's   disease, left thumb basilar joint arthritis.      PROCEDURES:  Left open carpal tunnel release, left de Quervain's release, left   thumb CMC excisional arthroplasty with excision of the trapezium and an   abductor pollicis longus tendon graft.      ANESTHESIA:  General.      SURGEON:  Pancho Palomino MD     OPERATIVE PROCEDURE:  Patient taken to the operating room following induction   of general anesthesia, left upper extremity was prepped and draped in routine   fashion.  Limb exsanguinated with an elastic bandage.  The tourniquet inflated   to 250 mmHg.  Left carpal tunnel was exposed through a standard incision.    Sharp dissection carried down through the skin and subcutaneous tissue.    Superficial palmar fascia divided, and transverse carpal ligament was released   in a distal to proximal fashion staying on the ulnar aspect of the canal.    Distal forearm fascia was released subcutaneously.  Inspection of the carpal   tunnel, slight thickening of the tenosynovium, which was debrided.  The median   nerve was freed up proximally and distally.  Attention was then directed to   the thumb, it was exposed through a longitudinal incision on the radial border   of the thumb and metacarpal and trapezium, which was continued proximally in   a zigzag fashion.  Alternating sharp and blunt dissection carried down through   the skin and subcutaneous tissue.  Meticulous care was taken to identify and   protect the sensory nerves.  The metacarpotrapezial joint was exposed, it was   severely arthritic.  Subperiosteal dissection was used to expose the trapezium   and was carefully removed with a rongeur in a piecemeal fashion, taking care   to protect the surrounding structures.  Inspecting  the wound, the flexor carpi   radialis tendon was about 75% eroded, so I elected not to use it for a tendon   graft and I chose instead to use a slip of the abductor pollicis longus,   elevating the skin, protecting the dorsal sensory branches of the radial   nerve.  The first extensor compartment was released freeing up the abductor   pollicis longus and the extensor pollicis brevis, 1 slip of the abductor   pollicis longus was divided and brought out distally where it attached at the   base of the metacarpal.  The base of the metacarpal was debrided.  A 3.5 mm   drill hole was made from dorsal to volar exiting the beak of the metacarpal.    The tendon graft was then brought through the drill hole at the base of the   metacarpal and several turns were taken around the flexor carpi radialis   tendon and the other half of the abductor pollicis longus creating an anchovy   effect and securing the thumb metacarpal base.  Tendon was sutured in place   with 2-0 Vicryl.  At this point, the thumb rested in the functional position   and the MP joint rested in flexion.  Skin edges infiltrated with 0.5%   Marcaine, tourniquet released, hemostasis obtained with electrocautery, wound   irrigated copiously.  The thumb capsule and thenar muscles were closed with   2-0 Vicryl and skin was closed with 4-0 nylon.  Compressive dressing and thumb   spica splint applied.  The arm was elevated and the patient taken to recovery   room in satisfactory condition.       ____________________________________     MD KIMI SMITH / GEO    DD:  08/21/2020 14:58:28  DT:  08/21/2020 15:59:55    D#:  [id number]  Job#:  [id number]

## 2020-08-21 NOTE — DISCHARGE INSTRUCTIONS
ACTIVITY: Rest and take it easy for the first 24 hours.  A responsible adult is recommended to remain with you during that time.  It is normal to feel sleepy.  We encourage you to not do anything that requires balance, judgment or coordination.    MILD FLU-LIKE SYMPTOMS ARE NORMAL. YOU MAY EXPERIENCE GENERALIZED MUSCLE ACHES, THROAT IRRITATION, HEADACHE AND/OR SOME NAUSEA.    FOR 24 HOURS DO NOT:  Drive, operate machinery or run household appliances.  Drink beer or alcoholic beverages.   Make important decisions or sign legal documents.    SPECIAL INSTRUCTIONS: *SEE CARPAL TUNNEL RELEASE INSTRUCTION SHEET  WEAR SLING WHEN UP AND ABOUT*    DIET: To avoid nausea, slowly advance diet as tolerated, avoiding spicy or greasy foods for the first day.  Add more substantial food to your diet according to your physician's instructions.  Babies can be fed formula or breast milk as soon as they are hungry.  INCREASE FLUIDS AND FIBER TO AVOID CONSTIPATION.    SURGICAL DRESSING/BATHING: *MAY SHOWER TOMORROW WITH DRESSING COVERED **    FOLLOW-UP APPOINTMENT:  A follow-up appointment should be arranged with your doctor in **FOLLOW UP WITH DR. BOWIE*; call to schedule.    You should CALL YOUR PHYSICIAN if you develop:  Fever greater than 101 degrees F.  Pain not relieved by medication, or persistent nausea or vomiting.  Excessive bleeding (blood soaking through dressing) or unexpected drainage from the wound.  Extreme redness or swelling around the incision site, drainage of pus or foul smelling drainage.  Inability to urinate or empty your bladder within 8 hours.  Problems with breathing or chest pain.    You should call 911 if you develop problems with breathing or chest pain.  If you are unable to contact your doctor or surgical center, you should go to the nearest emergency room or urgent care center.  Physician's telephone #: *DR. BOWIE [phone]**    If any questions arise, call your doctor.  If your doctor is not  available, please feel free to call the Surgical Center at [phone].  The Center is open Monday through Friday from 7AM to 7PM.  You can also call the HEALTH HOTLINE open 24 hours/day, 7 days/week and speak to a nurse at [phone], or toll free at [phone].    A registered nurse may call you a few days after your surgery to see how you are doing after your procedure.    MEDICATIONS: Resume taking daily medication.  Take prescribed pain medication with food.  If no medication is prescribed, you may take non-aspirin pain medication if needed.  PAIN MEDICATION CAN BE VERY CONSTIPATING.  Take a stool softener or laxative such as senokot, pericolace, or milk of magnesia if needed.    Prescription given for *PERCOCET**.  Last pain medication given at *NONE**.    If your physician has prescribed pain medication that includes Acetaminophen (Tylenol), do not take additional Acetaminophen (Tylenol) while taking the prescribed medication.    Depression / Suicide Risk    As you are discharged from this [facility] facility, it is important to learn how to keep safe from harming yourself.    Recognize the warning signs:  · Abrupt changes in personality, positive or negative- including increase in energy   · Giving away possessions  · Change in eating patterns- significant weight changes-  positive or negative  · Change in sleeping patterns- unable to sleep or sleeping all the time   · Unwillingness or inability to communicate  · Depression  · Unusual sadness, discouragement and loneliness  · Talk of wanting to die  · Neglect of personal appearance   · Rebelliousness- reckless behavior  · Withdrawal from people/activities they love  · Confusion- inability to concentrate     If you or a loved one observes any of these behaviors or has concerns about self-harm, here's what you can do:  · Talk about it- your feelings and reasons for harming yourself  · Remove any means that you might use to hurt yourself (examples:  pills, rope, extension cords, firearm)  · Get professional help from the community (Mental Health, Substance Abuse, psychological counseling)  · Do not be alone:Call your Safe Contact- someone whom you trust who will be there for you.  · Call your local CRISIS HOTLINE 783-6231 or 466-006-6949  · Call your local Children's Mobile Crisis Response Team [place] (715) 772-1828 or www.[employer]  · Call the toll free National Suicide Prevention Hotlines   · National Suicide Prevention Lifeline 042-876-DSIH (9690)  · National Hope Line Network 800-SUICIDE (724-8544)

## 2020-09-23 ENCOUNTER — PATIENT MESSAGE (OUTPATIENT)
Dept: MEDICAL GROUP | Facility: PHYSICIAN GROUP | Age: 75
End: 2020-09-23

## 2020-09-23 DIAGNOSIS — F51.01 PRIMARY INSOMNIA: ICD-10-CM

## 2020-09-24 RX ORDER — TRAZODONE HYDROCHLORIDE 50 MG/1
50 TABLET ORAL
Qty: 90 TAB | Refills: 3 | Status: SHIPPED | OUTPATIENT
Start: 2020-09-24 | End: 2021-08-18

## 2020-09-24 NOTE — TELEPHONE ENCOUNTER
From: Makenna Culver  To: Frances Laboy M.D.  Sent: 9/23/2020 7:13 PM PDT  Subject: Non-Urgent Medical Question    what happen to my sleeping pill refill? Trazodone 50mg. here isn't any refills on it. with having surgery on my hand and then a cast for 5 weeks , it is hard to get comfortable when trying to get some sleep because it has to be propped up. amadeo lion

## 2020-10-14 RX ORDER — CYCLOBENZAPRINE HCL 10 MG
10 TABLET ORAL 3 TIMES DAILY PRN
Qty: 90 TAB | Refills: 3 | Status: SHIPPED | OUTPATIENT
Start: 2020-10-14 | End: 2021-11-08

## 2020-11-17 ENCOUNTER — TELEPHONE (OUTPATIENT)
Dept: HEALTH INFORMATION MANAGEMENT | Facility: OTHER | Age: 75
End: 2020-11-17

## 2020-11-17 DIAGNOSIS — Z78.0 MENOPAUSE: ICD-10-CM

## 2020-11-17 NOTE — TELEPHONE ENCOUNTER
1. Caller Name: Makenna Culver                          Call Back Number: [phone] (home)         How would the patient prefer to be contacted with a response: [employer] message    2. SPECIFIC Action To Be Taken: Orders pending, please sign.    3. Diagnosis/Clinical Reason for Request:  Osteopenic     4. Specialty & Provider Name/Lab/Imaging Location: [facility]    5. Is appointment scheduled for requested order/referral: no    Patient was not informed they will receive a return phone call from the office ONLY if there are any questions before processing their request. Advised to call back if they haven't received a call from the referral department in 5 days.

## 2020-12-01 ENCOUNTER — PRE-ADMISSION TESTING (OUTPATIENT)
Dept: ADMISSIONS | Facility: MEDICAL CENTER | Age: 75
End: 2020-12-01
Attending: ORTHOPAEDIC SURGERY
Payer: MEDICARE

## 2020-12-01 DIAGNOSIS — Z01.812 PRE-OPERATIVE LABORATORY EXAMINATION: ICD-10-CM

## 2020-12-01 PROCEDURE — U0003 INFECTIOUS AGENT DETECTION BY NUCLEIC ACID (DNA OR RNA); SEVERE ACUTE RESPIRATORY SYNDROME CORONAVIRUS 2 (SARS-COV-2) (CORONAVIRUS DISEASE [COVID-19]), AMPLIFIED PROBE TECHNIQUE, MAKING USE OF HIGH THROUGHPUT TECHNOLOGIES AS DESCRIBED BY CMS-2020-01-R: HCPCS

## 2020-12-01 PROCEDURE — C9803 HOPD COVID-19 SPEC COLLECT: HCPCS

## 2020-12-01 RX ORDER — MULTIVIT-MIN/IRON/FOLIC ACID/K 18-600-40
1 CAPSULE ORAL DAILY
COMMUNITY
End: 2024-02-06

## 2020-12-01 ASSESSMENT — FIBROSIS 4 INDEX: FIB4 SCORE: 0.61

## 2020-12-02 NOTE — OR NURSING
Preadmission appt completed for procedure 12/4/2020. EKG requested from [facility] from 7/2020, to be faxed to [phone] into [facility].

## 2020-12-04 ENCOUNTER — ANESTHESIA (OUTPATIENT)
Dept: SURGERY | Facility: MEDICAL CENTER | Age: 75
End: 2020-12-04
Payer: MEDICARE

## 2020-12-04 ENCOUNTER — HOSPITAL ENCOUNTER (OUTPATIENT)
Facility: MEDICAL CENTER | Age: 75
End: 2020-12-04
Attending: ORTHOPAEDIC SURGERY | Admitting: ORTHOPAEDIC SURGERY
Payer: MEDICARE

## 2020-12-04 ENCOUNTER — ANESTHESIA EVENT (OUTPATIENT)
Dept: SURGERY | Facility: MEDICAL CENTER | Age: 75
End: 2020-12-04
Payer: MEDICARE

## 2020-12-04 VITALS
TEMPERATURE: 96.8 F | RESPIRATION RATE: 15 BRPM | HEIGHT: 60 IN | OXYGEN SATURATION: 92 % | HEART RATE: 68 BPM | DIASTOLIC BLOOD PRESSURE: 73 MMHG | BODY MASS INDEX: 24.11 KG/M2 | WEIGHT: 122.8 LBS | SYSTOLIC BLOOD PRESSURE: 131 MMHG

## 2020-12-04 PROCEDURE — 700101 HCHG RX REV CODE 250: Performed by: ANESTHESIOLOGY

## 2020-12-04 PROCEDURE — 160025 RECOVERY II MINUTES (STATS): Performed by: ORTHOPAEDIC SURGERY

## 2020-12-04 PROCEDURE — 700111 HCHG RX REV CODE 636 W/ 250 OVERRIDE (IP): Performed by: ANESTHESIOLOGY

## 2020-12-04 PROCEDURE — 160048 HCHG OR STATISTICAL LEVEL 1-5: Performed by: ORTHOPAEDIC SURGERY

## 2020-12-04 PROCEDURE — 160035 HCHG PACU - 1ST 60 MINS PHASE I: Performed by: ORTHOPAEDIC SURGERY

## 2020-12-04 PROCEDURE — 160046 HCHG PACU - 1ST 60 MINS PHASE II: Performed by: ORTHOPAEDIC SURGERY

## 2020-12-04 PROCEDURE — 700102 HCHG RX REV CODE 250 W/ 637 OVERRIDE(OP): Performed by: ORTHOPAEDIC SURGERY

## 2020-12-04 PROCEDURE — 500881 HCHG PACK, EXTREMITY: Performed by: ORTHOPAEDIC SURGERY

## 2020-12-04 PROCEDURE — 700102 HCHG RX REV CODE 250 W/ 637 OVERRIDE(OP): Performed by: ANESTHESIOLOGY

## 2020-12-04 PROCEDURE — A9270 NON-COVERED ITEM OR SERVICE: HCPCS | Performed by: ANESTHESIOLOGY

## 2020-12-04 PROCEDURE — A9270 NON-COVERED ITEM OR SERVICE: HCPCS | Performed by: ORTHOPAEDIC SURGERY

## 2020-12-04 PROCEDURE — 700105 HCHG RX REV CODE 258: Performed by: ORTHOPAEDIC SURGERY

## 2020-12-04 PROCEDURE — 160009 HCHG ANES TIME/MIN: Performed by: ORTHOPAEDIC SURGERY

## 2020-12-04 PROCEDURE — 160002 HCHG RECOVERY MINUTES (STAT): Performed by: ORTHOPAEDIC SURGERY

## 2020-12-04 PROCEDURE — 160039 HCHG SURGERY MINUTES - EA ADDL 1 MIN LEVEL 3: Performed by: ORTHOPAEDIC SURGERY

## 2020-12-04 PROCEDURE — 160028 HCHG SURGERY MINUTES - 1ST 30 MINS LEVEL 3: Performed by: ORTHOPAEDIC SURGERY

## 2020-12-04 PROCEDURE — 700101 HCHG RX REV CODE 250: Performed by: ORTHOPAEDIC SURGERY

## 2020-12-04 PROCEDURE — 501838 HCHG SUTURE GENERAL: Performed by: ORTHOPAEDIC SURGERY

## 2020-12-04 RX ORDER — BUPIVACAINE HYDROCHLORIDE AND EPINEPHRINE 5; 5 MG/ML; UG/ML
INJECTION, SOLUTION EPIDURAL; INTRACAUDAL; PERINEURAL
Status: DISCONTINUED | OUTPATIENT
Start: 2020-12-04 | End: 2020-12-04 | Stop reason: HOSPADM

## 2020-12-04 RX ORDER — ONDANSETRON 2 MG/ML
INJECTION INTRAMUSCULAR; INTRAVENOUS PRN
Status: DISCONTINUED | OUTPATIENT
Start: 2020-12-04 | End: 2020-12-04 | Stop reason: SURG

## 2020-12-04 RX ORDER — SODIUM CHLORIDE, SODIUM LACTATE, POTASSIUM CHLORIDE, CALCIUM CHLORIDE 600; 310; 30; 20 MG/100ML; MG/100ML; MG/100ML; MG/100ML
INJECTION, SOLUTION INTRAVENOUS CONTINUOUS
Status: DISCONTINUED | OUTPATIENT
Start: 2020-12-04 | End: 2020-12-04 | Stop reason: HOSPADM

## 2020-12-04 RX ORDER — HYDROMORPHONE HYDROCHLORIDE 2 MG/ML
0.2 INJECTION, SOLUTION INTRAMUSCULAR; INTRAVENOUS; SUBCUTANEOUS
Status: DISCONTINUED | OUTPATIENT
Start: 2020-12-04 | End: 2020-12-04 | Stop reason: HOSPADM

## 2020-12-04 RX ORDER — BUPIVACAINE HYDROCHLORIDE AND EPINEPHRINE 5; 5 MG/ML; UG/ML
INJECTION, SOLUTION EPIDURAL; INTRACAUDAL; PERINEURAL
Status: DISCONTINUED
Start: 2020-12-04 | End: 2020-12-04 | Stop reason: HOSPADM

## 2020-12-04 RX ORDER — KETOROLAC TROMETHAMINE 30 MG/ML
INJECTION, SOLUTION INTRAMUSCULAR; INTRAVENOUS PRN
Status: DISCONTINUED | OUTPATIENT
Start: 2020-12-04 | End: 2020-12-04 | Stop reason: SURG

## 2020-12-04 RX ORDER — DIPHENHYDRAMINE HYDROCHLORIDE 50 MG/ML
12.5 INJECTION INTRAMUSCULAR; INTRAVENOUS
Status: DISCONTINUED | OUTPATIENT
Start: 2020-12-04 | End: 2020-12-04 | Stop reason: HOSPADM

## 2020-12-04 RX ORDER — LABETALOL HYDROCHLORIDE 5 MG/ML
5 INJECTION, SOLUTION INTRAVENOUS
Status: DISCONTINUED | OUTPATIENT
Start: 2020-12-04 | End: 2020-12-04 | Stop reason: HOSPADM

## 2020-12-04 RX ORDER — CELECOXIB 200 MG/1
200 CAPSULE ORAL ONCE
Status: COMPLETED | OUTPATIENT
Start: 2020-12-04 | End: 2020-12-04

## 2020-12-04 RX ORDER — HYDROMORPHONE HYDROCHLORIDE 2 MG/ML
0.1 INJECTION, SOLUTION INTRAMUSCULAR; INTRAVENOUS; SUBCUTANEOUS
Status: DISCONTINUED | OUTPATIENT
Start: 2020-12-04 | End: 2020-12-04 | Stop reason: HOSPADM

## 2020-12-04 RX ORDER — ACETAMINOPHEN 325 MG/1
650 TABLET ORAL ONCE
Status: COMPLETED | OUTPATIENT
Start: 2020-12-04 | End: 2020-12-04

## 2020-12-04 RX ORDER — HYDRALAZINE HYDROCHLORIDE 20 MG/ML
5 INJECTION INTRAMUSCULAR; INTRAVENOUS
Status: DISCONTINUED | OUTPATIENT
Start: 2020-12-04 | End: 2020-12-04 | Stop reason: HOSPADM

## 2020-12-04 RX ORDER — HYDROMORPHONE HYDROCHLORIDE 2 MG/ML
0.4 INJECTION, SOLUTION INTRAMUSCULAR; INTRAVENOUS; SUBCUTANEOUS
Status: DISCONTINUED | OUTPATIENT
Start: 2020-12-04 | End: 2020-12-04 | Stop reason: HOSPADM

## 2020-12-04 RX ORDER — DEXAMETHASONE SODIUM PHOSPHATE 4 MG/ML
INJECTION, SOLUTION INTRA-ARTICULAR; INTRALESIONAL; INTRAMUSCULAR; INTRAVENOUS; SOFT TISSUE PRN
Status: DISCONTINUED | OUTPATIENT
Start: 2020-12-04 | End: 2020-12-04 | Stop reason: SURG

## 2020-12-04 RX ORDER — HALOPERIDOL 5 MG/ML
1 INJECTION INTRAMUSCULAR
Status: DISCONTINUED | OUTPATIENT
Start: 2020-12-04 | End: 2020-12-04 | Stop reason: HOSPADM

## 2020-12-04 RX ORDER — CEFAZOLIN SODIUM 1 G/3ML
INJECTION, POWDER, FOR SOLUTION INTRAMUSCULAR; INTRAVENOUS PRN
Status: DISCONTINUED | OUTPATIENT
Start: 2020-12-04 | End: 2020-12-04 | Stop reason: SURG

## 2020-12-04 RX ORDER — LIDOCAINE HYDROCHLORIDE 20 MG/ML
INJECTION, SOLUTION EPIDURAL; INFILTRATION; INTRACAUDAL; PERINEURAL PRN
Status: DISCONTINUED | OUTPATIENT
Start: 2020-12-04 | End: 2020-12-04 | Stop reason: SURG

## 2020-12-04 RX ORDER — OXYCODONE HCL 5 MG/5 ML
5 SOLUTION, ORAL ORAL
Status: DISCONTINUED | OUTPATIENT
Start: 2020-12-04 | End: 2020-12-04 | Stop reason: HOSPADM

## 2020-12-04 RX ORDER — ONDANSETRON 2 MG/ML
4 INJECTION INTRAMUSCULAR; INTRAVENOUS
Status: DISCONTINUED | OUTPATIENT
Start: 2020-12-04 | End: 2020-12-04 | Stop reason: HOSPADM

## 2020-12-04 RX ORDER — OXYCODONE HCL 5 MG/5 ML
10 SOLUTION, ORAL ORAL
Status: DISCONTINUED | OUTPATIENT
Start: 2020-12-04 | End: 2020-12-04 | Stop reason: HOSPADM

## 2020-12-04 RX ADMIN — ACETAMINOPHEN 650 MG: 325 TABLET, FILM COATED ORAL at 08:51

## 2020-12-04 RX ADMIN — FENTANYL CITRATE 25 MCG: 50 INJECTION, SOLUTION INTRAMUSCULAR; INTRAVENOUS at 09:47

## 2020-12-04 RX ADMIN — ONDANSETRON 4 MG: 2 INJECTION INTRAMUSCULAR; INTRAVENOUS at 09:30

## 2020-12-04 RX ADMIN — CELECOXIB 200 MG: 200 CAPSULE ORAL at 08:51

## 2020-12-04 RX ADMIN — FENTANYL CITRATE 50 MCG: 50 INJECTION, SOLUTION INTRAMUSCULAR; INTRAVENOUS at 09:40

## 2020-12-04 RX ADMIN — SODIUM CHLORIDE, POTASSIUM CHLORIDE, SODIUM LACTATE AND CALCIUM CHLORIDE: 600; 310; 30; 20 INJECTION, SOLUTION INTRAVENOUS at 08:51

## 2020-12-04 RX ADMIN — PROPOFOL 100 MG: 10 INJECTION, EMULSION INTRAVENOUS at 09:29

## 2020-12-04 RX ADMIN — DEXAMETHASONE SODIUM PHOSPHATE 8 MG: 4 INJECTION, SOLUTION INTRA-ARTICULAR; INTRALESIONAL; INTRAMUSCULAR; INTRAVENOUS; SOFT TISSUE at 09:30

## 2020-12-04 RX ADMIN — POVIDONE IODINE 15 ML: 100 SOLUTION TOPICAL at 08:51

## 2020-12-04 RX ADMIN — FENTANYL CITRATE 25 MCG: 50 INJECTION, SOLUTION INTRAMUSCULAR; INTRAVENOUS at 09:30

## 2020-12-04 RX ADMIN — CEFAZOLIN 2 G: 330 INJECTION, POWDER, FOR SOLUTION INTRAMUSCULAR; INTRAVENOUS at 09:25

## 2020-12-04 RX ADMIN — KETOROLAC TROMETHAMINE 15 MG: 30 INJECTION, SOLUTION INTRAMUSCULAR at 10:13

## 2020-12-04 RX ADMIN — FENTANYL CITRATE 25 MCG: 50 INJECTION, SOLUTION INTRAMUSCULAR; INTRAVENOUS at 10:05

## 2020-12-04 RX ADMIN — LIDOCAINE HYDROCHLORIDE 50 MG: 20 INJECTION, SOLUTION EPIDURAL; INFILTRATION; INTRACAUDAL at 09:29

## 2020-12-04 ASSESSMENT — FIBROSIS 4 INDEX: FIB4 SCORE: 0.61

## 2020-12-04 ASSESSMENT — PAIN SCALES - GENERAL: PAIN_LEVEL: 0

## 2020-12-04 NOTE — OR NURSING
1035 Pt arrived from OR to [facility]. ID verified. Report received. Connected to monitor. Drsg to right hand and forearm CDI. Pt unarousable to voice or touch. VSS. Weaned immediately to 3L O2 via NC.    1040 handoff to NAN Han

## 2020-12-04 NOTE — ANESTHESIA TIME REPORT
Anesthesia Start and Stop Event Times     Date Time Event    12/4/2020 0910 Ready for Procedure     0925 Anesthesia Start     1036 Anesthesia Stop        Responsible Staff  12/04/20    Name Role Begin End    Pankaj Greer M.D. Anesth 0925 1036        Preop Diagnosis (Free Text):  Pre-op Diagnosis     CARPAL TUNNEL SYNDROME BILATERAL UPPER LIMBS        Preop Diagnosis (Codes):    Post op Diagnosis  Right carpal tunnel syndrome  OA right thumb    Premium Reason  Non-Premium    Comments:

## 2020-12-04 NOTE — ANESTHESIA PROCEDURE NOTES
Airway    Date/Time: 12/4/2020 9:29 AM  Performed by: Pankaj Greer M.D.  Authorized by: Pankaj Greer M.D.     Location:  OR  Urgency:  Elective  Indications for Airway Management:  Anesthesia      Spontaneous Ventilation: absent    Sedation Level:  Deep  Preoxygenated: Yes    Final Airway Type:  Supraglottic airway  Final Supraglottic Airway:  Standard LMA    SGA Size:  4  Number of Attempts at Approach:  1

## 2020-12-04 NOTE — OR SURGEON
Immediate Post OP Note    PreOp Diagnosis: R cts, de Q,   Th c-mc oa    PostOp Diagnosis: same    Procedure(s):  RELEASE, CARPAL TUNNEL - Wound Class: Clean  RELEASE, HAND, FOR DEQUERVAIN'S TENOSYNOVITIS - Wound Class: Clean  ARTHROPLASTY, FINGER-THUMB CARPOMETACARPAL EXCISIONAL - Wound Class: Clean    Surgeon(s):  Pancho Palomino M.D.    Anesthesiologist/Type of Anesthesia:  Anesthesiologist: Pankaj Greer M.D./General    Surgical Staff:  Circulator: Gm Vergara R.N.  Scrub Person: Mohsen Merritt    Specimens removed if any:  * No specimens in log *    Estimated Blood Loss: 0    Findings: 0    Complications: 0        12/4/2020 10:40 AM Pancho Palomino M.D.

## 2020-12-04 NOTE — OR NURSING
1040  RECEIVED REPORT FROM CARTER NEWTON RN.  ASSUMED CARE OF PATIENT.  PATIENT RESTING .  NO DISTRESS NOTED.  DRESSING TO RIGHT HAND AND FOREARM IS CDI.  FINGERS ARE PINK AND WARM TO TOUCH.  ARM ELEVATED ON 2 PILLOWS AND ICE BAG APPLIED.    1105  PHASE 2.    1129  UP TO THE BATHROOM.    1142  DISCHARGED.  DISCHARGE INSTRUCTIONS GIVEN TO PATIENT AND HER .  A VERBAL UNDERSTANDING OF ALL INSTRUCTIONS WAS STATED.  PATIENT TAKING PO, VOIDING AND AMBULATING WITHOUT DIFFICULTY.  PATIENT STATES SHE IS READY TO GO HOME.  SLING IN PLACE.

## 2020-12-04 NOTE — ANESTHESIA POSTPROCEDURE EVALUATION
Patient: Makenna Culver    Procedure Summary     Date: 12/04/20 Room / Location: [facility] ROOM 21 / SURGERY SAME DAY [facility]    Anesthesia Start: 0925 Anesthesia Stop: 1036    Procedures:       RELEASE, CARPAL TUNNEL (Right Wrist)      RELEASE, HAND, FOR DEQUERVAIN'S TENOSYNOVITIS (Right Hand)      ARTHROPLASTY, FINGER-THUMB CARPOMETACARPAL EXCISIONAL (Right Thumb) Diagnosis: (CARPAL TUNNEL SYNDROME BILATERAL UPPER LIMBS)    Surgeons: Pancho Palomino M.D. Responsible Provider: Pankaj Greer M.D.    Anesthesia Type: general ASA Status: 2          Final Anesthesia Type: general  Last vitals  BP   Blood Pressure : 131/73    Temp   36 °C (96.8 °F)    Pulse   Pulse: 68   Resp   15    SpO2   92 %      Anesthesia Post Evaluation    Patient location during evaluation: PACU  Patient participation: complete - patient participated  Level of consciousness: awake and alert  Pain score: 0    Airway patency: patent  Anesthetic complications: no  Cardiovascular status: hemodynamically stable  Respiratory status: acceptable  Hydration status: euvolemic    PONV: none           Nurse Pain Score: 0 (NPRS)

## 2020-12-04 NOTE — ANESTHESIA PREPROCEDURE EVALUATION
Relevant Problems   ANESTHESIA (within normal limits)      PULMONARY (within normal limits)      NEURO   (+) History of peptic ulcer      CARDIAC (within normal limits)      GI   (+) GERD (gastroesophageal reflux disease)       (within normal limits)      ENDO   (+) Graves' disease with exophthalmos   (+) Hypothyroidism      Other   (+) Sacroiliitis, not elsewhere classified (HCC)       Physical Exam    Airway   Mallampati: II  TM distance: >3 FB  Neck ROM: full       Cardiovascular - normal exam  Rhythm: regular  Rate: normal  (-) murmur     Dental - normal exam           Pulmonary - normal exam  Breath sounds clear to auscultation     Abdominal    Neurological - normal exam                 Anesthesia Plan    ASA 2       Plan - general       Airway plan will be LMA        Induction: intravenous    Postoperative Plan: Postoperative administration of opioids is intended.    Pertinent diagnostic labs and testing reviewed    Informed Consent:    Anesthetic plan and risks discussed with patient.    Use of blood products discussed with: patient whom consented to blood products.

## 2020-12-04 NOTE — DISCHARGE INSTRUCTIONS
ACTIVITY: Rest and take it easy for the first 24 hours.  A responsible adult is recommended to remain with you during that time.  It is normal to feel sleepy.  We encourage you to not do anything that requires balance, judgment or coordination.    MILD FLU-LIKE SYMPTOMS ARE NORMAL. YOU MAY EXPERIENCE GENERALIZED MUSCLE ACHES, THROAT IRRITATION, HEADACHE AND/OR SOME NAUSEA.    FOR 24 HOURS DO NOT:  Drive, operate machinery or run household appliances.  Drink beer or alcoholic beverages.   Make important decisions or sign legal documents.    SPECIAL INSTRUCTIONS: *SEE CARPAL TUNNEL RELEASE INSTRUCTION SHEET  WEAR SLING WHEN UP AND ABOUT**    DIET: To avoid nausea, slowly advance diet as tolerated, avoiding spicy or greasy foods for the first day.  Add more substantial food to your diet according to your physician's instructions. INCREASE FLUIDS AND FIBER TO AVOID CONSTIPATION.    SURGICAL DRESSING/BATHING: *KEEP DRESSING CLEAN AND DRY  MAY SHOWER TOMORROW WITH DRESSING COVERED**    FOLLOW-UP APPOINTMENT:  A follow-up appointment should be arranged with your doctor in *FOLLOW UP WITH DR. BOWIE**; call [phone] to schedule.    You should CALL YOUR PHYSICIAN if you develop:  Fever greater than 101 degrees F.  Pain not relieved by medication, or persistent nausea or vomiting.  Excessive bleeding (blood soaking through dressing) or unexpected drainage from the wound.  Extreme redness or swelling around the incision site, drainage of pus or foul smelling drainage.  Inability to urinate or empty your bladder within 8 hours.  Problems with breathing or chest pain.    You should call 911 if you develop problems with breathing or chest pain.  If you are unable to contact your doctor or surgical center, you should go to the nearest emergency room or urgent care center.    [facility]'s telephone #: [phone]    If any questions arise, call your doctor.  If your doctor is not available, please feel free to call the  Surgical Center at [phone]. The Contact Center is open Monday through Friday 7AM to 5PM and may speak to a nurse at [phone], or toll free at [phone].     A registered nurse may call you a few days after your surgery to see how you are doing after your procedure.    MEDICATIONS: Resume taking daily medication.  Take prescribed pain medication with food.  If no medication is prescribed, you may take non-aspirin pain medication if needed.  PAIN MEDICATION CAN BE VERY CONSTIPATING.  Take a stool softener or laxative such as senokot, pericolace, or milk of magnesia if needed.    Prescription given for *PERCOCET.  Last pain medication given at *___________________________________________**.    If your physician has prescribed pain medication that includes Acetaminophen (Tylenol), do not take additional Acetaminophen (Tylenol) while taking the prescribed medication.    Depression / Suicide Risk    As you are discharged from this [facility] facility, it is important to learn how to keep safe from harming yourself.    Recognize the warning signs:  · Abrupt changes in personality, positive or negative- including increase in energy   · Giving away possessions  · Change in eating patterns- significant weight changes-  positive or negative  · Change in sleeping patterns- unable to sleep or sleeping all the time   · Unwillingness or inability to communicate  · Depression  · Unusual sadness, discouragement and loneliness  · Talk of wanting to die  · Neglect of personal appearance   · Rebelliousness- reckless behavior  · Withdrawal from people/activities they love  · Confusion- inability to concentrate     If you or a loved one observes any of these behaviors or has concerns about self-harm, here's what you can do:  · Talk about it- your feelings and reasons for harming yourself  · Remove any means that you might use to hurt yourself (examples: pills, rope, extension cords, firearm)  · Get professional help  from the community (Mental Health, Substance Abuse, psychological counseling)  · Do not be alone:Call your Safe Contact- someone whom you trust who will be there for you.  · Call your local CRISIS HOTLINE 638-4476 or 330-740-8243  · Call your local Children's Mobile Crisis Response Team [place] (294) 647-0536 or www.[employer]  · Call the toll free National Suicide Prevention Hotlines   · National Suicide Prevention Lifeline 477-694-GEEU (6519)  · National Hope Line Network 800-SUICIDE (089-0636)

## 2020-12-04 NOTE — OP REPORT
DATE OF SERVICE:  12/04/2020     PREOPERATIVE DIAGNOSES:  Right carpal tunnel syndrome, right de Quervain's   disease, and right thumb basilar joint arthritis.     POSTOPERATIVE DIAGNOSES:  Right carpal tunnel syndrome, right de Quervain's   disease, and right thumb basilar joint arthritis.     PROCEDURES:  Right open carpal tunnel release, right de Quervain's release,   right thumb CMC excisional arthroplasty with excision of the trapezium and a   flexor carpi radialis tendon graft.     ANESTHESIA:  General.     SURGEON:  Pancho Palomino MD     OPERATIVE PROCEDURE:  The patient was taken to the operating room.  Following   induction of general anesthesia, right upper extremity was prepped and draped   in routine fashion.  Limb was exsanguinated with an elastic bandage.    Tourniquet inflated to 250 mmHg.  Right carpal tunnel was exposed to standard   incision.  Sharp dissection carried down through the skin and subcutaneous   tissue.  Superficial palmar fascia divided.  Transverse carpal ligament was   released, and the distal forearm fascia was released.  Inspection of the   carpal tunnel, slight thickening of the tenosynovium.  The median nerve was   inflamed and narrowed.  It was freed up proximally and distally.  The right   thumb was then exposed through an incision along the dorsal radial aspect of   the metacarpal, curved ulnarward at the wrist crease.  The sharp and blunt   dissection carried down through the skin and subcutaneous tissue.  Care was   taken to identify and protect the sensory nerves.  The thenar muscles were   elevated off of the metacarpal and trapezium, and subperiosteal dissection was   used to expose the trapezium.  The metacarpotrapezial joint was markedly   arthritic.  The trapezium was carefully removed in a piecemeal fashion   protecting the surrounding structures.  Dissection was carried proximal.  The   first extensor compartment was decompressed with multiple slips of the    abductor pollicis longus in a separate compartment for the extensor pollicis   brevis.  These tendons were freed up proximally and distally.  The base of the   metacarpal was debrided.  A 3.5 mm drill hole was made from dorsal to volar   exiting in the beak of the metacarpal.  The flexor carpi radialis tendon was   identified in the distal forearm.  Half of it was divided, it was split   longitudinally, brought out in the depths of the wound.  The tendon was   brought up through the base of the metacarpal, brought proximal to metacarpal,   and several turns were taken around the other half of the flexor carpi   radialis tendon and the abductor pollicis longus tendon, creating an anchovy   effect.  The tendon was sutured in place with 2-0 Vicryl.  Once this was   accomplished, the thumb rested in the functional position.  The base of the   thumb was stable and the MP joint rested in flexion.  Skin edges infiltrated   with 0.5% Marcaine, tourniquet released, hemostasis obtained with   electrocautery.  Wounds were irrigated copiously.  Capsule and thenar muscles   were repaired with 2-0 Vicryl.  Skin wounds closed with 4-0 nylon.    Compressive dressing and splint applied.  The arm was elevated, and the   patient was taken to recovery room in satisfactory condition.        ______________________________________________  MD KIMI SMITH/KENNA    DD:  12/04/2020 11:19  DT:  12/04/2020 14:14    Job#:  [id number]

## 2020-12-09 ENCOUNTER — APPOINTMENT (OUTPATIENT)
Dept: RADIOLOGY | Facility: MEDICAL CENTER | Age: 75
End: 2020-12-09
Attending: FAMILY MEDICINE
Payer: MEDICARE

## 2020-12-15 DIAGNOSIS — E55.9 VITAMIN D DEFICIENCY: ICD-10-CM

## 2020-12-15 DIAGNOSIS — E78.2 MIXED HYPERLIPIDEMIA: ICD-10-CM

## 2020-12-15 DIAGNOSIS — E03.9 HYPOTHYROIDISM, UNSPECIFIED TYPE: ICD-10-CM

## 2020-12-16 DIAGNOSIS — Z79.890 POSTMENOPAUSAL HRT (HORMONE REPLACEMENT THERAPY): ICD-10-CM

## 2020-12-17 RX ORDER — ESTRADIOL 1 MG/1
TABLET ORAL
Qty: 90 TAB | Refills: 1 | Status: SHIPPED | OUTPATIENT
Start: 2020-12-17 | End: 2021-06-16

## 2020-12-22 RX ORDER — OMEPRAZOLE 40 MG/1
CAPSULE, DELAYED RELEASE ORAL
Qty: 90 CAP | Refills: 1 | Status: SHIPPED | OUTPATIENT
Start: 2020-12-22 | End: 2021-06-15

## 2020-12-31 ENCOUNTER — PATIENT MESSAGE (OUTPATIENT)
Dept: MEDICAL GROUP | Facility: PHYSICIAN GROUP | Age: 75
End: 2020-12-31

## 2021-01-01 ENCOUNTER — OFFICE VISIT (OUTPATIENT)
Dept: URGENT CARE | Facility: PHYSICIAN GROUP | Age: 76
End: 2021-01-01
Payer: MEDICARE

## 2021-01-01 VITALS
DIASTOLIC BLOOD PRESSURE: 68 MMHG | SYSTOLIC BLOOD PRESSURE: 120 MMHG | TEMPERATURE: 97.1 F | RESPIRATION RATE: 14 BRPM | WEIGHT: 125.4 LBS | HEART RATE: 82 BPM | HEIGHT: 60 IN | BODY MASS INDEX: 24.62 KG/M2 | OXYGEN SATURATION: 94 %

## 2021-01-01 DIAGNOSIS — R49.0 HOARSE VOICE QUALITY: ICD-10-CM

## 2021-01-01 DIAGNOSIS — J02.9 SORE THROAT: ICD-10-CM

## 2021-01-01 PROCEDURE — 99213 OFFICE O/P EST LOW 20 MIN: CPT | Performed by: PHYSICIAN ASSISTANT

## 2021-01-01 ASSESSMENT — FIBROSIS 4 INDEX: FIB4 SCORE: 0.61

## 2021-01-01 NOTE — PROGRESS NOTES
"Chief Complaint   Patient presents with   • Influenza     sore throat, cough w/mucus, lathargic. pt states that she has gottn better the past two days. pt believes she may have the flu. declines COVID test       HISTORY OF PRESENT ILLNESS: Patient is a 75 y.o. female who presents today for the following:    Patient is here for evaluation of a sore throat that started a couple days ago.  She had a very sore throat, sinus congestion, postnasal drip, and a very hoarse voice.  It has improved over the last couple of days.  She had a negative Covid test within the last month.  She denies fever, body aches, chills, shortness of breath.  She quit smoking in 2013 but continues to use \"blue cigarettes.\"    Patient Active Problem List    Diagnosis Date Noted   • Graves' ophthalmopathy 07/09/2015     Priority: High   • Osteoarthritis of multiple joints 06/13/2014     Priority: High   • Graves' disease with exophthalmos 06/13/2014     Priority: High   • Hyperlipidemia 06/22/2011     Priority: High   • Degeneration of lumbar or lumbosacral intervertebral disc 10/09/2009     Priority: High   • Hypothyroidism 10/09/2009     Priority: High   • Pain in joint, shoulder region 10/09/2009     Priority: High   • Dermatochalasis of both upper eyelids 04/20/2016     Priority: Low   • Other and combined forms of senile cataract 03/17/2015     Priority: Low   • Osteopenia 05/09/2012     Priority: Low   • Post menopausal syndrome 03/08/2012     Priority: Low   • History of peptic ulcer 11/04/2009     Priority: Low   • GERD (gastroesophageal reflux disease) 11/04/2009     Priority: Low   • Urinary frequency 07/01/2020   • Environmental allergies 05/28/2020   • Primary insomnia 05/28/2020   • Left wrist pain 11/27/2019   • Preoperative clearance 05/09/2019   • Hospital discharge follow-up 04/26/2019   • Colitis 04/15/2019   • Pulmonary nodule 04/15/2019   • Liver lesion 04/15/2019   • Sacroiliitis, not elsewhere classified (HCC) 07/25/2018   • " "Right knee pain 04/21/2017   • Senile nuclear sclerosis 03/03/2015       Allergies:Codeine, Morphine, and Vicodin [hydrocodone-acetaminophen]    Current Outpatient Medications Ordered in Epic   Medication Sig Dispense Refill   • omeprazole (PRILOSEC) 40 MG delayed-release capsule Take 1 capsule by mouth once daily 90 Cap 1   • estradiol (ESTRACE) 1 MG Tab Take 1 tablet by mouth once daily 90 Tab 1   • Diclofenac Sodium (DICLO GEL) 1 % Kit Place 1 Dose on the skin as needed.     • Cholecalciferol (VITAMIN D) 50 MCG (2000 UT) Cap Take 1 Cap by mouth every day.     • B Complex Vitamins (VITAMIN B-COMPLEX PO) Take 1 Cap by mouth every day.     • Multiple Vitamins-Minerals (PRESERVISION AREDS 2 PO) Take 1 Dose by mouth every day.     • cyclobenzaprine (FLEXERIL) 10 mg Tab Take 1 Tab by mouth 3 times a day as needed. 90 Tab 3   • traZODone (DESYREL) 50 MG Tab Take 1 Tab by mouth at bedtime as needed for Sleep. 90 Tab 3   • albuterol 108 (90 Base) MCG/ACT Aero Soln inhalation aerosol Inhale 2 Puffs by mouth every 6 hours as needed for Shortness of Breath. \"PRO AIR HFA\" 8.5 g 3   • atorvastatin (LIPITOR) 10 MG Tab Take 1 Tab by mouth every 48 hours. (every other day) for high cholesterol. 50 Tab 3   • ibuprofen (MOTRIN) 800 MG Tab Take 800 mg by mouth 3 times a day as needed. TID PRN, WITH MEALS. PER PATIENT'S MED LIST BROUGHT ON MRI APPT 6.10.20     • levothyroxine (SYNTHROID) 75 MCG Tab Take 1 Tab by mouth Every morning on an empty stomach. 90 Tab 3   • tramadol (ULTRAM) 50 MG Tab Take 50 mg by mouth every 6 hours as needed for Mild Pain.       No current Epic-ordered facility-administered medications on file.        Past Medical History:   Diagnosis Date   • Arthritis     hips, back and shoulders (osteoarthritis)    • Colon polyp 12/2011   • Degeneration of lumbar or lumbosacral intervertebral disc     Dr. Cisneros   • Dental disorder     lower partial    • Ear problems as child had surgery   • Graves' disease with " exophthalmos 2014    Being followed by Dr. Han San   • Hemorrhoid     internal   • High cholesterol     not medicated at this time    • History of carpal tunnel repair 2013    Dr. Villa repaired both.   • Menopause 10/14/2009   • Osteoarthritis of multiple joints 2014   • Pain     wrists   • Pain in joint, pelvic region and thigh     bursitis and arthritis in hips-takes ibuprofen and darvacet   • Pain in joint, shoulder region    • Periodic pelvic examination due   • Postcoital bleeding     improved   • Rash 2014    Started about a week ago when she got news about surgery High stress Nothing new Back, stomach, chest, arms Itch No pain   • S/P colonoscopy 2011    repeat in 5 years- had polyp, initial .    • Screening mammogram  2008=normal   • Tobacco use disorder    • Ulcer of the stomach and intestine hx.    stable   • Unspecified cataract     bilateral IOL   • Unspecified hypothyroidism        Social History     Tobacco Use   • Smoking status: Former Smoker     Packs/day: 1.00     Years: 50.00     Pack years: 50.00     Types: Cigarettes     Quit date: 2013     Years since quittin.9   • Smokeless tobacco: Never Used   • Tobacco comment: use E cig   Substance Use Topics   • Alcohol use: Yes     Alcohol/week: 0.6 oz     Types: 1 Glasses of wine per week     Frequency: 2-4 times a month     Comment:      0-1 per month                          • Drug use: No       Family Status   Relation Name Status   • Mo   at age 71 yo        had a hx. of stomach problems   • Fa          commited suicide   • Sis  Alive        3 sisters with thyroid problems   • Sis pt. daughter Alive        has a hx. of breast cyst   • Neg Hx  (Not Specified)     Family History   Problem Relation Age of Onset   • Hypertension Father    • Heart Disease Father         MI age 47 yo   • Cancer Neg Hx    • Diabetes Neg Hx        Review of Systems:   Constitutional ROS: No  unexpected change in weight, No weakness, No fatigue  Eye ROS: No recent significant change in vision, No eye pain, redness, discharge  Ear ROS: No drainage, No tinnitus or vertigo, No recent change in hearing  Mouth/Throat ROS: No teeth or gum problems, No bleeding gums, No tongue complaints  Neck ROS: No swollen glands, No significant pain in neck  Pulmonary ROS: No chronic cough, sputum, or hemoptysis, No dyspnea on exertion, No wheezing  Cardiovascular ROS: No diaphoresis, No edema, No palpitations  Musculoskeletal/Extremities ROS: No peripheral edema, No pain, redness or swelling on the joints  Hematologic/Lymphatic ROS: No chills, No night sweats, No weight loss  Skin/Integumentary ROS: No edema, No evidence of rash, No itching      Exam:  /68   Pulse 82   Temp 36.2 °C (97.1 °F) (Temporal)   Resp 14   Ht 1.524 m (5')   Wt 56.9 kg (125 lb 6.4 oz)   SpO2 94%   General: Well developed, well nourished. No distress.    Eye: PERRL/EOMI; conjunctivae clear, lids normal.  ENMT: Lips without lesions, MMM. Oropharynx is clear.  Patient does have a hoarse voice.  Pulmonary: Unlabored respiratory effort. Lungs clear to auscultation, no wheezes, no rhonchi.    Cardiovascular: Regular rate and rhythm without murmur.   Neurologic: Grossly nonfocal. No facial asymmetry noted.  Lymph: No cervical lymphadenopathy noted.  Skin: Warm, dry, good turgor. No rashes in visible areas.   Psych: Normal mood. Alert and oriented to person, place and time.    Assessment/Plan:  Discussed likely viral etiology.  Flu is negative.  Recommend fluticasone nasal spray and chlorpheniramine tablets.  May need to see ENT if hoarseness does not resolve pneumonia.  Discussed appropriate over-the-counter symptomatic medication, and when to return to clinic. Follow up for worsening or persistent symptoms.  1. Hoarse voice quality     2. Sore throat      Suspect is due to postnasal drip.

## 2021-01-02 ENCOUNTER — PATIENT MESSAGE (OUTPATIENT)
Dept: URGENT CARE | Facility: PHYSICIAN GROUP | Age: 76
End: 2021-01-02

## 2021-01-02 DIAGNOSIS — R06.02 SOB (SHORTNESS OF BREATH): ICD-10-CM

## 2021-01-03 RX ORDER — ALBUTEROL SULFATE 90 UG/1
2 AEROSOL, METERED RESPIRATORY (INHALATION) EVERY 6 HOURS PRN
Qty: 8.5 G | Refills: 1 | Status: SHIPPED | OUTPATIENT
Start: 2021-01-03 | End: 2021-05-24 | Stop reason: SDUPTHER

## 2021-01-03 NOTE — TELEPHONE ENCOUNTER
From: Makenna Culver  To: Carolyn Londono P.A.-C.  Sent: 1/2/2021 7:26 PM PST  Subject: Non-Urgent Medical Question    Hi I was in on the 1st of Jan. I found my husbands inhaler and it works great, It is Proair HFA AER TEV . 8.5 It works a lot better then the one Dr. Laboy called over for me which is Albuterol HFA 90MCG (9gm) AER LUP generic for Proair HFA AER can you call over [facility] prescription for me. I know it cost more with my insurance but it works a lot better then mine. Cancel mine out i use to much of it because it just doesn't work that well. [facility] for your help. If you can't do this will you pass this message to Dr. Laboy I'm feeling a lot better using his prescription and nasal spray. makenna culver

## 2021-01-04 ENCOUNTER — NURSE TRIAGE (OUTPATIENT)
Dept: HEALTH INFORMATION MANAGEMENT | Facility: OTHER | Age: 76
End: 2021-01-04

## 2021-01-04 NOTE — TELEPHONE ENCOUNTER
From: Makenna Culver  To: Frances Laboy M.D.  Sent: 12/31/2020 6:34 PM PST  Subject: Non-Urgent Medical Question    I have a sore throat and a lot of mucus and no energy. my smell and taste are good and no fever. I also have no voice. is there something you can call in to help this. amadeo lion

## 2021-01-04 NOTE — TELEPHONE ENCOUNTER
----- Message from Marielle Aden sent at 1/4/2021 11:42 AM PST -----  PT WANTS TO BE SEEN IN OFFICE AND IS EXPERIENCING EXTREME FATIGUE, HEADACHE, CHEST PAIN, COUGH

## 2021-01-04 NOTE — TELEPHONE ENCOUNTER
12/30 symptom onset. Pt c/o nasal congestion, chest congestion, productive cough, body aches, sore throat, hoarseness. Pt also complains of mild SOB with activity. Seen at  on Jan 1st. Advised to take sinus medications at home (flonase, inhaler, claritin). Pt reports no improvement of symptoms.     1. Caller Name: Makenna Culver                 Call Back Number: [phone]  [facility] PCP or Specialty Provider: Yes Frances Laboy        2.  Has the patient previously tested positive for COVID-19? No, negative December 2nd, 2020    3.  In the last two weeks, has the patient had any new or worsening symptoms (not explained by alternative diagnosis)? Yes, the patient reports the following COVID-19 consistent symptoms: cough, sore throat, congestion or runny nose, muscle pain or body aches and fatigue.    4.  Does patient have any comoribidities? None     5.  Has the patient had any known contact with someone who is suspected or confirmed to have COVID-19? No.    5. Disposition: Advised to go to , pt advised to f/u today with [facility] for symptoms not improving. Pt wants to wait for  appointment tomorrow.    Note routed to [facility] Provider: NAZ only.     Reason for Disposition  • HIGH RISK patient (e.g., age > 64 years, diabetes, heart or lung disease, weak immune system)    Additional Information  • Negative: SEVERE difficulty breathing (e.g., struggling for each breath, speaks in single words)  • Negative: Difficult to awaken or acting confused (e.g., disoriented, slurred speech)  • Negative: Bluish (or gray) lips or face now  • Negative: Shock suspected (e.g., cold/pale/clammy skin, too weak to stand, low BP, rapid pulse)  • Negative: Sounds like a life-threatening emergency to the triager  • Negative: SEVERE or constant chest pain (Exception: mild central chest pain, present only when coughing)  • Negative: MODERATE difficulty breathing (e.g., speaks in phrases, SOB even at rest, pulse 100-120)  • Negative:  "MILD difficulty breathing (e.g., minimal/no SOB at rest, SOB with walking, pulse <100)  • Negative: Chest pain  • Negative: Patient sounds very sick or weak to the triager    Answer Assessment - Initial Assessment Questions  1. COVID-19 DIAGNOSIS: \"Who made your Coronavirus (COVID-19) diagnosis?\" \"Was it confirmed by a positive lab test?\" If not diagnosed by a HCP, ask \"Are there lots of cases (community spread) where you live?\" (See public health department website, if unsure)    * MAJOR community spread: high number of cases; numbers of cases are increasing; many people hospitalized.    * MINOR community spread: low number of cases; not increasing; few or no people hospitalized      MAJOR  2. ONSET: \"When did the COVID-19 symptoms start?\"       12/30  3. WORST SYMPTOM: \"What is your worst symptom?\" (e.g., cough, fever, shortness of breath, muscle aches)      SOB, cough, congestion  4. COUGH: \"How bad is the cough?\"        Productive cough, yellow sputum  5. FEVER: \"Do you have a fever?\" If so, ask: \"What is your temperature, how was it measured, and when did it start?\"      DENIES  6. RESPIRATORY STATUS: \"Describe your breathing?\" (e.g., shortness of breath, wheezing, unable to speak)       Mild SOB with activity  7. BETTER-SAME-WORSE: \"Are you getting better, staying the same or getting worse compared to yesterday?\"  If getting worse, ask, \"In what way?\"      NOT IMPROVING  8. HIGH RISK DISEASE: \"Do you have any chronic medical problems?\" (e.g., asthma, heart or lung disease, weak immune system, etc.)      DENIES  9. PREGNANCY: \"Is there any chance you are pregnant?\" \"When was your last menstrual period?\"      N/A  10. OTHER SYMPTOMS: \"Do you have any other symptoms?\"  (e.g., runny nose, headache, sore throat, loss of smell)        HA, sore throat, hoarseness, body aches    Protocols used: CORONAVIRUS (COVID-19) DIAGNOSED OR [id number]      "

## 2021-01-05 ENCOUNTER — HOSPITAL ENCOUNTER (OUTPATIENT)
Facility: MEDICAL CENTER | Age: 76
End: 2021-01-05
Attending: PHYSICIAN ASSISTANT
Payer: MEDICARE

## 2021-01-05 ENCOUNTER — OFFICE VISIT (OUTPATIENT)
Dept: URGENT CARE | Facility: PHYSICIAN GROUP | Age: 76
End: 2021-01-05
Payer: MEDICARE

## 2021-01-05 VITALS
RESPIRATION RATE: 16 BRPM | DIASTOLIC BLOOD PRESSURE: 68 MMHG | OXYGEN SATURATION: 95 % | SYSTOLIC BLOOD PRESSURE: 112 MMHG | TEMPERATURE: 98 F | BODY MASS INDEX: 24.85 KG/M2 | WEIGHT: 126.6 LBS | HEART RATE: 88 BPM | HEIGHT: 60 IN

## 2021-01-05 DIAGNOSIS — J06.9 UPPER RESPIRATORY TRACT INFECTION, UNSPECIFIED TYPE: ICD-10-CM

## 2021-01-05 LAB
COVID ORDER STATUS COVID19: NORMAL
INT CON NEG: NORMAL
INT CON POS: NORMAL
S PYO AG THROAT QL: NEGATIVE
SARS-COV-2 RNA RESP QL NAA+PROBE: NOTDETECTED
SPECIMEN SOURCE: NORMAL

## 2021-01-05 PROCEDURE — 99000 SPECIMEN HANDLING OFFICE-LAB: CPT | Performed by: PHYSICIAN ASSISTANT

## 2021-01-05 PROCEDURE — 87880 STREP A ASSAY W/OPTIC: CPT | Performed by: PHYSICIAN ASSISTANT

## 2021-01-05 PROCEDURE — U0005 INFEC AGEN DETEC AMPLI PROBE: HCPCS

## 2021-01-05 PROCEDURE — 99214 OFFICE O/P EST MOD 30 MIN: CPT | Performed by: PHYSICIAN ASSISTANT

## 2021-01-05 PROCEDURE — U0003 INFECTIOUS AGENT DETECTION BY NUCLEIC ACID (DNA OR RNA); SEVERE ACUTE RESPIRATORY SYNDROME CORONAVIRUS 2 (SARS-COV-2) (CORONAVIRUS DISEASE [COVID-19]), AMPLIFIED PROBE TECHNIQUE, MAKING USE OF HIGH THROUGHPUT TECHNOLOGIES AS DESCRIBED BY CMS-2020-01-R: HCPCS

## 2021-01-05 RX ORDER — PREDNISONE 20 MG/1
TABLET ORAL
Qty: 10 TAB | Refills: 0 | Status: SHIPPED | OUTPATIENT
Start: 2021-01-05 | End: 2021-05-24

## 2021-01-05 RX ORDER — BENZONATATE 200 MG/1
200 CAPSULE ORAL 3 TIMES DAILY PRN
Qty: 30 CAP | Refills: 0 | Status: SHIPPED | OUTPATIENT
Start: 2021-01-05 | End: 2021-05-24

## 2021-01-05 ASSESSMENT — ENCOUNTER SYMPTOMS
PALPITATIONS: 0
SORE THROAT: 1
SPUTUM PRODUCTION: 0
COUGH: 0
HEMOPTYSIS: 0
WHEEZING: 0
SHORTNESS OF BREATH: 0
CHILLS: 0
FEVER: 0

## 2021-01-05 ASSESSMENT — FIBROSIS 4 INDEX: FIB4 SCORE: 0.61

## 2021-01-05 NOTE — PROGRESS NOTES
Subjective:   Makenna Culver is a 75 y.o. female who presents for Follow-Up (follow up from 01-. symptoms have not gotten any better. )      URI   This is a new problem. The current episode started in the past 7 days. The problem has been unchanged. There has been no fever. Associated symptoms include congestion and a sore throat. Pertinent negatives include no chest pain, coughing, ear pain or wheezing. She has tried nothing for the symptoms.       Review of Systems   Constitutional: Positive for malaise/fatigue. Negative for chills and fever.   HENT: Positive for congestion and sore throat. Negative for ear pain.    Respiratory: Negative for cough, hemoptysis, sputum production, shortness of breath and wheezing.    Cardiovascular: Negative for chest pain and palpitations.   All other systems reviewed and are negative.      Medications:    • albuterol Aers  • atorvastatin Tabs  • benzonatate  • cyclobenzaprine Tabs  • Diclo Gel Kit  • estradiol Tabs  • ibuprofen Tabs  • levothyroxine Tabs  • omeprazole  • predniSONE Tabs  • PRESERVISION AREDS 2 PO  • traMADol Tabs  • traZODone Tabs  • VITAMIN B-COMPLEX PO  • Vitamin D Caps    Allergies: Codeine, Morphine, and Vicodin [hydrocodone-acetaminophen]    Problem List: Makenna Culver has Degeneration of lumbar or lumbosacral intervertebral disc; Hypothyroidism; Pain in joint, shoulder region; History of peptic ulcer; GERD (gastroesophageal reflux disease); Hyperlipidemia; Post menopausal syndrome; Osteopenia; Osteoarthritis of multiple joints; Graves' disease with exophthalmos; Senile nuclear sclerosis; Other and combined forms of senile cataract; Graves' ophthalmopathy; Dermatochalasis of both upper eyelids; Right knee pain; Sacroiliitis, not elsewhere classified (HCC); Colitis; Pulmonary nodule; Liver lesion; Hospital discharge follow-up; Preoperative clearance; Left wrist pain; Environmental allergies; Primary insomnia; and Urinary frequency on their problem  list.    Surgical History:  Past Surgical History:   Procedure Laterality Date   • PB REVISE MEDIAN N/CARPAL TUNNEL SURG Right 12/4/2020    Procedure: RELEASE, CARPAL TUNNEL;  Surgeon: Pancho Palomino M.D.;  Location: SURGERY SAME DAY [facility];  Service: Orthopedics   • PB INCIS TENDON SHEATH,RADIAL STYLOID Right 12/4/2020    Procedure: RELEASE, HAND, FOR DEQUERVAIN'S TENOSYNOVITIS;  Surgeon: Pancho Palomino M.D.;  Location: SURGERY SAME DAY [facility];  Service: Orthopedics   • FINGER ARTHROPLASTY Right 12/4/2020    Procedure: ARTHROPLASTY, FINGER-THUMB CARPOMETACARPAL EXCISIONAL;  Surgeon: Pancho Palomino M.D.;  Location: SURGERY SAME DAY [facility];  Service: Orthopedics   • PB REVISE MEDIAN N/CARPAL TUNNEL SURG Left 8/21/2020    Procedure: RELEASE, CARPAL TUNNEL;  Surgeon: Pancho Palomino M.D.;  Location: SURGERY SAME DAY [facility] ORS;  Service: Orthopedics   • PB INCIS TENDON SHEATH,RADIAL STYLOID Left 8/21/2020    Procedure: RELEASE, HAND, FOR DEQUERVAIN'S TENOSYNOVITIS;  Surgeon: Pancho Palomino M.D.;  Location: SURGERY SAME DAY [facility] ORS;  Service: Orthopedics   • PB REPAIR INTERCARP/CARP-METACARP JT Left 8/21/2020    Procedure: EXCISION, TRAPEZIUM, WITH LIGAMENT RECONSTRUCTION AND TENDON INTERPOSITION- PARTIAL TRAPEZOID EXCISION FLEXOR CARPI RADIALIS TENDON GRAFT, PLASCENCIA;  Surgeon: Pancho Palomino M.D.;  Location: SURGERY SAME DAY [facility] ORS;  Service: Orthopedics   • FINGER ARTHROPLASTY Left 8/21/2020    Procedure: ARTHROPLASTY, FINGER- THUMB CARPOMETACARPAL EXCISIONAL;  Surgeon: Pancho Palomino M.D.;  Location: SURGERY SAME DAY [facility] ORS;  Service: Orthopedics   • BLEPHAROPLASTY Bilateral 4/20/2016    Procedure: BLEPHAROPLASTY - UPPER;  Surgeon: Moshe Sena M.D.;  Location: [facility] ORS;  Service:    • RECTUS REPAIR Bilateral 7/9/2015    Procedure: RECTUS REPAIR FOR LT SUPERIOR RECESSION & RT INFERIOR RECESSION ;  Surgeon: Leila Villegas M.D.;   Location: SURGERY SAME DAY [facility];  Service:    • CATARACT PHACO WITH IOL  3/17/2015    Performed by Derrick Gupta M.D. at SURGERY [facility]   • CATARACT PHACO WITH IOL  3/3/2015    Performed by Derrick Gupta M.D. at SURGERY [facility]   • TENSILON TEST  11/20/2014    Performed by Maynor San D.O. at ENDOSCOPY [facility] ORS   • CARPAL TUNNEL ENDOSCOPIC  2/8/2013    Performed by Pancho Palomino M.D. at SURGERY SAME DAY [facility] ORS   • CARPAL TUNNEL ENDOSCOPIC  11/17/2012    Performed by Pancho Palomino M.D. at SURGERY [facility] ORS   • ABDOMINAL HYSTERECTOMY TOTAL  partial    fibroid tumors, has ovary and tube left   • OTHER SURGICAL PROCEDURE      ear surgery as child   • THYROIDECTOMY  thyroid ablation with iodine therapy   • TONSILLECTOMY AND ADENOIDECTOMY         Past Social Hx: Makenna Culver  reports that she quit smoking about 7 years ago. Her smoking use included cigarettes. She has a 50.00 pack-year smoking history. She has never used smokeless tobacco. She reports current alcohol use of about 0.6 oz of alcohol per week. She reports that she does not use drugs.     Past Family Hx:  Makenna Culver family history includes Heart Disease in her father; Hypertension in her father.     Problem list, medications, and allergies reviewed by myself today in Epic.     Objective:     Blood Pressure 112/68   Pulse 88   Temperature 36.7 °C (98 °F) (Temporal)   Respiration 16   Height 1.524 m (5')   Weight 57.4 kg (126 lb 9.6 oz)   Oxygen Saturation 95%   Body Mass Index 24.72 kg/m²     Physical Exam  Vitals signs reviewed.   Constitutional:       General: She is not in acute distress.     Appearance: She is well-developed. She is not ill-appearing or toxic-appearing.      Interventions: She is not intubated.  HENT:      Head: Normocephalic and atraumatic.      Right Ear: Hearing, tympanic membrane, ear canal and external ear normal.      Left Ear: Hearing,  tympanic membrane, ear canal and external ear normal.      Nose: Nose normal.      Mouth/Throat:      Pharynx: Uvula midline.   Eyes:      General: Lids are normal.      Conjunctiva/sclera: Conjunctivae normal.   Neck:      Musculoskeletal: Full passive range of motion without pain, normal range of motion and neck supple.   Cardiovascular:      Rate and Rhythm: Regular rhythm.      Heart sounds: Normal heart sounds, S1 normal and S2 normal. No murmur. No friction rub. No gallop.    Pulmonary:      Effort: Pulmonary effort is normal. No tachypnea, bradypnea, accessory muscle usage or respiratory distress. She is not intubated.      Breath sounds: Normal breath sounds. No decreased breath sounds, wheezing, rhonchi or rales.   Chest:      Chest wall: No tenderness.   Musculoskeletal: Normal range of motion.   Skin:     General: Skin is warm and dry.   Neurological:      Mental Status: She is alert and oriented to person, place, and time.   Psychiatric:         Speech: Speech normal.         Behavior: Behavior normal.         Thought Content: Thought content normal.         Judgment: Judgment normal.     Rapid Strep: NEG      Assessment/Plan:     Medical Decision Making/Comments     Pt is a  75 yr old female who presents for evaluation of URI symptoms.  Pt states hoarse voice and cough for 5 days.  Pt denies fever or SOB.  Vital signs normal.  ENT exam is unremarkable.  Lungs are clear to auscultation.   Heart sounds are normal.  Pt is most likely experiencing an upper respiratory infection.  Discussed at length that 90% of URI's are viral in etiology and only supportive care is indicated.      Diagnosis and associated orders     1. Upper respiratory tract infection, unspecified type  POCT Rapid Strep A    COVID/SARS CoV-2 PCR    predniSONE (DELTASONE) 20 MG Tab    benzonatate (TESSALON) 200 MG capsule              Differential diagnosis, natural history, supportive care, and indications for immediate follow-up  discussed.    Advised the patient to follow-up with the primary care physician for recheck, reevaluation, and consideration of further management.    Please note that this dictation was created using voice recognition software. I have made a reasonable attempt to correct obvious errors, but I expect that there are errors of grammar and possibly content that I did not discover before finalizing the note.

## 2021-01-15 DIAGNOSIS — Z23 NEED FOR VACCINATION: ICD-10-CM

## 2021-01-18 ENCOUNTER — PATIENT MESSAGE (OUTPATIENT)
Dept: MEDICAL GROUP | Facility: PHYSICIAN GROUP | Age: 76
End: 2021-01-18

## 2021-01-19 NOTE — TELEPHONE ENCOUNTER
From: Makenna Culver  To: Frances Laboy M.D.  Sent: 1/18/2021 6:47 PM PST  Subject: Non-Urgent Medical Question    I give up. tried to make appts. for vaccine for me and garrett and no such luck. they are all located every time i call. garrett is 78 and me 75 kendra has a blood disorder and needs to get this shot. If he gets covid-19 he probably won't come out of the hospital. Me I'm more healthy . can you see what you can do. ty love makenna

## 2021-02-01 ENCOUNTER — ANESTHESIA EVENT (OUTPATIENT)
Dept: RADIOLOGY | Facility: MEDICAL CENTER | Age: 76
End: 2021-02-01
Payer: MEDICARE

## 2021-02-01 ENCOUNTER — HOSPITAL ENCOUNTER (OUTPATIENT)
Dept: RADIOLOGY | Facility: MEDICAL CENTER | Age: 76
End: 2021-02-01
Attending: PHYSICAL MEDICINE & REHABILITATION
Payer: MEDICARE

## 2021-02-01 ENCOUNTER — ANESTHESIA (OUTPATIENT)
Dept: RADIOLOGY | Facility: MEDICAL CENTER | Age: 76
End: 2021-02-01
Payer: MEDICARE

## 2021-02-01 VITALS
HEIGHT: 60 IN | BODY MASS INDEX: 23.81 KG/M2 | SYSTOLIC BLOOD PRESSURE: 124 MMHG | TEMPERATURE: 97.4 F | HEART RATE: 78 BPM | WEIGHT: 121.25 LBS | RESPIRATION RATE: 18 BRPM | OXYGEN SATURATION: 95 % | DIASTOLIC BLOOD PRESSURE: 69 MMHG

## 2021-02-01 DIAGNOSIS — M51.36 DEGENERATION OF LUMBAR INTERVERTEBRAL DISC: ICD-10-CM

## 2021-02-01 LAB
BUN BLD-MCNC: 8 MG/DL (ref 8–22)
CA-I BLD ISE-SCNC: 1.25 MMOL/L (ref 1.1–1.3)
CHLORIDE BLD-SCNC: 104 MMOL/L (ref 96–112)
CO2 BLD-SCNC: 26 MMOL/L (ref 20–33)
CREAT BLD-MCNC: 0.7 MG/DL (ref 0.5–1.4)
GLUCOSE BLD-MCNC: 83 MG/DL (ref 65–99)
HCT VFR BLD CALC: 38 % (ref 37–47)
HGB BLD-MCNC: 12.9 G/DL (ref 12–16)
POTASSIUM BLD-SCNC: 3.8 MMOL/L (ref 3.6–5.5)
SARS-COV+SARS-COV-2 AG RESP QL IA.RAPID: NOTDETECTED
SODIUM BLD-SCNC: 138 MMOL/L (ref 135–145)
SPECIMEN SOURCE: NORMAL

## 2021-02-01 PROCEDURE — 700105 HCHG RX REV CODE 258: Performed by: ANESTHESIOLOGY

## 2021-02-01 PROCEDURE — 72148 MRI LUMBAR SPINE W/O DYE: CPT

## 2021-02-01 PROCEDURE — 87426 SARSCOV CORONAVIRUS AG IA: CPT

## 2021-02-01 PROCEDURE — 80047 BASIC METABLC PNL IONIZED CA: CPT

## 2021-02-01 PROCEDURE — 700101 HCHG RX REV CODE 250: Performed by: ANESTHESIOLOGY

## 2021-02-01 PROCEDURE — 85014 HEMATOCRIT: CPT

## 2021-02-01 PROCEDURE — 700111 HCHG RX REV CODE 636 W/ 250 OVERRIDE (IP): Performed by: ANESTHESIOLOGY

## 2021-02-01 RX ORDER — SODIUM CHLORIDE, SODIUM LACTATE, POTASSIUM CHLORIDE, CALCIUM CHLORIDE 600; 310; 30; 20 MG/100ML; MG/100ML; MG/100ML; MG/100ML
INJECTION, SOLUTION INTRAVENOUS
Status: DISCONTINUED | OUTPATIENT
Start: 2021-02-01 | End: 2021-02-01 | Stop reason: SURG

## 2021-02-01 RX ORDER — LABETALOL HYDROCHLORIDE 5 MG/ML
5 INJECTION, SOLUTION INTRAVENOUS
Status: DISCONTINUED | OUTPATIENT
Start: 2021-02-01 | End: 2021-02-02 | Stop reason: HOSPADM

## 2021-02-01 RX ORDER — DIPHENHYDRAMINE HYDROCHLORIDE 50 MG/ML
12.5 INJECTION INTRAMUSCULAR; INTRAVENOUS
Status: DISCONTINUED | OUTPATIENT
Start: 2021-02-01 | End: 2021-02-02 | Stop reason: HOSPADM

## 2021-02-01 RX ORDER — MEPERIDINE HYDROCHLORIDE 25 MG/ML
12.5 INJECTION INTRAMUSCULAR; INTRAVENOUS; SUBCUTANEOUS
Status: DISCONTINUED | OUTPATIENT
Start: 2021-02-01 | End: 2021-02-02 | Stop reason: HOSPADM

## 2021-02-01 RX ORDER — ONDANSETRON 2 MG/ML
4 INJECTION INTRAMUSCULAR; INTRAVENOUS
Status: DISCONTINUED | OUTPATIENT
Start: 2021-02-01 | End: 2021-02-02 | Stop reason: HOSPADM

## 2021-02-01 RX ORDER — LIDOCAINE HYDROCHLORIDE 20 MG/ML
INJECTION, SOLUTION EPIDURAL; INFILTRATION; INTRACAUDAL; PERINEURAL PRN
Status: DISCONTINUED | OUTPATIENT
Start: 2021-02-01 | End: 2021-02-01 | Stop reason: SURG

## 2021-02-01 RX ORDER — SODIUM CHLORIDE, SODIUM LACTATE, POTASSIUM CHLORIDE, CALCIUM CHLORIDE 600; 310; 30; 20 MG/100ML; MG/100ML; MG/100ML; MG/100ML
INJECTION, SOLUTION INTRAVENOUS CONTINUOUS
Status: DISCONTINUED | OUTPATIENT
Start: 2021-02-01 | End: 2021-02-02 | Stop reason: HOSPADM

## 2021-02-01 RX ADMIN — SODIUM CHLORIDE, POTASSIUM CHLORIDE, SODIUM LACTATE AND CALCIUM CHLORIDE: 600; 310; 30; 20 INJECTION, SOLUTION INTRAVENOUS at 11:00

## 2021-02-01 RX ADMIN — FENTANYL CITRATE 25 MCG: 50 INJECTION, SOLUTION INTRAMUSCULAR; INTRAVENOUS at 11:04

## 2021-02-01 RX ADMIN — LIDOCAINE HYDROCHLORIDE 30 MG: 20 INJECTION, SOLUTION EPIDURAL; INFILTRATION; INTRACAUDAL at 11:04

## 2021-02-01 RX ADMIN — PROPOFOL 120 MG: 10 INJECTION, EMULSION INTRAVENOUS at 11:04

## 2021-02-01 ASSESSMENT — FIBROSIS 4 INDEX: FIB4 SCORE: 0.61

## 2021-02-01 ASSESSMENT — PAIN SCALES - GENERAL: PAIN_LEVEL: 0

## 2021-02-01 NOTE — ANESTHESIA PREPROCEDURE EVALUATION
Relevant Problems   NEURO   (+) History of peptic ulcer      GI   (+) GERD (gastroesophageal reflux disease)      ENDO   (+) Graves' disease with exophthalmos   (+) Hypothyroidism      Other   (+) Sacroiliitis, not elsewhere classified (HCC)       Physical Exam    Airway   Mallampati: II  TM distance: >3 FB  Neck ROM: full       Cardiovascular - normal exam  Rhythm: regular  Rate: normal  (-) murmur     Dental - normal exam             Pulmonary - normal exam  Breath sounds clear to auscultation     Abdominal    Neurological - normal exam                 Anesthesia Plan    ASA 2       Plan - general       Airway plan will be LMA        Induction: intravenous      Pertinent diagnostic labs and testing reviewed    Informed Consent:    Anesthetic plan and risks discussed with patient.    Use of blood products discussed with: patient whom consented to blood products.

## 2021-02-01 NOTE — PROGRESS NOTES
Patient to MRI outpatient dept. Patient informed process and plan of care during this visit.  Anesthesiologist Yoans spoke with Patient and discussed provider's plan of care.  Consent obtained.  MRI completed without incident.  Patient tolerated diet and activities once awake, alert, and appropriate.  DC instructions discussed with Patient and .  Questions encouraged and answered.  Defer to Provider for further instruction.  Patient discharged in stable condition to responsible adult (, Sp).

## 2021-02-01 NOTE — ANESTHESIA PROCEDURE NOTES
Airway    Date/Time: 2/1/2021 11:05 AM  Performed by: Frank Branham M.D.  Authorized by: Frank Branham M.D.     Location:  OR  Urgency:  Elective  Difficult Airway: No    Indications for Airway Management:  Anesthesia      Spontaneous Ventilation: absent    Sedation Level:  Deep  Preoxygenated: Yes    Mask Difficulty Assessment:  0 - not attempted  Final Airway Type:  Supraglottic airway  Final Supraglottic Airway:  Standard LMA    SGA Size:  3  Number of Attempts at Approach:  1

## 2021-02-01 NOTE — ANESTHESIA TIME REPORT
Anesthesia Start and Stop Event Times     Date Time Event    2/1/2021 1043 Ready for Procedure     1100 Anesthesia Start     1134 Anesthesia Stop        Responsible Staff  02/01/21    Name Role Begin End    Frank Branham M.D. Anesth 1100 1134        Preop Diagnosis (Free Text):  Pre-op Diagnosis             Preop Diagnosis (Codes):    Post op Diagnosis  Degeneration of lumbosacral intervertebral disc      Premium Reason  Non-Premium    Comments:

## 2021-02-01 NOTE — DISCHARGE INSTRUCTIONS
MRI ADULT DISCHARGE INSTRUCTIONS    You have been medicated today for your scan. Please follow the instructions below to ensure your safe recovery. If you have any questions or problems, feel free to call us at [phone] or [phone].     1.   Have someone stay with you to assist you as needed.    2.   Do not drive or operate any mechanical devices.    3.   Do not perform any activity that requires concentration. Make no major decisions over the next 24 hours.     4.   Be careful changing positions from laying down to sitting or standing, as you may become dizzy.     5.   Do not drink alcohol for 48 hours.    6.   There are no restrictions for eating your normal meals. Drink fluids.    7.   You may continue your usual medications for pain, tranquilizers, muscle relaxants or sedatives when awake.     8.   Tomorrow, you may continue your normal daily activities.     9.   Pressure dressing on 10 - 15 minutes. If swelling or bleeding occurs when removed, continue placing direct pressure on injection site for another 5 minutes, or until bleeding stops.     I have been informed of and understand the above discharge instructions.

## 2021-02-01 NOTE — ANESTHESIA POSTPROCEDURE EVALUATION
Patient: Makenna Culver    Procedure Summary     Date: 02/01/21 Room / Location: [facility] MRI  [address]    Anesthesia Start: 1100 Anesthesia Stop: 1134    Procedure: MR-LUMBAR SPINE-W/O Diagnosis:       Degeneration of lumbar intervertebral disc      Other intervertebral disc degeneration, lumbar region    Scheduled Providers:  Responsible Provider: Frank Branham M.D.    Anesthesia Type: general ASA Status: 2          Final Anesthesia Type: general  Last vitals  BP   Blood Pressure : 124/69    Temp   36.3 °C (97.4 °F)    Pulse   Pulse: 78   Resp   18    SpO2   95 %      Anesthesia Post Evaluation    Patient location during evaluation: PACU  Patient participation: complete - patient participated  Level of consciousness: awake and alert  Pain score: 0    Airway patency: patent  Anesthetic complications: no  Cardiovascular status: hemodynamically stable  Respiratory status: acceptable  Hydration status: euvolemic    PONV: none           Nurse Pain Score: 0 (NPRS)

## 2021-02-02 ENCOUNTER — PATIENT MESSAGE (OUTPATIENT)
Dept: MEDICAL GROUP | Facility: PHYSICIAN GROUP | Age: 76
End: 2021-02-02

## 2021-02-03 NOTE — TELEPHONE ENCOUNTER
From: Makenna Culver  To: Frances Laboy M.D.  Sent: 2/2/2021 7:30 PM PST  Subject: Non-Urgent Medical Question    hi there , hope all is good. I have been trying to find anywhere that they are giving the covid-19 vaccine and I'm striking out. Dre, fire dept. and my chart. I can't believe that they aren't anywhere to be found. What do you suggest. [facility] makenna culver

## 2021-02-10 ENCOUNTER — HOSPITAL ENCOUNTER (OUTPATIENT)
Dept: RADIOLOGY | Facility: MEDICAL CENTER | Age: 76
End: 2021-02-10
Attending: FAMILY MEDICINE
Payer: MEDICARE

## 2021-02-10 DIAGNOSIS — Z78.0 MENOPAUSE: ICD-10-CM

## 2021-02-10 PROCEDURE — 77080 DXA BONE DENSITY AXIAL: CPT

## 2021-03-11 ENCOUNTER — HOSPITAL ENCOUNTER (OUTPATIENT)
Dept: RADIOLOGY | Facility: MEDICAL CENTER | Age: 76
End: 2021-03-11
Attending: FAMILY MEDICINE
Payer: MEDICARE

## 2021-03-11 DIAGNOSIS — E03.9 HYPOTHYROIDISM, UNSPECIFIED TYPE: ICD-10-CM

## 2021-03-11 DIAGNOSIS — Z12.31 ENCOUNTER FOR MAMMOGRAM TO ESTABLISH BASELINE MAMMOGRAM: ICD-10-CM

## 2021-03-11 PROCEDURE — 77063 BREAST TOMOSYNTHESIS BI: CPT

## 2021-03-12 RX ORDER — LEVOTHYROXINE SODIUM 0.07 MG/1
TABLET ORAL
Qty: 90 TABLET | Refills: 3 | Status: SHIPPED | OUTPATIENT
Start: 2021-03-12 | End: 2022-03-10

## 2021-04-09 NOTE — RESULT ENCOUNTER NOTE
Released to [facility]  Your mammogram was normal! Next is due in one year.   Please let me know immediately if you notice any new lumps/rashes/pain/nipple discharge.  Frances Laboy M.D.

## 2021-05-23 ENCOUNTER — PATIENT MESSAGE (OUTPATIENT)
Dept: MEDICAL GROUP | Facility: PHYSICIAN GROUP | Age: 76
End: 2021-05-23

## 2021-05-24 ENCOUNTER — OFFICE VISIT (OUTPATIENT)
Dept: MEDICAL GROUP | Facility: PHYSICIAN GROUP | Age: 76
End: 2021-05-24
Payer: MEDICARE

## 2021-05-24 ENCOUNTER — NURSE TRIAGE (OUTPATIENT)
Dept: HEALTH INFORMATION MANAGEMENT | Facility: OTHER | Age: 76
End: 2021-05-24

## 2021-05-24 VITALS
OXYGEN SATURATION: 98 % | HEIGHT: 60 IN | DIASTOLIC BLOOD PRESSURE: 70 MMHG | WEIGHT: 124 LBS | SYSTOLIC BLOOD PRESSURE: 110 MMHG | RESPIRATION RATE: 12 BRPM | TEMPERATURE: 97.8 F | HEART RATE: 100 BPM | BODY MASS INDEX: 24.35 KG/M2

## 2021-05-24 DIAGNOSIS — R06.02 SOB (SHORTNESS OF BREATH): ICD-10-CM

## 2021-05-24 DIAGNOSIS — H93.8X3 SENSATION OF FULLNESS IN BOTH EARS: ICD-10-CM

## 2021-05-24 PROCEDURE — 99213 OFFICE O/P EST LOW 20 MIN: CPT | Performed by: NURSE PRACTITIONER

## 2021-05-24 ASSESSMENT — FIBROSIS 4 INDEX: FIB4 SCORE: 0.61

## 2021-05-24 ASSESSMENT — PATIENT HEALTH QUESTIONNAIRE - PHQ9: CLINICAL INTERPRETATION OF PHQ2 SCORE: 0

## 2021-05-24 NOTE — TELEPHONE ENCOUNTER
From: Makenna Culver  To: Physician Frances Laboy  Sent: 5/23/2021 8:17 PM PDT  Subject: Non-Urgent Medical Question    have had an ear ache for about 5 days now and a sore throat. there isn't a place where i can check in before a go to urgent care. Do I just show up. amadeo lion

## 2021-05-24 NOTE — ASSESSMENT & PLAN NOTE
Patient has had bilateral ear discomfort, right greater than left over the past 5 days  She feels like the pain in her right ear comes and goes but there does not report any other associated symptoms such as cough, fever or body aches, denies any ear drainage  Upon physical examination she was noted to have retracted TMs bilaterally, no sign of infection  Discussed most likely causes seasonal allergies  Not currently taking anything for her allergies, she was advised to consider taking second-generation antihistamine, Flonase and generous use of nasal saline  She is to follow-up if symptoms do not improve, worsen or she develops any other associated symptoms over the next 7 days

## 2021-05-24 NOTE — PATIENT INSTRUCTIONS
Consider using flonase 1 spray to each nostril twice a day, as well second generation anti-histamine such as zyrtec, also use nasal saline generously

## 2021-05-24 NOTE — PROGRESS NOTES
Chief Complaint   Patient presents with   • Ear Fullness       HISTORY OF PRESENT ILLNESS: Patient is a 75 y.o. female established patient who presents today for bilateral ear fullness    Sensation of fullness in both ears  Patient has had bilateral ear discomfort, right greater than left over the past 5 days  She feels like the pain in her right ear comes and goes but there does not report any other associated symptoms such as cough, fever or body aches, denies any ear drainage  Upon physical examination she was noted to have retracted TMs bilaterally, no sign of infection  Discussed most likely causes seasonal allergies  Not currently taking anything for her allergies, she was advised to consider taking second-generation antihistamine, Flonase and generous use of nasal saline  She is to follow-up if symptoms do not improve, worsen or she develops any other associated symptoms over the next 7 days      Patient Active Problem List    Diagnosis Date Noted   • Graves' ophthalmopathy 07/09/2015   • Osteoarthritis of multiple joints 06/13/2014   • Graves' disease with exophthalmos 06/13/2014   • Hyperlipidemia 06/22/2011   • Degeneration of lumbar or lumbosacral intervertebral disc 10/09/2009   • Hypothyroidism 10/09/2009   • Pain in joint, shoulder region 10/09/2009   • Dermatochalasis of both upper eyelids 04/20/2016   • Other and combined forms of senile cataract 03/17/2015   • Osteopenia 05/09/2012   • Post menopausal syndrome 03/08/2012   • History of peptic ulcer 11/04/2009   • GERD (gastroesophageal reflux disease) 11/04/2009   • Sensation of fullness in both ears 05/24/2021   • Urinary frequency 07/01/2020   • Environmental allergies 05/28/2020   • Primary insomnia 05/28/2020   • Left wrist pain 11/27/2019   • Preoperative clearance 05/09/2019   • Hospital discharge follow-up 04/26/2019   • Colitis 04/15/2019   • Pulmonary nodule 04/15/2019   • Liver lesion 04/15/2019   • Sacroiliitis, not elsewhere classified  ([facility]) 2018   • Right knee pain 2017   • Senile nuclear sclerosis 2015       Allergies:Codeine, Morphine, and Vicodin [hydrocodone-acetaminophen]    Current Outpatient Medications   Medication Sig Dispense Refill   • levothyroxine (SYNTHROID) 75 MCG Tab TAKE 1 TABLET BY MOUTH IN THE MORNING ON AN EMPTY STOMACH 90 tablet 3   • albuterol 108 (90 Base) MCG/ACT Aero Soln inhalation aerosol Inhale 2 Puffs every 6 hours as needed for Shortness of Breath. 8.5 g 1   • omeprazole (PRILOSEC) 40 MG delayed-release capsule Take 1 capsule by mouth once daily 90 Cap 1   • estradiol (ESTRACE) 1 MG Tab Take 1 tablet by mouth once daily 90 Tab 1   • Cholecalciferol (VITAMIN D) 50 MCG (2000 UT) Cap Take 1 Cap by mouth every day.     • B Complex Vitamins (VITAMIN B-COMPLEX PO) Take 1 Cap by mouth every day.     • Multiple Vitamins-Minerals (PRESERVISION AREDS 2 PO) Take 1 Dose by mouth every day.     • cyclobenzaprine (FLEXERIL) 10 mg Tab Take 1 Tab by mouth 3 times a day as needed. 90 Tab 3   • traZODone (DESYREL) 50 MG Tab Take 1 Tab by mouth at bedtime as needed for Sleep. 90 Tab 3   • atorvastatin (LIPITOR) 10 MG Tab Take 1 Tab by mouth every 48 hours. (every other day) for high cholesterol. 50 Tab 3   • ibuprofen (MOTRIN) 800 MG Tab Take 800 mg by mouth 3 times a day as needed. TID PRN, WITH MEALS. PER PATIENT'S MED LIST BROUGHT ON MRI APPT 6.10.20     • tramadol (ULTRAM) 50 MG Tab Take 50 mg by mouth every 6 hours as needed for Mild Pain.       No current facility-administered medications for this visit.       Social History     Tobacco Use   • Smoking status: Former Smoker     Packs/day: 1.00     Years: 50.00     Pack years: 50.00     Types: Cigarettes     Quit date: 2013     Years since quittin.3   • Smokeless tobacco: Never Used   • Tobacco comment: use E cig   Vaping Use   • Vaping Use: Never used   Substance Use Topics   • Alcohol use: Yes     Alcohol/week: 0.6 oz     Types: 1 Glasses of wine per  week     Comment:      0-1 per month                          • Drug use: No       Family Status   Relation Name Status   • Mo   at age 71 yo        had a hx. of stomach problems   • Fa          commited suicide   • Sis  Alive        3 sisters with thyroid problems   • Sis pt. daughter Alive        has a hx. of breast cyst   • Neg Hx  (Not Specified)     Family History   Problem Relation Age of Onset   • Hypertension Father    • Heart Disease Father         MI age 49 yo   • Cancer Neg Hx    • Diabetes Neg Hx        Review of Systems:   Constitutional: Negative for fever, chills, weight loss and malaise/fatigue.   HENT: Positive per HPI   Respiratory: Negative for cough, sputum production, shortness of breath and wheezing.    Cardiovascular: Negative for chest pain, palpitations, orthopnea and leg swelling.   Gastrointestinal: Negative for heartburn, nausea, vomiting and abdominal pain.   Genitourinary/Renal: Negative for dysuria, urgency and frequency.   Musculoskeletal: Negative for myalgias, back pain and joint pain.   Skin: Negative for rash and itching.   Neurological: Negative for dizziness, tingling, tremors, sensory change, focal weakness and headaches.       Exam:  /70   Pulse 100   Temp 36.6 °C (97.8 °F) (Temporal)   Resp 12   Ht 1.524 m (5')   Wt 56.2 kg (124 lb)   SpO2 98%   General:  Well nourished, well developed female in NAD  Skin: warm, dry, intact, no evidence of rash or concerning lesions  Head: is grossly normal.  HEENT: eyes clear, conjunctiva normal, PERRLA,TMs with no evidence of infection but bilaterally retracted  Neck: Supple without JVD or bruit. Thyroid is not enlarged.  Pulmonary: Normal rate, rhythm and effort  Musculoskeletal: no clubbing, cyanosis, or edema.  Psych/mental: no depression, anxiety, hallucinations  Neuro: alert, intact, CN 2-12 grossly intact    Medical decision-making and discussion:    As mentioned above discussed the importance of allergy  regimen to include second-generation antihistamine, Flonase and generous use of nasal saline.  She is advised to follow-up if symptoms do not improve over the next 5 to 7 days or if she develops any other associated symptoms        Assessment/Plan:  Makenna was seen today for ear fullness.    Diagnoses and all orders for this visit:    Sensation of fullness in both ears         Return if symptoms worsen or fail to improve, for Follow-up.    Please note that this dictation was created using voice recognition software. I have made every reasonable attempt to correct obvious errors, but I expect that there are errors of grammar and possibly content that I did not discover before finalizing the note.

## 2021-05-24 NOTE — TELEPHONE ENCOUNTER
1. Caller Name: Makenna Culver  2.                  Call Back Number: [phone]  [facility] PCP or Specialty Provider: Yes         2. Has the patient previously tested positive for COVID-19? No    3.  In the last two weeks, has the patient had any new or worsening symptoms (not explained by alternative diagnosis)? No.    4.  Does patient have any comoribidities? None     5.  Has the patient had any known contact with someone who is suspected or confirmed to have COVID-19? No.    5. Disposition: Cleared by RN Triage as potential is low for COVID-19; OK to keep/schedule appointment    Note routed to [facility] Provider: NAZ only.

## 2021-05-25 RX ORDER — ALBUTEROL SULFATE 90 UG/1
2 AEROSOL, METERED RESPIRATORY (INHALATION) EVERY 6 HOURS PRN
Qty: 8.5 G | Refills: 1 | Status: SHIPPED | OUTPATIENT
Start: 2021-05-25 | End: 2021-09-16 | Stop reason: SDUPTHER

## 2021-06-08 ENCOUNTER — TELEPHONE (OUTPATIENT)
Dept: MEDICAL GROUP | Facility: PHYSICIAN GROUP | Age: 76
End: 2021-06-08

## 2021-06-08 NOTE — TELEPHONE ENCOUNTER
----- Message from Greyson Pablo M.D. sent at 6/7/2021  4:07 PM PDT -----  Mild bone loss in test, she needs to increase vit d and exercise

## 2021-06-11 ENCOUNTER — TELEPHONE (OUTPATIENT)
Dept: MEDICAL GROUP | Facility: PHYSICIAN GROUP | Age: 76
End: 2021-06-11
Payer: MEDICARE

## 2021-06-15 RX ORDER — OMEPRAZOLE 40 MG/1
CAPSULE, DELAYED RELEASE ORAL
Qty: 90 CAPSULE | Refills: 3 | Status: SHIPPED | OUTPATIENT
Start: 2021-06-15 | End: 2022-06-07

## 2021-06-15 NOTE — TELEPHONE ENCOUNTER
Received request via: Pharmacy    Was the patient seen in the last year in this department? Yes    Does the patient have an active prescription (recently filled or refills available) for medication(s) requested? No      Last office visit:05/24/2021    Last Labs: 02/01/2021

## 2021-06-16 ENCOUNTER — PATIENT MESSAGE (OUTPATIENT)
Dept: MEDICAL GROUP | Facility: PHYSICIAN GROUP | Age: 76
End: 2021-06-16

## 2021-06-16 DIAGNOSIS — Z79.890 POSTMENOPAUSAL HRT (HORMONE REPLACEMENT THERAPY): ICD-10-CM

## 2021-06-16 RX ORDER — ESTRADIOL 1 MG/1
TABLET ORAL
Qty: 90 TABLET | Refills: 0 | Status: SHIPPED | OUTPATIENT
Start: 2021-06-16 | End: 2021-09-13

## 2021-06-17 NOTE — TELEPHONE ENCOUNTER
From: Makenna Culver  To: Physician Frances Laboy  Sent: 6/16/2021 7:16 PM PDT  Subject: Non-Urgent Medical Question    I got a call from your MA i called her back at 1pm she hasn't called me bad. what is going on with my test that you already told me to do? I have been taking 2 @ 2000IU (50 mcg) for about 9 months. calcium is with d also. my calcium is (Ca/650mg) vitamin D 12.5 (500IU) vitamin K 40mg.2 chew= 100% DV of calcium.I have only been taking 1 a day. I hope that helps you to decide what I should be taking. [email or web address] lion   Patient wanted to know how he can go about getting a referral for a second opinion. He has already seen Dr. Mitchell and has another appointment set up with him but want to see another doctor for a second opinion on his plantar fascitis

## 2021-07-12 PROBLEM — M51.36 LUMBAR DEGENERATIVE DISC DISEASE: Status: ACTIVE | Noted: 2021-07-12

## 2021-07-12 PROBLEM — M51.369 LUMBAR DEGENERATIVE DISC DISEASE: Status: ACTIVE | Noted: 2021-07-12

## 2021-07-12 PROBLEM — M79.10 MYALGIA: Status: ACTIVE | Noted: 2021-07-12

## 2021-07-12 PROBLEM — M54.16 LUMBAR RADICULITIS: Status: ACTIVE | Noted: 2021-07-12

## 2021-08-03 ENCOUNTER — PATIENT MESSAGE (OUTPATIENT)
Dept: HEALTH INFORMATION MANAGEMENT | Facility: OTHER | Age: 76
End: 2021-08-03

## 2021-08-11 ENCOUNTER — HOSPITAL ENCOUNTER (OUTPATIENT)
Dept: LAB | Facility: MEDICAL CENTER | Age: 76
End: 2021-08-11
Attending: FAMILY MEDICINE
Payer: MEDICARE

## 2021-08-11 DIAGNOSIS — E55.9 VITAMIN D DEFICIENCY: ICD-10-CM

## 2021-08-11 DIAGNOSIS — E78.2 MIXED HYPERLIPIDEMIA: ICD-10-CM

## 2021-08-11 DIAGNOSIS — E03.9 HYPOTHYROIDISM, UNSPECIFIED TYPE: ICD-10-CM

## 2021-08-11 LAB
25(OH)D3 SERPL-MCNC: 92 NG/ML (ref 30–100)
ALBUMIN SERPL BCP-MCNC: 4.3 G/DL (ref 3.2–4.9)
ALBUMIN/GLOB SERPL: 1.8 G/DL
ALP SERPL-CCNC: 70 U/L (ref 30–99)
ALT SERPL-CCNC: 14 U/L (ref 2–50)
ANION GAP SERPL CALC-SCNC: 11 MMOL/L (ref 7–16)
AST SERPL-CCNC: 18 U/L (ref 12–45)
BILIRUB SERPL-MCNC: 0.3 MG/DL (ref 0.1–1.5)
BUN SERPL-MCNC: 9 MG/DL (ref 8–22)
CALCIUM SERPL-MCNC: 9.6 MG/DL (ref 8.5–10.5)
CHLORIDE SERPL-SCNC: 101 MMOL/L (ref 96–112)
CHOLEST SERPL-MCNC: 238 MG/DL (ref 100–199)
CO2 SERPL-SCNC: 26 MMOL/L (ref 20–33)
CREAT SERPL-MCNC: 0.68 MG/DL (ref 0.5–1.4)
FASTING STATUS PATIENT QL REPORTED: NORMAL
GLOBULIN SER CALC-MCNC: 2.4 G/DL (ref 1.9–3.5)
GLUCOSE SERPL-MCNC: 91 MG/DL (ref 65–99)
HDLC SERPL-MCNC: 86 MG/DL
LDLC SERPL CALC-MCNC: 120 MG/DL
POTASSIUM SERPL-SCNC: 4.5 MMOL/L (ref 3.6–5.5)
PROT SERPL-MCNC: 6.7 G/DL (ref 6–8.2)
SODIUM SERPL-SCNC: 138 MMOL/L (ref 135–145)
TRIGL SERPL-MCNC: 159 MG/DL (ref 0–149)
TSH SERPL DL<=0.005 MIU/L-ACNC: 0.39 UIU/ML (ref 0.38–5.33)

## 2021-08-11 PROCEDURE — 82306 VITAMIN D 25 HYDROXY: CPT

## 2021-08-11 PROCEDURE — 84443 ASSAY THYROID STIM HORMONE: CPT

## 2021-08-11 PROCEDURE — 80053 COMPREHEN METABOLIC PANEL: CPT

## 2021-08-11 PROCEDURE — 80061 LIPID PANEL: CPT

## 2021-08-11 PROCEDURE — 36415 COLL VENOUS BLD VENIPUNCTURE: CPT

## 2021-08-12 ENCOUNTER — TELEPHONE (OUTPATIENT)
Dept: MEDICAL GROUP | Facility: PHYSICIAN GROUP | Age: 76
End: 2021-08-12

## 2021-08-12 ENCOUNTER — PATIENT MESSAGE (OUTPATIENT)
Dept: MEDICAL GROUP | Facility: PHYSICIAN GROUP | Age: 76
End: 2021-08-12

## 2021-08-12 NOTE — TELEPHONE ENCOUNTER
Phone Number Called: [phone] (home) [phone] (work)      Call outcome: Did not leave a detailed message. Requested patient to call back.    Message: Called to inform patient results and to re-schedule appointment.

## 2021-08-13 NOTE — TELEPHONE ENCOUNTER
From: Makenna Culver  To: Nurse Practitioner Gila Mejia  Sent: 8/12/2021 7:03 PM PDT  Subject: appt.    need an appt., Dr. Laboy [place]. This is my yearly check up and follow-up on blood test i did wednesday. ty makenna culver

## 2021-08-24 ENCOUNTER — PATIENT MESSAGE (OUTPATIENT)
Dept: MEDICAL GROUP | Facility: PHYSICIAN GROUP | Age: 76
End: 2021-08-24

## 2021-08-25 NOTE — TELEPHONE ENCOUNTER
From: Makenna Culver  To: Physician Frances Laboy  Sent: 8/24/2021 7:27 PM PDT  Subject: refill    I have no refills on TRAZODONE for sleeping can you please call one in [facility]. makenna hope your family is okay

## 2021-08-29 DIAGNOSIS — E78.2 MIXED HYPERLIPIDEMIA: ICD-10-CM

## 2021-08-29 RX ORDER — ATORVASTATIN CALCIUM 10 MG/1
TABLET, FILM COATED ORAL
Qty: 50 TABLET | Refills: 0 | Status: SHIPPED | OUTPATIENT
Start: 2021-08-29 | End: 2021-12-16

## 2021-08-30 SDOH — ECONOMIC STABILITY: FOOD INSECURITY: WITHIN THE PAST 12 MONTHS, THE FOOD YOU BOUGHT JUST DIDN'T LAST AND YOU DIDN'T HAVE MONEY TO GET MORE.: NEVER TRUE

## 2021-08-30 SDOH — ECONOMIC STABILITY: INCOME INSECURITY: IN THE LAST 12 MONTHS, WAS THERE A TIME WHEN YOU WERE NOT ABLE TO PAY THE MORTGAGE OR RENT ON TIME?: NO

## 2021-08-30 SDOH — HEALTH STABILITY: PHYSICAL HEALTH: ON AVERAGE, HOW MANY DAYS PER WEEK DO YOU ENGAGE IN MODERATE TO STRENUOUS EXERCISE (LIKE A BRISK WALK)?: 5 DAYS

## 2021-08-30 SDOH — ECONOMIC STABILITY: INCOME INSECURITY: HOW HARD IS IT FOR YOU TO PAY FOR THE VERY BASICS LIKE FOOD, HOUSING, MEDICAL CARE, AND HEATING?: NOT HARD AT ALL

## 2021-08-30 SDOH — ECONOMIC STABILITY: HOUSING INSECURITY: IN THE LAST 12 MONTHS, HOW MANY PLACES HAVE YOU LIVED?: 1

## 2021-08-30 SDOH — ECONOMIC STABILITY: HOUSING INSECURITY
IN THE LAST 12 MONTHS, WAS THERE A TIME WHEN YOU DID NOT HAVE A STEADY PLACE TO SLEEP OR SLEPT IN A SHELTER (INCLUDING NOW)?: NO

## 2021-08-30 SDOH — ECONOMIC STABILITY: TRANSPORTATION INSECURITY
IN THE PAST 12 MONTHS, HAS LACK OF RELIABLE TRANSPORTATION KEPT YOU FROM MEDICAL APPOINTMENTS, MEETINGS, WORK OR FROM GETTING THINGS NEEDED FOR DAILY LIVING?: NO

## 2021-08-30 SDOH — ECONOMIC STABILITY: TRANSPORTATION INSECURITY
IN THE PAST 12 MONTHS, HAS THE LACK OF TRANSPORTATION KEPT YOU FROM MEDICAL APPOINTMENTS OR FROM GETTING MEDICATIONS?: NO

## 2021-08-30 SDOH — ECONOMIC STABILITY: FOOD INSECURITY: WITHIN THE PAST 12 MONTHS, YOU WORRIED THAT YOUR FOOD WOULD RUN OUT BEFORE YOU GOT MONEY TO BUY MORE.: NEVER TRUE

## 2021-08-30 SDOH — HEALTH STABILITY: MENTAL HEALTH
STRESS IS WHEN SOMEONE FEELS TENSE, NERVOUS, ANXIOUS, OR CAN'T SLEEP AT NIGHT BECAUSE THEIR MIND IS TROUBLED. HOW STRESSED ARE YOU?: NOT AT ALL

## 2021-08-30 SDOH — HEALTH STABILITY: PHYSICAL HEALTH: ON AVERAGE, HOW MANY MINUTES DO YOU ENGAGE IN EXERCISE AT THIS LEVEL?: 150+ MIN

## 2021-08-30 SDOH — ECONOMIC STABILITY: TRANSPORTATION INSECURITY
IN THE PAST 12 MONTHS, HAS LACK OF TRANSPORTATION KEPT YOU FROM MEETINGS, WORK, OR FROM GETTING THINGS NEEDED FOR DAILY LIVING?: NO

## 2021-08-30 ASSESSMENT — SOCIAL DETERMINANTS OF HEALTH (SDOH)
WITHIN THE PAST 12 MONTHS, YOU WORRIED THAT YOUR FOOD WOULD RUN OUT BEFORE YOU GOT THE MONEY TO BUY MORE: NEVER TRUE
HOW OFTEN DO YOU GET TOGETHER WITH FRIENDS OR RELATIVES?: ONCE A WEEK
IN A TYPICAL WEEK, HOW MANY TIMES DO YOU TALK ON THE PHONE WITH FAMILY, FRIENDS, OR NEIGHBORS?: TWICE A WEEK
HOW MANY DRINKS CONTAINING ALCOHOL DO YOU HAVE ON A TYPICAL DAY WHEN YOU ARE DRINKING: 1 OR 2
HOW HARD IS IT FOR YOU TO PAY FOR THE VERY BASICS LIKE FOOD, HOUSING, MEDICAL CARE, AND HEATING?: NOT HARD AT ALL
HOW OFTEN DO YOU GET TOGETHER WITH FRIENDS OR RELATIVES?: ONCE A WEEK
HOW OFTEN DO YOU ATTEND CHURCH OR RELIGIOUS SERVICES?: NEVER
HOW OFTEN DO YOU ATTENT MEETINGS OF THE CLUB OR ORGANIZATION YOU BELONG TO?: NEVER
IN A TYPICAL WEEK, HOW MANY TIMES DO YOU TALK ON THE PHONE WITH FAMILY, FRIENDS, OR NEIGHBORS?: TWICE A WEEK
DO YOU BELONG TO ANY CLUBS OR ORGANIZATIONS SUCH AS CHURCH GROUPS UNIONS, FRATERNAL OR ATHLETIC GROUPS, OR SCHOOL GROUPS?: NO
HOW OFTEN DO YOU ATTENT MEETINGS OF THE CLUB OR ORGANIZATION YOU BELONG TO?: NEVER
DO YOU BELONG TO ANY CLUBS OR ORGANIZATIONS SUCH AS CHURCH GROUPS UNIONS, FRATERNAL OR ATHLETIC GROUPS, OR SCHOOL GROUPS?: NO
HOW OFTEN DO YOU HAVE A DRINK CONTAINING ALCOHOL: MONTHLY OR LESS
HOW OFTEN DO YOU ATTEND CHURCH OR RELIGIOUS SERVICES?: NEVER
HOW OFTEN DO YOU HAVE SIX OR MORE DRINKS ON ONE OCCASION: NEVER

## 2021-08-30 ASSESSMENT — LIFESTYLE VARIABLES
HOW OFTEN DO YOU HAVE A DRINK CONTAINING ALCOHOL: MONTHLY OR LESS
HOW OFTEN DO YOU HAVE SIX OR MORE DRINKS ON ONE OCCASION: NEVER
HOW MANY STANDARD DRINKS CONTAINING ALCOHOL DO YOU HAVE ON A TYPICAL DAY: 1 OR 2

## 2021-09-02 ENCOUNTER — OFFICE VISIT (OUTPATIENT)
Dept: MEDICAL GROUP | Facility: PHYSICIAN GROUP | Age: 76
End: 2021-09-02
Payer: MEDICARE

## 2021-09-02 VITALS
HEART RATE: 78 BPM | DIASTOLIC BLOOD PRESSURE: 62 MMHG | OXYGEN SATURATION: 97 % | SYSTOLIC BLOOD PRESSURE: 102 MMHG | RESPIRATION RATE: 12 BRPM | WEIGHT: 122 LBS | TEMPERATURE: 98 F | HEIGHT: 61 IN | BODY MASS INDEX: 23.03 KG/M2

## 2021-09-02 DIAGNOSIS — M79.10 MYALGIA: ICD-10-CM

## 2021-09-02 DIAGNOSIS — M54.16 LUMBAR RADICULITIS: ICD-10-CM

## 2021-09-02 DIAGNOSIS — R35.0 URINARY FREQUENCY: ICD-10-CM

## 2021-09-02 DIAGNOSIS — Z00.00 ENCOUNTER FOR MEDICARE ANNUAL WELLNESS EXAM: ICD-10-CM

## 2021-09-02 DIAGNOSIS — M51.37 DEGENERATION OF LUMBAR OR LUMBOSACRAL INTERVERTEBRAL DISC: ICD-10-CM

## 2021-09-02 DIAGNOSIS — M25.532 LEFT WRIST PAIN: ICD-10-CM

## 2021-09-02 DIAGNOSIS — E03.9 HYPOTHYROIDISM, UNSPECIFIED TYPE: ICD-10-CM

## 2021-09-02 DIAGNOSIS — M85.89 OSTEOPENIA OF MULTIPLE SITES: ICD-10-CM

## 2021-09-02 DIAGNOSIS — K52.9 COLITIS: ICD-10-CM

## 2021-09-02 DIAGNOSIS — Z87.11 HISTORY OF PEPTIC ULCER: ICD-10-CM

## 2021-09-02 DIAGNOSIS — F17.210 TOBACCO DEPENDENCE DUE TO CIGARETTES: ICD-10-CM

## 2021-09-02 DIAGNOSIS — E05.00 GRAVES' OPHTHALMOPATHY: ICD-10-CM

## 2021-09-02 DIAGNOSIS — E78.2 MIXED HYPERLIPIDEMIA: ICD-10-CM

## 2021-09-02 DIAGNOSIS — K76.9 LIVER LESION: ICD-10-CM

## 2021-09-02 DIAGNOSIS — Z12.12 SCREENING FOR COLORECTAL CANCER: ICD-10-CM

## 2021-09-02 DIAGNOSIS — R91.1 PULMONARY NODULE: ICD-10-CM

## 2021-09-02 DIAGNOSIS — Z23 NEED FOR VACCINATION: ICD-10-CM

## 2021-09-02 DIAGNOSIS — N95.1 POST MENOPAUSAL SYNDROME: ICD-10-CM

## 2021-09-02 DIAGNOSIS — Z09 HOSPITAL DISCHARGE FOLLOW-UP: ICD-10-CM

## 2021-09-02 DIAGNOSIS — K21.9 GASTROESOPHAGEAL REFLUX DISEASE WITHOUT ESOPHAGITIS: ICD-10-CM

## 2021-09-02 DIAGNOSIS — E05.00 GRAVES' DISEASE WITH EXOPHTHALMOS: ICD-10-CM

## 2021-09-02 DIAGNOSIS — Z12.11 SCREENING FOR COLORECTAL CANCER: ICD-10-CM

## 2021-09-02 DIAGNOSIS — M15.9 PRIMARY OSTEOARTHRITIS INVOLVING MULTIPLE JOINTS: ICD-10-CM

## 2021-09-02 DIAGNOSIS — H25.10 NUCLEAR SENILE CATARACT, UNSPECIFIED LATERALITY: ICD-10-CM

## 2021-09-02 DIAGNOSIS — M51.36 LUMBAR DEGENERATIVE DISC DISEASE: ICD-10-CM

## 2021-09-02 DIAGNOSIS — H93.8X3 SENSATION OF FULLNESS IN BOTH EARS: ICD-10-CM

## 2021-09-02 DIAGNOSIS — F51.01 PRIMARY INSOMNIA: ICD-10-CM

## 2021-09-02 DIAGNOSIS — M46.1 SACROILIITIS, NOT ELSEWHERE CLASSIFIED (HCC): ICD-10-CM

## 2021-09-02 PROBLEM — Z91.09 ENVIRONMENTAL ALLERGIES: Status: RESOLVED | Noted: 2020-05-28 | Resolved: 2021-09-02

## 2021-09-02 PROBLEM — M25.561 RIGHT KNEE PAIN: Status: RESOLVED | Noted: 2017-04-21 | Resolved: 2021-09-02

## 2021-09-02 PROBLEM — Z01.818 PREOPERATIVE CLEARANCE: Status: RESOLVED | Noted: 2019-05-09 | Resolved: 2021-09-02

## 2021-09-02 PROCEDURE — G0439 PPPS, SUBSEQ VISIT: HCPCS | Mod: 25 | Performed by: NURSE PRACTITIONER

## 2021-09-02 PROCEDURE — 90750 HZV VACC RECOMBINANT IM: CPT | Performed by: NURSE PRACTITIONER

## 2021-09-02 PROCEDURE — 90471 IMMUNIZATION ADMIN: CPT | Performed by: NURSE PRACTITIONER

## 2021-09-02 ASSESSMENT — ACTIVITIES OF DAILY LIVING (ADL): BATHING_REQUIRES_ASSISTANCE: 0

## 2021-09-02 ASSESSMENT — FIBROSIS 4 INDEX: FIB4 SCORE: 0.6

## 2021-09-02 ASSESSMENT — PATIENT HEALTH QUESTIONNAIRE - PHQ9: CLINICAL INTERPRETATION OF PHQ2 SCORE: 0

## 2021-09-02 ASSESSMENT — ENCOUNTER SYMPTOMS: GENERAL WELL-BEING: GOOD

## 2021-09-02 NOTE — ASSESSMENT & PLAN NOTE
This is a chronic condition.  Patient continues to take atorvastatin 10 mg tab daily.  She denies any myalgia.

## 2021-09-02 NOTE — PROGRESS NOTES
Chief Complaint   Patient presents with   • Medicare Annual Wellness     Patient is here for her Medicare annual annual wellness exam.  Patient reports that she feels well today.  Patient has multiple comorbidity issues as well as somatic conditions.  These have all been addressed throughout the exam today.    HPI:  Makenna Culver is a 76 y.o. here for Medicare Annual Wellness Visit     Patient Active Problem List    Diagnosis Date Noted   • Tobacco dependence due to cigarettes 09/02/2021   • Lumbar degenerative disc disease 07/12/2021   • Lumbar radiculitis 07/12/2021   • Myalgia 07/12/2021   • Sensation of fullness in both ears 05/24/2021   • Urinary frequency 07/01/2020   • Primary insomnia 05/28/2020   • Hospital discharge follow-up 04/26/2019   • Colitis 04/15/2019   • Pulmonary nodule 04/15/2019   • Liver lesion 04/15/2019   • Sacroiliitis, not elsewhere classified (HCC) 07/25/2018   • Graves' ophthalmopathy 07/09/2015   • Other and combined forms of senile cataract 03/17/2015   • Senile nuclear sclerosis 03/03/2015   • Osteoarthritis of multiple joints 06/13/2014   • Graves' disease with exophthalmos 06/13/2014   • Osteopenia 05/09/2012   • Hyperlipidemia 06/22/2011   • GERD (gastroesophageal reflux disease) 11/04/2009   • Degeneration of lumbar or lumbosacral intervertebral disc 10/09/2009   • Hypothyroidism 10/09/2009       Current Outpatient Medications   Medication Sig Dispense Refill   • diclofenac sodium (VOLTAREN) 1 % Gel APPLY 1 3 GRAMS TOPICALLY 3 TIMES DAILY.     • traMADol (ULTRAM) 50 MG Tab 1 PO QD PRN Pain    30 day supply 30 Tablet 0   • atorvastatin (LIPITOR) 10 MG Tab TAKE 1 TABLET BY MOUTH EVERY OTHER DAY FOR  HIGH  CHOLESTEROL. 50 Tablet 0   • traZODone (DESYREL) 50 MG Tab TAKE 1 TABLET BY MOUTH AT BEDTIME AS NEEDED FOR SLEEP 90 Tablet 0   • ibuprofen (MOTRIN) 800 MG Tab TAKE 1 TABLET BY MOUTH THREE TIMES DAILY WITH FOOD 90 tablet 0   • estradiol (ESTRACE) 1 MG Tab Take 1 tablet by mouth  once daily 90 tablet 0   • omeprazole (PRILOSEC) 40 MG delayed-release capsule Take 1 capsule by mouth once daily 90 capsule 3   • albuterol 108 (90 Base) MCG/ACT Aero Soln inhalation aerosol Inhale 2 Puffs every 6 hours as needed for Shortness of Breath. 8.5 g 1   • levothyroxine (SYNTHROID) 75 MCG Tab TAKE 1 TABLET BY MOUTH IN THE MORNING ON AN EMPTY STOMACH 90 tablet 3   • Cholecalciferol (VITAMIN D) 50 MCG (2000 UT) Cap Take 1 Cap by mouth every day.     • B Complex Vitamins (VITAMIN B-COMPLEX PO) Take 1 Cap by mouth every day.     • cyclobenzaprine (FLEXERIL) 10 mg Tab Take 1 Tab by mouth 3 times a day as needed. 90 Tab 3   • Multiple Vitamins-Minerals (PRESERVISION AREDS 2 PO) Take 1 Dose by mouth every day. (Patient not taking: Reported on 9/2/2021)       No current facility-administered medications for this visit.          Current supplements as per medication list.     Allergies: Codeine, Morphine, and Vicodin [hydrocodone-acetaminophen]    Current social contact/activities: Patient reports that she has a good friend base and does activities with her .  She also works on her garden at least 3 times per week.    She  reports that she quit smoking about 8 years ago. Her smoking use included cigarettes. She has a 50.00 pack-year smoking history. She has never used smokeless tobacco. She reports current alcohol use of about 0.6 oz of alcohol per week. She reports that she does not use drugs.  Counseling given: Not Answered  Comment: use E cig      DPA/Advanced Directive:  Patient does not have an Advanced Directive.  A packet and workshop information was given on Advanced Directives.    ROS:    Gait: Uses no assistive device  Ostomy: No  Other tubes: No  Amputations: No  Chronic oxygen use: No  Last eye exam: Follows with ophthalmology, next appointment is in November.  Wears hearing aids: No   : Denies any urinary leakage during the last 6 months    Screening:    Depression Screening    Little  interest or pleasure in doing things?  0 - not at all  Feeling down, depressed , or hopeless? 0 - not at all  Patient Health Questionnaire Score: 0     If depressive symptoms identified deferred to follow up visit unless specifically addressed in assessment and plan.    Interpretation of PHQ-9 Total Score   Score Severity   1-4 No Depression   5-9 Mild Depression   10-14 Moderate Depression   15-19 Moderately Severe Depression   20-27 Severe Depression    Screening for Cognitive Impairment    Three Minute Recall (captain, garden, picture) 3/3    Pradip clock face with all 12 numbers and set the hands to show 5 past 8.  Yes    Cognitive concerns identified deferred for follow up unless specifically addressed in assessment and plan.    Fall Risk Assessment    Has the patient had two or more falls in the last year or any fall with injury in the last year?  No    Safety Assessment    Throw rugs on floor.  Yes  Handrails on all stairs.  No  Good lighting in all hallways.  Yes  Difficulty hearing.  No  Patient counseled about all safety risks that were identified.    Functional Assessment ADLs    Are there any barriers preventing you from cooking for yourself or meeting nutritional needs?  No.    Are there any barriers preventing you from driving safely or obtaining transportation?  No.    Are there any barriers preventing you from using a telephone or calling for help?  No.    Are there any barriers preventing you from shopping?  No.    Are there any barriers preventing you from taking care of your own finances?  No.    Are there any barriers preventing you from managing your medications?  No.    Are there any barriers preventing you from showering, bathing or dressing yourself?  No.    Are you currently engaging in any exercise or physical activity?  Yes.     What is your perception of your health?  Good.      Health Maintenance Summary                IMM ZOSTER VACCINES Overdue 9/29/2011      Done 8/4/2011 Imm Admin:  Zoster Vaccine Live (ZVL) (Zostavax) - HISTORICAL DATA    COLORECTAL CANCER SCREENING Overdue 1/15/2020     IMM INFLUENZA Overdue 2021      Done 2019 Imm Admin: Influenza Vaccine Adult HD     Patient has more history with this topic...    MAMMOGRAM Next Due 3/11/2022      Done 3/11/2021 MA-SCREENING MAMMO BILAT W/TOMOSYNTHESIS W/CAD     Patient has more history with this topic...    Annual Wellness Visit Next Due 9/3/2022      Done 2021 SUBSEQUENT ANNUAL WELLNESS VISIT-INCLUDES PPPS ()     Patient has more history with this topic...    IMM DTaP/Tdap/Td Vaccine Next Due 2023      Done 2013 Imm Admin: Tdap Vaccine    BONE DENSITY Next Due 2/10/2026      Done 2/10/2021 DS-BONE DENSITY STUDY (DEXA)     Patient has more history with this topic...          Patient Care Team:  Frances Laboy M.D. as PCP - General (Family Medicine)  Mahesh De Leon M.D. as Consulting Physician (Phys Med and Rehab)  Han San D.O. as Consulting Physician (Neurology)        Social History     Tobacco Use   • Smoking status: Former Smoker     Packs/day: 1.00     Years: 50.00     Pack years: 50.00     Types: Cigarettes     Quit date: 2013     Years since quittin.6   • Smokeless tobacco: Never Used   • Tobacco comment: use E cig   Vaping Use   • Vaping Use: Never used   Substance Use Topics   • Alcohol use: Yes     Alcohol/week: 0.6 oz     Types: 1 Glasses of wine per week     Comment:      0-1 per month                          • Drug use: No     Family History   Problem Relation Age of Onset   • Hypertension Father    • Heart Disease Father         MI age 47 yo   • Cancer Neg Hx    • Diabetes Neg Hx      She  has a past medical history of Arthritis, Colon polyp (2011), Degeneration of lumbar or lumbosacral intervertebral disc, Dental disorder, Ear problems (as child had surgery), Graves' disease with exophthalmos (2014), Hemorrhoid, High cholesterol, History of carpal tunnel repair  (7/31/2013), Menopause (10/14/2009), Osteoarthritis of multiple joints (6/13/2014), Pain, Pain in joint, pelvic region and thigh (2015), Pain in joint, shoulder region (2015), Periodic pelvic examination (due), Postcoital bleeding, Rash (12/29/2014), S/P colonoscopy (12/2011), Screening mammogram ( 11/2008=normal), Tobacco use disorder, Ulcer of the stomach and intestine (hx.), Unspecified cataract, and Unspecified hypothyroidism.   Past Surgical History:   Procedure Laterality Date   • PB REVISE MEDIAN N/CARPAL TUNNEL SURG Right 12/4/2020    Procedure: RELEASE, CARPAL TUNNEL;  Surgeon: Pancho Palomino M.D.;  Location: SURGERY SAME DAY [facility];  Service: Orthopedics   • PB INCIS TENDON SHEATH,RADIAL STYLOID Right 12/4/2020    Procedure: RELEASE, HAND, FOR DEQUERVAIN'S TENOSYNOVITIS;  Surgeon: Pancho Palomino M.D.;  Location: SURGERY SAME DAY [facility];  Service: Orthopedics   • FINGER ARTHROPLASTY Right 12/4/2020    Procedure: ARTHROPLASTY, FINGER-THUMB CARPOMETACARPAL EXCISIONAL;  Surgeon: Pancho Palomino M.D.;  Location: SURGERY SAME DAY [facility];  Service: Orthopedics   • PB REVISE MEDIAN N/CARPAL TUNNEL SURG Left 8/21/2020    Procedure: RELEASE, CARPAL TUNNEL;  Surgeon: Pancho Palomino M.D.;  Location: SURGERY SAME DAY [facility] ORS;  Service: Orthopedics   • PB INCIS TENDON SHEATH,RADIAL STYLOID Left 8/21/2020    Procedure: RELEASE, HAND, FOR DEQUERVAIN'S TENOSYNOVITIS;  Surgeon: Pancho Palomino M.D.;  Location: SURGERY SAME DAY [facility] ORS;  Service: Orthopedics   • PB REPAIR INTERCARP/CARP-METACARP JT Left 8/21/2020    Procedure: EXCISION, TRAPEZIUM, WITH LIGAMENT RECONSTRUCTION AND TENDON INTERPOSITION- PARTIAL TRAPEZOID EXCISION FLEXOR CARPI RADIALIS TENDON GRAFT, PLASCENCIA;  Surgeon: Pancho Palomino M.D.;  Location: SURGERY SAME DAY [facility] OR[facility];  Service: Orthopedics   • FINGER ARTHROPLASTY Left 8/21/2020    Procedure: ARTHROPLASTY, FINGER- THUMB CARPOMETACARPAL EXCISIONAL;   "Surgeon: Pancho Palomino M.D.;  Location: SURGERY SAME DAY [facility];  Service: Orthopedics   • BLEPHAROPLASTY Bilateral 4/20/2016    Procedure: BLEPHAROPLASTY - UPPER;  Surgeon: Moshe Sena M.D.;  Location: SURGERY [facility];  Service:    • RECTUS REPAIR Bilateral 7/9/2015    Procedure: RECTUS REPAIR FOR LT SUPERIOR RECESSION & RT INFERIOR RECESSION ;  Surgeon: Leila Villegas M.D.;  Location: SURGERY SAME DAY [facility];  Service:    • CATARACT PHACO WITH IOL  3/17/2015    Performed by Derrick Gupta M.D. at [facility]   • CATARACT PHACO WITH IOL  3/3/2015    Performed by Derrick Gupta M.D. at [facility]   • TENSILON TEST  11/20/2014    Performed by Maynor San D.O. at [facility]   • CARPAL TUNNEL ENDOSCOPIC  2/8/2013    Performed by Pancho Palomino M.D. at SURGERY SAME DAY [facility]   • CARPAL TUNNEL ENDOSCOPIC  11/17/2012    Performed by Pancho Palomino M.D. at SURGERY [facility]   • ABDOMINAL HYSTERECTOMY TOTAL  partial    fibroid tumors, has ovary and tube left   • OTHER SURGICAL PROCEDURE      ear surgery as child   • THYROIDECTOMY  thyroid ablation with iodine therapy   • TONSILLECTOMY AND ADENOIDECTOMY         Exam:   /62 (BP Location: Right arm, Patient Position: Sitting, BP Cuff Size: Adult)   Pulse 78   Temp 36.7 °C (98 °F)   Resp 12   Ht 1.537 m (5' 0.5\")   Wt 55.3 kg (122 lb)   SpO2 97%  Body mass index is 23.43 kg/m².    Hearing good.    Dentition upper dentures  Alert, oriented in no acute distress.  Eye contact is good, speech goal directed, affect calm    Assessment and Plan. The following treatment and monitoring plan is recommended:   1. Encounter for Medicare annual wellness exam    2. Sacroiliitis, not elsewhere classified (HCC)    3. Need for vaccination  - Shingrix Vaccine  - Subsequent Annual Wellness Visit - Includes PPPS ()    4. Screening for colorectal cancer  - Shingrix " Vaccine  - Subsequent Annual Wellness Visit - Includes PPPS ()    5. Colitis    6. Degeneration of lumbar or lumbosacral intervertebral disc    7. Graves' disease with exophthalmos    8. Graves' ophthalmopathy    9. History of peptic ulcer    10. Mixed hyperlipidemia    11. Hypothyroidism, unspecified type    12. Left wrist pain    13. Liver lesion    14. Lumbar degenerative disc disease    15. Lumbar radiculitis    16. Myalgia    17. Primary osteoarthritis involving multiple joints    18. Osteopenia of multiple sites    19. Post menopausal syndrome    20. Primary insomnia    21. Pulmonary nodule  - REFERRAL TO PULMONARY AND SLEEP MEDICINE    22. Nuclear senile cataract, unspecified laterality    23. Sensation of fullness in both ears    24. Tobacco dependence due to cigarettes    25. Urinary frequency    26. Hospital discharge follow-up    27. Gastroesophageal reflux disease without esophagitis    Other orders  - diclofenac sodium (VOLTAREN) 1 % Gel; APPLY 1 3 GRAMS TOPICALLY 3 TIMES DAILY.      Services suggested: No services needed at this time  Health Care Screening: Age-appropriate preventive services recommended by USPTF and ACIP covered by Medicare were discussed today. Services ordered if indicated and agreed upon by the patient.  Referrals offered: Community-based lifestyle interventions to reduce health risks and promote self-management and wellness, fall prevention, nutrition, physical activity, tobacco-use cessation, weight loss, and mental health services as per orders if indicated.    Discussion today about general wellness and lifestyle habits:    · Prevent falls and reduce trip hazards; Cautioned about securing or removing rugs.  · Have a working fire alarm and carbon monoxide detector;   · Engage in regular physical activity and social activities     Follow-up: Return in about 1 year (around 9/2/2022).

## 2021-09-02 NOTE — PROGRESS NOTES
No chief complaint on file.      Subjective:     HPI:   Makenna Culver is a 76 y.o. female here to discuss the evaluation and management of:        No problem-specific Assessment & Plan notes found for this encounter.          ROS:***  Gen: no fevers/chills, no changes in weight  Eyes: no changes in vision  ENT: no sore throat, no hearing loss, no bloody nose  Pulm: no sob, no cough  CV: no chest pain, no palpitations  GI: no nausea/vomiting, no diarrhea  : no dysuria  MSk: no myalgias  Skin: no rash  Neuro: no headaches, no numbness/tingling  Heme/Lymph: no easy bruising    Allergies   Allergen Reactions   • Codeine Vomiting   • Morphine      Chills    • Vicodin [Hydrocodone-Acetaminophen] Vomiting     OK if takes with phenergan        Current medicines (including changes today)  Current Outpatient Medications   Medication Sig Dispense Refill   • traMADol (ULTRAM) 50 MG Tab 1 PO QD PRN Pain    30 day supply 30 Tablet 0   • atorvastatin (LIPITOR) 10 MG Tab TAKE 1 TABLET BY MOUTH EVERY OTHER DAY FOR  HIGH  CHOLESTEROL. 50 Tablet 0   • traZODone (DESYREL) 50 MG Tab TAKE 1 TABLET BY MOUTH AT BEDTIME AS NEEDED FOR SLEEP 90 Tablet 0   • ibuprofen (MOTRIN) 800 MG Tab TAKE 1 TABLET BY MOUTH THREE TIMES DAILY WITH FOOD 90 tablet 0   • estradiol (ESTRACE) 1 MG Tab Take 1 tablet by mouth once daily 90 tablet 0   • omeprazole (PRILOSEC) 40 MG delayed-release capsule Take 1 capsule by mouth once daily 90 capsule 3   • albuterol 108 (90 Base) MCG/ACT Aero Soln inhalation aerosol Inhale 2 Puffs every 6 hours as needed for Shortness of Breath. 8.5 g 1   • levothyroxine (SYNTHROID) 75 MCG Tab TAKE 1 TABLET BY MOUTH IN THE MORNING ON AN EMPTY STOMACH 90 tablet 3   • Cholecalciferol (VITAMIN D) 50 MCG (2000 UT) Cap Take 1 Cap by mouth every day.     • B Complex Vitamins (VITAMIN B-COMPLEX PO) Take 1 Cap by mouth every day.     • Multiple Vitamins-Minerals (PRESERVISION AREDS 2 PO) Take 1 Dose by mouth every day.     •  cyclobenzaprine (FLEXERIL) 10 mg Tab Take 1 Tab by mouth 3 times a day as needed. 90 Tab 3     No current facility-administered medications for this visit.       Social History     Tobacco Use   • Smoking status: Former Smoker     Packs/day: 1.00     Years: 50.00     Pack years: 50.00     Types: Cigarettes     Quit date: 2013     Years since quittin.6   • Smokeless tobacco: Never Used   • Tobacco comment: use E cig   Vaping Use   • Vaping Use: Never used   Substance Use Topics   • Alcohol use: Yes     Alcohol/week: 0.6 oz     Types: 1 Glasses of wine per week     Comment:      0-1 per month                          • Drug use: No       Patient Active Problem List    Diagnosis Date Noted   • Lumbar degenerative disc disease 2021   • Lumbar radiculitis 2021   • Myalgia 2021   • Sensation of fullness in both ears 2021   • Urinary frequency 2020   • Environmental allergies 2020   • Primary insomnia 2020   • Left wrist pain 2019   • Preoperative clearance 2019   • Hospital discharge follow-up 2019   • Colitis 04/15/2019   • Pulmonary nodule 04/15/2019   • Liver lesion 04/15/2019   • Sacroiliitis, not elsewhere classified (HCC) 2018   • Right knee pain 2017   • Dermatochalasis of both upper eyelids 2016   • Graves' ophthalmopathy 2015   • Other and combined forms of senile cataract 2015   • Senile nuclear sclerosis 2015   • Osteoarthritis of multiple joints 2014   • Graves' disease with exophthalmos 2014   • Osteopenia 2012   • Post menopausal syndrome 2012   • Hyperlipidemia 2011   • History of peptic ulcer 2009   • GERD (gastroesophageal reflux disease) 2009   • Degeneration of lumbar or lumbosacral intervertebral disc 10/09/2009   • Hypothyroidism 10/09/2009   • Pain in joint, shoulder region 10/09/2009       Family History   Problem Relation Age of Onset   • Hypertension  Father    • Heart Disease Father         MI age 49 yo   • Cancer Neg Hx    • Diabetes Neg Hx           Objective:     Hospital Outpatient Visit on 08/11/2021   Component Date Value Ref Range Status   • 25-Hydroxy   Vitamin D 25 08/11/2021 92  30 - 100 ng/mL Final    Comment: Adult Ranges:   <20 ng/mL - Deficiency  20-29 ng/mL - Insufficiency   ng/mL - Sufficiency  Effective 3/18/2020, this electrochemiluminescence binding assay is  performed using Roche shirin e immunoassay analyzer.  The Ele[employer] Vitamin D  total II assay is intended for the quantitative determination of total 25  hydroxyvitamin D in human serum and plasma. This assay is to be used as an  aid in the assessment of vitamin D sufficiency in adults.     • TSH 08/11/2021 0.390  0.380 - 5.330 uIU/mL Final    Comment: Reference Range:    Pregnant Females, 1st Trimester  0.050-3.700  Pregnant Females, 2nd Trimester  0.310-4.350  Pregnant Females, 3rd Trimester  0.410-5.180     • Cholesterol,Tot 08/11/2021 238* 100 - 199 mg/dL Final   • Triglycerides 08/11/2021 159* 0 - 149 mg/dL Final   • HDL 08/11/2021 86  >=40 mg/dL Final   • LDL 08/11/2021 120* <100 mg/dL Final   • Sodium 08/11/2021 138  135 - 145 mmol/L Final   • Potassium 08/11/2021 4.5  3.6 - 5.5 mmol/L Final   • Chloride 08/11/2021 101  96 - 112 mmol/L Final   • Co2 08/11/2021 26  20 - 33 mmol/L Final   • Anion Gap 08/11/2021 11.0  7.0 - 16.0 Final   • Glucose 08/11/2021 91  65 - 99 mg/dL Final   • Bun 08/11/2021 9  8 - 22 mg/dL Final   • Creatinine 08/11/2021 0.68  0.50 - 1.40 mg/dL Final   • Calcium 08/11/2021 9.6  8.5 - 10.5 mg/dL Final   • AST(SGOT) 08/11/2021 18  12 - 45 U/L Final   • ALT(SGPT) 08/11/2021 14  2 - 50 U/L Final   • Alkaline Phosphatase 08/11/2021 70  30 - 99 U/L Final   • Total Bilirubin 08/11/2021 0.3  0.1 - 1.5 mg/dL Final   • Albumin 08/11/2021 4.3  3.2 - 4.9 g/dL Final   • Total Protein 08/11/2021 6.7  6.0 - 8.2 g/dL Final   • Globulin 08/11/2021 2.4  1.9 - 3.5 g/dL Final    • A-G Ratio 08/11/2021 1.8  g/dL Final   • Fasting Status 08/11/2021 Fasting   Final   • GFR If  08/11/2021 >60  >60 mL/min/1.73 m 2 Final   • GFR If Non  08/11/2021 >60  >60 mL/min/1.73 m 2 Final       There were no vitals taken for this visit. There is no height or weight on file to calculate BMI.    Physical Exam:***  Constitutional: Well-developed and well-nourished female in NAD. Not diaphoretic. No distress.   Skin: warm, dry, intact, no evidence of rash or concerning lesions  Head: Atraumatic without lesions.  Eyes: Conjunctivae and extraocular motions are normal. Pupils are equal, round, and reactive to light. No scleral icterus.   Ears:  External ears unremarkable. TMs normal; bilaterally  Mouth/Throat: Tongue normal. Oropharynx is clear and moist. Posterior pharynx without erythema or exudates.  Neck: Supple, trachea midline. No thyromegaly present. No cervical or supraclavicular lymphadenopathy.  Cardiovascular: Regular rate and rhythm without murmur. Radial pulses are intact and equal bilaterally.  Pulmonary: Clear to ausculation. Normal effort. No rales, ronchi, or wheezing.  Abdomen: Soft, non tender, and without distention. Active bowel sounds in all four quadrants. No rebound, guarding, masses   Extremities: No cyanosis, clubbing, erythema, nor edema.   Neurological: Alert and oriented x 3. No cranial nerve deficit. 5/5 myotomes. Sensation intact.   Psychiatric:  Behavior, mood, and affect are appropriate.       Assessment and Plan:     The following treatment plan was discussed:    There are no diagnoses linked to this encounter. ***    Any change or worsening of signs or symptoms, patient encouraged to follow-up or report to emergency room for further evaluation. Patient verbalizes understanding and agrees.    Follow-Up: No follow-ups on file.      PLEASE NOTE: This dictation was created using voice recognition software. I have made every reasonable attempt to  correct obvious errors, but I expect that there are errors of grammar and possibly content that I did not discover before finalizing the note.

## 2021-09-02 NOTE — ASSESSMENT & PLAN NOTE
This is a chronic condition that is well controlled on low-dose estradiol.  Patient denies any symptoms of hot flashes today.  She is up-to-date on her mammogram.

## 2021-09-02 NOTE — ASSESSMENT & PLAN NOTE
Patient reports that this is a chronic condition.  She relates it to working too hard in the yard.  Denies any acute concerns today.

## 2021-09-02 NOTE — ASSESSMENT & PLAN NOTE
"Patient reports that her left eye her cataract surgery is \"flipping\" she does follow closely with ophthalmology and next appointment is in November 2021.  "

## 2021-09-02 NOTE — ASSESSMENT & PLAN NOTE
Chronic and stable condition.  Patient does follow closely with endocrinology.  No acute findings.  Not currently taking any medications.

## 2021-09-02 NOTE — ASSESSMENT & PLAN NOTE
Chronic and stable condition.  Patient is using trazodone as needed to help with sleep.  No refills needed today.

## 2021-09-02 NOTE — ASSESSMENT & PLAN NOTE
Chronic and stable condition.  Patient continues to follow with orthopedics.  No acute findings today.

## 2021-09-02 NOTE — ASSESSMENT & PLAN NOTE
This is a chronic condition with no acute findings today.  Reviewed most recent MRI and not indicating any follow-up needed.

## 2021-09-02 NOTE — ASSESSMENT & PLAN NOTE
Patient is up-to-date on DEXA scan screening.  Reiterated the importance of taking vitamin D, calcium, and aerobic exercise.

## 2021-09-02 NOTE — ASSESSMENT & PLAN NOTE
This is a chronic condition well-controlled with omeprazole.  Patient has no acute complaints today.

## 2021-09-02 NOTE — ASSESSMENT & PLAN NOTE
This is a chronic and stable condition.  Patient is taking tramadol, diclofenac gel, and ibuprofen.  Also he was receiving trigger point injections via orthopedics.

## 2021-09-02 NOTE — ASSESSMENT & PLAN NOTE
This is a chronic condition.  Patient denies any chest pain, shortness of breath, fever or chills.  Patient is due for her CT scan.  Referral was placed to pulmonology to lung node clinic.

## 2021-09-02 NOTE — ASSESSMENT & PLAN NOTE
Patient reports that she quit smoking cigarettes 8 years ago.  She is currently using the e-cigarettes blue are nicotine-based.  Patient is due for follow-up CT with lung node clinic.

## 2021-09-09 DIAGNOSIS — Z79.890 POSTMENOPAUSAL HRT (HORMONE REPLACEMENT THERAPY): ICD-10-CM

## 2021-09-13 RX ORDER — ESTRADIOL 1 MG/1
TABLET ORAL
Qty: 90 TABLET | Refills: 0 | Status: SHIPPED | OUTPATIENT
Start: 2021-09-13 | End: 2021-12-09

## 2021-09-16 DIAGNOSIS — R06.02 SOB (SHORTNESS OF BREATH): ICD-10-CM

## 2021-09-16 RX ORDER — ALBUTEROL SULFATE 90 UG/1
2 AEROSOL, METERED RESPIRATORY (INHALATION) EVERY 6 HOURS PRN
Qty: 8.5 G | Refills: 0 | Status: SHIPPED | OUTPATIENT
Start: 2021-09-16 | End: 2021-10-28 | Stop reason: SDUPTHER

## 2021-09-16 NOTE — TELEPHONE ENCOUNTER
Received request via: Patient    Was the patient seen in the last year in this department? Yes    Does the patient have an active prescription (recently filled or refills available) for medication(s) requested? No    Requested Prescriptions     Pending Prescriptions Disp Refills   • albuterol 108 (90 Base) MCG/ACT Aero Soln inhalation aerosol 8.5 g 1     Sig: Inhale 2 Puffs every 6 hours as needed for Shortness of Breath.

## 2021-10-03 ASSESSMENT — ENCOUNTER SYMPTOMS
HEMOPTYSIS: 0
RESPIRATORY SYMPTOMS COMMENTS: NO
SHORTNESS OF BREATH: 0
CHEST TIGHTNESS: 0
WHEEZING: 0
DYSPNEA AT REST: 0

## 2021-10-04 ENCOUNTER — OFFICE VISIT (OUTPATIENT)
Dept: SLEEP MEDICINE | Facility: MEDICAL CENTER | Age: 76
End: 2021-10-04
Payer: MEDICARE

## 2021-10-04 VITALS
HEART RATE: 84 BPM | RESPIRATION RATE: 16 BRPM | WEIGHT: 121 LBS | OXYGEN SATURATION: 94 % | SYSTOLIC BLOOD PRESSURE: 122 MMHG | DIASTOLIC BLOOD PRESSURE: 80 MMHG | BODY MASS INDEX: 23.75 KG/M2 | HEIGHT: 60 IN

## 2021-10-04 DIAGNOSIS — R91.1 PULMONARY NODULE: ICD-10-CM

## 2021-10-04 DIAGNOSIS — J30.89 ENVIRONMENTAL AND SEASONAL ALLERGIES: ICD-10-CM

## 2021-10-04 PROCEDURE — 99204 OFFICE O/P NEW MOD 45 MIN: CPT | Performed by: INTERNAL MEDICINE

## 2021-10-04 ASSESSMENT — FIBROSIS 4 INDEX: FIB4 SCORE: 0.6

## 2021-10-04 ASSESSMENT — ENCOUNTER SYMPTOMS
SINUS PAIN: 1
MYALGIAS: 0
HEMOPTYSIS: 0
SPUTUM PRODUCTION: 0
HEADACHES: 1
COUGH: 0
WEIGHT LOSS: 0
SHORTNESS OF BREATH: 0
ABDOMINAL PAIN: 0
PALPITATIONS: 0
WHEEZING: 0

## 2021-10-04 NOTE — PROGRESS NOTES
Pulmonary Clinic- Initial Consult    Date of Service: 10/4/21    Referring Physician: Melinda Rivera A*    Reason for Consult: Pulmonary Nodules    Chief Complaint:   Chief Complaint   Patient presents with   • Establish Care     Referred by Melinda LANDA for Pulmonary Nodules   • Other     Labs 08/11/21, CT-Abd/Pelvis 04/15/19       HPI:   Makenna Culver is a very pleasant 76 y.o. female tobacco smoker 25 pack years, quit in 2013 but now smoked e-cigarettes who is followed by Dr. Laboy and is referred to the pulmonary clinic for 2mm nodule seen on CT abdomen/pelvis in 2019. Patient has not been seen by pulmonary in the past.     Makenna is not limited by pulmonary symptoms and her activities of daily living.  She has no shortness of breath with exercise, no cough and no sputum production.  She states she is very active gardening and has no problems with shortness of breath or limitations.  She does report new allergies over the summer including sneezing congestion and severe sinus headaches.  She has tried Zyrtec, nasal saline, and Flonase with little relief.  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    She had a CT scan in 2019 of the abdomen and pelvis which incidentally showed a 2 mm right lower lobe nodule.  At the time radiology recommended follow-up due to her smoking history.  Patient has not had a follow-up CT scan.  She does have a significant smoking history and is still smoking e-cigarettes which puts her at high risk.    Patient has not been seen by pulmonary in the past.  she has no prior PFTs  she has never been hospitalized for respiratory issues.         Past Medical History:   Diagnosis Date   • Arthritis     hips, back and shoulders (osteoarthritis)    • Back pain    • Chickenpox    • Colon polyp 12/2011   • Constipation    • Degeneration of lumbar or lumbosacral intervertebral disc     Dr. Cisneros   • Dental disorder     lower partial     • Double vision    • Ear problems as child had surgery   • [place] measles    • Graves' disease with exophthalmos 6/13/2014    Being followed by Dr. Han San   • Hemorrhoid     internal   • High cholesterol     not medicated at this time    • History of carpal tunnel repair 7/31/2013    Dr. Villa repaired both.   • Hyperthyroidism    • Menopause 10/14/2009   • Morning headache    • Osteoarthritis of multiple joints 6/13/2014   • Osteoporosis    • Pain     wrists   • Pain in joint, pelvic region and thigh 2015    bursitis and arthritis in hips-takes ibuprofen and darvacet   • Pain in joint, shoulder region 2015   • Peptic ulcer    • Periodic pelvic examination due   • Postcoital bleeding     improved   • Rash 12/29/2014    Started about a week ago when she got news about surgery High stress Nothing new Back, stomach, chest, arms Itch No pain   • Rheumatoid arthritis (HCC)    • Ringing in ears    • S/P colonoscopy 12/2011    repeat in 5 years- had polyp, initial 2005.    • Screening mammogram  11/2008=normal   • Tobacco use disorder    • Tonsillitis    • Ulcer of the stomach and intestine hx.    stable   • Unspecified cataract     bilateral IOL   • Unspecified hypothyroidism    • Wears glasses        Past Surgical History:   Procedure Laterality Date   • PB REVISE MEDIAN N/CARPAL TUNNEL SURG Right 12/4/2020    Procedure: RELEASE, CARPAL TUNNEL;  Surgeon: Pancho Palomino M.D.;  Location: SURGERY SAME DAY [facility];  Service: Orthopedics   • PB INCIS TENDON SHEATH,RADIAL STYLOID Right 12/4/2020    Procedure: RELEASE, HAND, FOR DEQUERVAIN'S TENOSYNOVITIS;  Surgeon: Pancho Palomino M.D.;  Location: SURGERY SAME DAY [facility];  Service: Orthopedics   • FINGER ARTHROPLASTY Right 12/4/2020    Procedure: ARTHROPLASTY, FINGER-THUMB CARPOMETACARPAL EXCISIONAL;  Surgeon: Pancho Palomino M.D.;  Location: SURGERY SAME DAY [facility];  Service: Orthopedics   • PB REVISE MEDIAN N/CARPAL TUNNEL SURG Left  8/21/2020    Procedure: RELEASE, CARPAL TUNNEL;  Surgeon: Pancho Palomino M.D.;  Location: SURGERY SAME DAY [facility];  Service: Orthopedics   • PB INCIS TENDON SHEATH,RADIAL STYLOID Left 8/21/2020    Procedure: RELEASE, HAND, FOR DEQUERVAIN'S TENOSYNOVITIS;  Surgeon: Pancho Palomino M.D.;  Location: SURGERY SAME DAY [facility];  Service: Orthopedics   • PB REPAIR INTERCARP/CARP-METACARP JT Left 8/21/2020    Procedure: EXCISION, TRAPEZIUM, WITH LIGAMENT RECONSTRUCTION AND TENDON INTERPOSITION- PARTIAL TRAPEZOID EXCISION FLEXOR CARPI RADIALIS TENDON GRAFT, PLASCENCIA;  Surgeon: Pancho Palomino M.D.;  Location: SURGERY SAME DAY [facility];  Service: Orthopedics   • FINGER ARTHROPLASTY Left 8/21/2020    Procedure: ARTHROPLASTY, FINGER- THUMB CARPOMETACARPAL EXCISIONAL;  Surgeon: Pancho Palomino M.D.;  Location: SURGERY SAME DAY [facility];  Service: Orthopedics   • BLEPHAROPLASTY Bilateral 4/20/2016    Procedure: BLEPHAROPLASTY - UPPER;  Surgeon: Moshe Sena M.D.;  Location: SURGERY [facility];  Service:    • RECTUS REPAIR Bilateral 7/9/2015    Procedure: RECTUS REPAIR FOR LT SUPERIOR RECESSION & RT INFERIOR RECESSION ;  Surgeon: Leila Villegas M.D.;  Location: SURGERY SAME DAY [facility];  Service:    • CATARACT PHACO WITH IOL  3/17/2015    Performed by Derrick Gupta M.D. at [facility]   • CATARACT PHACO WITH IOL  3/3/2015    Performed by Derrick Gupta M.D. at [facility]   • TENSILON TEST  11/20/2014    Performed by Maynor San D.O. at ENDOSCOPY [facility]   • CARPAL TUNNEL ENDOSCOPIC  2/8/2013    Performed by Pancho Palomino M.D. at SURGERY SAME DAY [facility]   • CARPAL TUNNEL ENDOSCOPIC  11/17/2012    Performed by Pancho Palomino M.D. at SURGERY [facility]   • ABDOMINAL HYSTERECTOMY TOTAL  partial    fibroid tumors, has ovary and tube left   • CARPAL TUNNEL RELEASE     • HYSTERECTOMY LAPAROSCOPY     • OTHER SURGICAL  PROCEDURE      ear surgery as child   • THYROIDECTOMY  thyroid ablation with iodine therapy   • TONSILLECTOMY     • TONSILLECTOMY AND ADENOIDECTOMY         Social History     Socioeconomic History   • Marital status:      Spouse name: Not on file   • Number of children: Not on file   • Years of education: Not on file   • Highest education level: Some college, no degree   Occupational History   • Occupation: on social security disability for her back     Employer: OTHER   Tobacco Use   • Smoking status: Former Smoker     Packs/day: 0.00     Years: 50.00     Pack years: 0.00     Types: Cigarettes, Electronic Cigarettes     Start date: 1965     Quit date: 2013     Years since quittin.7   • Smokeless tobacco: Never Used   • Tobacco comment: use terry-cig electronic cig.   Vaping Use   • Vaping Use: Every day   • Substances: Nicotine   Substance and Sexual Activity   • Alcohol use: Yes     Alcohol/week: 0.6 oz     Types: 1 Glasses of wine per week     Comment: one wine 2 twice a month   • Drug use: No   • Sexual activity: Yes     Partners: Male     Comment:    Other Topics Concern   • Not on file   Social History Narrative    - 3 children     Social Determinants of Health     Financial Resource Strain: Low Risk    • Difficulty of Paying Living Expenses: Not hard at all   Food Insecurity: No Food Insecurity   • Worried About Running Out of Food in the Last Year: Never true   • Ran Out of Food in the Last Year: Never true   Transportation Needs: No Transportation Needs   • Lack of Transportation (Medical): No   • Lack of Transportation (Non-Medical): No   Physical Activity: Sufficiently Active   • Days of Exercise per Week: 5 days   • Minutes of Exercise per Session: 150+ min   Stress: No Stress Concern Present   • Feeling of Stress : Not at all   Social Connections: Moderately Isolated   • Frequency of Communication with Friends and Family: Twice a week   • Frequency of Social Gatherings  with Friends and Family: Once a week   • Attends Buddhism Services: Never   • Active Member of Clubs or Organizations: No   • Attends Club or Organization Meetings: Never   • Marital Status:    Intimate Partner Violence:    • Fear of Current or Ex-Partner:    • Emotionally Abused:    • Physically Abused:    • Sexually Abused:           Family History   Problem Relation Age of Onset   • Hypertension Father    • Heart Disease Father         MI age 47 yo   • Stroke Father    • Cancer Maternal Grandmother         part of lung   • Lung Cancer Maternal Grandmother    • Hypertension Brother    • Cancer Neg Hx    • Diabetes Neg Hx        Current Outpatient Medications on File Prior to Visit   Medication Sig Dispense Refill   • albuterol 108 (90 Base) MCG/ACT Aero Soln inhalation aerosol Inhale 2 Puffs every 6 hours as needed for Shortness of Breath. 8.5 g 0   • estradiol (ESTRACE) 1 MG Tab Take 1 tablet by mouth once daily 90 Tablet 0   • ibuprofen (MOTRIN) 800 MG Tab TAKE 1 TABLET BY MOUTH THREE TIMES DAILY WITH FOOD 90 Tablet 0   • diclofenac sodium (VOLTAREN) 1 % Gel APPLY 1 3 GRAMS TOPICALLY 3 TIMES DAILY.     • atorvastatin (LIPITOR) 10 MG Tab TAKE 1 TABLET BY MOUTH EVERY OTHER DAY FOR  HIGH  CHOLESTEROL. 50 Tablet 0   • traZODone (DESYREL) 50 MG Tab TAKE 1 TABLET BY MOUTH AT BEDTIME AS NEEDED FOR SLEEP 90 Tablet 0   • omeprazole (PRILOSEC) 40 MG delayed-release capsule Take 1 capsule by mouth once daily 90 capsule 3   • levothyroxine (SYNTHROID) 75 MCG Tab TAKE 1 TABLET BY MOUTH IN THE MORNING ON AN EMPTY STOMACH 90 tablet 3   • Cholecalciferol (VITAMIN D) 50 MCG (2000 UT) Cap Take 1 Cap by mouth every day.     • B Complex Vitamins (VITAMIN B-COMPLEX PO) Take 1 Cap by mouth every day.     • Multiple Vitamins-Minerals (PRESERVISION AREDS 2 PO) Take 1 Dose by mouth every day.     • cyclobenzaprine (FLEXERIL) 10 mg Tab Take 1 Tab by mouth 3 times a day as needed. 90 Tab 3     No current facility-administered  medications on file prior to visit.       Allergies: Codeine, Morphine, and Vicodin [hydrocodone-acetaminophen]      ROS:   Review of Systems   Constitutional: Negative for malaise/fatigue and weight loss.   HENT: Positive for congestion and sinus pain.    Respiratory: Negative for cough, hemoptysis, sputum production, shortness of breath and wheezing.    Cardiovascular: Negative for chest pain and palpitations.   Gastrointestinal: Negative for abdominal pain.   Musculoskeletal: Negative for myalgias.   Neurological: Positive for headaches.       Vitals:  /80 (BP Location: Right arm, Patient Position: Sitting, BP Cuff Size: Adult)   Pulse 84   Resp 16   Ht 1.524 m (5')   Wt 54.9 kg (121 lb)   SpO2 94%     Physical Exam:  Physical Exam  Constitutional:       Appearance: Normal appearance.   HENT:      Head: Normocephalic and atraumatic.   Cardiovascular:      Rate and Rhythm: Normal rate and regular rhythm.      Heart sounds: Normal heart sounds.   Pulmonary:      Effort: Pulmonary effort is normal. No respiratory distress.      Breath sounds: Normal breath sounds. No wheezing or rhonchi.   Chest:      Chest wall: No tenderness.   Abdominal:      General: Abdomen is flat.      Palpations: Abdomen is soft.   Musculoskeletal:         General: No swelling or deformity.   Neurological:      Mental Status: She is alert.         Laboratory Data:    PFTs as reviewed by me personally show: none    Imaging as reviewed by me personally show:    CT abdomen/Pelvis 4/2019:  IMPRESSION:        1.  Colonic wall thickening predominately from the splenic flexure extending distally, appearance most compatible with segmental colitis.  2.  Fluid-filled small bowel loops with reactive mucosal pattern, appearance suggests component of ileus and/or enteritis.  3.  Diverticulosis, component of diverticulitis at the sigmoid colon not excluded.  4.  Scattered subcentimeter low-density hepatic lesions, indeterminate, but could  represent small cysts or hemangiomas. Consider other hepatic lesion with additional workup as clinically appropriate.  5.  2 mm right lower lobe pulmonary nodule, see nodule follow-up recommendations below.    Assessment/Plan:    Problem List Items Addressed This Visit     Pulmonary nodule     Patient with a very small 2mm nodule in 2019.  We discussed the risks and benefits of lung cancer screening and she agrees to follow the nodule with low dose cancer screening CT  - CT chest without contrast  - Follow up in 3 months         Environmental and seasonal allergies     Patient likely has post-nasal drip  - Recommended switching zyrtec to allegra or claritin  - Continue flonase  - Continue nasal saline           Other Visit Diagnoses     Lung nodule < 6cm on CT        Relevant Orders    CT-CHEST (THORAX) W/O         Follow up CT scan in 3 months.     This note was generated using voice recognition software which has a chance of producing errors of grammar and possibly content.  I have made every reasonable attempt to find and correct any obvious errors, but it should be expected that some may not be found prior to finalization of this note.    Time spent in record review prior to patient arrival, reviewing results, and in face-to-face encounter totaled 45 min, excluding any procedures if performed.  __________  Sabina Lombardi MD  Pulmonary and Critical Care Medicine  [facility]

## 2021-10-05 NOTE — ASSESSMENT & PLAN NOTE
Patient likely has post-nasal drip  - Recommended switching zyrtec to allegra or claritin  - Continue flonase  - Continue nasal saline

## 2021-10-05 NOTE — ASSESSMENT & PLAN NOTE
Patient with a very small 2mm nodule in 2019.  We discussed the risks and benefits of lung cancer screening and she agrees to follow the nodule with low dose cancer screening CT  - CT chest without contrast  - Follow up in 3 months

## 2021-10-19 ENCOUNTER — HOSPITAL ENCOUNTER (OUTPATIENT)
Dept: RADIOLOGY | Facility: MEDICAL CENTER | Age: 76
End: 2021-10-19
Attending: INTERNAL MEDICINE
Payer: MEDICARE

## 2021-10-19 PROCEDURE — 71250 CT THORAX DX C-: CPT | Mod: ME

## 2021-10-28 DIAGNOSIS — R06.02 SOB (SHORTNESS OF BREATH): ICD-10-CM

## 2021-10-28 RX ORDER — ALBUTEROL SULFATE 90 UG/1
2 AEROSOL, METERED RESPIRATORY (INHALATION) EVERY 6 HOURS PRN
Qty: 8.5 G | Refills: 0 | Status: SHIPPED | OUTPATIENT
Start: 2021-10-28 | End: 2022-01-18

## 2021-10-28 NOTE — TELEPHONE ENCOUNTER
Received request via: Patient    Was the patient seen in the last year in this department? Yes    Does the patient have an active prescription (recently filled or refills available) for medication(s) requested? No    Requested Prescriptions     Pending Prescriptions Disp Refills   • albuterol 108 (90 Base) MCG/ACT Aero Soln inhalation aerosol 8.5 g 0     Sig: Inhale 2 Puffs every 6 hours as needed for Shortness of Breath.   ,

## 2021-11-03 ENCOUNTER — NON-PROVIDER VISIT (OUTPATIENT)
Dept: MEDICAL GROUP | Facility: PHYSICIAN GROUP | Age: 76
End: 2021-11-03
Payer: MEDICARE

## 2021-11-03 DIAGNOSIS — Z23 NEED FOR VACCINATION: ICD-10-CM

## 2021-11-03 PROCEDURE — 90750 HZV VACC RECOMBINANT IM: CPT | Performed by: FAMILY MEDICINE

## 2021-11-03 PROCEDURE — 90471 IMMUNIZATION ADMIN: CPT | Performed by: FAMILY MEDICINE

## 2021-11-03 NOTE — PROGRESS NOTES
"Makenna Culver is a 76 y.o. female here for a non-provider visit for:   SHINGRIX (Shingles)    Reason for immunization: continue or complete series started at the office  Immunization records indicate need for vaccine: Yes, confirmed with Epic  Minimum interval has been met for this vaccine: Yes  ABN completed: Not Indicated    VIS Dated  10/30/2019 was given to patient: Yes  All IAC Questionnaire questions were answered \"No.\"    Patient tolerated injection and no adverse effects were observed or reported: Yes    Pt scheduled for next dose in series: Not Indicated    "

## 2021-11-09 RX ORDER — CYCLOBENZAPRINE HCL 10 MG
TABLET ORAL
Qty: 90 TABLET | Refills: 0 | Status: SHIPPED | OUTPATIENT
Start: 2021-11-09 | End: 2022-09-21 | Stop reason: SDUPTHER

## 2021-11-17 DIAGNOSIS — F51.01 PRIMARY INSOMNIA: ICD-10-CM

## 2021-11-17 RX ORDER — TRAZODONE HYDROCHLORIDE 50 MG/1
TABLET ORAL
Qty: 90 TABLET | Refills: 3 | Status: SHIPPED | OUTPATIENT
Start: 2021-11-17 | End: 2022-11-29

## 2021-11-17 NOTE — TELEPHONE ENCOUNTER
Received request via: Pharmacy    Was the patient seen in the last year in this department? Yes    Does the patient have an active prescription (recently filled or refills available) for medication(s) requested? No    Requested Prescriptions     Pending Prescriptions Disp Refills    traZODone (DESYREL) 50 MG Tab [Pharmacy Med Name: traZODone HCl 50 MG Oral Tablet] 90 Tablet 0     Sig: TAKE 1 TABLET BY MOUTH AT BEDTIME AS NEEDED FOR SLEEP

## 2021-12-09 DIAGNOSIS — Z79.890 POSTMENOPAUSAL HRT (HORMONE REPLACEMENT THERAPY): ICD-10-CM

## 2021-12-09 RX ORDER — ESTRADIOL 1 MG/1
TABLET ORAL
Qty: 90 TABLET | Refills: 0 | Status: SHIPPED | OUTPATIENT
Start: 2021-12-09 | End: 2022-03-10

## 2021-12-09 NOTE — TELEPHONE ENCOUNTER
Received request via: Pharmacy    Was the patient seen in the last year in this department? Yes   LOV 09/02/2021    Does the patient have an active prescription (recently filled or refills available) for medication(s) requested? No

## 2021-12-14 DIAGNOSIS — E78.2 MIXED HYPERLIPIDEMIA: ICD-10-CM

## 2021-12-16 RX ORDER — ATORVASTATIN CALCIUM 10 MG/1
TABLET, FILM COATED ORAL
Qty: 50 TABLET | Refills: 3 | Status: SHIPPED | OUTPATIENT
Start: 2021-12-16 | End: 2023-01-24

## 2021-12-16 NOTE — TELEPHONE ENCOUNTER
Received request via: Pharmacy    Was the patient seen in the last year in this department? Yes  Last OV 9/2/21    Does the patient have an active prescription (recently filled or refills available) for medication(s) requested? No    Requested Prescriptions     Pending Prescriptions Disp Refills    atorvastatin (LIPITOR) 10 MG Tab [Pharmacy Med Name: Atorvastatin Calcium 10 MG Oral Tablet] 50 Tablet 1     Sig: TAKE 1 TABLET BY MOUTH EVERY OTHER DAY FOR HIGH CHOLESTEROL

## 2022-01-12 DIAGNOSIS — R06.02 SOB (SHORTNESS OF BREATH): ICD-10-CM

## 2022-01-18 RX ORDER — ALBUTEROL SULFATE 90 UG/1
AEROSOL, METERED RESPIRATORY (INHALATION)
Qty: 9 G | Refills: 3 | Status: SHIPPED | OUTPATIENT
Start: 2022-01-18 | End: 2022-06-14

## 2022-01-18 NOTE — TELEPHONE ENCOUNTER
Received request via: Pharmacy    Was the patient seen in the last year in this department? Yes  Last OV 9/2/21    Does the patient have an active prescription (recently filled or refills available) for medication(s) requested? No    Requested Prescriptions     Pending Prescriptions Disp Refills    albuterol 108 (90 Base) MCG/ACT Aero Soln inhalation aerosol [Pharmacy Med Name: Albuterol Sulfate  (90 Base) MCG/ACT Inhalation Aerosol Solution] 9 g 3     Sig: INHALE 2 PUFFS BY MOUTH EVERY 6 HOURS AS NEEDED FOR SHORTNESS OF BREATH

## 2022-03-09 DIAGNOSIS — E03.9 HYPOTHYROIDISM, UNSPECIFIED TYPE: ICD-10-CM

## 2022-03-10 DIAGNOSIS — Z12.31 ENCOUNTER FOR SCREENING MAMMOGRAM FOR MALIGNANT NEOPLASM OF BREAST: ICD-10-CM

## 2022-03-10 RX ORDER — LEVOTHYROXINE SODIUM 0.07 MG/1
TABLET ORAL
Qty: 90 TABLET | Refills: 3 | Status: SHIPPED | OUTPATIENT
Start: 2022-03-10 | End: 2023-02-28

## 2022-03-14 ENCOUNTER — OFFICE VISIT (OUTPATIENT)
Dept: MEDICAL GROUP | Facility: PHYSICIAN GROUP | Age: 77
End: 2022-03-14
Payer: MEDICARE

## 2022-03-14 VITALS
OXYGEN SATURATION: 100 % | BODY MASS INDEX: 24.05 KG/M2 | WEIGHT: 127.4 LBS | SYSTOLIC BLOOD PRESSURE: 110 MMHG | TEMPERATURE: 97.8 F | HEART RATE: 91 BPM | RESPIRATION RATE: 16 BRPM | DIASTOLIC BLOOD PRESSURE: 70 MMHG | HEIGHT: 61 IN

## 2022-03-14 DIAGNOSIS — R21 RASH: ICD-10-CM

## 2022-03-14 DIAGNOSIS — M51.36 LUMBAR DEGENERATIVE DISC DISEASE: ICD-10-CM

## 2022-03-14 PROCEDURE — 99214 OFFICE O/P EST MOD 30 MIN: CPT | Performed by: NURSE PRACTITIONER

## 2022-03-14 RX ORDER — TRAMADOL HYDROCHLORIDE 50 MG/1
TABLET ORAL
COMMUNITY
Start: 2022-03-03 | End: 2023-04-13

## 2022-03-14 RX ORDER — TRIAMCINOLONE ACETONIDE 1 MG/G
1 CREAM TOPICAL 2 TIMES DAILY
Qty: 30 G | Refills: 1 | Status: SHIPPED | OUTPATIENT
Start: 2022-03-14 | End: 2024-02-27 | Stop reason: SDUPTHER

## 2022-03-14 ASSESSMENT — PATIENT HEALTH QUESTIONNAIRE - PHQ9: CLINICAL INTERPRETATION OF PHQ2 SCORE: 0

## 2022-03-14 ASSESSMENT — FIBROSIS 4 INDEX: FIB4 SCORE: 0.6

## 2022-03-14 NOTE — ASSESSMENT & PLAN NOTE
This is a chronic condition.  Patient does continue to report that she has occasional left-sided radiculopathy occasionally.  She is followed closely with orthopedics.  She is currently taking tramadol to help with her pain.  She was prescribed pregabalin however she indicates that it caused severe drowsiness 24 hours after starting and does not want to continue taking it.    She denies any red flag symptoms.    Plan  Patient will discontinue use of pregabalin due to side effects.  Continue with tramadol.  Continue to follow with orthopedics.

## 2022-03-14 NOTE — PROGRESS NOTES
Chief Complaint   Patient presents with   • Rash     On chest, became blisters, now it's just dry and red x 2 months       Subjective:     HPI:   Makenna Culver is a 76 y.o. female here to discuss the evaluation and management of:      Rash  This is a new condition.  Patient reports approximately 1 week ago she started to have a blistery rash appear on her upper chest.  She reports that the blisters popped with clear fluid.  It was pruritic in nature and caused a burning sensation while in the shower.   Overall the majority of the areas have improved however it is starting to spread out the left side of her neck.  This area also started first with blisters.  She has had her Shingrix vaccine.    Has tried over-the-counter antiitch lotions with only minimal relief.    Just change laundry detergent about 1 month ago. But has changed back about 1 weeks ago.     No new medications at the time of the rash. She did just start pregabalin but only took 1 table two days ago.   Does wear necklaces.    Plan  Encourage patient to use mild soaps, detergents and lotions.  Take cool showers.  Clean all jewelry.  Trial of triamcinolone cream was sent to pharmacy.    Lumbar degenerative disc disease  This is a chronic condition.  Patient does continue to report that she has occasional left-sided radiculopathy occasionally.  She is followed closely with orthopedics.  She is currently taking tramadol to help with her pain.  She was prescribed pregabalin however she indicates that it caused severe drowsiness 24 hours after starting and does not want to continue taking it.    She denies any red flag symptoms.    Plan  Patient will discontinue use of pregabalin due to side effects.  Continue with tramadol.  Continue to follow with orthopedics.            ROS:  Gen: no fevers/chills, no changes in weight  Pulm: no sob, no cough  CV: no chest pain, no palpitations  MSk: no myalgias  Skin: Positive per HPI      Allergies   Allergen Reactions  "  • Codeine Vomiting   • Morphine      Chills    • Vicodin [Hydrocodone-Acetaminophen] Vomiting     OK if takes with phenergan        Current medicines (including changes today)  Current Outpatient Medications   Medication Sig Dispense Refill   • triamcinolone acetonide (KENALOG) 0.1 % Cream Apply 1 Application topically 2 times a day for 14 days. 30 g 1   • levothyroxine (SYNTHROID) 75 MCG Tab TAKE 1 TABLET BY MOUTH IN THE MORNING ON AN EMPTY STOMACH 90 Tablet 3   • estradiol (ESTRACE) 1 MG Tab Take 1 Tablet by mouth every day. Please discuss refill and new rx with Ann at your appointment with her on 3/14/22, mammogram ordered should be done is due this month. 30 Tablet 0   • albuterol 108 (90 Base) MCG/ACT Aero Soln inhalation aerosol INHALE 2 PUFFS BY MOUTH EVERY 6 HOURS AS NEEDED FOR SHORTNESS OF BREATH 9 g 3   • atorvastatin (LIPITOR) 10 MG Tab TAKE 1 TABLET BY MOUTH EVERY OTHER DAY FOR HIGH CHOLESTEROL 50 Tablet 3   • traZODone (DESYREL) 50 MG Tab TAKE 1 TABLET BY MOUTH AT BEDTIME AS NEEDED FOR SLEEP 90 Tablet 3   • cyclobenzaprine (FLEXERIL) 10 mg Tab Take 1 tablet by mouth three times daily as needed 90 Tablet 0   • ibuprofen (MOTRIN) 800 MG Tab TAKE 1 TABLET BY MOUTH THREE TIMES DAILY WITH FOOD 90 Tablet 0   • diclofenac sodium (VOLTAREN) 1 % Gel      • omeprazole (PRILOSEC) 40 MG delayed-release capsule Take 1 capsule by mouth once daily 90 capsule 3   • Cholecalciferol (VITAMIN D) 50 MCG (2000 UT) Cap Take 1 Cap by mouth every day.     • B Complex Vitamins (VITAMIN B-COMPLEX PO) Take 1 Cap by mouth every day.     • Multiple Vitamins-Minerals (PRESERVISION AREDS 2 PO) Take 1 Dose by mouth every day.       No current facility-administered medications for this visit.          Objective:       /70   Pulse 91   Temp 36.6 °C (97.8 °F)   Resp 16   Ht 1.549 m (5' 1\")   Wt 57.8 kg (127 lb 6.4 oz)   SpO2 100%  Body mass index is 24.07 kg/m².    Physical Exam:  Constitutional: Well-developed and " well-nourished female in NAD. Not diaphoretic. No distress.   Skin: Upper chest and left side of neck: Scattered areas of multiple lesions that are scabbed with mild erythematous edges.  It is pruritic in nature.  No blistering, discharge, or erythematous streaks.  Please see picture below.  Eyes: Conjunctivae and extraocular motions are normal. Pupils are equal, round, and reactive to light. No scleral icterus.   Cardiovascular: Regular rate and rhythm without murmur. Radial pulses are intact and equal bilaterally.  Pulmonary: Clear to ausculation. Normal effort. No rales, ronchi, or wheezing.   Neurological: Alert and oriented x 3.   Psychiatric:  Behavior, mood, and affect are appropriate.         Assessment and Plan:     The following treatment plan was discussed:    1. Rash  - triamcinolone acetonide (KENALOG) 0.1 % Cream; Apply 1 Application topically 2 times a day for 14 days.  Dispense: 30 g; Refill: 1    2. Lumbar degenerative disc disease      Any change or worsening of signs or symptoms, patient encouraged to follow-up or report to emergency room for further evaluation. Patient verbalizes understanding and agrees.    Follow-Up: Return in about 2 weeks (around 3/28/2022) for Rash .      PLEASE NOTE: This dictation was created using voice recognition software. I have made every reasonable attempt to correct obvious errors, but I expect that there are errors of grammar and possibly content that I did not discover before finalizing the note.

## 2022-03-14 NOTE — ASSESSMENT & PLAN NOTE
This is a new condition.  Patient reports approximately 1 week ago she started to have a blistery rash appear on her upper chest.  She reports that the blisters popped with clear fluid.  It was pruritic in nature and caused a burning sensation while in the shower.   Overall the majority of the areas have improved however it is starting to spread out the left side of her neck.  This area also started first with blisters.  She has had her Shingrix vaccine.    Has tried over-the-counter antiitch lotions with only minimal relief.    Just change laundry detergent about 1 month ago. But has changed back about 1 weeks ago.     No new medications at the time of the rash. She did just start pregabalin but only took 1 table two days ago.   Does wear necklaces.    Plan  Encourage patient to use mild soaps, detergents and lotions.  Take cool showers.  Clean all jewelry.  Trial of triamcinolone cream was sent to pharmacy.

## 2022-03-28 ENCOUNTER — OFFICE VISIT (OUTPATIENT)
Dept: SLEEP MEDICINE | Facility: MEDICAL CENTER | Age: 77
End: 2022-03-28
Payer: MEDICARE

## 2022-03-28 VITALS
HEIGHT: 61 IN | RESPIRATION RATE: 16 BRPM | HEART RATE: 84 BPM | SYSTOLIC BLOOD PRESSURE: 110 MMHG | BODY MASS INDEX: 24.35 KG/M2 | DIASTOLIC BLOOD PRESSURE: 68 MMHG | OXYGEN SATURATION: 93 % | WEIGHT: 129 LBS

## 2022-03-28 DIAGNOSIS — Z87.891 FORMER SMOKER: ICD-10-CM

## 2022-03-28 DIAGNOSIS — R91.8 PULMONARY NODULES: ICD-10-CM

## 2022-03-28 PROCEDURE — 99214 OFFICE O/P EST MOD 30 MIN: CPT | Performed by: INTERNAL MEDICINE

## 2022-03-28 ASSESSMENT — ENCOUNTER SYMPTOMS
MYALGIAS: 0
STRIDOR: 0
DIARRHEA: 0
DIZZINESS: 0
WEAKNESS: 0
EYE DISCHARGE: 0
HEADACHES: 0
SHORTNESS OF BREATH: 0
CONSTIPATION: 0
NECK PAIN: 0
SPUTUM PRODUCTION: 0
DEPRESSION: 0
PALPITATIONS: 0
SINUS PAIN: 0
NAUSEA: 0
TREMORS: 0
FALLS: 0
BLURRED VISION: 0
VOMITING: 0
DIAPHORESIS: 0
FOCAL WEAKNESS: 0
HEMOPTYSIS: 0
COUGH: 0
PND: 0
BACK PAIN: 0
SPEECH CHANGE: 0
HEARTBURN: 0
WEIGHT LOSS: 0
EYE PAIN: 0
ORTHOPNEA: 0
PHOTOPHOBIA: 0
CHILLS: 0
DOUBLE VISION: 0
CLAUDICATION: 0
EYE REDNESS: 0
WHEEZING: 0
FEVER: 0
ABDOMINAL PAIN: 0
SORE THROAT: 0

## 2022-03-28 ASSESSMENT — FIBROSIS 4 INDEX: FIB4 SCORE: 0.6

## 2022-03-28 NOTE — PROGRESS NOTES
Chief Complaint   Patient presents with   • Follow-Up     Last seen 10/4/21    • Results     CT Chest Thorax 10/19/21          HPI: This patient is a 76 y.o. female whom is followed in our clinic for pulmonary nodule last seen by Dr. Lombardi on 10/4/21.  The patient is a former tobacco smoker with roughly 25-pack-year history and quit in 2013.  Other pertinent past medical history includes hypothyroidism following an episode of Graves' disease, diverticulitis for which she was hospitalized briefly in April 2019 and CT abdomen and pelvis showed right lower lobe lung nodule 2 mm in size.  She was referred to pulmonary clinic given her tobacco history for follow-up in October of last year.  Dedicated CT chest showed resolution of 2 mm nodule however she had bilateral pulmonary nodules up to 4 mm in size.  Also some bilateral upper lobe emphysematous changes.  No lymphadenopathy.  She denies any shortness of breath, cough or respiratory limitations in her activity levels.  She is quite active.  She does continue to use e-cigarettes.  She is up-to-date on vaccines.    Past Medical History:   Diagnosis Date   • Arthritis     hips, back and shoulders (osteoarthritis)    • Back pain    • Chickenpox    • Colon polyp 12/2011   • Constipation    • Degeneration of lumbar or lumbosacral intervertebral disc     Dr. Cisneros   • Dental disorder     lower partial    • Double vision    • Ear problems as child had surgery   • Yor[place] measles    • Graves' disease with exophthalmos 6/13/2014    Being followed by Dr. Han San   • Hemorrhoid     internal   • High cholesterol     not medicated at this time    • History of carpal tunnel repair 7/31/2013    Dr. Villa repaired both.   • Hyperthyroidism    • Menopause 10/14/2009   • Morning headache    • Osteoarthritis of multiple joints 6/13/2014   • Osteoporosis    • Pain     wrists   • Pain in joint, pelvic region and thigh 2015    bursitis and arthritis in hips-takes ibuprofen  and darvacet   • Pain in joint, shoulder region    • Peptic ulcer    • Periodic pelvic examination due   • Postcoital bleeding     improved   • Rash 2014    Started about a week ago when she got news about surgery High stress Nothing new Back, stomach, chest, arms Itch No pain   • Rheumatoid arthritis (HCC)    • Ringing in ears    • S/P colonoscopy 2011    repeat in 5 years- had polyp, initial .    • Screening mammogram  2008=normal   • Tobacco use disorder    • Tonsillitis    • Ulcer of the stomach and intestine hx.    stable   • Unspecified cataract     bilateral IOL   • Unspecified hypothyroidism    • Wears glasses        Social History     Socioeconomic History   • Marital status:      Spouse name: Not on file   • Number of children: Not on file   • Years of education: Not on file   • Highest education level: Some college, no degree   Occupational History   • Occupation: on social security disability for her back     Employer: OTHER   Tobacco Use   • Smoking status: Former Smoker     Packs/day: 0.00     Years: 50.00     Pack years: 0.00     Types: Cigarettes, Electronic Cigarettes     Start date: 1965     Quit date: 2013     Years since quittin.2   • Smokeless tobacco: Never Used   • Tobacco comment: use terry-cig electronic cig.   Vaping Use   • Vaping Use: Every day   • Substances: Nicotine, Flavoring   • Devices: Pre-filled or refillable cartridge   • Passive vaping exposure: Yes   Substance and Sexual Activity   • Alcohol use: Yes     Alcohol/week: 0.0 - 0.6 oz     Comment: 0-1 a month    • Drug use: No   • Sexual activity: Yes     Partners: Male     Comment:    Other Topics Concern   • Not on file   Social History Narrative    - 3 children     Social Determinants of Health     Financial Resource Strain: Low Risk    • Difficulty of Paying Living Expenses: Not hard at all   Food Insecurity: No Food Insecurity   • Worried About Running Out of Food in the Last  Year: Never true   • Ran Out of Food in the Last Year: Never true   Transportation Needs: No Transportation Needs   • Lack of Transportation (Medical): No   • Lack of Transportation (Non-Medical): No   Physical Activity: Sufficiently Active   • Days of Exercise per Week: 5 days   • Minutes of Exercise per Session: 150+ min   Stress: No Stress Concern Present   • Feeling of Stress : Not at all   Social Connections: Moderately Isolated   • Frequency of Communication with Friends and Family: Twice a week   • Frequency of Social Gatherings with Friends and Family: Once a week   • Attends [employer] Services: Never   • Active Member of Clubs or Organizations: No   • Attends Club or Organization Meetings: Never   • Marital Status:    Intimate Partner Violence: Not on file   Housing Stability: Low Risk    • Unable to Pay for Housing in the Last Year: No   • Number of Places Lived in the Last Year: 1   • Unstable Housing in the Last Year: No       Family History   Problem Relation Age of Onset   • Hypertension Father    • Heart Disease Father         MI age 49 yo   • Stroke Father    • Cancer Maternal Grandmother         part of lung   • Lung Cancer Maternal Grandmother    • Hypertension Brother    • Cancer Neg Hx    • Diabetes Neg Hx        Current Outpatient Medications on File Prior to Visit   Medication Sig Dispense Refill   • triamcinolone acetonide (KENALOG) 0.1 % Cream Apply 1 Application topically 2 times a day for 14 days. 30 g 1   • traMADol (ULTRAM) 50 MG Tab      • levothyroxine (SYNTHROID) 75 MCG Tab TAKE 1 TABLET BY MOUTH IN THE MORNING ON AN EMPTY STOMACH 90 Tablet 3   • estradiol (ESTRACE) 1 MG Tab Take 1 Tablet by mouth every day. Please discuss refill and new rx with Ann at your appointment with her on 3/14/22, mammogram ordered should be done is due this month. 30 Tablet 0   • albuterol 108 (90 Base) MCG/ACT Aero Soln inhalation aerosol INHALE 2 PUFFS BY MOUTH EVERY 6 HOURS AS NEEDED FOR SHORTNESS  OF BREATH 9 g 3   • atorvastatin (LIPITOR) 10 MG Tab TAKE 1 TABLET BY MOUTH EVERY OTHER DAY FOR HIGH CHOLESTEROL 50 Tablet 3   • traZODone (DESYREL) 50 MG Tab TAKE 1 TABLET BY MOUTH AT BEDTIME AS NEEDED FOR SLEEP 90 Tablet 3   • cyclobenzaprine (FLEXERIL) 10 mg Tab Take 1 tablet by mouth three times daily as needed 90 Tablet 0   • ibuprofen (MOTRIN) 800 MG Tab TAKE 1 TABLET BY MOUTH THREE TIMES DAILY WITH FOOD 90 Tablet 0   • diclofenac sodium (VOLTAREN) 1 % Gel      • omeprazole (PRILOSEC) 40 MG delayed-release capsule Take 1 capsule by mouth once daily 90 capsule 3   • Cholecalciferol (VITAMIN D) 50 MCG (2000 UT) Cap Take 1 Cap by mouth every day.     • B Complex Vitamins (VITAMIN B-COMPLEX PO) Take 1 Cap by mouth every day.     • Multiple Vitamins-Minerals (PRESERVISION AREDS 2 PO) Take 1 Dose by mouth every day.       No current facility-administered medications on file prior to visit.       Codeine, Morphine, and Vicodin [hydrocodone-acetaminophen]      ROS:   Review of Systems   Constitutional: Negative for chills, diaphoresis, fever, malaise/fatigue and weight loss.   HENT: Negative for congestion, ear discharge, ear pain, hearing loss, nosebleeds, sinus pain, sore throat and tinnitus.    Eyes: Negative for blurred vision, double vision, photophobia, pain, discharge and redness.   Respiratory: Negative for cough, hemoptysis, sputum production, shortness of breath, wheezing and stridor.    Cardiovascular: Negative for chest pain, palpitations, orthopnea, claudication, leg swelling and PND.   Gastrointestinal: Negative for abdominal pain, constipation, diarrhea, heartburn, nausea and vomiting.   Genitourinary: Negative for dysuria and urgency.   Musculoskeletal: Negative for back pain, falls, joint pain, myalgias and neck pain.   Skin: Negative for itching and rash.   Neurological: Negative for dizziness, tremors, speech change, focal weakness, weakness and headaches.   Endo/Heme/Allergies: Negative for  "environmental allergies.   Psychiatric/Behavioral: Negative for depression.       /68 (BP Location: Right arm, Patient Position: Sitting, BP Cuff Size: Adult)   Pulse 84   Resp 16   Ht 1.549 m (5' 1\")   Wt 58.5 kg (129 lb)   SpO2 93%   Physical Exam  Vitals reviewed.   Constitutional:       General: She is not in acute distress.     Appearance: Normal appearance. She is well-developed and normal weight.   HENT:      Head: Normocephalic and atraumatic.      Right Ear: External ear normal.      Left Ear: External ear normal.      Nose: Nose normal. No congestion.      Mouth/Throat:      Mouth: Mucous membranes are moist.      Pharynx: Oropharynx is clear. No oropharyngeal exudate.   Eyes:      General: No scleral icterus.     Extraocular Movements: Extraocular movements intact.      Conjunctiva/sclera: Conjunctivae normal.      Pupils: Pupils are equal, round, and reactive to light.   Neck:      Vascular: No JVD.      Trachea: No tracheal deviation.   Cardiovascular:      Rate and Rhythm: Normal rate and regular rhythm.      Heart sounds: Normal heart sounds. No murmur heard.    No friction rub. No gallop.   Pulmonary:      Effort: Pulmonary effort is normal. No accessory muscle usage or respiratory distress.      Breath sounds: Normal breath sounds. No wheezing or rales.   Abdominal:      General: There is no distension.      Palpations: Abdomen is soft.      Tenderness: There is no abdominal tenderness.   Musculoskeletal:         General: No tenderness or deformity. Normal range of motion.      Cervical back: Normal range of motion and neck supple.      Right lower leg: No edema.      Left lower leg: No edema.   Lymphadenopathy:      Cervical: No cervical adenopathy.   Skin:     General: Skin is warm and dry.      Findings: No rash.      Nails: There is no clubbing.   Neurological:      Mental Status: She is alert and oriented to person, place, and time.      Cranial Nerves: No cranial nerve deficit.      " Gait: Gait normal.   Psychiatric:         Mood and Affect: Mood normal.         Behavior: Behavior normal.         PFTs as reviewed by me personally: NA    Imaging as reviewed by me personally:  As per hPI    Assessment:  1. Pulmonary nodules  CT-CHEST (THORAX) W/O   2. Former smoker         Plan:  1.  Bilateral subcentimeter pulmonary nodules in a moderate high risk patient given tobacco history.  Given she has not been tobacco free for 15 years and nodules up to 4 mm in size, will repeat CT chest in 1 year or October 2022.  2.  Patient is tobacco free but continues to use e-cigarettes.  Tobacco free for just under 10 years and I encouraged ongoing abstinence however we also discussed the relative lack of known long-term effects of cigarette use and potential harms of short-term use.  Return in about 7 months (around 10/28/2022) for or Dr. Lombardi with CT chest .

## 2022-03-29 ENCOUNTER — OFFICE VISIT (OUTPATIENT)
Dept: MEDICAL GROUP | Facility: PHYSICIAN GROUP | Age: 77
End: 2022-03-29
Payer: MEDICARE

## 2022-03-29 VITALS
SYSTOLIC BLOOD PRESSURE: 108 MMHG | OXYGEN SATURATION: 95 % | TEMPERATURE: 98.2 F | RESPIRATION RATE: 14 BRPM | BODY MASS INDEX: 24.17 KG/M2 | HEART RATE: 83 BPM | WEIGHT: 128 LBS | HEIGHT: 61 IN | DIASTOLIC BLOOD PRESSURE: 76 MMHG

## 2022-03-29 DIAGNOSIS — E05.00 GRAVES' OPHTHALMOPATHY: ICD-10-CM

## 2022-03-29 DIAGNOSIS — F17.210 TOBACCO DEPENDENCE DUE TO CIGARETTES: ICD-10-CM

## 2022-03-29 DIAGNOSIS — Z12.11 SCREENING FOR COLON CANCER: ICD-10-CM

## 2022-03-29 DIAGNOSIS — M46.1 SACROILIITIS, NOT ELSEWHERE CLASSIFIED (HCC): ICD-10-CM

## 2022-03-29 DIAGNOSIS — L65.9 HAIR LOSS: ICD-10-CM

## 2022-03-29 DIAGNOSIS — E03.9 HYPOTHYROIDISM, UNSPECIFIED TYPE: ICD-10-CM

## 2022-03-29 DIAGNOSIS — E78.2 MIXED HYPERLIPIDEMIA: ICD-10-CM

## 2022-03-29 DIAGNOSIS — R63.5 WEIGHT GAIN: ICD-10-CM

## 2022-03-29 DIAGNOSIS — E55.9 VITAMIN D DEFICIENCY: ICD-10-CM

## 2022-03-29 DIAGNOSIS — R23.9 RECENT SKIN CHANGES: ICD-10-CM

## 2022-03-29 PROCEDURE — 99214 OFFICE O/P EST MOD 30 MIN: CPT | Performed by: FAMILY MEDICINE

## 2022-03-29 ASSESSMENT — FIBROSIS 4 INDEX: FIB4 SCORE: 0.6

## 2022-03-29 NOTE — PROGRESS NOTES
Subjective:   Makenna Culver is a 76 y.o. female here today for evaluation and management of:     Sacroiliitis, not elsewhere classified (HCC)  Chronic condition, no acute changes, she stays active with walking, regular exercises.     Hypothyroidism  She notes recent hair loss on the top of her head, no pain or itching and it is diffuse hair loss.   Repeat TSH ordered for this year  Advised can use rogaine.   On levothyroxine 75 mcg  She notes skin changes, she uses sunscreen, she has age/sun related skin changes.   Referral to dermatology for skin survey done.     Tobacco dependence due to cigarettes  She quit smoking regualar cig in 2013 she smokes e cig         Current medicines (including changes today)  Current Outpatient Medications   Medication Sig Dispense Refill   • traMADol (ULTRAM) 50 MG Tab      • levothyroxine (SYNTHROID) 75 MCG Tab TAKE 1 TABLET BY MOUTH IN THE MORNING ON AN EMPTY STOMACH 90 Tablet 3   • estradiol (ESTRACE) 1 MG Tab Take 1 Tablet by mouth every day. Please discuss refill and new rx with Ann at your appointment with her on 3/14/22, mammogram ordered should be done is due this month. 30 Tablet 0   • albuterol 108 (90 Base) MCG/ACT Aero Soln inhalation aerosol INHALE 2 PUFFS BY MOUTH EVERY 6 HOURS AS NEEDED FOR SHORTNESS OF BREATH 9 g 3   • atorvastatin (LIPITOR) 10 MG Tab TAKE 1 TABLET BY MOUTH EVERY OTHER DAY FOR HIGH CHOLESTEROL 50 Tablet 3   • traZODone (DESYREL) 50 MG Tab TAKE 1 TABLET BY MOUTH AT BEDTIME AS NEEDED FOR SLEEP 90 Tablet 3   • cyclobenzaprine (FLEXERIL) 10 mg Tab Take 1 tablet by mouth three times daily as needed 90 Tablet 0   • ibuprofen (MOTRIN) 800 MG Tab TAKE 1 TABLET BY MOUTH THREE TIMES DAILY WITH FOOD 90 Tablet 0   • diclofenac sodium (VOLTAREN) 1 % Gel      • omeprazole (PRILOSEC) 40 MG delayed-release capsule Take 1 capsule by mouth once daily 90 capsule 3   • Cholecalciferol (VITAMIN D) 50 MCG (2000 UT) Cap Take 1 Cap by mouth every day.     • B Complex  "Vitamins (VITAMIN B-COMPLEX PO) Take 1 Cap by mouth every day.     • Multiple Vitamins-Minerals (PRESERVISION AREDS 2 PO) Take 1 Dose by mouth every day.       No current facility-administered medications for this visit.     She  has a past medical history of Arthritis, Back pain, Chickenpox, Colon polyp (12/2011), Constipation, Degeneration of lumbar or lumbosacral intervertebral disc, Dental disorder, Double vision, Ear problems (as child had surgery), Upper sorbian measles, Graves' disease with exophthalmos (6/13/2014), Hemorrhoid, High cholesterol, History of carpal tunnel repair (7/31/2013), Hyperthyroidism, Menopause (10/14/2009), Morning headache, Osteoarthritis of multiple joints (6/13/2014), Osteoporosis, Pain, Pain in joint, pelvic region and thigh (2015), Pain in joint, shoulder region (2015), Peptic ulcer, Periodic pelvic examination (due), Postcoital bleeding, Rash (12/29/2014), Rheumatoid arthritis (HCC), Ringing in ears, S/P colonoscopy (12/2011), Screening mammogram ( 11/2008=normal), Tobacco use disorder, Tonsillitis, Ulcer of the stomach and intestine (hx.), Unspecified cataract, Unspecified hypothyroidism, and Wears glasses.    ROS  No chest pain, no shortness of breath, no abdominal pain       Objective:     /76   Pulse 83   Temp 36.8 °C (98.2 °F) (Temporal)   Resp 14   Ht 1.549 m (5' 1\")   Wt 58.1 kg (128 lb)   SpO2 95%  Body mass index is 24.19 kg/m².   Physical Exam:  Constitutional: Alert, no distress.  Skin: Warm, dry, good turgor, no rashes in visible areas.  Eye: Equal, round and reactive, conjunctiva clear, lids normal.  ENMT: Lips without lesions, good dentition, oropharynx clear.  Neck: Trachea midline, no masses, no thyromegaly. No cervical or supraclavicular lymphadenopathy  Respiratory: Unlabored respiratory effort, lungs clear to auscultation, no wheezes, no ronchi.  Cardiovascular: Normal S1, S2, no murmur, no edema.  Abdomen: Soft, non-tender, no masses, no " hepatosplenomegaly.  Psych: Alert and oriented x3, normal affect and mood.        Assessment and Plan:   The following treatment plan was discussed    1. Screening for colon cancer    - COLOGUARD (FIT DNA)    2. Hypothyroidism, unspecified type    - TSH WITH REFLEX TO FT4; Future    3. Graves' ophthalmopathy  Repeat thyroid lab    4. Vitamin D deficiency    - VITAMIN D,25 HYDROXY; Future    5. Hair loss    - Comp Metabolic Panel; Future  - CBC WITH DIFFERENTIAL; Future  - Referral to Dermatology    6. Weight gain    - Comp Metabolic Panel; Future  - CBC WITH DIFFERENTIAL; Future    7. Mixed hyperlipidemia    - Lipid Profile; Future  - Comp Metabolic Panel; Future    8. Sacroiliitis, not elsewhere classified (HCC)      9. Tobacco dependence due to cigarettes  Has quit cig, uses ecig now.   Cutting back on these    10. Recent skin changes    - Referral to Dermatology      Followup: Return in about 6 months (around 9/29/2022) for skin and hair changes, weight gain.

## 2022-03-29 NOTE — ASSESSMENT & PLAN NOTE
She notes recent hair loss on the top of her head, no pain or itching and it is diffuse hair loss.   Repeat TSH ordered for this year  Advised can use rogaine.   On levothyroxine 75 mcg  She notes skin changes, she uses sunscreen, she has age/sun related skin changes.   Referral to dermatology for skin survey done.

## 2022-04-04 ENCOUNTER — PATIENT MESSAGE (OUTPATIENT)
Dept: HEALTH INFORMATION MANAGEMENT | Facility: OTHER | Age: 77
End: 2022-04-04

## 2022-04-06 ENCOUNTER — HOSPITAL ENCOUNTER (OUTPATIENT)
Dept: LAB | Facility: MEDICAL CENTER | Age: 77
End: 2022-04-06
Attending: FAMILY MEDICINE
Payer: MEDICARE

## 2022-04-06 DIAGNOSIS — L65.9 HAIR LOSS: ICD-10-CM

## 2022-04-06 DIAGNOSIS — E78.2 MIXED HYPERLIPIDEMIA: ICD-10-CM

## 2022-04-06 DIAGNOSIS — E03.9 HYPOTHYROIDISM, UNSPECIFIED TYPE: ICD-10-CM

## 2022-04-06 DIAGNOSIS — R63.5 WEIGHT GAIN: ICD-10-CM

## 2022-04-06 DIAGNOSIS — E55.9 VITAMIN D DEFICIENCY: ICD-10-CM

## 2022-04-06 LAB
25(OH)D3 SERPL-MCNC: 91 NG/ML (ref 30–100)
ALBUMIN SERPL BCP-MCNC: 4.8 G/DL (ref 3.2–4.9)
ALBUMIN/GLOB SERPL: 1.8 G/DL
ALP SERPL-CCNC: 68 U/L (ref 30–99)
ALT SERPL-CCNC: 16 U/L (ref 2–50)
ANION GAP SERPL CALC-SCNC: 16 MMOL/L (ref 7–16)
AST SERPL-CCNC: 21 U/L (ref 12–45)
BASOPHILS # BLD AUTO: 0.1 % (ref 0–1.8)
BASOPHILS # BLD: 0.01 K/UL (ref 0–0.12)
BILIRUB SERPL-MCNC: 0.4 MG/DL (ref 0.1–1.5)
BUN SERPL-MCNC: 12 MG/DL (ref 8–22)
CALCIUM SERPL-MCNC: 9.9 MG/DL (ref 8.5–10.5)
CHLORIDE SERPL-SCNC: 99 MMOL/L (ref 96–112)
CHOLEST SERPL-MCNC: 274 MG/DL (ref 100–199)
CO2 SERPL-SCNC: 20 MMOL/L (ref 20–33)
CREAT SERPL-MCNC: 0.81 MG/DL (ref 0.5–1.4)
EOSINOPHIL # BLD AUTO: 0 K/UL (ref 0–0.51)
EOSINOPHIL NFR BLD: 0 % (ref 0–6.9)
ERYTHROCYTE [DISTWIDTH] IN BLOOD BY AUTOMATED COUNT: 43.8 FL (ref 35.9–50)
FASTING STATUS PATIENT QL REPORTED: NORMAL
GFR SERPLBLD CREATININE-BSD FMLA CKD-EPI: 76 ML/MIN/1.73 M 2
GLOBULIN SER CALC-MCNC: 2.7 G/DL (ref 1.9–3.5)
GLUCOSE SERPL-MCNC: 136 MG/DL (ref 65–99)
HCT VFR BLD AUTO: 42.3 % (ref 37–47)
HDLC SERPL-MCNC: 101 MG/DL
HGB BLD-MCNC: 14.1 G/DL (ref 12–16)
IMM GRANULOCYTES # BLD AUTO: 0.03 K/UL (ref 0–0.11)
IMM GRANULOCYTES NFR BLD AUTO: 0.3 % (ref 0–0.9)
LDLC SERPL CALC-MCNC: 157 MG/DL
LYMPHOCYTES # BLD AUTO: 1.08 K/UL (ref 1–4.8)
LYMPHOCYTES NFR BLD: 10.3 % (ref 22–41)
MCH RBC QN AUTO: 30.5 PG (ref 27–33)
MCHC RBC AUTO-ENTMCNC: 33.3 G/DL (ref 33.6–35)
MCV RBC AUTO: 91.4 FL (ref 81.4–97.8)
MONOCYTES # BLD AUTO: 0.45 K/UL (ref 0–0.85)
MONOCYTES NFR BLD AUTO: 4.3 % (ref 0–13.4)
NEUTROPHILS # BLD AUTO: 8.92 K/UL (ref 2–7.15)
NEUTROPHILS NFR BLD: 85 % (ref 44–72)
NRBC # BLD AUTO: 0 K/UL
NRBC BLD-RTO: 0 /100 WBC
PLATELET # BLD AUTO: 499 K/UL (ref 164–446)
PMV BLD AUTO: 9.2 FL (ref 9–12.9)
POTASSIUM SERPL-SCNC: 4.4 MMOL/L (ref 3.6–5.5)
PROT SERPL-MCNC: 7.5 G/DL (ref 6–8.2)
RBC # BLD AUTO: 4.63 M/UL (ref 4.2–5.4)
SODIUM SERPL-SCNC: 135 MMOL/L (ref 135–145)
T4 FREE SERPL-MCNC: 1.33 NG/DL (ref 0.93–1.7)
TRIGL SERPL-MCNC: 81 MG/DL (ref 0–149)
TSH SERPL DL<=0.005 MIU/L-ACNC: 0.23 UIU/ML (ref 0.38–5.33)
WBC # BLD AUTO: 10.5 K/UL (ref 4.8–10.8)

## 2022-04-06 PROCEDURE — 80053 COMPREHEN METABOLIC PANEL: CPT

## 2022-04-06 PROCEDURE — 85025 COMPLETE CBC W/AUTO DIFF WBC: CPT

## 2022-04-06 PROCEDURE — 84443 ASSAY THYROID STIM HORMONE: CPT

## 2022-04-06 PROCEDURE — 82306 VITAMIN D 25 HYDROXY: CPT

## 2022-04-06 PROCEDURE — 84439 ASSAY OF FREE THYROXINE: CPT

## 2022-04-06 PROCEDURE — 80061 LIPID PANEL: CPT

## 2022-04-06 PROCEDURE — 36415 COLL VENOUS BLD VENIPUNCTURE: CPT

## 2022-04-12 DIAGNOSIS — D75.839 THROMBOCYTOSIS: ICD-10-CM

## 2022-04-12 DIAGNOSIS — E78.2 MIXED HYPERLIPIDEMIA: ICD-10-CM

## 2022-04-12 DIAGNOSIS — R73.01 ELEVATED FASTING GLUCOSE: ICD-10-CM

## 2022-04-12 DIAGNOSIS — E03.9 HYPOTHYROIDISM, UNSPECIFIED TYPE: ICD-10-CM

## 2022-04-12 NOTE — RESULT ENCOUNTER NOTE
Released to lazaro Mensah,  Your labs show platelets are improving and coming  to normal numbers. There are a few flags on slightly elevated neutrophils that should be rechecked. Thyroid T4 is normal but TSH is a bit suppressed. Should be rechecked also. Follow up labs ordered to be done a few days before your next visit with marquis Laboy M.D.

## 2022-04-19 DIAGNOSIS — Z79.890 POSTMENOPAUSAL HRT (HORMONE REPLACEMENT THERAPY): ICD-10-CM

## 2022-04-19 RX ORDER — ESTRADIOL 1 MG/1
1 TABLET ORAL DAILY
Qty: 30 TABLET | Refills: 1 | Status: SHIPPED | OUTPATIENT
Start: 2022-04-19 | End: 2022-06-16 | Stop reason: SDUPTHER

## 2022-04-26 NOTE — RESULT ENCOUNTER NOTE
Released to lazaro Mensah,  I saw the cologuard testing requires a new sample. They will send this to you. Important to get the colon cancer screening done with this.   Frances Laboy M.D.

## 2022-06-01 NOTE — RESULT ENCOUNTER NOTE
Your cologuard test for screening for colon cancer was normal! Next is due in 3 years. Let me know sooner if you have any abnormal weight loss, changes in stool or blood in the stool.

## 2022-06-06 ENCOUNTER — APPOINTMENT (OUTPATIENT)
Dept: RADIOLOGY | Facility: MEDICAL CENTER | Age: 77
End: 2022-06-06
Attending: FAMILY MEDICINE
Payer: MEDICARE

## 2022-06-06 ENCOUNTER — TELEPHONE (OUTPATIENT)
Dept: HEALTH INFORMATION MANAGEMENT | Facility: OTHER | Age: 77
End: 2022-06-06
Payer: MEDICARE

## 2022-06-07 ENCOUNTER — HOSPITAL ENCOUNTER (OUTPATIENT)
Dept: RADIOLOGY | Facility: MEDICAL CENTER | Age: 77
End: 2022-06-07
Attending: FAMILY MEDICINE
Payer: MEDICARE

## 2022-06-07 DIAGNOSIS — Z12.31 ENCOUNTER FOR SCREENING MAMMOGRAM FOR MALIGNANT NEOPLASM OF BREAST: ICD-10-CM

## 2022-06-07 PROCEDURE — 77063 BREAST TOMOSYNTHESIS BI: CPT

## 2022-06-07 RX ORDER — OMEPRAZOLE 40 MG/1
CAPSULE, DELAYED RELEASE ORAL
Qty: 90 CAPSULE | Refills: 3 | Status: SHIPPED | OUTPATIENT
Start: 2022-06-07 | End: 2023-06-13

## 2022-06-07 NOTE — TELEPHONE ENCOUNTER
Received request via: Pharmacy    Was the patient seen in the last year in this department? Yes    Does the patient have an active prescription (recently filled or refills available) for medication(s) requested? No     Last Office Visit:03/29/2022  Last Labs:04/06/2022

## 2022-06-08 NOTE — RESULT ENCOUNTER NOTE
Released to lazaro Mensah  Your mammogram was normal! Next is due in one year.   Please let me know immediately if you notice any new lumps/rashes/pain/nipple discharge.  Frances Laboy M.D.

## 2022-06-13 DIAGNOSIS — R06.02 SOB (SHORTNESS OF BREATH): ICD-10-CM

## 2022-06-14 RX ORDER — ALBUTEROL SULFATE 90 UG/1
AEROSOL, METERED RESPIRATORY (INHALATION)
Qty: 9 G | Refills: 3 | Status: SHIPPED | OUTPATIENT
Start: 2022-06-14 | End: 2022-07-26 | Stop reason: SDUPTHER

## 2022-06-16 DIAGNOSIS — Z79.890 POSTMENOPAUSAL HRT (HORMONE REPLACEMENT THERAPY): ICD-10-CM

## 2022-06-17 RX ORDER — ESTRADIOL 1 MG/1
1 TABLET ORAL DAILY
Qty: 90 TABLET | Refills: 3 | Status: SHIPPED | OUTPATIENT
Start: 2022-06-17 | End: 2023-06-02

## 2022-06-17 NOTE — TELEPHONE ENCOUNTER
Received request via: Pharmacy    Was the patient seen in the last year in this department? Yes    Does the patient have an active prescription (recently filled or refills available) for medication(s) requested? No     Last ov 3/29/22

## 2022-06-24 RX ORDER — MONTELUKAST SODIUM 10 MG/1
10 TABLET ORAL DAILY
Qty: 30 TABLET | Refills: 5 | Status: SHIPPED | OUTPATIENT
Start: 2022-06-24 | End: 2022-07-19

## 2022-07-19 ENCOUNTER — OFFICE VISIT (OUTPATIENT)
Dept: MEDICAL GROUP | Facility: PHYSICIAN GROUP | Age: 77
End: 2022-07-19
Payer: MEDICARE

## 2022-07-19 VITALS
OXYGEN SATURATION: 96 % | BODY MASS INDEX: 23.79 KG/M2 | HEIGHT: 61 IN | HEART RATE: 86 BPM | SYSTOLIC BLOOD PRESSURE: 110 MMHG | WEIGHT: 126 LBS | DIASTOLIC BLOOD PRESSURE: 74 MMHG | TEMPERATURE: 97 F | RESPIRATION RATE: 16 BRPM

## 2022-07-19 DIAGNOSIS — J30.89 ENVIRONMENTAL AND SEASONAL ALLERGIES: ICD-10-CM

## 2022-07-19 PROCEDURE — 99213 OFFICE O/P EST LOW 20 MIN: CPT | Performed by: NURSE PRACTITIONER

## 2022-07-19 RX ORDER — AZELASTINE HYDROCHLORIDE 137 UG/1
SPRAY, METERED NASAL
Qty: 30 ML | Refills: 6 | Status: SHIPPED | OUTPATIENT
Start: 2022-07-19 | End: 2022-09-14

## 2022-07-19 ASSESSMENT — FIBROSIS 4 INDEX: FIB4 SCORE: 0.79

## 2022-07-19 NOTE — PROGRESS NOTES
"Subjective:     CC:   Chief Complaint   Patient presents with   • Seasonal Allergies       HPI:   Makenna presents today with    Environmental and seasonal allergies  Suffering from allergies for a while but worsening over the last few years  Moved out here from [place] 28 years ago; same house since then; has some land w/lots of plants   Tried and failed singulair, ayr saline, flonase, zyrtec, benadryl, claritin, sudafed    Reports rhinorrhea, congestion, post nasal drip, dry nose, denies nosebleeds; some sneezing  Denies  itchy watery eyes    Uses humidifier in bedroom and living room   Doesn't feel she can do the nasal lavage-feels she'd choke                  ROS per HPI    Objective:     Exam:  /74   Pulse 86   Temp 36.1 °C (97 °F) (Temporal)   Resp 16   Ht 1.549 m (5' 1\")   Wt 57.2 kg (126 lb)   SpO2 96%   BMI 23.81 kg/m²  Body mass index is 23.81 kg/m².    Physical Exam:  Constitutional: Well-developed and well-nourished female  Not diaphoretic. No distress.   Skin: warm, dry, intact, no evidence of rash or concerning lesions  Head: Atraumatic without lesions.  Eyes: Conjunctivae are normal. Pupils are equal, round. No scleral icterus.   Ears:  External ears unremarkable. TMs wnl bilat;  mild cerumen right ear; normal left  Neck: Supple, trachea midline.   Cardiovascular: Regular rate    Pulmonary: . Normal effort. No rales, ronchi, or wheezing.  Extremities: No cyanosis, clubbing, erythema, nor edema.   Neurological: Alert and oriented x 3.   Psychiatric:  Behavior, mood, and affect are appropriate.        Assessment & Plan:     77 y.o. female with the following -     1. Environmental and seasonal allergies    Other orders  - Azelastine HCl 137 MCG/SPRAY Solution; Instill 1-2 drops twice daily in each nostril  Dispense: 30 mL; Refill: 6         Return in about 4 weeks (around 8/16/2022) for med check.    Please note that this dictation was created using voice recognition software. I have made every " reasonable attempt to correct obvious errors, but I expect that there are errors of grammar and possibly content that I did not discover before finalizing the note.

## 2022-07-19 NOTE — ASSESSMENT & PLAN NOTE
Suffering from allergies for a while but worsening over the last few years  Moved out here from [place] 28 years ago; same house since then; has some land w/lots of plants   Tried and failed singulair, ayr saline, flonase, zyrtec, benadryl, claritin, sudafed    Reports rhinorrhea, congestion, post nasal drip, dry nose, denies nosebleeds; some sneezing  Denies  itchy watery eyes    Uses humidifier in bedroom and living room   Doesn't feel she can do the nasal lavage-feels she'd choke

## 2022-07-26 ENCOUNTER — PATIENT MESSAGE (OUTPATIENT)
Dept: MEDICAL GROUP | Facility: PHYSICIAN GROUP | Age: 77
End: 2022-07-26
Payer: MEDICARE

## 2022-07-26 DIAGNOSIS — R06.02 SOB (SHORTNESS OF BREATH): ICD-10-CM

## 2022-07-26 RX ORDER — ALBUTEROL SULFATE 90 UG/1
2 AEROSOL, METERED RESPIRATORY (INHALATION) EVERY 6 HOURS PRN
Qty: 9 G | Refills: 3 | Status: SHIPPED | OUTPATIENT
Start: 2022-07-26 | End: 2023-03-03

## 2022-07-26 NOTE — PATIENT COMMUNICATION
Received request via: Patient    Was the patient seen in the last year in this department? Yes    Does the patient have an active prescription (recently filled or refills available) for medication(s) requested? Pt is requesting to have Sig changed on current Rx for albuterol. Pt stated they are only using their Rx 2x a day and are receiving too many Rx upon Rx refill.

## 2022-08-23 ENCOUNTER — HOSPITAL ENCOUNTER (OUTPATIENT)
Dept: LAB | Facility: MEDICAL CENTER | Age: 77
End: 2022-08-23
Attending: FAMILY MEDICINE
Payer: MEDICARE

## 2022-08-23 DIAGNOSIS — E03.9 HYPOTHYROIDISM, UNSPECIFIED TYPE: ICD-10-CM

## 2022-08-23 DIAGNOSIS — E78.2 MIXED HYPERLIPIDEMIA: ICD-10-CM

## 2022-08-23 DIAGNOSIS — D75.839 THROMBOCYTOSIS: ICD-10-CM

## 2022-08-23 DIAGNOSIS — R73.01 ELEVATED FASTING GLUCOSE: ICD-10-CM

## 2022-08-23 LAB
ALBUMIN SERPL BCP-MCNC: 4.2 G/DL (ref 3.2–4.9)
ALBUMIN/GLOB SERPL: 1.8 G/DL
ALP SERPL-CCNC: 70 U/L (ref 30–99)
ALT SERPL-CCNC: 13 U/L (ref 2–50)
ANION GAP SERPL CALC-SCNC: 12 MMOL/L (ref 7–16)
AST SERPL-CCNC: 20 U/L (ref 12–45)
BASOPHILS # BLD AUTO: 0.7 % (ref 0–1.8)
BASOPHILS # BLD: 0.04 K/UL (ref 0–0.12)
BILIRUB SERPL-MCNC: 0.2 MG/DL (ref 0.1–1.5)
BUN SERPL-MCNC: 8 MG/DL (ref 8–22)
CALCIUM SERPL-MCNC: 8.9 MG/DL (ref 8.5–10.5)
CHLORIDE SERPL-SCNC: 104 MMOL/L (ref 96–112)
CHOLEST SERPL-MCNC: 240 MG/DL (ref 100–199)
CO2 SERPL-SCNC: 24 MMOL/L (ref 20–33)
CREAT SERPL-MCNC: 0.71 MG/DL (ref 0.5–1.4)
EOSINOPHIL # BLD AUTO: 0.32 K/UL (ref 0–0.51)
EOSINOPHIL NFR BLD: 5.9 % (ref 0–6.9)
ERYTHROCYTE [DISTWIDTH] IN BLOOD BY AUTOMATED COUNT: 42.7 FL (ref 35.9–50)
EST. AVERAGE GLUCOSE BLD GHB EST-MCNC: 117 MG/DL
FASTING STATUS PATIENT QL REPORTED: NORMAL
GFR SERPLBLD CREATININE-BSD FMLA CKD-EPI: 89 ML/MIN/1.73 M 2
GLOBULIN SER CALC-MCNC: 2.4 G/DL (ref 1.9–3.5)
GLUCOSE SERPL-MCNC: 86 MG/DL (ref 65–99)
HBA1C MFR BLD: 5.7 % (ref 4–5.6)
HCT VFR BLD AUTO: 38.9 % (ref 37–47)
HDLC SERPL-MCNC: 73 MG/DL
HGB BLD-MCNC: 13.2 G/DL (ref 12–16)
IMM GRANULOCYTES # BLD AUTO: 0.01 K/UL (ref 0–0.11)
IMM GRANULOCYTES NFR BLD AUTO: 0.2 % (ref 0–0.9)
LDLC SERPL CALC-MCNC: 126 MG/DL
LYMPHOCYTES # BLD AUTO: 1.75 K/UL (ref 1–4.8)
LYMPHOCYTES NFR BLD: 32.3 % (ref 22–41)
MCH RBC QN AUTO: 30.6 PG (ref 27–33)
MCHC RBC AUTO-ENTMCNC: 33.9 G/DL (ref 33.6–35)
MCV RBC AUTO: 90.3 FL (ref 81.4–97.8)
MONOCYTES # BLD AUTO: 0.45 K/UL (ref 0–0.85)
MONOCYTES NFR BLD AUTO: 8.3 % (ref 0–13.4)
NEUTROPHILS # BLD AUTO: 2.85 K/UL (ref 2–7.15)
NEUTROPHILS NFR BLD: 52.6 % (ref 44–72)
NRBC # BLD AUTO: 0 K/UL
NRBC BLD-RTO: 0 /100 WBC
PLATELET # BLD AUTO: 399 K/UL (ref 164–446)
PMV BLD AUTO: 9.1 FL (ref 9–12.9)
POTASSIUM SERPL-SCNC: 3.7 MMOL/L (ref 3.6–5.5)
PROT SERPL-MCNC: 6.6 G/DL (ref 6–8.2)
RBC # BLD AUTO: 4.31 M/UL (ref 4.2–5.4)
SODIUM SERPL-SCNC: 140 MMOL/L (ref 135–145)
TRIGL SERPL-MCNC: 205 MG/DL (ref 0–149)
TSH SERPL DL<=0.005 MIU/L-ACNC: 0.74 UIU/ML (ref 0.38–5.33)
WBC # BLD AUTO: 5.4 K/UL (ref 4.8–10.8)

## 2022-08-23 PROCEDURE — 36415 COLL VENOUS BLD VENIPUNCTURE: CPT

## 2022-08-23 PROCEDURE — 85025 COMPLETE CBC W/AUTO DIFF WBC: CPT

## 2022-08-23 PROCEDURE — 83036 HEMOGLOBIN GLYCOSYLATED A1C: CPT

## 2022-08-23 PROCEDURE — 80061 LIPID PANEL: CPT

## 2022-08-23 PROCEDURE — 84443 ASSAY THYROID STIM HORMONE: CPT

## 2022-08-23 PROCEDURE — 80053 COMPREHEN METABOLIC PANEL: CPT

## 2022-08-30 ENCOUNTER — OFFICE VISIT (OUTPATIENT)
Dept: MEDICAL GROUP | Facility: PHYSICIAN GROUP | Age: 77
End: 2022-08-30
Payer: MEDICARE

## 2022-08-30 VITALS
OXYGEN SATURATION: 99 % | HEIGHT: 61 IN | RESPIRATION RATE: 16 BRPM | DIASTOLIC BLOOD PRESSURE: 76 MMHG | TEMPERATURE: 99 F | WEIGHT: 127 LBS | SYSTOLIC BLOOD PRESSURE: 122 MMHG | HEART RATE: 82 BPM | BODY MASS INDEX: 23.98 KG/M2

## 2022-08-30 DIAGNOSIS — J01.10 SUBACUTE FRONTAL SINUSITIS: ICD-10-CM

## 2022-08-30 PROCEDURE — 99214 OFFICE O/P EST MOD 30 MIN: CPT | Performed by: FAMILY MEDICINE

## 2022-08-30 RX ORDER — PREDNISONE 20 MG/1
TABLET ORAL
Qty: 15 TABLET | Refills: 0 | Status: SHIPPED | OUTPATIENT
Start: 2022-08-30 | End: 2022-09-14

## 2022-08-30 ASSESSMENT — FIBROSIS 4 INDEX: FIB4 SCORE: 1.04

## 2022-08-30 NOTE — ASSESSMENT & PLAN NOTE
Sinus pressure at bridge of nose, under eyes, and frontal pressure with headaches.   She has worse symptoms with fans  She has no fevers/pus/blood drainage.   She has been dusting and vacuuming weekly and using a home air purifier.   No improvement in the nasal congestion and pressure and pain with nasal saline, flonase, ibuprofen, tylenol  rx for short course of oral steroid provided with advise on risks including blood clots, elevated sugars, elevated bp.   We discussed ref to ENT, pt defers for now  She quit smoking and is using the e cig once a day    dmv form for placard due to pain from arthritis provided today we will fax it over.

## 2022-08-30 NOTE — PROGRESS NOTES
Subjective:   Makenna Culver is a 77 y.o. female here today for evaluation and management of:     Subacute frontal sinusitis  Sinus pressure at bridge of nose, under eyes, and frontal pressure with headaches.   She has worse symptoms with fans  She has no fevers/pus/blood drainage.   She has been dusting and vacuuming weekly and using a home air purifier.   No improvement in the nasal congestion and pressure and pain with nasal saline, flonase, ibuprofen, tylenol  rx for short course of oral steroid provided with advise on risks including blood clots, elevated sugars, elevated bp.   We discussed ref to ENT, pt defers for now  She quit smoking and is using the e cig once a day    dmv form for placard due to pain from arthritis provided today we will fax it over.          Current medicines (including changes today)  Current Outpatient Medications   Medication Sig Dispense Refill    predniSONE (DELTASONE) 20 MG Tab 40 mg daily for five days, followed by 20 mg daily for five days 15 Tablet 0    albuterol 108 (90 Base) MCG/ACT Aero Soln inhalation aerosol Inhale 2 Puffs every 6 hours as needed for Shortness of Breath. 9 g 3    Azelastine HCl 137 MCG/SPRAY Solution Instill 1-2 drops twice daily in each nostril 30 mL 6    estradiol (ESTRACE) 1 MG Tab Take 1 Tablet by mouth every day. wean down to the lowest effective dose. 90 Tablet 3    omeprazole (PRILOSEC) 40 MG delayed-release capsule Take 1 capsule by mouth once daily 90 Capsule 3    traMADol (ULTRAM) 50 MG Tab       levothyroxine (SYNTHROID) 75 MCG Tab TAKE 1 TABLET BY MOUTH IN THE MORNING ON AN EMPTY STOMACH 90 Tablet 3    atorvastatin (LIPITOR) 10 MG Tab TAKE 1 TABLET BY MOUTH EVERY OTHER DAY FOR HIGH CHOLESTEROL 50 Tablet 3    traZODone (DESYREL) 50 MG Tab TAKE 1 TABLET BY MOUTH AT BEDTIME AS NEEDED FOR SLEEP 90 Tablet 3    cyclobenzaprine (FLEXERIL) 10 mg Tab Take 1 tablet by mouth three times daily as needed 90 Tablet 0    ibuprofen (MOTRIN) 800 MG Tab TAKE 1  "TABLET BY MOUTH THREE TIMES DAILY WITH FOOD 90 Tablet 0    diclofenac sodium (VOLTAREN) 1 % Gel       Cholecalciferol (VITAMIN D) 50 MCG (2000 UT) Cap Take 1 Cap by mouth every day.      B Complex Vitamins (VITAMIN B-COMPLEX PO) Take 1 Cap by mouth every day.      Multiple Vitamins-Minerals (PRESERVISION AREDS 2 PO) Take 1 Dose by mouth every day.       No current facility-administered medications for this visit.     She  has a past medical history of Arthritis, Back pain, Chickenpox, Colon polyp (12/2011), Constipation, Degeneration of lumbar or lumbosacral intervertebral disc, Dental disorder, Double vision, Ear problems (as child had surgery), Chinese measles, Graves' disease with exophthalmos (6/13/2014), Hemorrhoid, High cholesterol, History of carpal tunnel repair (7/31/2013), Hyperthyroidism, Menopause (10/14/2009), Morning headache, Osteoarthritis of multiple joints (6/13/2014), Osteoporosis, Pain, Pain in joint, pelvic region and thigh (2015), Pain in joint, shoulder region (2015), Peptic ulcer, Periodic pelvic examination (due), Postcoital bleeding, Rash (12/29/2014), Rheumatoid arthritis (HCC), Ringing in ears, S/P colonoscopy (12/2011), Screening mammogram ( 11/2008=normal), Tobacco use disorder, Tonsillitis, Ulcer of the stomach and intestine (hx.), Unspecified cataract, Unspecified hypothyroidism, and Wears glasses.    ROS  No chest pain, no shortness of breath, no abdominal pain       Objective:     /76 (BP Location: Left arm, Patient Position: Sitting, BP Cuff Size: Adult)   Pulse 82   Temp 37.2 °C (99 °F) (Temporal)   Resp 16   Ht 1.549 m (5' 1\")   Wt 57.6 kg (127 lb)   SpO2 99%  Body mass index is 24 kg/m².   Physical Exam:  Constitutional: Alert, no distress.  Skin: Warm, dry, good turgor, no rashes in visible areas.  Eye: Equal, round and reactive, conjunctiva clear, lids normal.  ENMT: Lips without lesions, good dentition, oropharynx clear.  Neck: Trachea midline, no masses, no " thyromegaly. No cervical or supraclavicular lymphadenopathy  Respiratory: Unlabored respiratory effort, lungs clear to auscultation, no wheezes, no ronchi.  Cardiovascular: Normal S1, S2, no murmur, no edema.  Abdomen: Soft, non-tender, no masses, no hepatosplenomegaly.  Psych: Alert and oriented x3, normal affect and mood.        Assessment and Plan:   The following treatment plan was discussed    1. Subacute frontal sinusitis    Other orders  - predniSONE (DELTASONE) 20 MG Tab; 40 mg daily for five days, followed by 20 mg daily for five days  Dispense: 15 Tablet; Refill: 0      Followup: No follow-ups on file.

## 2022-09-14 ENCOUNTER — OFFICE VISIT (OUTPATIENT)
Dept: MEDICAL GROUP | Facility: PHYSICIAN GROUP | Age: 77
End: 2022-09-14
Payer: MEDICARE

## 2022-09-14 VITALS
OXYGEN SATURATION: 96 % | TEMPERATURE: 98.3 F | RESPIRATION RATE: 16 BRPM | DIASTOLIC BLOOD PRESSURE: 76 MMHG | WEIGHT: 126 LBS | HEART RATE: 90 BPM | BODY MASS INDEX: 23.79 KG/M2 | SYSTOLIC BLOOD PRESSURE: 120 MMHG | HEIGHT: 61 IN

## 2022-09-14 DIAGNOSIS — J30.89 ENVIRONMENTAL AND SEASONAL ALLERGIES: ICD-10-CM

## 2022-09-14 DIAGNOSIS — R35.0 URINARY FREQUENCY: ICD-10-CM

## 2022-09-14 DIAGNOSIS — M85.89 OSTEOPENIA OF MULTIPLE SITES: ICD-10-CM

## 2022-09-14 DIAGNOSIS — H25.10 NUCLEAR SENILE CATARACT, UNSPECIFIED LATERALITY: ICD-10-CM

## 2022-09-14 DIAGNOSIS — E03.9 HYPOTHYROIDISM, UNSPECIFIED TYPE: ICD-10-CM

## 2022-09-14 DIAGNOSIS — K76.9 LIVER LESION: ICD-10-CM

## 2022-09-14 DIAGNOSIS — K21.9 GASTROESOPHAGEAL REFLUX DISEASE WITHOUT ESOPHAGITIS: ICD-10-CM

## 2022-09-14 DIAGNOSIS — M54.16 LUMBAR RADICULITIS: ICD-10-CM

## 2022-09-14 DIAGNOSIS — E78.2 MIXED HYPERLIPIDEMIA: ICD-10-CM

## 2022-09-14 DIAGNOSIS — F17.210 TOBACCO DEPENDENCE DUE TO CIGARETTES: ICD-10-CM

## 2022-09-14 DIAGNOSIS — M46.1 SACROILIITIS, NOT ELSEWHERE CLASSIFIED (HCC): ICD-10-CM

## 2022-09-14 DIAGNOSIS — Z23 NEED FOR VACCINATION: ICD-10-CM

## 2022-09-14 DIAGNOSIS — R91.1 PULMONARY NODULE: ICD-10-CM

## 2022-09-14 DIAGNOSIS — M15.9 PRIMARY OSTEOARTHRITIS INVOLVING MULTIPLE JOINTS: ICD-10-CM

## 2022-09-14 DIAGNOSIS — M51.36 LUMBAR DEGENERATIVE DISC DISEASE: ICD-10-CM

## 2022-09-14 DIAGNOSIS — F51.01 PRIMARY INSOMNIA: ICD-10-CM

## 2022-09-14 PROBLEM — J01.10 SUBACUTE FRONTAL SINUSITIS: Status: RESOLVED | Noted: 2022-08-30 | Resolved: 2022-09-14

## 2022-09-14 PROBLEM — H93.8X3 SENSATION OF FULLNESS IN BOTH EARS: Status: RESOLVED | Noted: 2021-05-24 | Resolved: 2022-09-14

## 2022-09-14 PROBLEM — Z09 HOSPITAL DISCHARGE FOLLOW-UP: Status: RESOLVED | Noted: 2019-04-26 | Resolved: 2022-09-14

## 2022-09-14 PROBLEM — M79.10 MYALGIA: Status: RESOLVED | Noted: 2021-07-12 | Resolved: 2022-09-14

## 2022-09-14 PROBLEM — K52.9 COLITIS: Status: RESOLVED | Noted: 2019-04-15 | Resolved: 2022-09-14

## 2022-09-14 PROCEDURE — G0010 ADMIN HEPATITIS B VACCINE: HCPCS | Performed by: FAMILY MEDICINE

## 2022-09-14 PROCEDURE — 90662 IIV NO PRSV INCREASED AG IM: CPT | Performed by: FAMILY MEDICINE

## 2022-09-14 PROCEDURE — 90746 HEPB VACCINE 3 DOSE ADULT IM: CPT | Performed by: FAMILY MEDICINE

## 2022-09-14 PROCEDURE — G0439 PPPS, SUBSEQ VISIT: HCPCS | Mod: 25 | Performed by: FAMILY MEDICINE

## 2022-09-14 PROCEDURE — G0008 ADMIN INFLUENZA VIRUS VAC: HCPCS | Performed by: FAMILY MEDICINE

## 2022-09-14 RX ORDER — SULFAMETHOXAZOLE AND TRIMETHOPRIM 800; 160 MG/1; MG/1
1 TABLET ORAL 2 TIMES DAILY
Qty: 10 TABLET | Refills: 0 | Status: SHIPPED | OUTPATIENT
Start: 2022-09-14 | End: 2022-09-19

## 2022-09-14 RX ORDER — FLUTICASONE PROPIONATE 50 MCG
1 SPRAY, SUSPENSION (ML) NASAL DAILY
COMMUNITY
End: 2023-03-03

## 2022-09-14 ASSESSMENT — ACTIVITIES OF DAILY LIVING (ADL): BATHING_REQUIRES_ASSISTANCE: 0

## 2022-09-14 ASSESSMENT — PATIENT HEALTH QUESTIONNAIRE - PHQ9: CLINICAL INTERPRETATION OF PHQ2 SCORE: 0

## 2022-09-14 ASSESSMENT — ENCOUNTER SYMPTOMS: GENERAL WELL-BEING: EXCELLENT

## 2022-09-14 ASSESSMENT — FIBROSIS 4 INDEX: FIB4 SCORE: 1.04

## 2022-09-14 NOTE — ASSESSMENT & PLAN NOTE
Good improvement in levels.   She is on atorvastatin 10 mg every other day with normal lfts and no myalgia   Latest Reference Range & Units 4/6/22 07:54 8/23/22 07:48   Cholesterol,Tot 100 - 199 mg/dL 274 (H) 240 (H)   Triglycerides 0 - 149 mg/dL 81 205 (H)   HDL >=40 mg/dL 101 73   LDL <100 mg/dL 157 (H) 126 (H)   (H): Data is abnormally high

## 2022-09-14 NOTE — ASSESSMENT & PLAN NOTE
Chronic condition, stable.   Somewhat limits her activities.   Followed by pain management has epidural injections, trigger pt injections.

## 2022-09-14 NOTE — PROGRESS NOTES
Chief Complaint   Patient presents with    Annual Exam     scp       HPI:  Makenna Culver is a 77 y.o. here for Medicare Annual Wellness Visit     Patient Active Problem List    Diagnosis Date Noted    Lumbar degenerative disc disease 07/12/2021    Lumbar radiculitis 07/12/2021    Environmental and seasonal allergies 05/28/2020    Primary insomnia 05/28/2020    Pulmonary nodule 04/15/2019    Liver lesion 04/15/2019    Sacroiliitis, not elsewhere classified (HCC) 07/25/2018    Senile nuclear sclerosis 03/03/2015    Osteoarthritis of multiple joints 06/13/2014    Osteopenia 05/09/2012    Hyperlipidemia 06/22/2011    GERD (gastroesophageal reflux disease) 11/04/2009    Hypothyroidism 10/09/2009       Current Outpatient Medications   Medication Sig Dispense Refill    fluticasone (FLONASE) 50 MCG/ACT nasal spray Administer 1 Spray into affected nostril(S) every day.      sulfamethoxazole-trimethoprim (BACTRIM DS) 800-160 MG tablet Take 1 Tablet by mouth 2 times a day for 5 days. 10 Tablet 0    albuterol 108 (90 Base) MCG/ACT Aero Soln inhalation aerosol Inhale 2 Puffs every 6 hours as needed for Shortness of Breath. 9 g 3    estradiol (ESTRACE) 1 MG Tab Take 1 Tablet by mouth every day. wean down to the lowest effective dose. 90 Tablet 3    omeprazole (PRILOSEC) 40 MG delayed-release capsule Take 1 capsule by mouth once daily 90 Capsule 3    traMADol (ULTRAM) 50 MG Tab       levothyroxine (SYNTHROID) 75 MCG Tab TAKE 1 TABLET BY MOUTH IN THE MORNING ON AN EMPTY STOMACH 90 Tablet 3    atorvastatin (LIPITOR) 10 MG Tab TAKE 1 TABLET BY MOUTH EVERY OTHER DAY FOR HIGH CHOLESTEROL 50 Tablet 3    traZODone (DESYREL) 50 MG Tab TAKE 1 TABLET BY MOUTH AT BEDTIME AS NEEDED FOR SLEEP 90 Tablet 3    cyclobenzaprine (FLEXERIL) 10 mg Tab Take 1 tablet by mouth three times daily as needed 90 Tablet 0    ibuprofen (MOTRIN) 800 MG Tab TAKE 1 TABLET BY MOUTH THREE TIMES DAILY WITH FOOD 90 Tablet 0    diclofenac sodium (VOLTAREN) 1 % Gel        Cholecalciferol (VITAMIN D) 50 MCG (2000 UT) Cap Take 1 Cap by mouth every day.      B Complex Vitamins (VITAMIN B-COMPLEX PO) Take 1 Cap by mouth every day.       No current facility-administered medications for this visit.          Current supplements as per medication list.     Allergies: Codeine, Morphine, and Vicodin [hydrocodone-acetaminophen]    Current social contact/activities:      She  reports that she quit smoking about 9 years ago. Her smoking use included cigarettes and electronic cigarettes. She started smoking about 57 years ago. She has never used smokeless tobacco. She reports current alcohol use. She reports that she does not use drugs.  Counseling given: Not Answered  Tobacco comments: use terry-cig electronic cig.      DPA/Advanced Directive:  Patient does not have an Advanced Directive.  A packet and workshop information was given on Advanced Directives.    ROS:    Gait: Uses no assistive device  Ostomy: No  Other tubes: No  Amputations: No  Chronic oxygen use: No  Last eye exam: 6 months ago  Wears hearing aids: No   : Denies any urinary leakage during the last 6 months    Screening:    Depression Screening  Little interest or pleasure in doing things?  0 - not at all  Feeling down, depressed , or hopeless? 0 - not at all  Patient Health Questionnaire Score: 0     If depressive symptoms identified deferred to follow up visit unless specifically addressed in assessment and plan.    Interpretation of PHQ-9 Total Score   Score Severity   1-4 No Depression   5-9 Mild Depression   10-14 Moderate Depression   15-19 Moderately Severe Depression   20-27 Severe Depression    Screening for Cognitive Impairment  Three Minute Recall (daughter, heaven, mountain) 3/3    Pradip clock face with all 12 numbers and set the hands to show 10 past 11.  Yes    Cognitive concerns identified deferred for follow up unless specifically addressed in assessment and plan.    Fall Risk Assessment  Has the patient had two  or more falls in the last year or any fall with injury in the last year?  No    Safety Assessment  Throw rugs on floor.  Yes  Handrails on all stairs.  No  Good lighting in all hallways.  Yes  Difficulty hearing.  No  Patient counseled about all safety risks that were identified.    Functional Assessment ADLs  Are there any barriers preventing you from cooking for yourself or meeting nutritional needs?  No.    Are there any barriers preventing you from driving safely or obtaining transportation?  No.    Are there any barriers preventing you from using a telephone or calling for help?  No.    Are there any barriers preventing you from shopping?  No.    Are there any barriers preventing you from taking care of your own finances?  No.    Are there any barriers preventing you from managing your medications?  No.    Are there any barriers preventing you from showering, bathing or dressing yourself?  No.    Are you currently engaging in any exercise or physical activity?  Yes.     What is your perception of your health?  Excellent.      Health Maintenance Summary            Ordered - IMM HEP B VACCINE (1 of 3 - 3-dose series) Ordered on 9/14/2022      No completion history exists for this topic.              Ordered - IMM INFLUENZA (1) Ordered on 9/14/2022 11/22/2019  Imm Admin: Influenza Vaccine Adult HD    12/26/2018  Imm Admin: Influenza Vaccine Adult HD    10/20/2017  Imm Admin: Influenza Vaccine Adult HD    10/12/2016  Imm Admin: Influenza Vaccine Adult HD    09/23/2015  Imm Admin: Influenza Vaccine Adult HD    Only the first 5 history entries have been loaded, but more history exists.              Postponed - COVID-19 Vaccine (3 - Booster for Moderna series) Postponed until 3/28/2023      03/04/2021  Imm Admin: Moderna SARS-CoV-2 Vaccine    02/04/2021  Imm Admin: Moderna SARS-CoV-2 Vaccine              MAMMOGRAM (Yearly) Next due on 6/7/2023 06/07/2022  MA-SCREENING MAMMO BILAT W/TOMOSYNTHESIS W/CAD     03/11/2021  MA-SCREENING MAMMO BILAT W/TOMOSYNTHESIS W/CAD    03/13/2019  MA-SCREENING MAMMO BILAT W/TOMOSYNTHESIS W/CAD    10/31/2017  MA-MAMMO SCREENING BILAT W/BRYCE W/CAD    10/14/2015  MA-SCREEN MAMMO W/CAD-BILAT    Only the first 5 history entries have been loaded, but more history exists.              IMM DTaP/Tdap/Td Vaccine (2 - Td or Tdap) Next due on 7/31/2023 07/31/2013  Imm Admin: Tdap Vaccine              Annual Wellness Visit (Every 366 Days) Next due on 9/15/2023      09/14/2022  Subsequent Annual Wellness Visit - Includes PPPS ()    09/02/2021  Subsequent Annual Wellness Visit - Includes PPPS ()    09/02/2021  Visit Dx: Encounter for Medicare annual wellness exam    08/30/2016  Visit Dx: Medicare annual wellness visit, subsequent    06/13/2014  Done    Only the first 5 history entries have been loaded, but more history exists.              COLORECTAL CANCER SCREENING (COLONOSCOPY - Every 3 Years) Next due on 5/31/2025 08/30/2022  COLOGUARD STOOL DNA (Done - repeat in 3 years.)    05/31/2022  COLOGUARD COLON CANCER SCREENING    05/31/2022  COLOGUARD STOOL DNA (Reason not specified)    05/31/2022  COLOGUARD STOOL DNA (Reason not specified)    04/18/2022  COLOGUARD COLON CANCER SCREENING    Only the first 5 history entries have been loaded, but more history exists.              BONE DENSITY (Every 5 Years) Next due on 2/10/2026      02/10/2021  DS-BONE DENSITY STUDY (DEXA)    10/14/2015  DS-BONE DENSITY STUDY (DEXA)    09/15/2011  DS-BONE DENSITY STUDY (DEXA)              IMM PNEUMOCOCCAL VACCINE: 65+ Years (Series Information) Completed      01/16/2019  Imm Admin: Pneumococcal polysaccharide vaccine (PPSV-23)    10/20/2017  Imm Admin: Pneumococcal Conjugate Vaccine (Prevnar/PCV-13)    10/20/2017  Imm Admin: Pneumococcal polysaccharide vaccine (PPSV-23)    05/18/2016  Imm Admin: Pneumococcal Conjugate Vaccine (Prevnar/PCV-13)              HEPATITIS C SCREENING  Completed       2020  HEP C VIRUS ANTIBODY              IMM ZOSTER VACCINES (Series Information) Completed      2021  Imm Admin: Zoster Vaccine Recombinant (RZV) (SHINGRIX)    2021  Imm Admin: Zoster Vaccine Recombinant (RZV) (SHINGRIX)    2011  Imm Admin: Zoster Vaccine Live (ZVL) (Zostavax) - HISTORICAL DATA              IMM MENINGOCOCCAL ACWY VACCINE (Series Information) Aged Out      No completion history exists for this topic.                    Patient Care Team:  Frances Laboy M.D. as PCP - General (Family Medicine)  Frances Laboy M.D. as PCP - [facility] Paneled  Mahesh De Leon M.D. as Consulting Physician (Phys Med and Rehab)  Han San D.O. as Consulting Physician (Neurology)        Social History     Tobacco Use    Smoking status: Former     Packs/day: 0.00     Years: 50.00     Pack years: 0.00     Types: Cigarettes, Electronic Cigarettes     Start date: 1965     Quit date: 2013     Years since quittin.6    Smokeless tobacco: Never    Tobacco comments:     use terry-cig electronic cig.   Vaping Use    Vaping Use: Every day    Substances: Nicotine, Flavoring    Devices: Pre-filled or refillable cartridge    Passive vaping exposure: Yes   Substance Use Topics    Alcohol use: Yes     Alcohol/week: 0.0 - 0.6 oz     Comment: 0-1 a month     Drug use: No     Family History   Problem Relation Age of Onset    Hypertension Father     Heart Disease Father         MI age 47 yo    Stroke Father     Cancer Maternal Grandmother         part of lung    Lung Cancer Maternal Grandmother     Hypertension Brother     Cancer Neg Hx     Diabetes Neg Hx      She  has a past medical history of Arthritis, Back pain, Chickenpox, Colon polyp (2011), Constipation, Degeneration of lumbar or lumbosacral intervertebral disc, Dental disorder, Double vision, Ear problems (as child had surgery), [place] citizen measles, Graves' disease with exophthalmos (2014), Hemorrhoid, High cholesterol, History of carpal  tunnel repair (7/31/2013), Hyperthyroidism, Menopause (10/14/2009), Morning headache, Osteoarthritis of multiple joints (6/13/2014), Osteoporosis, Pain, Pain in joint, pelvic region and thigh (2015), Pain in joint, shoulder region (2015), Peptic ulcer, Periodic pelvic examination (due), Postcoital bleeding, Rash (12/29/2014), Rheumatoid arthritis (HCC), Ringing in ears, S/P colonoscopy (12/2011), Screening mammogram ( 11/2008=normal), Tobacco use disorder, Tonsillitis, Ulcer of the stomach and intestine (hx.), Unspecified cataract, Unspecified hypothyroidism, and Wears glasses.   Past Surgical History:   Procedure Laterality Date    [place] NEUROPLASTY & OR TRANSPOS MEDIAN NRV CARPAL CHANDRA Right 12/4/2020    Procedure: RELEASE, CARPAL TUNNEL;  Surgeon: Pancho Palomino M.D.;  Location: SURGERY SAME DAY [facility];  Service: Orthopedics    PB INCIS TENDON SHEATH,RADIAL STYLOID Right 12/4/2020    Procedure: RELEASE, HAND, FOR DEQUERVAIN'S TENOSYNOVITIS;  Surgeon: Pancho Palomino M.D.;  Location: SURGERY SAME DAY [facility];  Service: Orthopedics    FINGER ARTHROPLASTY Right 12/4/2020    Procedure: ARTHROPLASTY, FINGER-THUMB CARPOMETACARPAL EXCISIONAL;  Surgeon: Pancho Palomino M.D.;  Location: SURGERY SAME DAY [facility];  Service: Orthopedics    [place] NEUROPLASTY & OR TRANSPOS MEDIAN NRV CARPAL CHANDRA Left 8/21/2020    Procedure: RELEASE, CARPAL TUNNEL;  Surgeon: Pancho Palomino M.D.;  Location: SURGERY SAME DAY [facility]S;  Service: Orthopedics    PB INCIS TENDON SHEATH,RADIAL STYLOID Left 8/21/2020    Procedure: RELEASE, HAND, FOR DEQUERVAIN'S TENOSYNOVITIS;  Surgeon: Pancho Palomino M.D.;  Location: SURGERY SAME DAY [facility] ORS;  Service: Orthopedics    PB REPAIR INTERCARP/CARP-METACARP JT Left 8/21/2020    Procedure: EXCISION, TRAPEZIUM, WITH LIGAMENT RECONSTRUCTION AND TENDON INTERPOSITION- PARTIAL TRAPEZOID EXCISION FLEXOR CARPI RADIALIS TENDON GRAFT, PLASCENCIA;  Surgeon: Pancho Palomino M.D.;  Location:  "SURGERY SAME DAY [facility];  Service: Orthopedics    FINGER ARTHROPLASTY Left 8/21/2020    Procedure: ARTHROPLASTY, FINGER- THUMB CARPOMETACARPAL EXCISIONAL;  Surgeon: Pancho Palomino M.D.;  Location: SURGERY SAME DAY [facility];  Service: Orthopedics    BLEPHAROPLASTY Bilateral 4/20/2016    Procedure: BLEPHAROPLASTY - UPPER;  Surgeon: Moshe Sena M.D.;  Location: SURGERY [facility];  Service:     RECTUS REPAIR Bilateral 7/9/2015    Procedure: RECTUS REPAIR FOR LT SUPERIOR RECESSION & RT INFERIOR RECESSION ;  Surgeon: Leila Villegas M.D.;  Location: SURGERY SAME DAY [facility];  Service:     CATARACT PHACO WITH IOL  3/17/2015    Performed by Derrick Gupta M.D. at SURGERY [facility]    CATARACT PHACO WITH IOL  3/3/2015    Performed by Derrick Gupta M.D. at [facility]    TENSILON TEST  11/20/2014    Performed by Maynor San D.O. at ENDOSCOPY [facility]    CARPAL TUNNEL ENDOSCOPIC  2/8/2013    Performed by Pancho Palomino M.D. at SURGERY SAME DAY [facility]    CARPAL TUNNEL ENDOSCOPIC  11/17/2012    Performed by Pancho Palomino M.D. at SURGERY [facility]S    ABDOMINAL HYSTERECTOMY TOTAL  partial    fibroid tumors, has ovary and tube left    CARPAL TUNNEL RELEASE      HYSTERECTOMY LAPAROSCOPY      OTHER SURGICAL PROCEDURE      ear surgery as child    THYROIDECTOMY  thyroid ablation with iodine therapy    TONSILLECTOMY      TONSILLECTOMY AND ADENOIDECTOMY         Exam:   /76 (BP Location: Left arm)   Pulse 90   Temp 36.8 °C (98.3 °F) (Temporal)   Resp 16   Ht 1.549 m (5' 1\")   Wt 57.2 kg (126 lb)   SpO2 96%  Body mass index is 23.81 kg/m².    Hearing excellent.    Dentition upper and lower dentures  Alert, oriented in no acute distress.  Eye contact is good, speech goal directed, affect calm    Assessment and Plan. The following treatment and monitoring plan is recommended:    1. Urinary frequency  - Subsequent Annual Wellness " Visit - Includes PPPS ()    2. Tobacco dependence due to cigarettes  - Subsequent Annual Wellness Visit - Includes PPPS ()    3. Nuclear senile cataract, unspecified laterality  - Subsequent Annual Wellness Visit - Includes PPPS ()    4. Sacroiliitis, not elsewhere classified (HCC)  - Subsequent Annual Wellness Visit - Includes PPPS ()    5. Pulmonary nodule  - Subsequent Annual Wellness Visit - Includes PPPS ()    6. Primary insomnia  - Subsequent Annual Wellness Visit - Includes PPPS ()    7. Osteopenia of multiple sites  - Subsequent Annual Wellness Visit - Includes PPPS ()    8. Primary osteoarthritis involving multiple joints  - Subsequent Annual Wellness Visit - Includes PPPS ()    9. Lumbar radiculitis  - Subsequent Annual Wellness Visit - Includes PPPS ()    10. Lumbar degenerative disc disease  - Subsequent Annual Wellness Visit - Includes PPPS ()    11. Liver lesion  - Subsequent Annual Wellness Visit - Includes PPPS ()    12. Hypothyroidism, unspecified type  - Subsequent Annual Wellness Visit - Includes PPPS ()    13. Mixed hyperlipidemia  - Subsequent Annual Wellness Visit - Includes PPPS ()    14. Gastroesophageal reflux disease without esophagitis  - Subsequent Annual Wellness Visit - Includes PPPS ()    15. Environmental and seasonal allergies  - Subsequent Annual Wellness Visit - Includes PPPS ()    16. Need for vaccination  - Hepatitis B Vaccine Adult 20+  - Influenza Vaccine, High Dose (65+ Only)  - Subsequent Annual Wellness Visit - Includes PPPS ()    Other orders  - fluticasone (FLONASE) 50 MCG/ACT nasal spray; Administer 1 Spray into affected nostril(S) every day.  - sulfamethoxazole-trimethoprim (BACTRIM DS) 800-160 MG tablet; Take 1 Tablet by mouth 2 times a day for 5 days.  Dispense: 10 Tablet; Refill: 0    Patient has left leg laceration sustained about a week ago, wound examined, L shaped, 2 cm x 3 cm, clean,  mild serous discharge. Mild surrounding erythema which pt notes is increasing. Also tender.   Rx for bactrim provided. Wound dressing done.     Services suggested: No services needed at this time  Health Care Screening: Age-appropriate preventive services recommended by USPTF and ACIP covered by Medicare were discussed today. Services ordered if indicated and agreed upon by the patient.  Referrals offered: Community-based lifestyle interventions to reduce health risks and promote self-management and wellness, fall prevention, nutrition, physical activity, tobacco-use cessation, weight loss, and mental health services as per orders if indicated.    Discussion today about general wellness and lifestyle habits:    Prevent falls and reduce trip hazards; Cautioned about securing or removing rugs.  Have a working fire alarm and carbon monoxide detector;   Engage in regular physical activity and social activities     Follow-up: No follow-ups on file.

## 2022-09-14 NOTE — ASSESSMENT & PLAN NOTE
She has follow up CT scan end of this month and follow up with pulmonology end of October.   She no longer smokes cig, uses e cig  She has no hemoptysis, night sweats, or weight loss.

## 2022-09-14 NOTE — ASSESSMENT & PLAN NOTE
Was seeing neuro ophthalmologist who retired.   She is now seeing ophthalmology once a year. Dr. Gupta.   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   Had cataract surgery done also in the past.   Currently stable.   Had left eyelid surgery and left eye brow surgery in 2013 for lid droop.

## 2022-09-14 NOTE — ASSESSMENT & PLAN NOTE
She has arthritis, degenerative disc disease, arthritis in back and hips  She gets trigger point injections and epidural every 3 months.   Is on tramadol, muscle relaxant flexeril.   She stays active, works in her yard till she cannot then uses an ice pack.

## 2022-09-14 NOTE — ASSESSMENT & PLAN NOTE
This is controlled, she takes trazodone for insomnia.   Gets 7-8 hrs of sleep at night, feels rested.

## 2022-09-14 NOTE — ASSESSMENT & PLAN NOTE
She feels well on levothyroxine 75 mcg   She notes difficulty losing weight but no skin, hair changes, no voice changes, or difficulty with eating.

## 2022-09-22 RX ORDER — CYCLOBENZAPRINE HCL 10 MG
10 TABLET ORAL 3 TIMES DAILY PRN
Qty: 90 TABLET | Refills: 0 | Status: SHIPPED | OUTPATIENT
Start: 2022-09-22 | End: 2022-11-29

## 2022-09-22 NOTE — TELEPHONE ENCOUNTER
LAST OV 9/14/22    Received request via: Pharmacy    Was the patient seen in the last year in this department? Yes    Does the patient have an active prescription (recently filled or refills available) for medication(s) requested? No

## 2022-09-26 ENCOUNTER — HOSPITAL ENCOUNTER (OUTPATIENT)
Dept: RADIOLOGY | Facility: MEDICAL CENTER | Age: 77
End: 2022-09-26
Attending: INTERNAL MEDICINE
Payer: MEDICARE

## 2022-09-26 DIAGNOSIS — R91.8 PULMONARY NODULES: ICD-10-CM

## 2022-09-26 PROCEDURE — 71250 CT THORAX DX C-: CPT

## 2022-10-12 ENCOUNTER — APPOINTMENT (OUTPATIENT)
Dept: MEDICAL GROUP | Facility: PHYSICIAN GROUP | Age: 77
End: 2022-10-12
Payer: MEDICARE

## 2022-10-19 ENCOUNTER — NON-PROVIDER VISIT (OUTPATIENT)
Dept: MEDICAL GROUP | Facility: PHYSICIAN GROUP | Age: 77
End: 2022-10-19
Payer: MEDICARE

## 2022-10-19 DIAGNOSIS — Z23 NEED FOR VACCINATION: ICD-10-CM

## 2022-10-19 PROCEDURE — 90746 HEPB VACCINE 3 DOSE ADULT IM: CPT | Performed by: FAMILY MEDICINE

## 2022-10-19 PROCEDURE — G0010 ADMIN HEPATITIS B VACCINE: HCPCS | Performed by: FAMILY MEDICINE

## 2022-10-19 NOTE — PROGRESS NOTES
"Makenna Culver is a 77 y.o. female here for a non-provider visit for:   HEPATITIS B 2 of 3    Reason for immunization: continue or complete series started at the office  Immunization records indicate need for vaccine: Yes, confirmed with Epic  Minimum interval has been met for this vaccine: Yes  ABN completed: Not Indicated    VIS Dated  101521  was given to patient: Yes  All IAC Questionnaire questions were answered \"No.\"    Patient tolerated injection and no adverse effects were observed or reported: Yes    Pt scheduled for next dose in series: Not Indicated    "

## 2022-12-02 ENCOUNTER — OFFICE VISIT (OUTPATIENT)
Dept: SLEEP MEDICINE | Facility: MEDICAL CENTER | Age: 77
End: 2022-12-02
Payer: MEDICARE

## 2022-12-02 VITALS
BODY MASS INDEX: 23.64 KG/M2 | RESPIRATION RATE: 16 BRPM | SYSTOLIC BLOOD PRESSURE: 116 MMHG | OXYGEN SATURATION: 96 % | DIASTOLIC BLOOD PRESSURE: 60 MMHG | WEIGHT: 125.2 LBS | HEIGHT: 61 IN | HEART RATE: 73 BPM

## 2022-12-02 DIAGNOSIS — R05.8 RECURRENT COUGH: ICD-10-CM

## 2022-12-02 DIAGNOSIS — Z87.891 FORMER SMOKER: ICD-10-CM

## 2022-12-02 DIAGNOSIS — R91.8 PULMONARY NODULES: ICD-10-CM

## 2022-12-02 PROCEDURE — 99214 OFFICE O/P EST MOD 30 MIN: CPT | Performed by: PHYSICIAN ASSISTANT

## 2022-12-02 RX ORDER — GABAPENTIN 100 MG/1
100 CAPSULE ORAL
COMMUNITY
Start: 2022-11-10 | End: 2023-08-31

## 2022-12-02 ASSESSMENT — ENCOUNTER SYMPTOMS
SINUS PAIN: 1
COUGH: 1
SORE THROAT: 0
DIZZINESS: 0
HEMOPTYSIS: 0
INSOMNIA: 0
WEIGHT LOSS: 0
WHEEZING: 0
PALPITATIONS: 0
SPUTUM PRODUCTION: 0
SHORTNESS OF BREATH: 0
TREMORS: 0
FEVER: 0
CHILLS: 0
ORTHOPNEA: 0
HEARTBURN: 0
HEADACHES: 0

## 2022-12-02 ASSESSMENT — PATIENT HEALTH QUESTIONNAIRE - PHQ9: CLINICAL INTERPRETATION OF PHQ2 SCORE: 0

## 2022-12-02 ASSESSMENT — FIBROSIS 4 INDEX: FIB4 SCORE: 1.04

## 2022-12-02 NOTE — PATIENT INSTRUCTIONS
1-reviewed CT scan  2-reviewed allergy/upper respiratory type symptoms  3-lung function test at next visit  4-consider montelukast  5-reviewed flonase technique  6-average once per week albuterol use   7-follow up CT one year  8-mucinex (R) or guaifenesin no DM  9-follow up in 6 months

## 2022-12-02 NOTE — PROGRESS NOTES
CC: CT results, recurrent cough    HPI:  Makenna Culver is a 77 y.o. year old female here today for follow-up on pulmonary nodules. Last seen in clinic 3/28/22 by Dr. Reba Oneil on 3/28/22.  She is a former smoker with reported quit date in 2013 and approximately 25 pack year history.  She is using e cigarettes. She is accompanied by her .     Pertinent past medical history includes hypothyroidism following an episode of Grave's disease, diverticulitis for which she had required hospitalization and abdominal CT demonstrating tiny lung nodule right lower lobe.  Follow CT showed resolution but bilat 4 mm nodules.      Reviewed in clinic vitals including /60, HR 73, oxygen saturation 96%, BMI 23.66 kg/m2.     Reviewed home medication regimen including albuterol which she requires approximately 1 x per week, omeprazole 40 mg. She reports requiring antibiotics and prednisone in the fall for allergies.     Reviewed most recent imaging including chest CT 9/26/22 demonstrating biapical scarring and emphysematous changes. Unchanged multiple scattered non calcified lung nodules, largest RML measuring 4 x 8 mm.  No new nodules. No significant lymphadenopathy.      CT scan obtained 10/19/21 demonstrated bilateral pulmonary nodules measuring up to 4 mm in size, previously described 2mm nodule has resolved.  Emphysematous and bullous changes of the lung. Bilat parenchymal scarring.    No pulmonary function on file.    Review of Systems   Constitutional:  Negative for chills, fever, malaise/fatigue and weight loss.   HENT:  Positive for congestion, hearing loss (left ear) and sinus pain. Negative for nosebleeds, sore throat and tinnitus.    Eyes:         Presc glasses   Respiratory:  Positive for cough. Negative for hemoptysis, sputum production, shortness of breath and wheezing.    Cardiovascular:  Negative for chest pain, palpitations, orthopnea and leg swelling.   Gastrointestinal:  Negative for heartburn.         Upper and lower dentures, no significant swallowing difficulty    Neurological:  Negative for dizziness, tremors and headaches.   Psychiatric/Behavioral:  The patient does not have insomnia.      Past Medical History:   Diagnosis Date    Arthritis     hips, back and shoulders (osteoarthritis)     Back pain     Chickenpox     Colon polyp 12/2011    Constipation     Degeneration of lumbar or lumbosacral intervertebral disc     Dr. Cisneros    Dental disorder     lower partial     Double vision     Ear problems as child had surgery    Sinhala measles     Graves' disease with exophthalmos 6/13/2014    Being followed by Dr. Han San    Hemorrhoid     internal    High cholesterol     not medicated at this time     History of carpal tunnel repair 7/31/2013    Dr. Villa repaired both.    Hyperthyroidism     Menopause 10/14/2009    Morning headache     Osteoarthritis of multiple joints 6/13/2014    Osteoporosis     Pain     wrists    Pain in joint, pelvic region and thigh 2015    bursitis and arthritis in hips-takes ibuprofen and darvacet    Pain in joint, shoulder region 2015    Peptic ulcer     Periodic pelvic examination due    Postcoital bleeding     improved    Rash 12/29/2014    Started about a week ago when she got news about surgery High stress Nothing new Back, stomach, chest, arms Itch No pain    Rheumatoid arthritis (HCC)     Ringing in ears     S/P colonoscopy 12/2011    repeat in 5 years- had polyp, initial 2005.     Screening mammogram  11/2008=normal    Tobacco use disorder     Tonsillitis     Ulcer of the stomach and intestine hx.    stable    Unspecified cataract     bilateral IOL    Unspecified hypothyroidism     Wears glasses        Past Surgical History:   Procedure Laterality Date    AK NEUROPLASTY & OR TRANSPOS MEDIAN NRV CARPAL CHANDRA Right 12/4/2020    Procedure: RELEASE, CARPAL TUNNEL;  Surgeon: Pancho Palomino M.D.;  Location: SURGERY SAME DAY [facility];  Service: Orthopedics    PB  INCIS TENDON SHEATH,RADIAL STYLOID Right 12/4/2020    Procedure: RELEASE, HAND, FOR DEQUERVAIN'S TENOSYNOVITIS;  Surgeon: Pancho Palomino M.D.;  Location: SURGERY SAME DAY [facility] ER;  Service: Orthopedics    FINGER ARTHROPLASTY Right 12/4/2020    Procedure: ARTHROPLASTY, FINGER-THUMB CARPOMETACARPAL EXCISIONAL;  Surgeon: Pancho Palomino M.D.;  Location: SURGERY SAME DAY [facility] ER;  Service: Orthopedics    DC NEUROPLASTY & OR TRANSPOS MEDIAN NRV CARPAL CHANDRA Left 8/21/2020    Procedure: RELEASE, CARPAL TUNNEL;  Surgeon: Pancho Palomino M.D.;  Location: SURGERY SAME DAY [facility] ORS;  Service: Orthopedics    PB INCIS TENDON SHEATH,RADIAL STYLOID Left 8/21/2020    Procedure: RELEASE, HAND, FOR DEQUERVAIN'S TENOSYNOVITIS;  Surgeon: Pancho Palomino M.D.;  Location: SURGERY SAME DAY [facility] ORS;  Service: Orthopedics    PB REPAIR INTERCARP/CARP-METACARP JT Left 8/21/2020    Procedure: EXCISION, TRAPEZIUM, WITH LIGAMENT RECONSTRUCTION AND TENDON INTERPOSITION- PARTIAL TRAPEZOID EXCISION FLEXOR CARPI RADIALIS TENDON GRAFT, PLASCENCIA;  Surgeon: Pancho Palomino M.D.;  Location: SURGERY SAME DAY [facility] ORS;  Service: Orthopedics    FINGER ARTHROPLASTY Left 8/21/2020    Procedure: ARTHROPLASTY, FINGER- THUMB CARPOMETACARPAL EXCISIONAL;  Surgeon: Pancho Palomino M.D.;  Location: SURGERY SAME DAY [facility] ORS;  Service: Orthopedics    BLEPHAROPLASTY Bilateral 4/20/2016    Procedure: BLEPHAROPLASTY - UPPER;  Surgeon: Moshe Sena M.D.;  Location: [facility];  Service:     RECTUS REPAIR Bilateral 7/9/2015    Procedure: RECTUS REPAIR FOR LT SUPERIOR RECESSION & RT INFERIOR RECESSION ;  Surgeon: Leila Villegas M.D.;  Location: SURGERY SAME DAY [facility] ORS;  Service:     CATARACT PHACO WITH IOL  3/17/2015    Performed by Derrick Gupta M.D. at [facility]    CATARACT PHACO WITH IOL  3/3/2015    Performed by Derrick Gupta M.D. at [facility]    TENSILON TEST   2014    Performed by Maynor San D.O. at ENDOSCOPY [facility] ORS    CARPAL TUNNEL ENDOSCOPIC  2013    Performed by Pancho Palomino M.D. at SURGERY SAME DAY [facility] ORS    CARPAL TUNNEL ENDOSCOPIC  2012    Performed by Pancho Palomino M.D. at SURGERY [facility] ORS    ABDOMINAL HYSTERECTOMY TOTAL  partial    fibroid tumors, has ovary and tube left    CARPAL TUNNEL RELEASE      HYSTERECTOMY LAPAROSCOPY      OTHER SURGICAL PROCEDURE      ear surgery as child    THYROIDECTOMY  thyroid ablation with iodine therapy    TONSILLECTOMY      TONSILLECTOMY AND ADENOIDECTOMY         Family History   Problem Relation Age of Onset    Hypertension Father     Heart Disease Father         MI age 49 yo    Stroke Father     Cancer Maternal Grandmother         part of lung    Lung Cancer Maternal Grandmother     Hypertension Brother     Cancer Neg Hx     Diabetes Neg Hx        Social History     Socioeconomic History    Marital status:      Spouse name: Not on file    Number of children: Not on file    Years of education: Not on file    Highest education level: Some college, no degree   Occupational History    Occupation: on social security disability for her back     Employer: OTHER   Tobacco Use    Smoking status: Former     Packs/day: 0.00     Years: 50.00     Pack years: 0.00     Types: Cigarettes, Electronic Cigarettes     Start date: 1965     Quit date: 2013     Years since quittin.9    Smokeless tobacco: Never    Tobacco comments:     use terry-cig electronic cig.   Vaping Use    Vaping Use: Every day    Substances: Nicotine, Flavoring    Devices: Pre-filled or refillable cartridge    Passive vaping exposure: Yes   Substance and Sexual Activity    Alcohol use: Yes     Alcohol/week: 0.0 - 0.6 oz     Comment: 0-1 a month     Drug use: No    Sexual activity: Yes     Partners: Male     Comment:    Other Topics Concern    Not on file   Social History Narrative    -  "3 children     Social Determinants of Health     Financial Resource Strain: Not on file   Food Insecurity: Not on file   Transportation Needs: Not on file   Physical Activity: Not on file   Stress: Not on file   Social Connections: Not on file   Intimate Partner Violence: Not on file   Housing Stability: Not on file       Allergies as of 12/02/2022 - Reviewed 12/02/2022   Allergen Reaction Noted    Codeine Vomiting 10/09/2009    Morphine  11/17/2017    Vicodin [hydrocodone-acetaminophen] Vomiting 10/09/2009        @Vital signs for this encounter:  /60 (BP Location: Left arm, Patient Position: Sitting, BP Cuff Size: Adult)   Pulse 73   Resp 16   Ht 1.549 m (5' 1\")   Wt 56.8 kg (125 lb 3.2 oz)   SpO2 96%     Current medications as of today   Current Outpatient Medications   Medication Sig Dispense Refill    gabapentin (NEURONTIN) 100 MG Cap Take 100 mg by mouth at bedtime.      traZODone (DESYREL) 50 MG Tab TAKE 1 TABLET BY MOUTH AT BEDTIME AS NEEDED FOR SLEEP 90 Tablet 1    cyclobenzaprine (FLEXERIL) 10 mg Tab TAKE 1 TABLET BY MOUTH THREE TIMES DAILY AS NEEDED FOR MILD PAIN 90 Tablet 1    fluticasone (FLONASE) 50 MCG/ACT nasal spray Administer 1 Spray into affected nostril(S) every day.      albuterol 108 (90 Base) MCG/ACT Aero Soln inhalation aerosol Inhale 2 Puffs every 6 hours as needed for Shortness of Breath. 9 g 3    estradiol (ESTRACE) 1 MG Tab Take 1 Tablet by mouth every day. wean down to the lowest effective dose. 90 Tablet 3    omeprazole (PRILOSEC) 40 MG delayed-release capsule Take 1 capsule by mouth once daily 90 Capsule 3    traMADol (ULTRAM) 50 MG Tab       levothyroxine (SYNTHROID) 75 MCG Tab TAKE 1 TABLET BY MOUTH IN THE MORNING ON AN EMPTY STOMACH 90 Tablet 3    atorvastatin (LIPITOR) 10 MG Tab TAKE 1 TABLET BY MOUTH EVERY OTHER DAY FOR HIGH CHOLESTEROL 50 Tablet 3    ibuprofen (MOTRIN) 800 MG Tab TAKE 1 TABLET BY MOUTH THREE TIMES DAILY WITH FOOD 90 Tablet 0    diclofenac sodium " (VOLTAREN) 1 % Gel       Cholecalciferol (VITAMIN D) 50 MCG (2000 UT) Cap Take 1 Cap by mouth every day.      B Complex Vitamins (VITAMIN B-COMPLEX PO) Take 1 Cap by mouth every day.       No current facility-administered medications for this visit.         Physical Exam:   Gen:           Alert and oriented, No apparent distress. Mood and affect appropriate, normal interaction with provider.  Eyes:          sclere white, conjunctive moist.  Hearing:     Grossly intact.  Dentition:    Upper and lower dentures  Oropharynx:   Tongue normal, posterior pharynx without erythema or exudate.  Neck:        Supple, trachea midline, no masses.  Respiratory Effort: No intercostal retractions or use of accessory muscles.   Lung Auscultation:      occasional expiratory wheeze no rales, or rhonchi.  CV:            Regular rate and rhythm. No edema. No murmurs, rubs or gallops.  Digits, Nails, Ext: No clubbing, cyanosis, petechiae, or nodes.   Skin:        No rashes, lesions or ulcers noted on exposed skin surfaces.                     Assessment:  1. Pulmonary nodules        2. Former smoker  PULMONARY FUNCTION TESTS -Test requested: Complete Pulmonary Function Test      3. Recurrent cough  PULMONARY FUNCTION TESTS -Test requested: Complete Pulmonary Function Test          Immunizations:    Flu:9/14/22  Pneumovax 23:1/16/19, 1/20/17  Prevnar 13:10/20/17, 5/18/16  Moderna Sars CoV2 vaccine: 3/4/21, 2/4/21    Plan:  77 year old female here to follow up on pulmonary nodules, complaining of recurrent cough.     Former smoker: Quit cigarette uses in 2013, she does however continue to use e cig/vape. This is also hazardous to her health. Complete and/or continued abstinence advised.      Recurrent cough:  she does require albuterol approximately once per week, increased symptoms in fall.  Reviewed allergy/upper respiratory type symptoms.  No pulmonary function testing on file, obtain at next visit.  Okay to use guaifenesin to facilitate  secretion clearance.     Pulmonary nodules:  Reviewed CT imaging. Stable nodules, no new nodules. Follow up CT in one year.      Follow up in 6 months with PFT.    This dictation was created using voice recognition software. The accuracy of the dictation is limited to the abilities of the software. I expect there may be some errors of grammar and possibly content.

## 2023-01-23 DIAGNOSIS — E78.2 MIXED HYPERLIPIDEMIA: ICD-10-CM

## 2023-01-23 NOTE — TELEPHONE ENCOUNTER
Received request via: Pharmacy    Was the patient seen in the last year in this department? Yes    Does the patient have an active prescription (recently filled or refills available) for medication(s) requested? No    Does the patient have care home Plus and need 100 day supply (blood pressure, diabetes and cholesterol meds only)? Patient does not have SCP    Last office Visit:09/14/2022  Last Labs:08/23/2022

## 2023-01-24 RX ORDER — ATORVASTATIN CALCIUM 10 MG/1
TABLET, FILM COATED ORAL
Qty: 50 TABLET | Refills: 0 | Status: SHIPPED | OUTPATIENT
Start: 2023-01-24 | End: 2023-02-28

## 2023-02-28 DIAGNOSIS — E78.2 MIXED HYPERLIPIDEMIA: ICD-10-CM

## 2023-02-28 DIAGNOSIS — E03.9 HYPOTHYROIDISM, UNSPECIFIED TYPE: ICD-10-CM

## 2023-02-28 DIAGNOSIS — F51.01 PRIMARY INSOMNIA: ICD-10-CM

## 2023-02-28 RX ORDER — CYCLOBENZAPRINE HCL 10 MG
TABLET ORAL
Qty: 90 TABLET | Refills: 3 | Status: SHIPPED | OUTPATIENT
Start: 2023-02-28 | End: 2023-05-12 | Stop reason: SDUPTHER

## 2023-02-28 RX ORDER — TRAZODONE HYDROCHLORIDE 50 MG/1
TABLET ORAL
Qty: 90 TABLET | Refills: 3 | Status: SHIPPED | OUTPATIENT
Start: 2023-02-28 | End: 2023-03-03

## 2023-02-28 RX ORDER — LEVOTHYROXINE SODIUM 0.07 MG/1
TABLET ORAL
Qty: 90 TABLET | Refills: 3 | Status: SHIPPED | OUTPATIENT
Start: 2023-02-28 | End: 2024-02-26 | Stop reason: SDUPTHER

## 2023-02-28 RX ORDER — ATORVASTATIN CALCIUM 10 MG/1
TABLET, FILM COATED ORAL
Qty: 50 TABLET | Refills: 3 | Status: SHIPPED | OUTPATIENT
Start: 2023-02-28 | End: 2023-04-11 | Stop reason: SDUPTHER

## 2023-02-28 NOTE — TELEPHONE ENCOUNTER
Received request via: Pharmacy    Was the patient seen in the last year in this department? Yes    Does the patient have an active prescription (recently filled or refills available) for medication(s) requested? No    Does the patient have [facility] and need 100 day supply (blood pressure, diabetes and cholesterol meds only)? Patient does not have SCP    Last Office Visit:09/14/2022  Last Labs:08/23/2022

## 2023-02-28 NOTE — TELEPHONE ENCOUNTER
Received request via: Pharmacy    Was the patient seen in the last year in this department? Yes    Does the patient have an active prescription (recently filled or refills available) for medication(s) requested? No    Does the patient have shelter Plus and need 100 day supply (blood pressure, diabetes and cholesterol meds only)? Patient does not have SCP    Last Office Visit:09/14/2022  Last Labs:08/23/2022

## 2023-02-28 NOTE — TELEPHONE ENCOUNTER
Received request via: Pharmacy    Was the patient seen in the last year in this department? Yes    Does the patient have an active prescription (recently filled or refills available) for medication(s) requested? No    Does the patient have nursing home Plus and need 100 day supply (blood pressure, diabetes and cholesterol meds only)? Patient does not have SCP    Last Office Visit:09/14/2022  Last Labs:08/23/2022

## 2023-03-03 ENCOUNTER — OFFICE VISIT (OUTPATIENT)
Dept: MEDICAL GROUP | Facility: PHYSICIAN GROUP | Age: 78
End: 2023-03-03
Payer: MEDICARE

## 2023-03-03 VITALS
HEIGHT: 62 IN | DIASTOLIC BLOOD PRESSURE: 78 MMHG | SYSTOLIC BLOOD PRESSURE: 118 MMHG | BODY MASS INDEX: 24.11 KG/M2 | TEMPERATURE: 98.8 F | WEIGHT: 131 LBS | RESPIRATION RATE: 14 BRPM | OXYGEN SATURATION: 93 % | HEART RATE: 97 BPM

## 2023-03-03 DIAGNOSIS — E55.9 VITAMIN D DEFICIENCY: ICD-10-CM

## 2023-03-03 DIAGNOSIS — R53.82 CHRONIC FATIGUE: ICD-10-CM

## 2023-03-03 DIAGNOSIS — R26.89 POOR BALANCE: ICD-10-CM

## 2023-03-03 DIAGNOSIS — E78.2 MIXED HYPERLIPIDEMIA: ICD-10-CM

## 2023-03-03 DIAGNOSIS — E03.9 HYPOTHYROIDISM, UNSPECIFIED TYPE: ICD-10-CM

## 2023-03-03 PROCEDURE — 99214 OFFICE O/P EST MOD 30 MIN: CPT | Performed by: FAMILY MEDICINE

## 2023-03-03 RX ORDER — TRAZODONE HYDROCHLORIDE 100 MG/1
100 TABLET ORAL NIGHTLY
Qty: 90 TABLET | Refills: 3 | Status: SHIPPED | OUTPATIENT
Start: 2023-03-03 | End: 2023-08-24 | Stop reason: SDUPTHER

## 2023-03-03 ASSESSMENT — FIBROSIS 4 INDEX: FIB4 SCORE: 1.04

## 2023-03-03 ASSESSMENT — PATIENT HEALTH QUESTIONNAIRE - PHQ9: CLINICAL INTERPRETATION OF PHQ2 SCORE: 0

## 2023-03-03 NOTE — PATIENT INSTRUCTIONS
Please get fasting labs, fasting for 8-10 hours before next visit. You can make an appointment for the lab or walk in.   Lab hours [facility] location: Monday to Friday 6 am - 4 pm, Saturday 7 am -noon  Even if you lose your lab paperwork, you can still come in to get your lab done.

## 2023-03-03 NOTE — ASSESSMENT & PLAN NOTE
She has dry skin, more hair loss  Last TSH in 2022 was normal  She's on levothyroxine 75 mcg daily.   Repeat labs ordered.

## 2023-03-03 NOTE — ASSESSMENT & PLAN NOTE
Worsening fatigue for 2 months. She falls asleep during the day.   She has insomnia. Trazodone 50 mg, would like to increase to 100mg  She has normal tsh, denies depression  Repeat cbc, cmp, thyroid labs ordered.   She has PFTS ordered by her pulmonologist scheduled in June  She has chronic pain had an epidural shot done yesterday for lumbar degenerative disc disease

## 2023-03-03 NOTE — PROGRESS NOTES
Subjective:   Makenna Culver is a 77 y.o. female here today for evaluation and management of:     Hypothyroidism  She has dry skin, more hair loss  Last TSH in 2022 was normal  She's on levothyroxine 75 mcg daily.   Repeat labs ordered.     Chronic fatigue  Worsening fatigue for 2 months. She falls asleep during the day.   She has insomnia. Trazodone 50 mg, would like to increase to 100mg  She has normal tsh, denies depression  Repeat cbc, cmp, thyroid labs ordered.   She has PFTS ordered by her pulmonologist scheduled in June  She has chronic pain had an epidural shot done yesterday for lumbar degenerative disc disease              Current medicines (including changes today)  Current Outpatient Medications   Medication Sig Dispense Refill    traZODone (DESYREL) 100 MG Tab Take 1 Tablet by mouth every evening. 90 Tablet 3    cyclobenzaprine (FLEXERIL) 10 mg Tab TAKE 1 TABLET BY MOUTH THREE TIMES DAILY AS NEEDED FOR MILD PAIN 90 Tablet 3    levothyroxine (SYNTHROID) 75 MCG Tab TAKE 1 TABLET BY MOUTH IN THE MORNING ON AN EMPTY STOMACH 90 Tablet 3    atorvastatin (LIPITOR) 10 MG Tab TAKE 1 TABLET BY MOUTH EVERY OTHER DAY FOR HIGH CHOLESTEROL 50 Tablet 3    gabapentin (NEURONTIN) 100 MG Cap Take 100 mg by mouth at bedtime.      estradiol (ESTRACE) 1 MG Tab Take 1 Tablet by mouth every day. wean down to the lowest effective dose. 90 Tablet 3    omeprazole (PRILOSEC) 40 MG delayed-release capsule Take 1 capsule by mouth once daily 90 Capsule 3    traMADol (ULTRAM) 50 MG Tab       ibuprofen (MOTRIN) 800 MG Tab TAKE 1 TABLET BY MOUTH THREE TIMES DAILY WITH FOOD 90 Tablet 0    diclofenac sodium (VOLTAREN) 1 % Gel       Cholecalciferol (VITAMIN D) 50 MCG (2000 UT) Cap Take 1 Cap by mouth every day.      B Complex Vitamins (VITAMIN B-COMPLEX PO) Take 1 Cap by mouth every day.       No current facility-administered medications for this visit.     She  has a past medical history of Arthritis, Back pain, Chickenpox, Colon polyp  "(12/2011), Constipation, Degeneration of lumbar or lumbosacral intervertebral disc, Dental disorder, Double vision, Ear problems (as child had surgery), Slovak measles, Graves' disease with exophthalmos (6/13/2014), Hemorrhoid, High cholesterol, History of carpal tunnel repair (7/31/2013), Hyperthyroidism, Menopause (10/14/2009), Morning headache, Osteoarthritis of multiple joints (6/13/2014), Osteoporosis, Pain, Pain in joint, pelvic region and thigh (2015), Pain in joint, shoulder region (2015), Peptic ulcer, Periodic pelvic examination (due), Postcoital bleeding, Rash (12/29/2014), Rheumatoid arthritis (HCC), Ringing in ears, S/P colonoscopy (12/2011), Screening mammogram ( 11/2008=normal), Tobacco use disorder, Tonsillitis, Ulcer of the stomach and intestine (hx.), Unspecified cataract, Unspecified hypothyroidism, and Wears glasses.    ROS  No chest pain, no shortness of breath, no abdominal pain       Objective:     /78 (BP Location: Left arm, Patient Position: Sitting, BP Cuff Size: Adult)   Pulse 97   Temp 37.1 °C (98.8 °F) (Temporal)   Resp 14   Ht 1.562 m (5' 1.5\")   Wt 59.4 kg (131 lb)   SpO2 93%  Body mass index is 24.35 kg/m².   Physical Exam:  Constitutional: Alert, no distress.  Skin: Warm, dry, good turgor, no rashes in visible areas.  Eye: Equal, round and reactive, conjunctiva clear, lids normal.  ENMT: Lips without lesions, good dentition, oropharynx clear.  Neck: Trachea midline, no masses, no thyromegaly. No cervical or supraclavicular lymphadenopathy  Respiratory: Unlabored respiratory effort, lungs clear to auscultation, no wheezes, no ronchi.  Cardiovascular: Normal S1, S2, no murmur, no edema.  Abdomen: Soft, non-tender, no masses, no hepatosplenomegaly.  Psych: Alert and oriented x3, normal affect and mood.        Assessment and Plan:   The following treatment plan was discussed    1. Chronic fatigue  - CBC WITH DIFFERENTIAL; Future    2. Poor balance  - URINALYSIS,CULTURE IF " INDICATED; Future    3. Mixed hyperlipidemia  - Lipid Profile; Future  - Comp Metabolic Panel; Future    4. Hypothyroidism, unspecified type  - FREE THYROXINE; Future  - TSH; Future    5. Vitamin D deficiency  - VITAMIN D,25 HYDROXY (DEFICIENCY); Future    Other orders  - traZODone (DESYREL) 100 MG Tab; Take 1 Tablet by mouth every evening.  Dispense: 90 Tablet; Refill: 3      Followup: Return in about 4 months (around 7/3/2023) for Lab Review, fatigue.

## 2023-03-28 ENCOUNTER — NON-PROVIDER VISIT (OUTPATIENT)
Dept: MEDICAL GROUP | Facility: PHYSICIAN GROUP | Age: 78
End: 2023-03-28
Payer: MEDICARE

## 2023-03-28 DIAGNOSIS — Z23 NEED FOR VACCINATION: ICD-10-CM

## 2023-03-28 PROCEDURE — G0010 ADMIN HEPATITIS B VACCINE: HCPCS | Performed by: FAMILY MEDICINE

## 2023-03-28 PROCEDURE — 90746 HEPB VACCINE 3 DOSE ADULT IM: CPT | Performed by: FAMILY MEDICINE

## 2023-03-28 NOTE — NON-PROVIDER
"Makenna Culver is a 77 y.o. female here for a non-provider visit for:   HEPATITIS B 3 of 3    Reason for immunization: continue or complete series started at the office  Immunization records indicate need for vaccine: Yes, confirmed with Epic  Minimum interval has been met for this vaccine: Yes  ABN completed: No    VIS Dated  10/15/2021   was given to patient: Yes  All IAC Questionnaire questions were answered \"No.\"    Patient tolerated injection and no adverse effects were observed or reported: Yes    Pt scheduled for next dose in series: Not Indicated      "

## 2023-04-10 ENCOUNTER — HOSPITAL ENCOUNTER (OUTPATIENT)
Dept: LAB | Facility: MEDICAL CENTER | Age: 78
End: 2023-04-10
Attending: FAMILY MEDICINE
Payer: MEDICARE

## 2023-04-10 DIAGNOSIS — R53.82 CHRONIC FATIGUE: ICD-10-CM

## 2023-04-10 DIAGNOSIS — R26.89 POOR BALANCE: ICD-10-CM

## 2023-04-10 DIAGNOSIS — E03.9 HYPOTHYROIDISM, UNSPECIFIED TYPE: ICD-10-CM

## 2023-04-10 DIAGNOSIS — E55.9 VITAMIN D DEFICIENCY: ICD-10-CM

## 2023-04-10 DIAGNOSIS — E78.2 MIXED HYPERLIPIDEMIA: ICD-10-CM

## 2023-04-10 LAB
ALBUMIN SERPL BCP-MCNC: 4.3 G/DL (ref 3.2–4.9)
ALBUMIN/GLOB SERPL: 1.7 G/DL
ALP SERPL-CCNC: 63 U/L (ref 30–99)
ALT SERPL-CCNC: 14 U/L (ref 2–50)
ANION GAP SERPL CALC-SCNC: 14 MMOL/L (ref 7–16)
APPEARANCE UR: CLEAR
AST SERPL-CCNC: 19 U/L (ref 12–45)
BILIRUB SERPL-MCNC: 0.4 MG/DL (ref 0.1–1.5)
BILIRUB UR QL STRIP.AUTO: NEGATIVE
BUN SERPL-MCNC: 8 MG/DL (ref 8–22)
CALCIUM ALBUM COR SERPL-MCNC: 9.1 MG/DL (ref 8.5–10.5)
CALCIUM SERPL-MCNC: 9.3 MG/DL (ref 8.5–10.5)
CHLORIDE SERPL-SCNC: 105 MMOL/L (ref 96–112)
CHOLEST SERPL-MCNC: 254 MG/DL (ref 100–199)
CO2 SERPL-SCNC: 23 MMOL/L (ref 20–33)
COLOR UR: YELLOW
CREAT SERPL-MCNC: 0.69 MG/DL (ref 0.5–1.4)
FASTING STATUS PATIENT QL REPORTED: NORMAL
GFR SERPLBLD CREATININE-BSD FMLA CKD-EPI: 90 ML/MIN/1.73 M 2
GLOBULIN SER CALC-MCNC: 2.5 G/DL (ref 1.9–3.5)
GLUCOSE SERPL-MCNC: 97 MG/DL (ref 65–99)
GLUCOSE UR STRIP.AUTO-MCNC: NEGATIVE MG/DL
HDLC SERPL-MCNC: 90 MG/DL
KETONES UR STRIP.AUTO-MCNC: NEGATIVE MG/DL
LDLC SERPL CALC-MCNC: 123 MG/DL
LEUKOCYTE ESTERASE UR QL STRIP.AUTO: NEGATIVE
MICRO URNS: NORMAL
NITRITE UR QL STRIP.AUTO: NEGATIVE
PH UR STRIP.AUTO: 7.5 [PH] (ref 5–8)
POTASSIUM SERPL-SCNC: 3.8 MMOL/L (ref 3.6–5.5)
PROT SERPL-MCNC: 6.8 G/DL (ref 6–8.2)
PROT UR QL STRIP: NEGATIVE MG/DL
RBC UR QL AUTO: NEGATIVE
SODIUM SERPL-SCNC: 142 MMOL/L (ref 135–145)
SP GR UR STRIP.AUTO: 1.01
TRIGL SERPL-MCNC: 207 MG/DL (ref 0–149)
UROBILINOGEN UR STRIP.AUTO-MCNC: 0.2 MG/DL

## 2023-04-10 PROCEDURE — 84439 ASSAY OF FREE THYROXINE: CPT

## 2023-04-10 PROCEDURE — 36415 COLL VENOUS BLD VENIPUNCTURE: CPT

## 2023-04-10 PROCEDURE — 80053 COMPREHEN METABOLIC PANEL: CPT

## 2023-04-10 PROCEDURE — 80061 LIPID PANEL: CPT

## 2023-04-10 PROCEDURE — 82306 VITAMIN D 25 HYDROXY: CPT

## 2023-04-10 PROCEDURE — 84443 ASSAY THYROID STIM HORMONE: CPT

## 2023-04-10 PROCEDURE — 85025 COMPLETE CBC W/AUTO DIFF WBC: CPT

## 2023-04-10 PROCEDURE — 81003 URINALYSIS AUTO W/O SCOPE: CPT

## 2023-04-11 DIAGNOSIS — E78.2 MIXED HYPERLIPIDEMIA: ICD-10-CM

## 2023-04-11 LAB
25(OH)D3 SERPL-MCNC: 92 NG/ML (ref 30–100)
BASOPHILS # BLD AUTO: 0.7 % (ref 0–1.8)
BASOPHILS # BLD: 0.04 K/UL (ref 0–0.12)
EOSINOPHIL # BLD AUTO: 0.15 K/UL (ref 0–0.51)
EOSINOPHIL NFR BLD: 2.6 % (ref 0–6.9)
ERYTHROCYTE [DISTWIDTH] IN BLOOD BY AUTOMATED COUNT: 43 FL (ref 35.9–50)
HCT VFR BLD AUTO: 41.9 % (ref 37–47)
HGB BLD-MCNC: 14.1 G/DL (ref 12–16)
IMM GRANULOCYTES # BLD AUTO: 0.01 K/UL (ref 0–0.11)
IMM GRANULOCYTES NFR BLD AUTO: 0.2 % (ref 0–0.9)
LYMPHOCYTES # BLD AUTO: 1.91 K/UL (ref 1–4.8)
LYMPHOCYTES NFR BLD: 32.6 % (ref 22–41)
MCH RBC QN AUTO: 30.9 PG (ref 27–33)
MCHC RBC AUTO-ENTMCNC: 33.7 G/DL (ref 33.6–35)
MCV RBC AUTO: 91.7 FL (ref 81.4–97.8)
MONOCYTES # BLD AUTO: 0.47 K/UL (ref 0–0.85)
MONOCYTES NFR BLD AUTO: 8 % (ref 0–13.4)
NEUTROPHILS # BLD AUTO: 3.28 K/UL (ref 2–7.15)
NEUTROPHILS NFR BLD: 55.9 % (ref 44–72)
NRBC # BLD AUTO: 0 K/UL
NRBC BLD-RTO: 0 /100 WBC
PLATELET # BLD AUTO: 455 K/UL (ref 164–446)
PMV BLD AUTO: 9.2 FL (ref 9–12.9)
RBC # BLD AUTO: 4.57 M/UL (ref 4.2–5.4)
T4 FREE SERPL-MCNC: 1.43 NG/DL (ref 0.93–1.7)
TSH SERPL DL<=0.005 MIU/L-ACNC: 2 UIU/ML (ref 0.38–5.33)
WBC # BLD AUTO: 5.9 K/UL (ref 4.8–10.8)

## 2023-04-12 RX ORDER — ATORVASTATIN CALCIUM 10 MG/1
10 TABLET, FILM COATED ORAL DAILY
Qty: 100 TABLET | Refills: 3 | Status: SHIPPED | OUTPATIENT
Start: 2023-04-12

## 2023-04-12 NOTE — TELEPHONE ENCOUNTER
Received request via: Pharmacy    Was the patient seen in the last year in this department? Yes 3/3/23    Does the patient have an active prescription (recently filled or refills available) for medication(s) requested? No    Does the patient have intermediate Plus and need 100 day supply (blood pressure, diabetes and cholesterol meds only)? Yes, quantity updated to 100 days

## 2023-04-13 ENCOUNTER — OFFICE VISIT (OUTPATIENT)
Dept: MEDICAL GROUP | Facility: PHYSICIAN GROUP | Age: 78
End: 2023-04-13
Payer: MEDICARE

## 2023-04-13 VITALS
TEMPERATURE: 98.6 F | WEIGHT: 127 LBS | HEART RATE: 86 BPM | RESPIRATION RATE: 14 BRPM | BODY MASS INDEX: 23.98 KG/M2 | DIASTOLIC BLOOD PRESSURE: 70 MMHG | OXYGEN SATURATION: 96 % | HEIGHT: 61 IN | SYSTOLIC BLOOD PRESSURE: 120 MMHG

## 2023-04-13 DIAGNOSIS — H90.3 SENSORINEURAL HEARING LOSS (SNHL) OF BOTH EARS: ICD-10-CM

## 2023-04-13 DIAGNOSIS — H93.11 TINNITUS OF RIGHT EAR: ICD-10-CM

## 2023-04-13 DIAGNOSIS — D75.839 THROMBOCYTOSIS: ICD-10-CM

## 2023-04-13 PROCEDURE — 99214 OFFICE O/P EST MOD 30 MIN: CPT | Performed by: FAMILY MEDICINE

## 2023-04-13 RX ORDER — ALPRAZOLAM 0.25 MG/1
0.25 TABLET ORAL NIGHTLY PRN
Qty: 10 TABLET | Refills: 1 | Status: SHIPPED | OUTPATIENT
Start: 2023-04-13 | End: 2023-05-13

## 2023-04-13 ASSESSMENT — FIBROSIS 4 INDEX: FIB4 SCORE: 0.84

## 2023-04-13 NOTE — ASSESSMENT & PLAN NOTE
Hx of ringing of ears. But in last two months she has worsening right ear tinnitus, worse at night when it is quiet, causing her a lot of anxiety and stress. She is able to finally go to sleep with a sleeping pill.   She notes she has history of right ear problems.   She has hearing loss but in the left ear.   Occasional vertigo but nothing severe.   She feels the ringing in the right side of her head also.   High pitched buzz, not pulsatile, does not change with position, constant  Sounds most consistent with sensorineural hearing loss  I advised white noise to mask  Does not bother her when asleep, but when she wakes to go to the bathroom at night she hears it again and can't go back to sleep for 2 hrs.   She is on one tramadol a day if needed for pain.     rx for xanax 0.25 mg qhs prn every 3 days or so if needed for insomnia/tinnitus provided  Advised on side effects possible: sedation, confusion, falls fractures.   She does not drink alcohol.   Advised not not to take with alcohol.     TMs clear b/l, no TMJ click or pain  No bruits over temple or skull or neck.     Ref to audiology provided.   MRI brain with anesthesiology sedation as she is claustrophobic if worsening symptoms.

## 2023-04-13 NOTE — PROGRESS NOTES
Subjective:   Makenna Culver is a 77 y.o. female here today for evaluation and management of:     Ringing in ears  Hx of ringing of ears. But in last two months she has worsening right ear tinnitus, worse at night when it is quiet, causing her a lot of anxiety and stress. She is able to finally go to sleep with a sleeping pill.   She notes she has history of right ear problems.   She has hearing loss but in the left ear.   Occasional vertigo but nothing severe.   She feels the ringing in the right side of her head also.   High pitched buzz, not pulsatile, does not change with position, constant  Sounds most consistent with sensorineural hearing loss  I advised white noise to mask  Does not bother her when asleep, but when she wakes to go to the bathroom at night she hears it again and can't go back to sleep for 2 hrs.   She is on one tramadol a day if needed for pain.     rx for xanax 0.25 mg qhs prn every 3 days or so if needed for insomnia/tinnitus provided  Advised on side effects possible: sedation, confusion, falls fractures.   She does not drink alcohol.   Advised not not to take with alcohol.     TMs clear b/l, no TMJ click or pain  No bruits over temple or skull or neck.     Ref to audiology provided.   MRI brain with anesthesiology sedation as she is claustrophobic if worsening symptoms.                Current medicines (including changes today)  Current Outpatient Medications   Medication Sig Dispense Refill    ALPRAZolam (XANAX) 0.25 MG Tab Take 1 Tablet by mouth at bedtime as needed (tinnitus) for up to 30 days. 10 Tablet 1    atorvastatin (LIPITOR) 10 MG Tab Take 1 Tablet by mouth every day. 100 Tablet 3    traZODone (DESYREL) 100 MG Tab Take 1 Tablet by mouth every evening. 90 Tablet 3    cyclobenzaprine (FLEXERIL) 10 mg Tab TAKE 1 TABLET BY MOUTH THREE TIMES DAILY AS NEEDED FOR MILD PAIN 90 Tablet 3    levothyroxine (SYNTHROID) 75 MCG Tab TAKE 1 TABLET BY MOUTH IN THE MORNING ON AN EMPTY STOMACH 90  "Tablet 3    gabapentin (NEURONTIN) 100 MG Cap Take 100 mg by mouth at bedtime.      estradiol (ESTRACE) 1 MG Tab Take 1 Tablet by mouth every day. wean down to the lowest effective dose. 90 Tablet 3    omeprazole (PRILOSEC) 40 MG delayed-release capsule Take 1 capsule by mouth once daily 90 Capsule 3    ibuprofen (MOTRIN) 800 MG Tab TAKE 1 TABLET BY MOUTH THREE TIMES DAILY WITH FOOD 90 Tablet 0    diclofenac sodium (VOLTAREN) 1 % Gel       Cholecalciferol (VITAMIN D) 50 MCG (2000 UT) Cap Take 1 Cap by mouth every day.      B Complex Vitamins (VITAMIN B-COMPLEX PO) Take 1 Cap by mouth every day.       No current facility-administered medications for this visit.     She  has a past medical history of Arthritis, Back pain, Chickenpox, Colon polyp (12/2011), Constipation, Degeneration of lumbar or lumbosacral intervertebral disc, Dental disorder, Double vision, Ear problems (as child had surgery), Turkish measles, Graves' disease with exophthalmos (6/13/2014), Hemorrhoid, High cholesterol, History of carpal tunnel repair (7/31/2013), Hyperthyroidism, Menopause (10/14/2009), Morning headache, Osteoarthritis of multiple joints (6/13/2014), Osteoporosis, Pain, Pain in joint, pelvic region and thigh (2015), Pain in joint, shoulder region (2015), Peptic ulcer, Periodic pelvic examination (due), Postcoital bleeding, Rash (12/29/2014), Rheumatoid arthritis (HCC), Ringing in ears, S/P colonoscopy (12/2011), Screening mammogram ( 11/2008=normal), Tobacco use disorder, Tonsillitis, Ulcer of the stomach and intestine (hx.), Unspecified cataract, Unspecified hypothyroidism, and Wears glasses.    ROS  No chest pain, no shortness of breath, no abdominal pain       Objective:     /70 (BP Location: Left arm, Patient Position: Sitting, BP Cuff Size: Adult)   Pulse 86   Temp 37 °C (98.6 °F) (Temporal)   Resp 14   Ht 1.549 m (5' 1\")   Wt 57.6 kg (127 lb)   SpO2 96%  Body mass index is 24 kg/m².   Physical Exam:  Constitutional: " Alert, no distress.  Skin: Warm, dry, good turgor, no rashes in visible areas.  Eye: Equal, round and reactive, conjunctiva clear, lids normal.  ENMT: Lips without lesions, good dentition, oropharynx clear.  Neck: Trachea midline, no masses, no thyromegaly. No cervical or supraclavicular lymphadenopathy  Respiratory: Unlabored respiratory effort, lungs clear to auscultation, no wheezes, no ronchi.  Cardiovascular: Normal S1, S2, no murmur, no edema.  Abdomen: Soft, non-tender, no masses, no hepatosplenomegaly.  Psych: Alert and oriented x3, normal affect and mood.        Assessment and Plan:   The following treatment plan was discussed    1. Tinnitus of right ear  - ALPRAZolam (XANAX) 0.25 MG Tab; Take 1 Tablet by mouth at bedtime as needed (tinnitus) for up to 30 days.  Dispense: 10 Tablet; Refill: 1  - Referral to Audiology  - MR-BRAIN-W/O; Future    2. Sensorineural hearing loss (SNHL) of both ears  - Referral to Audiology      Followup: No follow-ups on file.

## 2023-04-18 DIAGNOSIS — R53.82 CHRONIC FATIGUE: ICD-10-CM

## 2023-04-26 DIAGNOSIS — Z01.818 NEEDS PRE-ANESTHESIA ASSESSMENT: ICD-10-CM

## 2023-04-30 DIAGNOSIS — Z01.818 NEEDS PRE-ANESTHESIA ASSESSMENT: ICD-10-CM

## 2023-05-15 RX ORDER — CYCLOBENZAPRINE HCL 10 MG
TABLET ORAL
Qty: 90 TABLET | Refills: 3 | Status: SHIPPED | OUTPATIENT
Start: 2023-05-15 | End: 2023-11-08

## 2023-05-30 ENCOUNTER — HOSPITAL ENCOUNTER (OUTPATIENT)
Dept: LAB | Facility: MEDICAL CENTER | Age: 78
End: 2023-05-30
Attending: FAMILY MEDICINE
Payer: MEDICARE

## 2023-05-30 ENCOUNTER — NON-PROVIDER VISIT (OUTPATIENT)
Dept: MEDICAL GROUP | Facility: PHYSICIAN GROUP | Age: 78
End: 2023-05-30
Payer: MEDICARE

## 2023-05-30 DIAGNOSIS — D75.839 THROMBOCYTOSIS: ICD-10-CM

## 2023-05-30 DIAGNOSIS — R53.82 CHRONIC FATIGUE: ICD-10-CM

## 2023-05-30 LAB
ANION GAP SERPL CALC-SCNC: 10 MMOL/L (ref 7–16)
BASOPHILS # BLD AUTO: 0.9 % (ref 0–1.8)
BASOPHILS # BLD: 0.05 K/UL (ref 0–0.12)
BUN SERPL-MCNC: 9 MG/DL (ref 8–22)
CALCIUM SERPL-MCNC: 9.3 MG/DL (ref 8.5–10.5)
CHLORIDE SERPL-SCNC: 102 MMOL/L (ref 96–112)
CO2 SERPL-SCNC: 27 MMOL/L (ref 20–33)
CREAT SERPL-MCNC: 0.71 MG/DL (ref 0.5–1.4)
EOSINOPHIL # BLD AUTO: 0.11 K/UL (ref 0–0.51)
EOSINOPHIL NFR BLD: 1.9 % (ref 0–6.9)
ERYTHROCYTE [DISTWIDTH] IN BLOOD BY AUTOMATED COUNT: 42.1 FL (ref 35.9–50)
GFR SERPLBLD CREATININE-BSD FMLA CKD-EPI: 88 ML/MIN/1.73 M 2
GLUCOSE SERPL-MCNC: 83 MG/DL (ref 65–99)
HCT VFR BLD AUTO: 40.7 % (ref 37–47)
HGB BLD-MCNC: 13.7 G/DL (ref 12–16)
IMM GRANULOCYTES # BLD AUTO: 0.01 K/UL (ref 0–0.11)
IMM GRANULOCYTES NFR BLD AUTO: 0.2 % (ref 0–0.9)
LYMPHOCYTES # BLD AUTO: 1.6 K/UL (ref 1–4.8)
LYMPHOCYTES NFR BLD: 28.2 % (ref 22–41)
MCH RBC QN AUTO: 30.8 PG (ref 27–33)
MCHC RBC AUTO-ENTMCNC: 33.7 G/DL (ref 32.2–35.5)
MCV RBC AUTO: 91.5 FL (ref 81.4–97.8)
MONOCYTES # BLD AUTO: 0.42 K/UL (ref 0–0.85)
MONOCYTES NFR BLD AUTO: 7.4 % (ref 0–13.4)
NEUTROPHILS # BLD AUTO: 3.48 K/UL (ref 1.82–7.42)
NEUTROPHILS NFR BLD: 61.4 % (ref 44–72)
NRBC # BLD AUTO: 0 K/UL
NRBC BLD-RTO: 0 /100 WBC (ref 0–0.2)
PLATELET # BLD AUTO: 330 K/UL (ref 164–446)
PMV BLD AUTO: 9 FL (ref 9–12.9)
POTASSIUM SERPL-SCNC: 4 MMOL/L (ref 3.6–5.5)
RBC # BLD AUTO: 4.45 M/UL (ref 4.2–5.4)
SODIUM SERPL-SCNC: 139 MMOL/L (ref 135–145)
WBC # BLD AUTO: 5.7 K/UL (ref 4.8–10.8)

## 2023-05-30 PROCEDURE — 80048 BASIC METABOLIC PNL TOTAL CA: CPT

## 2023-05-30 PROCEDURE — 36415 COLL VENOUS BLD VENIPUNCTURE: CPT

## 2023-05-30 PROCEDURE — 85025 COMPLETE CBC W/AUTO DIFF WBC: CPT

## 2023-05-30 NOTE — PROGRESS NOTES
Makenna Culver is a 77 y.o. female here for a non-provider visit for EKG     If abnormal was an in office provider notified today (if so, indicate provider)? Yes    Routed to PCP? Yes

## 2023-05-31 DIAGNOSIS — Z79.890 POSTMENOPAUSAL HRT (HORMONE REPLACEMENT THERAPY): ICD-10-CM

## 2023-05-31 NOTE — TELEPHONE ENCOUNTER
Received request via: Pharmacy    Was the patient seen in the last year in this department? Yes    Does the patient have an active prescription (recently filled or refills available) for medication(s) requested? No    Does the patient have residential Plus and need 100 day supply (blood pressure, diabetes and cholesterol meds only)? Patient does not have SCP    Last office Visit:04/13/2023  Last Labs:05/31/2023

## 2023-06-02 DIAGNOSIS — Z12.31 ENCOUNTER FOR SCREENING MAMMOGRAM FOR MALIGNANT NEOPLASM OF BREAST: ICD-10-CM

## 2023-06-02 RX ORDER — ESTRADIOL 1 MG/1
TABLET ORAL
Qty: 90 TABLET | Refills: 0 | Status: SHIPPED | OUTPATIENT
Start: 2023-06-02 | End: 2023-08-24 | Stop reason: SDUPTHER

## 2023-06-09 DIAGNOSIS — R53.82 CHRONIC FATIGUE: ICD-10-CM

## 2023-06-09 DIAGNOSIS — K76.9 LIVER LESION: ICD-10-CM

## 2023-06-12 NOTE — TELEPHONE ENCOUNTER
Received request via: Pharmacy    Was the patient seen in the last year in this department? Yes    Does the patient have an active prescription (recently filled or refills available) for medication(s) requested? No    Does the patient have shelter Plus and need 100 day supply (blood pressure, diabetes and cholesterol meds only)? Patient does not have SCP    Last Office Visit:04/13/2023  Last Labs:05/30/2023

## 2023-06-13 RX ORDER — OMEPRAZOLE 40 MG/1
CAPSULE, DELAYED RELEASE ORAL
Qty: 90 CAPSULE | Refills: 3 | Status: SHIPPED | OUTPATIENT
Start: 2023-06-13 | End: 2024-02-26 | Stop reason: SDUPTHER

## 2023-06-14 ENCOUNTER — APPOINTMENT (OUTPATIENT)
Dept: RADIOLOGY | Facility: MEDICAL CENTER | Age: 78
End: 2023-06-14
Attending: FAMILY MEDICINE
Payer: MEDICARE

## 2023-07-03 ENCOUNTER — HOSPITAL ENCOUNTER (OUTPATIENT)
Dept: LAB | Facility: MEDICAL CENTER | Age: 78
End: 2023-07-03
Attending: FAMILY MEDICINE
Payer: MEDICARE

## 2023-07-03 DIAGNOSIS — Z01.818 NEEDS PRE-ANESTHESIA ASSESSMENT: ICD-10-CM

## 2023-07-03 LAB
ANION GAP SERPL CALC-SCNC: 13 MMOL/L (ref 7–16)
BUN SERPL-MCNC: 9 MG/DL (ref 8–22)
CALCIUM SERPL-MCNC: 9.6 MG/DL (ref 8.5–10.5)
CHLORIDE SERPL-SCNC: 100 MMOL/L (ref 96–112)
CO2 SERPL-SCNC: 25 MMOL/L (ref 20–33)
CREAT SERPL-MCNC: 0.69 MG/DL (ref 0.5–1.4)
GFR SERPLBLD CREATININE-BSD FMLA CKD-EPI: 90 ML/MIN/1.73 M 2
GLUCOSE SERPL-MCNC: 90 MG/DL (ref 65–99)
POTASSIUM SERPL-SCNC: 3.8 MMOL/L (ref 3.6–5.5)
SODIUM SERPL-SCNC: 138 MMOL/L (ref 135–145)

## 2023-07-03 PROCEDURE — 36415 COLL VENOUS BLD VENIPUNCTURE: CPT

## 2023-07-03 PROCEDURE — 80048 BASIC METABOLIC PNL TOTAL CA: CPT

## 2023-07-10 ENCOUNTER — APPOINTMENT (OUTPATIENT)
Dept: ADMISSIONS | Facility: MEDICAL CENTER | Age: 78
End: 2023-07-10
Attending: PLASTIC SURGERY
Payer: MEDICARE

## 2023-07-12 ENCOUNTER — HOSPITAL ENCOUNTER (OUTPATIENT)
Dept: RADIOLOGY | Facility: MEDICAL CENTER | Age: 78
End: 2023-07-12
Attending: FAMILY MEDICINE
Payer: MEDICARE

## 2023-07-12 ENCOUNTER — ANESTHESIA EVENT (OUTPATIENT)
Dept: RADIOLOGY | Facility: MEDICAL CENTER | Age: 78
End: 2023-07-12
Payer: MEDICARE

## 2023-07-12 ENCOUNTER — ANESTHESIA (OUTPATIENT)
Dept: RADIOLOGY | Facility: MEDICAL CENTER | Age: 78
End: 2023-07-12
Payer: MEDICARE

## 2023-07-12 VITALS
HEART RATE: 82 BPM | SYSTOLIC BLOOD PRESSURE: 163 MMHG | WEIGHT: 125.22 LBS | OXYGEN SATURATION: 95 % | HEIGHT: 61 IN | BODY MASS INDEX: 23.64 KG/M2 | RESPIRATION RATE: 18 BRPM | TEMPERATURE: 98.9 F | DIASTOLIC BLOOD PRESSURE: 71 MMHG

## 2023-07-12 DIAGNOSIS — H93.11 TINNITUS OF RIGHT EAR: ICD-10-CM

## 2023-07-12 PROCEDURE — 01922 ANES N-INVAS IMG/RADJ THER: CPT | Performed by: ANESTHESIOLOGY

## 2023-07-12 PROCEDURE — 70551 MRI BRAIN STEM W/O DYE: CPT

## 2023-07-12 PROCEDURE — 700111 HCHG RX REV CODE 636 W/ 250 OVERRIDE (IP): Performed by: ANESTHESIOLOGY

## 2023-07-12 PROCEDURE — 700105 HCHG RX REV CODE 258: Mod: JZ | Performed by: ANESTHESIOLOGY

## 2023-07-12 PROCEDURE — 99100 ANES PT EXTEME AGE<1 YR&>70: CPT | Performed by: ANESTHESIOLOGY

## 2023-07-12 PROCEDURE — 700101 HCHG RX REV CODE 250: Performed by: ANESTHESIOLOGY

## 2023-07-12 RX ORDER — EPHEDRINE SULFATE 50 MG/ML
5 INJECTION, SOLUTION INTRAVENOUS
Status: DISCONTINUED | OUTPATIENT
Start: 2023-07-12 | End: 2023-07-13 | Stop reason: HOSPADM

## 2023-07-12 RX ORDER — LABETALOL HYDROCHLORIDE 5 MG/ML
5 INJECTION, SOLUTION INTRAVENOUS
Status: DISCONTINUED | OUTPATIENT
Start: 2023-07-12 | End: 2023-07-13 | Stop reason: HOSPADM

## 2023-07-12 RX ORDER — DIPHENHYDRAMINE HYDROCHLORIDE 50 MG/ML
12.5 INJECTION INTRAMUSCULAR; INTRAVENOUS
Status: DISCONTINUED | OUTPATIENT
Start: 2023-07-12 | End: 2023-07-13 | Stop reason: HOSPADM

## 2023-07-12 RX ORDER — ONDANSETRON 2 MG/ML
4 INJECTION INTRAMUSCULAR; INTRAVENOUS
Status: DISCONTINUED | OUTPATIENT
Start: 2023-07-12 | End: 2023-07-13 | Stop reason: HOSPADM

## 2023-07-12 RX ORDER — SODIUM CHLORIDE, SODIUM LACTATE, POTASSIUM CHLORIDE, CALCIUM CHLORIDE 600; 310; 30; 20 MG/100ML; MG/100ML; MG/100ML; MG/100ML
INJECTION, SOLUTION INTRAVENOUS
Status: DISCONTINUED | OUTPATIENT
Start: 2023-07-12 | End: 2023-07-12 | Stop reason: SURG

## 2023-07-12 RX ORDER — LIDOCAINE HYDROCHLORIDE 20 MG/ML
INJECTION, SOLUTION EPIDURAL; INFILTRATION; INTRACAUDAL; PERINEURAL PRN
Status: DISCONTINUED | OUTPATIENT
Start: 2023-07-12 | End: 2023-07-12 | Stop reason: SURG

## 2023-07-12 RX ORDER — ONDANSETRON 2 MG/ML
INJECTION INTRAMUSCULAR; INTRAVENOUS PRN
Status: DISCONTINUED | OUTPATIENT
Start: 2023-07-12 | End: 2023-07-12 | Stop reason: SURG

## 2023-07-12 RX ORDER — HYDRALAZINE HYDROCHLORIDE 20 MG/ML
5 INJECTION INTRAMUSCULAR; INTRAVENOUS
Status: DISCONTINUED | OUTPATIENT
Start: 2023-07-12 | End: 2023-07-13 | Stop reason: HOSPADM

## 2023-07-12 RX ORDER — HALOPERIDOL 5 MG/ML
1 INJECTION INTRAMUSCULAR
Status: DISCONTINUED | OUTPATIENT
Start: 2023-07-12 | End: 2023-07-13 | Stop reason: HOSPADM

## 2023-07-12 RX ADMIN — PROPOFOL 120 MG: 10 INJECTION, EMULSION INTRAVENOUS at 13:33

## 2023-07-12 RX ADMIN — LIDOCAINE HYDROCHLORIDE 100 MG: 20 INJECTION, SOLUTION EPIDURAL; INFILTRATION; INTRACAUDAL at 13:32

## 2023-07-12 RX ADMIN — PROPOFOL 40 MG: 10 INJECTION, EMULSION INTRAVENOUS at 13:44

## 2023-07-12 RX ADMIN — SODIUM CHLORIDE, POTASSIUM CHLORIDE, SODIUM LACTATE AND CALCIUM CHLORIDE: 600; 310; 30; 20 INJECTION, SOLUTION INTRAVENOUS at 13:27

## 2023-07-12 RX ADMIN — ONDANSETRON 4 MG: 2 INJECTION INTRAMUSCULAR; INTRAVENOUS at 13:37

## 2023-07-12 ASSESSMENT — FIBROSIS 4 INDEX: FIB4 SCORE: 1.17

## 2023-07-12 NOTE — ANESTHESIA PREPROCEDURE EVALUATION
Date/Time: 07/12/23 1245    Scheduled providers: Cheng Rg M.D.    Procedure: MR-BRAIN-W/O    Diagnosis: Tinnitus of right ear [H93.11]    Location: [facility] - MRI - [address]          Relevant Problems   GI   (positive) GERD (gastroesophageal reflux disease)         (positive) Liver lesion      ENDO   (positive) Hypothyroidism      Other   (positive) Hyperlipidemia   (positive) Sacroiliitis, not elsewhere classified (HCC)       Physical Exam    Airway   Mallampati: II  TM distance: >3 FB  Neck ROM: full       Cardiovascular - normal exam  Rhythm: regular  Rate: normal  (-) murmur     Dental - normal exam  (+) upper dentures, lower dentures           Pulmonary - normal exam  Breath sounds clear to auscultation     Abdominal    Neurological - normal exam               Anesthesia Plan    ASA 2       Plan - general       Airway plan will be LMA          Induction: intravenous    Postoperative Plan: Postoperative administration of opioids is intended.    Pertinent diagnostic labs and testing reviewed    Informed Consent:    Anesthetic plan and risks discussed with patient.    Use of blood products discussed with: patient whom consented to blood products.

## 2023-07-12 NOTE — DISCHARGE INSTRUCTIONS
MRI ADULT DISCHARGE INSTRUCTIONS    You have been medicated today for your scan. Please follow the instructions below to ensure your safe recovery. If you have any questions or problems, feel free to call us at [phone] or [phone].     1.   Have someone stay with you to assist you as needed.    2.   Do not drive or operate any mechanical devices.    3.   Do not perform any activity that requires concentration. Make no major decisions over the next 24 hours.     4.   Be careful changing positions from laying down to sitting or standing, as you may become dizzy.     5.   Do not drink alcohol for 48 hours.    6.   There are no restrictions for eating your normal meals. Drink fluids.    7.   You may continue your usual medications for pain, tranquilizers, muscle relaxants or sedatives when awake.     8.   Tomorrow, you may continue your normal daily activities.     9.   Pressure dressing on 10 - 15 minutes. If swelling or bleeding occurs when removed, continue placing direct pressure on injection site for another 5 minutes, or until bleeding stops.     I have been informed of and understand the above discharge instructions.

## 2023-07-12 NOTE — PROGRESS NOTES
MRI NURSING NOTES:      Patient and spouse, Sp, to MRI Outpatient Dept.  Patient informed process and plan of care during this visit.  Anesthesiologist, Dr Rg, spoke c Patient and discussed provider's plan of care.  Patient agreed.  MRI brain s contrast completed.  Patient awoke from anesthesia s discomfort and/or issues/concerns.  Patient tolerated diet and activities once awake and appropriate.  DC instructions discussed. Questions encouraged.  Questions answered &/or deferred to provider.    Patient DC'd in stable condition c responsible adult, Sp, spouse.

## 2023-07-12 NOTE — ANESTHESIA PROCEDURE NOTES
Airway    Date/Time: 7/12/2023 1:34 PM    Performed by: Cheng Rg M.D.  Authorized by: Cheng Rg M.D.    Location:  OR  Urgency:  Elective  Difficult Airway: No    Indications for Airway Management:  Anesthesia      Spontaneous Ventilation: absent    Sedation Level:  Deep  Preoxygenated: Yes    Mask Difficulty Assessment:  0 - not attempted  Final Airway Type:  Supraglottic airway  Final Supraglottic Airway:  Standard LMA    SGA Size:  3  Number of Attempts at Approach:  1

## 2023-07-12 NOTE — ANESTHESIA POSTPROCEDURE EVALUATION
Patient: Makenna Culver    Procedure Summary     Date: 07/12/23 [address]    Anesthesia Start: 1329 Anesthesia Stop: 1403    Procedure: MR-BRAIN-W/O Diagnosis: Tinnitus of right ear    Scheduled Providers: Cheng Rg M.D. Responsible Provider: Cheng Rg M.D.    Anesthesia Type: general ASA Status: 2          Final Anesthesia Type: general  Last vitals  BP   Blood Pressure : (!) 163/71, NIBP: 145/85    Temp   37.2 °C (98.9 °F)    Pulse   82   Resp   18    SpO2   95 %      Anesthesia Post Evaluation    Patient location during evaluation: PACU  Patient participation: complete - patient participated  Level of consciousness: awake and alert    Airway patency: patent  Anesthetic complications: no  Cardiovascular status: hemodynamically stable  Respiratory status: acceptable  Hydration status: euvolemic    PONV: none          No notable events documented.     Nurse Pain Score: 0 (NPRS)

## 2023-07-12 NOTE — ANESTHESIA TIME REPORT
Anesthesia Start and Stop Event Times     Date Time Event    7/12/2023 1325 Ready for Procedure     1329 Anesthesia Start     1403 Anesthesia Stop        Responsible Staff  07/12/23    Name Role Begin End    Cheng Rg M.D. Anesth 1329 1403        Overtime Reason:  no overtime (within assigned shift)    Comments:

## 2023-07-18 ENCOUNTER — OFFICE VISIT (OUTPATIENT)
Dept: MEDICAL GROUP | Facility: PHYSICIAN GROUP | Age: 78
End: 2023-07-18
Payer: MEDICARE

## 2023-07-18 VITALS
HEIGHT: 61 IN | TEMPERATURE: 98.3 F | RESPIRATION RATE: 14 BRPM | OXYGEN SATURATION: 95 % | BODY MASS INDEX: 23.41 KG/M2 | DIASTOLIC BLOOD PRESSURE: 80 MMHG | WEIGHT: 124 LBS | SYSTOLIC BLOOD PRESSURE: 118 MMHG | HEART RATE: 83 BPM

## 2023-07-18 DIAGNOSIS — H93.11 TINNITUS OF RIGHT EAR: ICD-10-CM

## 2023-07-18 DIAGNOSIS — E78.2 MIXED HYPERLIPIDEMIA: ICD-10-CM

## 2023-07-18 PROCEDURE — 3074F SYST BP LT 130 MM HG: CPT | Performed by: FAMILY MEDICINE

## 2023-07-18 PROCEDURE — 99214 OFFICE O/P EST MOD 30 MIN: CPT | Performed by: FAMILY MEDICINE

## 2023-07-18 PROCEDURE — 3079F DIAST BP 80-89 MM HG: CPT | Performed by: FAMILY MEDICINE

## 2023-07-18 RX ORDER — TRAMADOL HYDROCHLORIDE 50 MG/1
TABLET ORAL
COMMUNITY
Start: 2023-05-04

## 2023-07-18 RX ORDER — TRAZODONE HYDROCHLORIDE 50 MG/1
TABLET ORAL
COMMUNITY
Start: 2023-05-04 | End: 2023-07-18

## 2023-07-18 ASSESSMENT — FIBROSIS 4 INDEX: FIB4 SCORE: 1.17

## 2023-07-18 NOTE — ASSESSMENT & PLAN NOTE
MRI brain shows an old omar stroke. Other wise no acute abnormalities.   She has hearing loss, has appt with ENT for evaluation.

## 2023-07-18 NOTE — PROGRESS NOTES
Subjective:   Makenna Culver is a 78 y.o. female here today for evaluation and management of:     Hyperlipidemia  The 10-year ASCVD risk score (Belen MATUTE, et al., 2019) is: 16%   She is on atorvastatin 10 mg every other day. She initially was very hesitant to start the medication.   No imp in lipids on the medication.   She declines taking it every day or higher dose.   Her father had massive heart attacks at young age.   Pt is 78, had no heart attacks, she had a brain MRI done for tinnitus which showed Remote infarct in the left omar with encephalomalacia noted., smoked when younger, quit smoking 10 yrs ago in 2013  Advised to continue atorvastatin 10 mg every other day at least.       Ringing in ears  MRI brain shows an old omar stroke. Other wise no acute abnormalities.   She has hearing loss, has appt with ENT for evaluation.                Current medicines (including changes today)  Current Outpatient Medications   Medication Sig Dispense Refill    traMADol (ULTRAM) 50 MG Tab TAKE 1 TABLET BY MOUTH TWICE A DAY AS NEEDED FOR PAIN FOR 60 DAYS      omeprazole (PRILOSEC) 40 MG delayed-release capsule Take 1 capsule by mouth once daily 90 Capsule 3    estradiol (ESTRACE) 1 MG Tab TAKE 1 TABLET BY MOUTH ONCE DAILY *  WEAN  DOWN  TO  LOWEST  EFFECTIVE  DOSE* 90 Tablet 0    cyclobenzaprine (FLEXERIL) 10 mg Tab 1 tab up to tid prn muscle spasms 90 Tablet 3    atorvastatin (LIPITOR) 10 MG Tab Take 1 Tablet by mouth every day. 100 Tablet 3    traZODone (DESYREL) 100 MG Tab Take 1 Tablet by mouth every evening. 90 Tablet 3    levothyroxine (SYNTHROID) 75 MCG Tab TAKE 1 TABLET BY MOUTH IN THE MORNING ON AN EMPTY STOMACH 90 Tablet 3    gabapentin (NEURONTIN) 100 MG Cap Take 100 mg by mouth at bedtime.      ibuprofen (MOTRIN) 800 MG Tab TAKE 1 TABLET BY MOUTH THREE TIMES DAILY WITH FOOD 90 Tablet 0    diclofenac sodium (VOLTAREN) 1 % Gel       Cholecalciferol (VITAMIN D) 50 MCG (2000 UT) Cap Take 1 Cap by mouth every day.    "   B Complex Vitamins (VITAMIN B-COMPLEX PO) Take 1 Cap by mouth every day.       No current facility-administered medications for this visit.     She  has a past medical history of Arthritis, Back pain, Chickenpox, Colon polyp (12/2011), Constipation, Degeneration of lumbar or lumbosacral intervertebral disc, Dental disorder, Double vision, Ear problems (as child had surgery), Swedish measles, Graves' disease with exophthalmos (6/13/2014), Hemorrhoid, High cholesterol, History of carpal tunnel repair (7/31/2013), Hyperthyroidism, Menopause (10/14/2009), Morning headache, Osteoarthritis of multiple joints (6/13/2014), Osteoporosis, Pain, Pain in joint, pelvic region and thigh (2015), Pain in joint, shoulder region (2015), Peptic ulcer, Periodic pelvic examination (due), Postcoital bleeding, Rash (12/29/2014), Rheumatoid arthritis (HCC), Ringing in ears, S/P colonoscopy (12/2011), Screening mammogram ( 11/2008=normal), Tobacco use disorder, Tonsillitis, Ulcer of the stomach and intestine (hx.), Unspecified cataract, Unspecified hypothyroidism, and Wears glasses.    ROS  No chest pain, no shortness of breath, no abdominal pain       Objective:     /80   Pulse 83   Temp 36.8 °C (98.3 °F) (Temporal)   Resp 14   Ht 1.549 m (5' 1\")   Wt 56.2 kg (124 lb)   SpO2 95%  Body mass index is 23.43 kg/m².   Physical Exam:  Constitutional: Alert, no distress, well-groomed.  Skin: No rashes in visible areas.  Eye: Round. Conjunctiva clear, lids normal. No icterus.   ENMT: Lips pink without lesions, good dentition, moist mucous membranes. Phonation normal.  Neck: No masses, no thyromegaly. Moves freely without pain.  Respiratory: Unlabored respiratory effort, no cough or audible wheeze  Psych: Alert and oriented x3, normal affect and mood.          Assessment and Plan:   The following treatment plan was discussed    1. Mixed hyperlipidemia    2. Tinnitus of right ear    Other orders  - traMADol (ULTRAM) 50 MG Tab; TAKE 1 " TABLET BY MOUTH TWICE A DAY AS NEEDED FOR PAIN FOR 60 DAYS      Followup: Return for As Scheduled.

## 2023-07-18 NOTE — ASSESSMENT & PLAN NOTE
The 10-year ASCVD risk score (Belen MATUTE, et al., 2019) is: 16%   She is on atorvastatin 10 mg every other day. She initially was very hesitant to start the medication.   No imp in lipids on the medication.   She declines taking it every day or higher dose.   Her father had massive heart attacks at young age.   Pt is 78, had no heart attacks, she had a brain MRI done for tinnitus which showed Remote infarct in the left oamr with encephalomalacia noted., smoked when younger, quit smoking 10 yrs ago in 2013  Advised to continue atorvastatin 10 mg every other day at least.

## 2023-07-27 ENCOUNTER — TELEPHONE (OUTPATIENT)
Dept: HEALTH INFORMATION MANAGEMENT | Facility: OTHER | Age: 78
End: 2023-07-27
Payer: MEDICARE

## 2023-07-27 DIAGNOSIS — H93.11 TINNITUS OF RIGHT EAR: ICD-10-CM

## 2023-07-27 NOTE — TELEPHONE ENCOUNTER
1. Caller Name: Makenna Culver                         Call Back Number: [phone]       How would the patient prefer to be contacted with a response: MyChart message    2. SPECIFIC Action To Be Taken: Orders pending, please sign.    3. Diagnosis/Clinical Reason for Request:   2nd opinion needed     4. Specialty & Provider Name/Lab/Imaging Location: [facility] specailist, Lincoln Davalos MD at [address] [place] [address]    5. Is appointment scheduled for requested order/referral: no    Patient was not informed they will receive a return phone call from the office ONLY if there are any questions before processing their request. Advised to call back if they haven't received a call from the referral department in 5 days.

## 2023-08-02 ENCOUNTER — APPOINTMENT (OUTPATIENT)
Dept: MEDICAL GROUP | Facility: PHYSICIAN GROUP | Age: 78
End: 2023-08-02
Payer: MEDICARE

## 2023-08-22 NOTE — ASSESSMENT & PLAN NOTE
The 10-year ASCVD risk score (Briana HIGH Jr., et al., 2013) is: 7.9%  Patient on Zetia and is requesting labs to be done before she follows up with her pa PCP in the fall, this is been ordered.  Discussed the importance of lifestyle modifications.   Awake/Alert

## 2023-08-24 DIAGNOSIS — Z79.890 POSTMENOPAUSAL HRT (HORMONE REPLACEMENT THERAPY): ICD-10-CM

## 2023-08-25 RX ORDER — ESTRADIOL 1 MG/1
1 TABLET ORAL DAILY
Qty: 90 TABLET | Refills: 3 | Status: SHIPPED | OUTPATIENT
Start: 2023-08-25

## 2023-08-25 RX ORDER — TRAZODONE HYDROCHLORIDE 100 MG/1
100 TABLET ORAL NIGHTLY
Qty: 90 TABLET | Refills: 3 | Status: SHIPPED | OUTPATIENT
Start: 2023-08-25

## 2023-08-25 NOTE — TELEPHONE ENCOUNTER
Requested Prescriptions     Pending Prescriptions Disp Refills    traZODone (DESYREL) 100 MG Tab 90 Tablet 3     Sig: Take 1 Tablet by mouth every evening.        Last office visit: 7/18/23  Last lab: 7/3/23

## 2023-08-25 NOTE — TELEPHONE ENCOUNTER
Requested Prescriptions     Pending Prescriptions Disp Refills    estradiol (ESTRACE) 1 MG Tab 90 Tablet 0        Last office visit: 7/18/23  Last lab: 7/3/23

## 2023-08-31 ENCOUNTER — PRE-ADMISSION TESTING (OUTPATIENT)
Dept: ADMISSIONS | Facility: MEDICAL CENTER | Age: 78
End: 2023-08-31
Attending: PLASTIC SURGERY
Payer: MEDICARE

## 2023-09-06 ENCOUNTER — HOSPITAL ENCOUNTER (OUTPATIENT)
Dept: RADIOLOGY | Facility: MEDICAL CENTER | Age: 78
End: 2023-09-06
Attending: PHYSICIAN ASSISTANT
Payer: MEDICARE

## 2023-09-06 DIAGNOSIS — Z12.31 ENCOUNTER FOR SCREENING MAMMOGRAM FOR MALIGNANT NEOPLASM OF BREAST: ICD-10-CM

## 2023-09-06 PROCEDURE — 77063 BREAST TOMOSYNTHESIS BI: CPT

## 2023-09-14 ENCOUNTER — ANESTHESIA (OUTPATIENT)
Dept: SURGERY | Facility: MEDICAL CENTER | Age: 78
End: 2023-09-14
Payer: MEDICARE

## 2023-09-14 ENCOUNTER — HOSPITAL ENCOUNTER (OUTPATIENT)
Facility: MEDICAL CENTER | Age: 78
End: 2023-09-14
Attending: PLASTIC SURGERY | Admitting: PLASTIC SURGERY
Payer: MEDICARE

## 2023-09-14 ENCOUNTER — ANESTHESIA EVENT (OUTPATIENT)
Dept: SURGERY | Facility: MEDICAL CENTER | Age: 78
End: 2023-09-14
Payer: MEDICARE

## 2023-09-14 VITALS
BODY MASS INDEX: 23.1 KG/M2 | HEART RATE: 61 BPM | HEIGHT: 61 IN | TEMPERATURE: 97.6 F | RESPIRATION RATE: 18 BRPM | SYSTOLIC BLOOD PRESSURE: 157 MMHG | OXYGEN SATURATION: 93 % | DIASTOLIC BLOOD PRESSURE: 70 MMHG | WEIGHT: 122.36 LBS

## 2023-09-14 PROCEDURE — 700111 HCHG RX REV CODE 636 W/ 250 OVERRIDE (IP): Performed by: ANESTHESIOLOGY

## 2023-09-14 PROCEDURE — 160036 HCHG PACU - EA ADDL 30 MINS PHASE I: Performed by: PLASTIC SURGERY

## 2023-09-14 PROCEDURE — 700101 HCHG RX REV CODE 250: Performed by: PLASTIC SURGERY

## 2023-09-14 PROCEDURE — 160035 HCHG PACU - 1ST 60 MINS PHASE I: Performed by: PLASTIC SURGERY

## 2023-09-14 PROCEDURE — 160028 HCHG SURGERY MINUTES - 1ST 30 MINS LEVEL 3: Performed by: PLASTIC SURGERY

## 2023-09-14 PROCEDURE — 160025 RECOVERY II MINUTES (STATS): Performed by: PLASTIC SURGERY

## 2023-09-14 PROCEDURE — 700105 HCHG RX REV CODE 258: Performed by: ANESTHESIOLOGY

## 2023-09-14 PROCEDURE — 160039 HCHG SURGERY MINUTES - EA ADDL 1 MIN LEVEL 3: Performed by: PLASTIC SURGERY

## 2023-09-14 PROCEDURE — A9270 NON-COVERED ITEM OR SERVICE: HCPCS | Performed by: PLASTIC SURGERY

## 2023-09-14 PROCEDURE — 700102 HCHG RX REV CODE 250 W/ 637 OVERRIDE(OP): Performed by: ANESTHESIOLOGY

## 2023-09-14 PROCEDURE — 700102 HCHG RX REV CODE 250 W/ 637 OVERRIDE(OP): Performed by: PLASTIC SURGERY

## 2023-09-14 PROCEDURE — 700101 HCHG RX REV CODE 250: Performed by: ANESTHESIOLOGY

## 2023-09-14 PROCEDURE — 160009 HCHG ANES TIME/MIN: Performed by: PLASTIC SURGERY

## 2023-09-14 PROCEDURE — 160048 HCHG OR STATISTICAL LEVEL 1-5: Performed by: PLASTIC SURGERY

## 2023-09-14 PROCEDURE — 160046 HCHG PACU - 1ST 60 MINS PHASE II: Performed by: PLASTIC SURGERY

## 2023-09-14 PROCEDURE — A9270 NON-COVERED ITEM OR SERVICE: HCPCS | Performed by: ANESTHESIOLOGY

## 2023-09-14 PROCEDURE — 700111 HCHG RX REV CODE 636 W/ 250 OVERRIDE (IP): Mod: JZ | Performed by: ANESTHESIOLOGY

## 2023-09-14 PROCEDURE — 160002 HCHG RECOVERY MINUTES (STAT): Performed by: PLASTIC SURGERY

## 2023-09-14 RX ORDER — BALANCED SALT SOLUTION 6.4; .75; .48; .3; 3.9; 1.7 MG/ML; MG/ML; MG/ML; MG/ML; MG/ML; MG/ML
SOLUTION OPHTHALMIC
Status: DISCONTINUED | OUTPATIENT
Start: 2023-09-14 | End: 2023-09-14 | Stop reason: HOSPADM

## 2023-09-14 RX ORDER — HALOPERIDOL 5 MG/ML
1 INJECTION INTRAMUSCULAR
Status: DISCONTINUED | OUTPATIENT
Start: 2023-09-14 | End: 2023-09-14 | Stop reason: HOSPADM

## 2023-09-14 RX ORDER — OXYCODONE HCL 5 MG/5 ML
5 SOLUTION, ORAL ORAL
Status: COMPLETED | OUTPATIENT
Start: 2023-09-14 | End: 2023-09-14

## 2023-09-14 RX ORDER — ONDANSETRON 2 MG/ML
INJECTION INTRAMUSCULAR; INTRAVENOUS PRN
Status: DISCONTINUED | OUTPATIENT
Start: 2023-09-14 | End: 2023-09-14 | Stop reason: SURG

## 2023-09-14 RX ORDER — BACITRACIN ZINC 500 [USP'U]/G
OINTMENT TOPICAL
Status: DISCONTINUED
Start: 2023-09-14 | End: 2023-09-14 | Stop reason: HOSPADM

## 2023-09-14 RX ORDER — ONDANSETRON 2 MG/ML
4 INJECTION INTRAMUSCULAR; INTRAVENOUS
Status: DISCONTINUED | OUTPATIENT
Start: 2023-09-14 | End: 2023-09-14 | Stop reason: HOSPADM

## 2023-09-14 RX ORDER — OXYCODONE HCL 5 MG/5 ML
10 SOLUTION, ORAL ORAL
Status: COMPLETED | OUTPATIENT
Start: 2023-09-14 | End: 2023-09-14

## 2023-09-14 RX ORDER — DIPHENHYDRAMINE HYDROCHLORIDE 50 MG/ML
12.5 INJECTION INTRAMUSCULAR; INTRAVENOUS
Status: DISCONTINUED | OUTPATIENT
Start: 2023-09-14 | End: 2023-09-14 | Stop reason: HOSPADM

## 2023-09-14 RX ORDER — BALANCED SALT SOLUTION 6.4; .75; .48; .3; 3.9; 1.7 MG/ML; MG/ML; MG/ML; MG/ML; MG/ML; MG/ML
SOLUTION OPHTHALMIC
Status: DISCONTINUED
Start: 2023-09-14 | End: 2023-09-14 | Stop reason: HOSPADM

## 2023-09-14 RX ORDER — DEXAMETHASONE SODIUM PHOSPHATE 4 MG/ML
INJECTION, SOLUTION INTRA-ARTICULAR; INTRALESIONAL; INTRAMUSCULAR; INTRAVENOUS; SOFT TISSUE PRN
Status: DISCONTINUED | OUTPATIENT
Start: 2023-09-14 | End: 2023-09-14 | Stop reason: SURG

## 2023-09-14 RX ORDER — BUPIVACAINE HYDROCHLORIDE 2.5 MG/ML
INJECTION, SOLUTION EPIDURAL; INFILTRATION; INTRACAUDAL
Status: DISCONTINUED
Start: 2023-09-14 | End: 2023-09-14 | Stop reason: HOSPADM

## 2023-09-14 RX ORDER — SODIUM CHLORIDE, SODIUM LACTATE, POTASSIUM CHLORIDE, CALCIUM CHLORIDE 600; 310; 30; 20 MG/100ML; MG/100ML; MG/100ML; MG/100ML
INJECTION, SOLUTION INTRAVENOUS
Status: DISCONTINUED | OUTPATIENT
Start: 2023-09-14 | End: 2023-09-14 | Stop reason: SURG

## 2023-09-14 RX ORDER — EPINEPHRINE 1 MG/ML(1)
AMPUL (ML) INJECTION
Status: DISCONTINUED
Start: 2023-09-14 | End: 2023-09-14 | Stop reason: HOSPADM

## 2023-09-14 RX ORDER — BACITRACIN ZINC 500 [USP'U]/G
OINTMENT TOPICAL
Status: DISCONTINUED | OUTPATIENT
Start: 2023-09-14 | End: 2023-09-14 | Stop reason: HOSPADM

## 2023-09-14 RX ORDER — BUPIVACAINE HYDROCHLORIDE AND EPINEPHRINE 2.5; 5 MG/ML; UG/ML
INJECTION, SOLUTION EPIDURAL; INFILTRATION; INTRACAUDAL; PERINEURAL
Status: DISCONTINUED | OUTPATIENT
Start: 2023-09-14 | End: 2023-09-14 | Stop reason: HOSPADM

## 2023-09-14 RX ORDER — ACETAMINOPHEN 500 MG
1000 TABLET ORAL ONCE
Status: COMPLETED | OUTPATIENT
Start: 2023-09-14 | End: 2023-09-14

## 2023-09-14 RX ORDER — SODIUM CHLORIDE, SODIUM LACTATE, POTASSIUM CHLORIDE, CALCIUM CHLORIDE 600; 310; 30; 20 MG/100ML; MG/100ML; MG/100ML; MG/100ML
INJECTION, SOLUTION INTRAVENOUS CONTINUOUS
Status: DISCONTINUED | OUTPATIENT
Start: 2023-09-14 | End: 2023-09-14 | Stop reason: HOSPADM

## 2023-09-14 RX ORDER — SCOLOPAMINE TRANSDERMAL SYSTEM 1 MG/1
1 PATCH, EXTENDED RELEASE TRANSDERMAL
Status: DISCONTINUED | OUTPATIENT
Start: 2023-09-14 | End: 2023-09-14 | Stop reason: HOSPADM

## 2023-09-14 RX ORDER — ROCURONIUM BROMIDE 10 MG/ML
INJECTION, SOLUTION INTRAVENOUS PRN
Status: DISCONTINUED | OUTPATIENT
Start: 2023-09-14 | End: 2023-09-14 | Stop reason: SURG

## 2023-09-14 RX ORDER — MIDAZOLAM HYDROCHLORIDE 1 MG/ML
INJECTION INTRAMUSCULAR; INTRAVENOUS PRN
Status: DISCONTINUED | OUTPATIENT
Start: 2023-09-14 | End: 2023-09-14 | Stop reason: SURG

## 2023-09-14 RX ORDER — LIDOCAINE HYDROCHLORIDE 20 MG/ML
INJECTION, SOLUTION EPIDURAL; INFILTRATION; INTRACAUDAL; PERINEURAL PRN
Status: DISCONTINUED | OUTPATIENT
Start: 2023-09-14 | End: 2023-09-14 | Stop reason: SURG

## 2023-09-14 RX ADMIN — LIDOCAINE HYDROCHLORIDE 100 MG: 20 INJECTION, SOLUTION EPIDURAL; INFILTRATION; INTRACAUDAL at 10:04

## 2023-09-14 RX ADMIN — DEXAMETHASONE SODIUM PHOSPHATE 4 MG: 4 INJECTION INTRA-ARTICULAR; INTRALESIONAL; INTRAMUSCULAR; INTRAVENOUS; SOFT TISSUE at 10:04

## 2023-09-14 RX ADMIN — PROPOFOL 150 MG: 10 INJECTION, EMULSION INTRAVENOUS at 10:04

## 2023-09-14 RX ADMIN — MIDAZOLAM 2 MG: 1 INJECTION, SOLUTION INTRAMUSCULAR; INTRAVENOUS at 10:04

## 2023-09-14 RX ADMIN — OXYCODONE HYDROCHLORIDE 5 MG: 5 SOLUTION ORAL at 11:41

## 2023-09-14 RX ADMIN — SCOPOLAMINE 1 PATCH: 1.5 PATCH, EXTENDED RELEASE TRANSDERMAL at 08:57

## 2023-09-14 RX ADMIN — SODIUM CHLORIDE, POTASSIUM CHLORIDE, SODIUM LACTATE AND CALCIUM CHLORIDE: 600; 310; 30; 20 INJECTION, SOLUTION INTRAVENOUS at 09:57

## 2023-09-14 RX ADMIN — ONDANSETRON 4 MG: 2 INJECTION INTRAMUSCULAR; INTRAVENOUS at 10:04

## 2023-09-14 RX ADMIN — FENTANYL CITRATE 25 MCG: 50 INJECTION, SOLUTION INTRAMUSCULAR; INTRAVENOUS at 11:42

## 2023-09-14 RX ADMIN — ACETAMINOPHEN 1000 MG: 500 TABLET, FILM COATED ORAL at 08:57

## 2023-09-14 RX ADMIN — SUGAMMADEX 200 MG: 100 INJECTION, SOLUTION INTRAVENOUS at 11:03

## 2023-09-14 RX ADMIN — ROCURONIUM BROMIDE 40 MG: 50 INJECTION, SOLUTION INTRAVENOUS at 10:04

## 2023-09-14 RX ADMIN — FENTANYL CITRATE 100 MCG: 50 INJECTION, SOLUTION INTRAMUSCULAR; INTRAVENOUS at 10:04

## 2023-09-14 RX ADMIN — FENTANYL CITRATE 25 MCG: 50 INJECTION, SOLUTION INTRAMUSCULAR; INTRAVENOUS at 11:52

## 2023-09-14 ASSESSMENT — PAIN DESCRIPTION - PAIN TYPE
TYPE: SURGICAL PAIN

## 2023-09-14 ASSESSMENT — PAIN SCALES - GENERAL: PAIN_LEVEL: 0

## 2023-09-14 ASSESSMENT — FIBROSIS 4 INDEX: FIB4 SCORE: 1.17

## 2023-09-14 NOTE — OP REPORT
DATE OF SERVICE:  09/14/2023     PREOPERATIVE DIAGNOSIS:  Bilateral dermatochalasis with left levator   blepharoptosis.     POSTOPERATIVE DIAGNOSIS:  Bilateral dermatochalasis with left levator   blepharoptosis.     PROCEDURES:  Bilateral upper lid blepharoplasty for removal of excess skin,   weighting down lid as well as left levator plication.     SURGEON:  Jose Manuel Trammell Jr., MD     ANESTHESIOLOGIST:  Giancarlo Tracy MD     INDICATIONS FOR PROCEDURE:  The patient is a 78-year-old white female who had   been referred by Dr. Derrick Gupta for consideration of bilateral   dermatochalasis and left blepharoptosis.  The patient and I discussed her   situation over the course of our consultation and together decided in order to   best meet her goals.  It would require bilateral upper lid blepharoplasty   with a left levator plication.  The patient was well informed of the risks,   benefits and alternatives to the procedure and participated in the decision to   proceed with surgery.     DESCRIPTION OF PROCEDURE:  The patient was marked preoperatively in the   holding area, taken to the operating room and under general anesthesia, was   prepped and draped over the face and the upper eyelids.  I began by placing   scleral shields over the globes bilaterally to protect these.  The markings   were confirmed and the patient was infiltrated with 0.25% Marcaine with   epinephrine.  The right side was approached first.  The skin and soft tissue   was excised in a crescentic fashion as previously marked and the wound was   then closed with running sutures of 5-0 fast absorbing gut.  The left side had   an identical amount of skin excised and then I incised down through the   orbital septum.  The pre-levator fat pad was identified and this was carefully   dissected away to expose the levator muscle.  Plication of the levator muscle   was then undertaken using interrupted sutures of 5-0 Vicryl.  This elevated   the lid to where I  felt we had a more symmetric eyelid ____ the right side.    The wounds on the skin were then closed with running sutures of 5-0 fast   absorbing gut as well.  When I was satisfied with the symmetry of the lids,   the area was then cleaned and the scleral shields were removed.  Sterile   dressings were then applied.  The patient tolerated the procedure well and was   taken to the recovery room in good condition.  Needle, sponge and instrument   counts were correct.        ______________________________  GABRIELE SINCLAIR MD    [facility]    DD:  09/14/2023 11:21  DT:  09/14/2023 12:11    Job#:  [id number]

## 2023-09-14 NOTE — DISCHARGE INSTRUCTIONS
Blepharoplasty, Care After  The following information offers guidance on how to care for yourself after your procedure. Your health care provider may also give you more specific instructions. If you have problems or questions, contact your health care provider.  What can I expect after the procedure?  After the procedure, it is common to have:  Swelling.  Bruising.  Soreness.  Sticky, dry, and itchy eyes.  Some numbness on the upper eyelids.  Follow these instructions at home:  Medicines    Take over-the-counter and prescription medicines only as told by your health care provider. This includes any moistening eye drops or eye ointment.  If you were prescribed an antibiotic medicine, take or apply it as told by your health care provider. Do not stop using the antibiotic even if you start to feel better.  Use eye drops or eye ointment as told by your health care provider.  Incision care    Do not soak or wash your face until your health care provider says that you can. Follow instructions from your health care provider about bathing.  Follow instructions from your health care provider about how to take care of your incision. Make sure you:  Wash your hands with soap and water for at least 20 seconds before and after you change your bandage (dressing). If soap and water are not available, use hand .  Change your dressing as told by your health care provider.  Leave stitches (sutures), skin glue, or adhesive strips in place. These skin closures may need to stay in place for 2 weeks or longer. If adhesive strip edges start to loosen and curl up, you may trim the loose edges. Do not remove adhesive strips completely unless your health care provider tells you to do that.  Check your incision area every day for signs of infection. Check for:  More redness, swelling, or pain.  Fluid or blood.  Warmth.  Pus or a bad smell.  If directed, put ice on the eye area to help reduce swelling and soreness. To do this:  Put  ice in a plastic bag.  Place a towel between your skin and the bag.  Leave the ice on for 20 minutes, 2-4 times a day.  Remove the ice if your skin turns bright red. This is very important. If you cannot feel pain, heat, or cold, you have a greater risk of damage to the area.  Activity  Keep your head raised (elevated) on a few pillows when resting and sleeping.  Do not bend over. Bending over causes your head to be lower than your heart and may increase swelling and bruising around your eyes.  You may have to avoid lifting. Ask your health care provider how much you can safely lift.  Do not do any activities that take a lot of effort.  Return to your normal activities as told by your health care provider. Ask your health care provider what activities are safe for you.  General instructions  Do not use any products that contain nicotine or tobacco. These products include cigarettes, chewing tobacco, and vaping devices, such as e-cigarettes. If you need help quitting, ask your health care provider.  Do not wear contact lenses until your health care provider approves.  Avoid rubbing your eye.  To protect your eyes from the sun, wear dark sunglasses and a wide-brimmed hat. Do this until healing is complete. This helps to keep the suture areas from becoming discolored.  Keep all follow-up visits. This is important.  Contact a health care provider if:  You have signs of infection:  More redness, swelling, or pain around your incision.  Fluid or blood coming from your incision.  Your incision feels warm to the touch.  Bad smell or pus coming from your eyelids or eye or incision.  You have a fever.  Your incision breaks open after being closed.  You cannot close your eyes completely.  You have dryness, pain, swelling, or bruising that is not getting better.  You have a change in your vision.  You have double vision or blurry vision that is not getting better.  Get help right away if:  Your eyeball is bulging or your  eyeball position is different from normal.  You have severe loss of vision.  You have severe eye pain.  These symptoms may be an emergency. Get help right away. Call 911.  Do not wait to see if the symptoms will go away.  Do not drive yourself to the hospital.  Summary  After this procedure, it is common to have swelling and bruising around the eyes.  Follow instructions from your health care provider about how to take care of yourself at home.  Take over-the-counter and prescription medicines only as told by your health care provider. This includes any moistening eye drops or eye ointment.  Keep your head raised (elevated) on a few pillows when resting and sleeping.  Keep all follow-up visits. This is important.  This information is not intended to replace advice given to you by your health care provider. Make sure you discuss any questions you have with your health care provider.  Document Revised: 12/08/2022 Document Reviewed: 12/08/2022  [employer] Education © 2023 [employer]  HOME CARE INSTRUCTIONS    ACTIVITY: Rest and take it easy for the first 24 hours.  A responsible adult is recommended to remain with you during that time.  It is normal to feel sleepy.  We encourage you to not do anything that requires balance, judgment or coordination.    FOR 24 HOURS DO NOT:  Drive, operate machinery or run household appliances.  Drink beer or alcoholic beverages.  Make important decisions or sign legal documents.    SPECIAL INSTRUCTIONS: Follow up Dr Trammell in 1 week call set up an appointment [phone]    DIET: To avoid nausea, slowly advance diet as tolerated, avoiding spicy or greasy foods for the first day.  Add more substantial food to your diet according to your physician's instructions.  Babies can be fed formula or breast milk as soon as they are hungry.  INCREASE FLUIDS AND FIBER TO AVOID CONSTIPATION.    SURGICAL DRESSING/BATHING: none    MEDICATIONS: Resume taking daily medication.  Take prescribed  pain medication with food.  If no medication is prescribed, you may take non-aspirin pain medication if needed.  PAIN MEDICATION CAN BE VERY CONSTIPATING.  Take a stool softener or laxative such as senokot, pericolace, or milk of magnesia if needed.    Prescription given for none.  Last pain medication oxycodone given at 11:42 am, tylenol given at 8:57 am next dose of tylenol if needed 2:57 PM    A follow-up appointment should be arranged with your doctor in [phone]; call to schedule.    You should CALL YOUR PHYSICIAN if you develop:  Fever greater than 101 degrees F.  Pain not relieved by medication, or persistent nausea or vomiting.  Excessive bleeding (blood soaking through dressing) or unexpected drainage from the wound.  Extreme redness or swelling around the incision site, drainage of pus or foul smelling drainage.  Inability to urinate or empty your bladder within 8 hours.  Problems with breathing or chest pain.    You should call 911 if you develop problems with breathing or chest pain.  If you are unable to contact your doctor or surgical center, you should go to the nearest emergency room or urgent care center.  Physician's telephone #: [phone]    MILD FLU-LIKE SYMPTOMS ARE NORMAL.  YOU MAY EXPERIENCE GENERALIZED MUSCLE ACHES, THROAT IRRITATION, HEADACHE AND/OR SOME NAUSEA.    If any questions arise, call your doctor.  If your doctor is not available, please feel free to call the Surgical Center at [phone].  The Center is open Monday through Friday from 7AM to 7PM.      A registered nurse may call you a few days after your surgery to see how you are doing after your procedure.    You may also receive a survey in the mail within the next two weeks and we ask that you take a few moments to complete the survey and return it to us.  Our goal is to provide you with very good care and we value your comments.     Depression / Suicide Risk    As you are discharged from this [facility] facility, it  is important to learn how to keep safe from harming yourself.    Recognize the warning signs:  Abrupt changes in personality, positive or negative- including increase in energy   Giving away possessions  Change in eating patterns- significant weight changes-  positive or negative  Change in sleeping patterns- unable to sleep or sleeping all the time   Unwillingness or inability to communicate  Depression  Unusual sadness, discouragement and loneliness  Talk of wanting to die  Neglect of personal appearance   Rebelliousness- reckless behavior  Withdrawal from people/activities they love  Confusion- inability to concentrate     If you or a loved one observes any of these behaviors or has concerns about self-harm, here's what you can do:  Talk about it- your feelings and reasons for harming yourself  Remove any means that you might use to hurt yourself (examples: pills, rope, extension cords, firearm)  Get professional help from the community (Mental Health, Substance Abuse, psychological counseling)  Do not be alone:Call your Safe Contact- someone whom you trust who will be there for you.  Call your local CRISIS HOTLINE 687-9792 or 651-428-8023  Call your local Children's Mobile Crisis Response Team [place] (110) 175-6084 or www.[employer]  Call the toll free National Suicide Prevention Hotlines   National Suicide Prevention Lifeline 831-806-WFHK (1165)  National Hope Line Network 800-SUICIDE (529-8902)    I acknowledge receipt and understanding of these Home Care instructions.

## 2023-09-14 NOTE — ANESTHESIA POSTPROCEDURE EVALUATION
Patient: Makenna Culver    Procedure Summary     Date: 09/14/23 Room / Location: [facility] ROOM 27 / SURGERY SAME DAY [facility]    Anesthesia Start: 0957 Anesthesia Stop: 1113    Procedure: LEFT LEVATOR PLICATION, BILATERAL BLEPHAROPLASTY (Eye) Diagnosis: (BILATERAL BLEPHAROPTOSIS, LEFT BLEPHAROPTOSIS)    Surgeons: Jose Manuel Trammell Jr., M.D. Responsible Provider: Giancarlo Tracy M.D.    Anesthesia Type: general ASA Status: 2          Final Anesthesia Type: general  Last vitals  BP   Blood Pressure : 130/62    Temp   36.2 °C (97.1 °F)    Pulse   71   Resp   18    SpO2   99 %      Anesthesia Post Evaluation    Patient location during evaluation: PACU  Patient participation: complete - patient participated  Level of consciousness: awake and alert  Pain score: 0    Airway patency: patent  Anesthetic complications: no  Cardiovascular status: hemodynamically stable  Respiratory status: acceptable  Hydration status: euvolemic    PONV: none          There were no known notable events for this encounter.     Nurse Pain Score: 0 (NPRS)

## 2023-09-14 NOTE — OR NURSING
1109 Arrived from OR. ID verified. Report received attached to monitors. Patient sleep easy to arouse. 4L 02 mask respirations even and unlabored.     1141 Oxycodone and fentanyl given for pain     1156 Assisted to bathroom able to void without difficulties.     1236 Patient escorted via w/c to responsible adult with all personal belongings.

## 2023-09-14 NOTE — ANESTHESIA TIME REPORT
Anesthesia Start and Stop Event Times     Date Time Event    9/14/2023 0944 Ready for Procedure     0957 Anesthesia Start     1113 Anesthesia Stop        Responsible Staff  09/14/23    Name Role Begin End    Giancarlo Tracy M.D. Anesth 0957 1113        Overtime Reason:  no overtime (within assigned shift)    Comments:

## 2023-09-14 NOTE — ANESTHESIA PREPROCEDURE EVALUATION
Case: [id number] Date/Time: 09/14/23 0945    Procedure: LEFT LEVATOR PLICATION    Pre-op diagnosis: LEFT BLEPHAROPTOSIS    Location: [facility] ROOM 27 / SURGERY SAME DAY [facility]    Surgeons: Jose Manuel Trammell Jr., M.D.          Relevant Problems   GI   (positive) GERD (gastroesophageal reflux disease)         (positive) Liver lesion      ENDO   (positive) Hypothyroidism      Other   (positive) Sacroiliitis, not elsewhere classified (HCC)       Physical Exam    Airway   Mallampati: II  TM distance: >3 FB  Neck ROM: full       Cardiovascular - normal exam  Rhythm: regular  Rate: normal  (-) murmur     Dental - normal exam           Pulmonary - normal exam  Breath sounds clear to auscultation     Abdominal    Neurological - normal exam                 Anesthesia Plan    ASA 2       Plan - general       Airway plan will be ETT          Induction: intravenous    Postoperative Plan: Postoperative administration of opioids is intended.    Pertinent diagnostic labs and testing reviewed    Informed Consent:    Anesthetic plan and risks discussed with patient.    Use of blood products discussed with: patient whom consented to blood products.

## 2023-09-14 NOTE — ANESTHESIA PROCEDURE NOTES
Airway    Date/Time: 9/14/2023 10:04 AM    Performed by: Giancarlo Tracy M.D.  Authorized by: Giancarlo Tracy M.D.    Location:  OR  Urgency:  Elective  Indications for Airway Management:  Anesthesia      Spontaneous Ventilation: absent    Sedation Level:  Deep  Preoxygenated: Yes    Patient Position:  Sniffing  Final Airway Type:  Endotracheal airway  Final Endotracheal Airway:  ETT  Cuffed: Yes    Technique Used for Successful ETT Placement:  Direct laryngoscopy    Insertion Site:  Oral  Blade Type:  Biggs  Laryngoscope Blade/Videolaryngoscope Blade Size:  2  ETT Size (mm):  7.0  Measured from:  Teeth  ETT to Teeth (cm):  22  Placement Verified by: auscultation and capnometry    Cormack-Lehane Classification:  Grade I - full view of glottis  Number of Attempts at Approach:  1

## 2023-10-12 ENCOUNTER — HOSPITAL ENCOUNTER (OUTPATIENT)
Dept: LAB | Facility: MEDICAL CENTER | Age: 78
End: 2023-10-12
Attending: FAMILY MEDICINE
Payer: MEDICARE

## 2023-10-12 DIAGNOSIS — K76.9 LIVER LESION: ICD-10-CM

## 2023-10-12 DIAGNOSIS — R53.82 CHRONIC FATIGUE: ICD-10-CM

## 2023-10-12 LAB
ANION GAP SERPL CALC-SCNC: 11 MMOL/L (ref 7–16)
BUN SERPL-MCNC: 12 MG/DL (ref 8–22)
CALCIUM SERPL-MCNC: 9.1 MG/DL (ref 8.5–10.5)
CHLORIDE SERPL-SCNC: 99 MMOL/L (ref 96–112)
CO2 SERPL-SCNC: 26 MMOL/L (ref 20–33)
CREAT SERPL-MCNC: 0.72 MG/DL (ref 0.5–1.4)
GFR SERPLBLD CREATININE-BSD FMLA CKD-EPI: 86 ML/MIN/1.73 M 2
GLUCOSE SERPL-MCNC: 87 MG/DL (ref 65–99)
POTASSIUM SERPL-SCNC: 4 MMOL/L (ref 3.6–5.5)
SODIUM SERPL-SCNC: 136 MMOL/L (ref 135–145)

## 2023-10-12 PROCEDURE — 80048 BASIC METABOLIC PNL TOTAL CA: CPT

## 2023-10-12 PROCEDURE — 36415 COLL VENOUS BLD VENIPUNCTURE: CPT

## 2023-11-05 SDOH — ECONOMIC STABILITY: FOOD INSECURITY: WITHIN THE PAST 12 MONTHS, THE FOOD YOU BOUGHT JUST DIDN'T LAST AND YOU DIDN'T HAVE MONEY TO GET MORE.: NEVER TRUE

## 2023-11-05 SDOH — ECONOMIC STABILITY: HOUSING INSECURITY: IN THE LAST 12 MONTHS, HOW MANY PLACES HAVE YOU LIVED?: 1

## 2023-11-05 SDOH — HEALTH STABILITY: PHYSICAL HEALTH: ON AVERAGE, HOW MANY DAYS PER WEEK DO YOU ENGAGE IN MODERATE TO STRENUOUS EXERCISE (LIKE A BRISK WALK)?: 3 DAYS

## 2023-11-05 SDOH — ECONOMIC STABILITY: INCOME INSECURITY: IN THE LAST 12 MONTHS, WAS THERE A TIME WHEN YOU WERE NOT ABLE TO PAY THE MORTGAGE OR RENT ON TIME?: NO

## 2023-11-05 SDOH — HEALTH STABILITY: PHYSICAL HEALTH: ON AVERAGE, HOW MANY MINUTES DO YOU ENGAGE IN EXERCISE AT THIS LEVEL?: 20 MIN

## 2023-11-05 SDOH — ECONOMIC STABILITY: FOOD INSECURITY: WITHIN THE PAST 12 MONTHS, YOU WORRIED THAT YOUR FOOD WOULD RUN OUT BEFORE YOU GOT MONEY TO BUY MORE.: NEVER TRUE

## 2023-11-05 SDOH — ECONOMIC STABILITY: INCOME INSECURITY: HOW HARD IS IT FOR YOU TO PAY FOR THE VERY BASICS LIKE FOOD, HOUSING, MEDICAL CARE, AND HEATING?: NOT HARD AT ALL

## 2023-11-05 ASSESSMENT — SOCIAL DETERMINANTS OF HEALTH (SDOH)
HOW OFTEN DO YOU GET TOGETHER WITH FRIENDS OR RELATIVES?: TWICE A WEEK
HOW HARD IS IT FOR YOU TO PAY FOR THE VERY BASICS LIKE FOOD, HOUSING, MEDICAL CARE, AND HEATING?: NOT HARD AT ALL
DO YOU BELONG TO ANY CLUBS OR ORGANIZATIONS SUCH AS CHURCH GROUPS UNIONS, FRATERNAL OR ATHLETIC GROUPS, OR SCHOOL GROUPS?: NO
HOW OFTEN DO YOU GET TOGETHER WITH FRIENDS OR RELATIVES?: TWICE A WEEK
IN A TYPICAL WEEK, HOW MANY TIMES DO YOU TALK ON THE PHONE WITH FAMILY, FRIENDS, OR NEIGHBORS?: MORE THAN THREE TIMES A WEEK
DO YOU BELONG TO ANY CLUBS OR ORGANIZATIONS SUCH AS CHURCH GROUPS UNIONS, FRATERNAL OR ATHLETIC GROUPS, OR SCHOOL GROUPS?: NO
HOW OFTEN DO YOU ATTEND CHURCH OR RELIGIOUS SERVICES?: PATIENT DECLINED
HOW OFTEN DO YOU ATTEND CHURCH OR RELIGIOUS SERVICES?: PATIENT DECLINED
WITHIN THE PAST 12 MONTHS, YOU WORRIED THAT YOUR FOOD WOULD RUN OUT BEFORE YOU GOT THE MONEY TO BUY MORE: NEVER TRUE
HOW OFTEN DO YOU HAVE A DRINK CONTAINING ALCOHOL: MONTHLY OR LESS
HOW MANY DRINKS CONTAINING ALCOHOL DO YOU HAVE ON A TYPICAL DAY WHEN YOU ARE DRINKING: 1 OR 2
HOW OFTEN DO YOU ATTENT MEETINGS OF THE CLUB OR ORGANIZATION YOU BELONG TO?: NEVER
IN A TYPICAL WEEK, HOW MANY TIMES DO YOU TALK ON THE PHONE WITH FAMILY, FRIENDS, OR NEIGHBORS?: MORE THAN THREE TIMES A WEEK
HOW OFTEN DO YOU HAVE SIX OR MORE DRINKS ON ONE OCCASION: NEVER
HOW OFTEN DO YOU ATTENT MEETINGS OF THE CLUB OR ORGANIZATION YOU BELONG TO?: NEVER

## 2023-11-05 ASSESSMENT — LIFESTYLE VARIABLES
HOW OFTEN DO YOU HAVE A DRINK CONTAINING ALCOHOL: MONTHLY OR LESS
SKIP TO QUESTIONS 9-10: 1
HOW OFTEN DO YOU HAVE SIX OR MORE DRINKS ON ONE OCCASION: NEVER
AUDIT-C TOTAL SCORE: 1
HOW MANY STANDARD DRINKS CONTAINING ALCOHOL DO YOU HAVE ON A TYPICAL DAY: 1 OR 2

## 2023-11-08 ENCOUNTER — OFFICE VISIT (OUTPATIENT)
Dept: MEDICAL GROUP | Facility: PHYSICIAN GROUP | Age: 78
End: 2023-11-08
Payer: MEDICARE

## 2023-11-08 VITALS
BODY MASS INDEX: 22.84 KG/M2 | TEMPERATURE: 97.9 F | HEIGHT: 61 IN | RESPIRATION RATE: 14 BRPM | HEART RATE: 79 BPM | DIASTOLIC BLOOD PRESSURE: 74 MMHG | SYSTOLIC BLOOD PRESSURE: 122 MMHG | OXYGEN SATURATION: 95 % | WEIGHT: 121 LBS

## 2023-11-08 DIAGNOSIS — M25.512 CHRONIC LEFT SHOULDER PAIN: ICD-10-CM

## 2023-11-08 DIAGNOSIS — H25.10 NUCLEAR SENILE CATARACT, UNSPECIFIED LATERALITY: ICD-10-CM

## 2023-11-08 DIAGNOSIS — M46.1 SACROILIITIS, NOT ELSEWHERE CLASSIFIED (HCC): ICD-10-CM

## 2023-11-08 DIAGNOSIS — M15.9 PRIMARY OSTEOARTHRITIS INVOLVING MULTIPLE JOINTS: ICD-10-CM

## 2023-11-08 DIAGNOSIS — J43.8 OTHER EMPHYSEMA (HCC): ICD-10-CM

## 2023-11-08 DIAGNOSIS — R91.1 PULMONARY NODULE: ICD-10-CM

## 2023-11-08 DIAGNOSIS — M85.89 OSTEOPENIA OF MULTIPLE SITES: ICD-10-CM

## 2023-11-08 DIAGNOSIS — K21.9 GASTROESOPHAGEAL REFLUX DISEASE WITHOUT ESOPHAGITIS: ICD-10-CM

## 2023-11-08 DIAGNOSIS — E55.9 VITAMIN D DEFICIENCY: ICD-10-CM

## 2023-11-08 DIAGNOSIS — G89.29 CHRONIC LEFT SHOULDER PAIN: ICD-10-CM

## 2023-11-08 DIAGNOSIS — E78.2 MIXED HYPERLIPIDEMIA: ICD-10-CM

## 2023-11-08 DIAGNOSIS — H93.11 TINNITUS OF RIGHT EAR: ICD-10-CM

## 2023-11-08 DIAGNOSIS — E03.9 HYPOTHYROIDISM, UNSPECIFIED TYPE: ICD-10-CM

## 2023-11-08 PROCEDURE — 3078F DIAST BP <80 MM HG: CPT | Performed by: FAMILY MEDICINE

## 2023-11-08 PROCEDURE — 3074F SYST BP LT 130 MM HG: CPT | Performed by: FAMILY MEDICINE

## 2023-11-08 PROCEDURE — G0439 PPPS, SUBSEQ VISIT: HCPCS | Performed by: FAMILY MEDICINE

## 2023-11-08 RX ORDER — ALBUTEROL SULFATE 90 UG/1
2 AEROSOL, METERED RESPIRATORY (INHALATION) EVERY 4 HOURS PRN
Qty: 2 EACH | Refills: 3 | Status: SHIPPED | OUTPATIENT
Start: 2023-11-08

## 2023-11-08 ASSESSMENT — ACTIVITIES OF DAILY LIVING (ADL): BATHING_REQUIRES_ASSISTANCE: 0

## 2023-11-08 ASSESSMENT — PATIENT HEALTH QUESTIONNAIRE - PHQ9: CLINICAL INTERPRETATION OF PHQ2 SCORE: 0

## 2023-11-08 ASSESSMENT — ENCOUNTER SYMPTOMS: GENERAL WELL-BEING: EXCELLENT

## 2023-11-08 ASSESSMENT — FIBROSIS 4 INDEX: FIB4 SCORE: 1.17

## 2023-11-08 NOTE — ASSESSMENT & PLAN NOTE
On atorvastatin 10 mg every other day, she declines higher dose.   Repeat labs annually   Latest Reference Range & Units Most Recent   Cholesterol,Tot 100 - 199 mg/dL 254 (H)  4/10/23 08:25   Triglycerides 0 - 149 mg/dL 207 (H)  4/10/23 08:25   HDL >=40 mg/dL 90  4/10/23 08:25   LDL <100 mg/dL 123 (H)  4/10/23 08:25

## 2023-11-08 NOTE — ASSESSMENT & PLAN NOTE
No acute changes. She has degenerative disc disease, arthritis in back and hips  She gets trigger point injections and epidural every 3 months.   Is on tramadol, muscle relaxant flexeril.   She stays active, works in her yard till she cannot then uses an ice pack.

## 2023-11-08 NOTE — PROGRESS NOTES
Chief Complaint   Patient presents with    Medicare Annual Wellness       HPI:  Makenna Culver is a 78 y.o. here for Medicare Annual Wellness Visit     Patient Active Problem List    Diagnosis Date Noted    Other emphysema (HCC) 11/08/2023    Ringing in ears     Chronic fatigue 03/03/2023    Lumbar degenerative disc disease 07/12/2021    Lumbar radiculitis 07/12/2021    Environmental and seasonal allergies 05/28/2020    Primary insomnia 05/28/2020    Pulmonary nodule 04/15/2019    Liver lesion 04/15/2019    Sacroiliitis, not elsewhere classified (HCC) 07/25/2018    Senile nuclear sclerosis 03/03/2015    Osteoarthritis of multiple joints 06/13/2014    Osteopenia 05/09/2012    Hyperlipidemia 06/22/2011    GERD (gastroesophageal reflux disease) 11/04/2009    Hypothyroidism 10/09/2009       Current Outpatient Medications   Medication Sig Dispense Refill    albuterol 108 (90 Base) MCG/ACT Aero Soln inhalation aerosol Inhale 2 Puffs every four hours as needed for Shortness of Breath. 2 Each 3    estradiol (ESTRACE) 1 MG Tab Take 1 Tablet by mouth every day. 90 Tablet 3    traZODone (DESYREL) 100 MG Tab Take 1 Tablet by mouth every evening. 90 Tablet 3    traMADol (ULTRAM) 50 MG Tab TAKE 1 TABLET BY MOUTH TWICE A DAY AS NEEDED FOR PAIN FOR 60 DAYS      omeprazole (PRILOSEC) 40 MG delayed-release capsule Take 1 capsule by mouth once daily 90 Capsule 3    atorvastatin (LIPITOR) 10 MG Tab Take 1 Tablet by mouth every day. 100 Tablet 3    levothyroxine (SYNTHROID) 75 MCG Tab TAKE 1 TABLET BY MOUTH IN THE MORNING ON AN EMPTY STOMACH 90 Tablet 3    ibuprofen (MOTRIN) 800 MG Tab TAKE 1 TABLET BY MOUTH THREE TIMES DAILY WITH FOOD 90 Tablet 0    diclofenac sodium (VOLTAREN) 1 % Gel       Cholecalciferol (VITAMIN D) 50 MCG (2000 UT) Cap Take 1 Cap by mouth every day.      B Complex Vitamins (VITAMIN B-COMPLEX PO) Take 1 Cap by mouth every day.       No current facility-administered medications for this visit.          Current  supplements as per medication list.     Allergies: Codeine, Morphine, and Vicodin [hydrocodone-acetaminophen]    Current social contact/activities: NO     She  reports that she quit smoking about 10 years ago. Her smoking use included cigarettes and electronic cigarettes. She started smoking about 58 years ago. She has been exposed to tobacco smoke. She has never used smokeless tobacco. She reports that she does not currently use alcohol. She reports that she does not use drugs.  Counseling given: Not Answered  Tobacco comments: use terry-cig electronic cig.      ROS:    Gait: Uses no assistive device  Ostomy: No  Other tubes: No  Amputations: No  Chronic oxygen use: No  Last eye exam: 2022    Wears hearing aids: No   : Denies any urinary leakage during the last 6 months    Screening:    Depression Screening  Little interest or pleasure in doing things?  0 - not at all  Feeling down, depressed , or hopeless? 0 - not at all  Patient Health Questionnaire Score: 0     If depressive symptoms identified deferred to follow up visit unless specifically addressed in assessment and plan.    Interpretation of PHQ-9 Total Score   Score Severity   1-4 No Depression   5-9 Mild Depression   10-14 Moderate Depression   15-19 Moderately Severe Depression   20-27 Severe Depression    Screening for Cognitive Impairment  Do you or any of your friends or family members have any concern about your memory? No  Three Minute Recall (Banana, Sunrise, Chair) 2/3    Pradip clock face with all 12 numbers and set the hands to show 20 past 8.  Yes    Cognitive concerns identified deferred for follow up unless specifically addressed in assessment and plan.    Fall Risk Assessment  Has the patient had two or more falls in the last year or any fall with injury in the last year?  No    Safety Assessment  Do you always wear your seatbelt?  Yes  Any changes to home needed to function safely? No  Difficulty hearing.  Yes  Patient counseled about all  safety risks that were identified.    Functional Assessment ADLs  Are there any barriers preventing you from cooking for yourself or meeting nutritional needs?  No.    Are there any barriers preventing you from driving safely or obtaining transportation?  No.    Are there any barriers preventing you from using a telephone or calling for help?  No    Are there any barriers preventing you from shopping?  No.    Are there any barriers preventing you from taking care of your own finances?  No    Are there any barriers preventing you from managing your medications?  No    Are there any barriers preventing you from showering, bathing or dressing yourself? No    Are there any barriers preventing you from doing housework or laundry? No  Are there any barriers preventing you from using the toilet?No  Are you currently engaging in any exercise or physical activity?  Yes.      Self-Assessment of Health  What is your perception of your health? Excellent  Do you sleep more than six hours a night? Yes  In the past 7 days, how much did pain keep you from doing your normal work? Some  Do you spend quality time with family or friends (virtually or in person)? Yes  Do you usually eat a heart healthy diet that constists of a variety of fruits, vegetables, whole grains and fiber? Yes  Do you eat foods high in fat and/or Fast Food more than three times per week? No    Advance Care Planning  Do you have an Advance Directive, Living Will, Durable Power of , or POLST? Yes          is not on file - instructed patient to bring in a copy to scan into their chart      Health Maintenance Summary            Overdue - COVID-19 Vaccine (3 - Moderna series) Overdue since 4/29/2021 03/04/2021  Imm Admin: MODERNA SARS-COV-2 VACCINE (12+)    02/04/2021  Imm Admin: MODERNA SARS-COV-2 VACCINE (12+)              Overdue - IMM DTaP/Tdap/Td Vaccine (2 - Td or Tdap) Overdue since 7/31/2023 07/31/2013  Imm Admin: Tdap Vaccine               Overdue - Influenza Vaccine (1) Overdue since 9/1/2023 09/14/2022  Imm Admin: Influenza Vaccine Adult HD    11/22/2019  Imm Admin: Influenza Vaccine Adult HD    12/26/2018  Imm Admin: Influenza Vaccine Adult HD    10/20/2017  Imm Admin: Influenza Vaccine Adult HD    10/12/2016  Imm Admin: Influenza Vaccine Adult HD    Only the first 5 history entries have been loaded, but more history exists.              Annual Wellness Visit (Every 366 Days) Next due on 11/8/2024 11/08/2023  Subsequent Annual Wellness Visit - Includes PPPS ()    09/14/2022  Subsequent Annual Wellness Visit - Includes PPPS ()    09/02/2021  Subsequent Annual Wellness Visit - Includes PPPS ()    09/02/2021  Visit Dx: Encounter for Medicare annual wellness exam    08/30/2016  Visit Dx: Medicare annual wellness visit, subsequent    Only the first 5 history entries have been loaded, but more history exists.              Bone Density Scan (Every 5 Years) Tentatively due on 2/10/2026      02/10/2021  DS-BONE DENSITY STUDY (DEXA)    10/14/2015  DS-BONE DENSITY STUDY (DEXA)    09/15/2011  DS-BONE DENSITY STUDY (DEXA)              Pneumococcal Vaccine: 65+ Years (Series Information) Completed      01/16/2019  Imm Admin: Pneumococcal polysaccharide vaccine (PPSV-23)    10/20/2017  Imm Admin: Pneumococcal Conjugate Vaccine (Prevnar/PCV-13)    10/20/2017  Imm Admin: Pneumococcal polysaccharide vaccine (PPSV-23)    05/18/2016  Imm Admin: Pneumococcal Conjugate Vaccine (Prevnar/PCV-13)              Hepatitis C Screening  Tentatively Complete      01/02/2020  Hepatitis C Antibody component of HEP C VIRUS ANTIBODY              Zoster (Shingles) Vaccines (Series Information) Completed      11/03/2021  Imm Admin: Zoster Vaccine Recombinant (RZV) (SHINGRIX)    09/02/2021  Imm Admin: Zoster Vaccine Recombinant (RZV) (SHINGRIX)    08/04/2011  Imm Admin: Zoster Vaccine Live (ZVL) (Zostavax) - HISTORICAL DATA              Hepatitis B Vaccine (Hep  B) (Series Information) Completed      03/28/2023  Imm Admin: Hepatitis B Vaccine (Adol/Adult)    10/19/2022  Imm Admin: Hepatitis B Vaccine (Adol/Adult)    09/14/2022  Imm Admin: Hepatitis B Vaccine (Adol/Adult)              Hepatitis A Vaccine (Hep A) (Series Information) Aged Out      No completion history exists for this topic.              HPV Vaccines (Series Information) Aged Out      No completion history exists for this topic.              Polio Vaccine (Inactivated Polio) (Series Information) Aged Out      No completion history exists for this topic.              Meningococcal Immunization (Series Information) Aged Out      No completion history exists for this topic.              Discontinued - Mammogram  Discontinued        Frequency changed to Never automatically (Topic No Longer Applies)    09/06/2023  MA-SCREENING MAMMO BILAT W/TOMOSYNTHESIS W/CAD    06/07/2022  MA-SCREENING MAMMO BILAT W/TOMOSYNTHESIS W/CAD    03/11/2021  MA-SCREENING MAMMO BILAT W/TOMOSYNTHESIS W/CAD    03/13/2019  MA-SCREENING MAMMO BILAT W/TOMOSYNTHESIS W/CAD    Only the first 5 history entries have been loaded, but more history exists.              Discontinued - Cervical Cancer Screening  Discontinued        Frequency changed to Never automatically (Topic No Longer Applies)    11/10/2010  PAP IG, RFX HPV ASCU              Discontinued - Colorectal Cancer Screening  Discontinued        Frequency changed to Never automatically (Topic No Longer Applies)    08/30/2022  Colon Cancer Screening Cologuard Stool (FIT DNA) (Done - repeat in 3 years.)    05/31/2022  COLOGUARD COLON CANCER SCREENING    05/31/2022  Colon Cancer Screening Cologuard Stool (FIT DNA) (Reason not specified)    05/31/2022  Colon Cancer Screening Cologuard Stool (FIT DNA) (Reason not specified)    Only the first 5 history entries have been loaded, but more history exists.                    Patient Care Team:  Frances Laboy M.D. as PCP - General (Family  Medicine)  Frances Laboy M.D. as PCP - [facility] Paneled  Mahesh De Leon M.D. as Consulting Physician (Phys Med and Rehab)  Han San D.O. as Consulting Physician (Neurology)        Social History     Tobacco Use    Smoking status: Former     Current packs/day: 0.00     Types: Cigarettes, Electronic Cigarettes     Start date: 6/28/1965     Quit date: 1/9/2013     Years since quitting: 10.8     Passive exposure: Past    Smokeless tobacco: Never    Tobacco comments:     use terry-cig electronic cig.   Vaping Use    Vaping Use: Every day    Substances: Nicotine, Flavoring    Devices: Pre-filled or refillable cartridge    Passive vaping exposure: Yes   Substance Use Topics    Alcohol use: Not Currently     Comment: one wine 2 twice a month    Drug use: No     Family History   Problem Relation Age of Onset    Hypertension Father     Heart Disease Father         MI age 49 yo    Stroke Father     Alcohol abuse Father         drank everyday    Cancer Maternal Grandmother         part of lung    Lung Cancer Maternal Grandmother     Hypertension Brother     Cancer Neg Hx     Diabetes Neg Hx      She  has a past medical history of Arthritis, Back pain, Chickenpox, Colon polyp (12/2011), Constipation, Degeneration of lumbar or lumbosacral intervertebral disc, Dental disorder, Double vision, Ear problems (as child had surgery), [place] measles, Graves' disease with exophthalmos (06/13/2014), Hemorrhoid, High cholesterol, History of carpal tunnel repair (07/31/2013), Hyperthyroidism, Menopause (10/14/2009), Morning headache, Osteoarthritis of multiple joints (06/13/2014), Osteoporosis, Other emphysema (HCC) (11/8/2023), Pain, Pain in joint, pelvic region and thigh (2015), Pain in joint, shoulder region (2015), Peptic ulcer, Periodic pelvic examination (due), Postcoital bleeding, Rash (12/29/2014), Rheumatoid arthritis (HCC), Ringing in ears, S/P colonoscopy (12/2011), Screening mammogram ( 11/2008=normal), Tobacco use  disorder, Tonsillitis, Ulcer of the stomach and intestine (hx.), Unspecified cataract, Unspecified hypothyroidism, and Wears glasses.    She has no past medical history of Acute nasopharyngitis, Anesthesia, Anginal syndrome (HCC), Arrhythmia, Asthma, Blood clotting disorder (HCC), Bowel habit changes, Breath shortness, Bronchitis, Cancer (HCC), Carcinoma in situ of respiratory system, Congestive heart failure (HCC), Continuous ambulatory peritoneal dialysis status (HCC), Coughing blood, Diabetes (HCC), Dialysis patient (HCC), Glaucoma, Gynecological disorder, Heart burn, Heart murmur, Heart valve disease, Hemorrhagic disorder (HCC), Hepatitis A, Hepatitis B, Hepatitis C, Hiatus hernia syndrome, Hypertension, Indigestion, Jaundice, Myocardial infarct (HCC), Pacemaker, Pneumonia, Pregnant, Psychiatric problem, Renal disorder, Rheumatic fever, Seizure (HCC), Sleep apnea, Snoring, Stroke (HCC), Tuberculosis, Urinary bladder disorder, or Urinary incontinence.   Past Surgical History:   Procedure Laterality Date    NM FIX LID PTOSIS,LEVATR RESEC,EXTERN N/A 9/14/2023    Procedure: LEFT LEVATOR PLICATION, BILATERAL BLEPHAROPLASTY;  Surgeon: Jose Manuel Trammell Jr., M.D.;  Location: SURGERY SAME DAY [facility];  Service: Plastics    NM NEUROPLASTY & OR TRANSPOS MEDIAN NRV CARPAL CHANDRA Right 12/04/2020    Procedure: RELEASE, CARPAL TUNNEL;  Surgeon: Pancho Palomino M.D.;  Location: SURGERY SAME DAY [facility];  Service: Orthopedics    PB INCIS TENDON SHEATH,RADIAL STYLOID Right 12/04/2020    Procedure: RELEASE, HAND, FOR DEQUERVAIN'S TENOSYNOVITIS;  Surgeon: Pancho Palomino M.D.;  Location: SURGERY SAME DAY [facility];  Service: Orthopedics    FINGER ARTHROPLASTY Right 12/04/2020    Procedure: ARTHROPLASTY, FINGER-THUMB CARPOMETACARPAL EXCISIONAL;  Surgeon: Pancho Palomino M.D.;  Location: SURGERY SAME DAY [facility];  Service: Orthopedics    NM NEUROPLASTY & OR TRANSPOS MEDIAN NRV CARPAL CHANDRA Left 08/21/2020    Procedure:  RELEASE, CARPAL TUNNEL;  Surgeon: Pancho Palomino M.D.;  Location: SURGERY SAME DAY [facility];  Service: Orthopedics    PB INCIS TENDON SHEATH,RADIAL STYLOID Left 08/21/2020    Procedure: RELEASE, HAND, FOR DEQUERVAIN'S TENOSYNOVITIS;  Surgeon: Pancho Palomino M.D.;  Location: SURGERY SAME DAY [facility];  Service: Orthopedics    PB REPAIR INTERCARP/CARP-METACARP JT Left 08/21/2020    Procedure: EXCISION, TRAPEZIUM, WITH LIGAMENT RECONSTRUCTION AND TENDON INTERPOSITION- PARTIAL TRAPEZOID EXCISION FLEXOR CARPI RADIALIS TENDON GRAFT, PLASCENCIA;  Surgeon: Pancho Palomino M.D.;  Location: SURGERY SAME DAY [facility];  Service: Orthopedics    FINGER ARTHROPLASTY Left 08/21/2020    Procedure: ARTHROPLASTY, FINGER- THUMB CARPOMETACARPAL EXCISIONAL;  Surgeon: Pancho Palomino M.D.;  Location: SURGERY SAME DAY [facility];  Service: Orthopedics    BLEPHAROPLASTY Bilateral 04/20/2016    Procedure: BLEPHAROPLASTY - UPPER;  Surgeon: Moshe Sena M.D.;  Location: [facility]GERY [facility];  Service:     RECTUS REPAIR Bilateral 07/09/2015    Procedure: RECTUS REPAIR FOR LT SUPERIOR RECESSION & RT INFERIOR RECESSION ;  Surgeon: Leila Villegas M.D.;  Location: SURGERY SAME DAY [facility];  Service:     CATARACT PHACO WITH IOL  03/17/2015    Performed by Derrick Gupta M.D. at [facility]    CATARACT PHACO WITH IOL  03/03/2015    Performed by Derrick Gupta M.D. at [facility]    TENSILON TEST  11/20/2014    Performed by Maynor San D.O. at ENDOSCOPY [facility] ORS    CARPAL TUNNEL ENDOSCOPIC  02/08/2013    Performed by Pancho Palomino M.D. at SURGERY SAME DAY [facility]    CARPAL TUNNEL ENDOSCOPIC  11/17/2012    Performed by Pancho Palomino M.D. at SURGERY [facility] ORS    ABDOMINAL HYSTERECTOMY TOTAL  partial    fibroid tumors, has ovary and tube left    CARPAL TUNNEL RELEASE      HYSTERECTOMY LAPAROSCOPY      NO PERTINENT PAST SURGICAL HISTORY  2013  "   eyes    OTHER  pore , above the ear    OTHER SURGICAL PROCEDURE      ear surgery as child    THYROIDECTOMY  thyroid ablation with iodine therapy    TONSILLECTOMY      TONSILLECTOMY AND ADENOIDECTOMY         Exam:   /74   Pulse 79   Temp 36.6 °C (97.9 °F) (Temporal)   Resp 14   Ht 1.549 m (5' 1\")   Wt 54.9 kg (121 lb)   SpO2 95%  Body mass index is 22.86 kg/m².    Hearing fair.    Dentition upper and lower dentures  Alert, oriented in no acute distress.  Eye contact is good, speech goal directed, affect calm    Assessment and Plan. The following treatment and monitoring plan is recommended:    1. Chronic left shoulder pain  - DX-SHOULDER 2+ LEFT; Future  - MR-SHOULDER-W/O LEFT; Future  - Referral to Orthopedics  - Subsequent Annual Wellness Visit - Includes PPPS ()    2. Primary osteoarthritis involving multiple joints  - Subsequent Annual Wellness Visit - Includes PPPS ()    3. Mixed hyperlipidemia  - Subsequent Annual Wellness Visit - Includes PPPS ()  - Comp Metabolic Panel; Future  - Lipid Profile; Future    4. Gastroesophageal reflux disease without esophagitis  - Subsequent Annual Wellness Visit - Includes PPPS ()    5. Hypothyroidism, unspecified type  - Subsequent Annual Wellness Visit - Includes PPPS ()  - TSH WITH REFLEX TO FT4; Future    6. Osteopenia of multiple sites  - Subsequent Annual Wellness Visit - Includes PPPS ()    7. Nuclear senile cataract, unspecified laterality  - Subsequent Annual Wellness Visit - Includes PPPS ()    8. Sacroiliitis, not elsewhere classified (HCC)  - Subsequent Annual Wellness Visit - Includes PPPS ()    9. Pulmonary nodule  - Subsequent Annual Wellness Visit - Includes PPPS ()    10. Other emphysema (HCC)  - Subsequent Annual Wellness Visit - Includes PPPS ()    11. Tinnitus of right ear  - Subsequent Annual Wellness Visit - Includes PPPS ()    12. Vitamin D deficiency  - VITAMIN D,25 HYDROXY " (DEFICIENCY); Future    Other orders  - albuterol 108 (90 Base) MCG/ACT Aero Soln inhalation aerosol; Inhale 2 Puffs every four hours as needed for Shortness of Breath.  Dispense: 2 Each; Refill: 3      Services suggested: No services needed at this time  Health Care Screening: Age-appropriate preventive services recommended by USPTF and ACIP covered by Medicare were discussed today. Services ordered if indicated and agreed upon by the patient.  Referrals offered: Community-based lifestyle interventions to reduce health risks and promote self-management and wellness, fall prevention, nutrition, physical activity, tobacco-use cessation, weight loss, and mental health services as per orders if indicated.    Discussion today about general wellness and lifestyle habits:    Prevent falls and reduce trip hazards; Cautioned about securing or removing rugs.  Have a working fire alarm and carbon monoxide detector;   Engage in regular physical activity and social activities     Follow-up: No follow-ups on file.

## 2023-11-08 NOTE — ASSESSMENT & PLAN NOTE
No persistent symptoms. Only needs to use albuterol rescue inhaler with weather changes.   Refill for albuterol provided.

## 2023-11-08 NOTE — ASSESSMENT & PLAN NOTE
Patient had two steroid injections to left shoulder by Dr. Crowley her pain specialist but not able to carry items and limited ROM of left shoulder. She is right handed. Not able to  or lift objects with her left arm. Worsening over last 4 months. Not improved with PT, joint injections or pain medication like flexeril.   Will check xray and MRI and ref to orthopedic shoulder specialist provided.

## 2023-11-10 DIAGNOSIS — R53.82 CHRONIC FATIGUE: ICD-10-CM

## 2023-11-10 DIAGNOSIS — R20.0 ANESTHESIA: ICD-10-CM

## 2023-11-10 DIAGNOSIS — E78.2 MIXED HYPERLIPIDEMIA: ICD-10-CM

## 2023-11-10 DIAGNOSIS — M25.512 CHRONIC LEFT SHOULDER PAIN: ICD-10-CM

## 2023-11-10 DIAGNOSIS — R68.89 ABNORMAL FINDING ON EVALUATION PROCEDURE: ICD-10-CM

## 2023-11-10 DIAGNOSIS — G89.29 CHRONIC LEFT SHOULDER PAIN: ICD-10-CM

## 2023-11-10 PROCEDURE — 93000 ELECTROCARDIOGRAM COMPLETE: CPT | Performed by: FAMILY MEDICINE

## 2023-12-06 ENCOUNTER — NON-PROVIDER VISIT (OUTPATIENT)
Dept: MEDICAL GROUP | Facility: PHYSICIAN GROUP | Age: 78
End: 2023-12-06
Payer: MEDICARE

## 2023-12-06 ENCOUNTER — HOSPITAL ENCOUNTER (OUTPATIENT)
Dept: LAB | Facility: MEDICAL CENTER | Age: 78
End: 2023-12-06
Attending: FAMILY MEDICINE
Payer: MEDICARE

## 2023-12-06 DIAGNOSIS — E78.2 MIXED HYPERLIPIDEMIA: ICD-10-CM

## 2023-12-06 DIAGNOSIS — E03.9 HYPOTHYROIDISM, UNSPECIFIED TYPE: ICD-10-CM

## 2023-12-06 DIAGNOSIS — G89.29 CHRONIC LEFT SHOULDER PAIN: ICD-10-CM

## 2023-12-06 DIAGNOSIS — E55.9 VITAMIN D DEFICIENCY: ICD-10-CM

## 2023-12-06 DIAGNOSIS — M25.512 CHRONIC LEFT SHOULDER PAIN: ICD-10-CM

## 2023-12-06 LAB
ERYTHROCYTE [DISTWIDTH] IN BLOOD BY AUTOMATED COUNT: 41.8 FL (ref 35.9–50)
HCT VFR BLD AUTO: 42 % (ref 37–47)
HGB BLD-MCNC: 14.3 G/DL (ref 12–16)
MCH RBC QN AUTO: 30.4 PG (ref 27–33)
MCHC RBC AUTO-ENTMCNC: 34 G/DL (ref 32.2–35.5)
MCV RBC AUTO: 89.2 FL (ref 81.4–97.8)
PLATELET # BLD AUTO: 426 K/UL (ref 164–446)
PMV BLD AUTO: 9.3 FL (ref 9–12.9)
RBC # BLD AUTO: 4.71 M/UL (ref 4.2–5.4)
WBC # BLD AUTO: 11 K/UL (ref 4.8–10.8)

## 2023-12-06 PROCEDURE — 36415 COLL VENOUS BLD VENIPUNCTURE: CPT

## 2023-12-06 PROCEDURE — 84443 ASSAY THYROID STIM HORMONE: CPT

## 2023-12-06 PROCEDURE — 80061 LIPID PANEL: CPT

## 2023-12-06 PROCEDURE — 85027 COMPLETE CBC AUTOMATED: CPT

## 2023-12-06 PROCEDURE — 80053 COMPREHEN METABOLIC PANEL: CPT

## 2023-12-06 PROCEDURE — 82306 VITAMIN D 25 HYDROXY: CPT

## 2023-12-06 PROCEDURE — 93000 ELECTROCARDIOGRAM COMPLETE: CPT | Performed by: FAMILY MEDICINE

## 2023-12-06 NOTE — PROGRESS NOTES
Makenna Culver is a 78 y.o. female here for a non-provider visit for EKG   Prior to MRI          If abnormal, was the Registered Nurse (office provider if RN is unavailable) notified today? Not Indicated    Routed to PCP/Requested Provider? No    Scanned in to media

## 2023-12-07 LAB
25(OH)D3 SERPL-MCNC: 117 NG/ML (ref 30–100)
ALBUMIN SERPL BCP-MCNC: 4.8 G/DL (ref 3.2–4.9)
ALBUMIN/GLOB SERPL: 1.7 G/DL
ALP SERPL-CCNC: 75 U/L (ref 30–99)
ALT SERPL-CCNC: 18 U/L (ref 2–50)
ANION GAP SERPL CALC-SCNC: 14 MMOL/L (ref 7–16)
AST SERPL-CCNC: 23 U/L (ref 12–45)
BILIRUB SERPL-MCNC: 0.3 MG/DL (ref 0.1–1.5)
BUN SERPL-MCNC: 13 MG/DL (ref 8–22)
CALCIUM ALBUM COR SERPL-MCNC: 9.4 MG/DL (ref 8.5–10.5)
CALCIUM SERPL-MCNC: 10 MG/DL (ref 8.5–10.5)
CHLORIDE SERPL-SCNC: 100 MMOL/L (ref 96–112)
CHOLEST SERPL-MCNC: 274 MG/DL (ref 100–199)
CO2 SERPL-SCNC: 23 MMOL/L (ref 20–33)
CREAT SERPL-MCNC: 0.65 MG/DL (ref 0.5–1.4)
FASTING STATUS PATIENT QL REPORTED: NORMAL
GFR SERPLBLD CREATININE-BSD FMLA CKD-EPI: 91 ML/MIN/1.73 M 2
GLOBULIN SER CALC-MCNC: 2.8 G/DL (ref 1.9–3.5)
GLUCOSE SERPL-MCNC: 116 MG/DL (ref 65–99)
HDLC SERPL-MCNC: 113 MG/DL
LDLC SERPL CALC-MCNC: 142 MG/DL
POTASSIUM SERPL-SCNC: 4.3 MMOL/L (ref 3.6–5.5)
PROT SERPL-MCNC: 7.6 G/DL (ref 6–8.2)
SODIUM SERPL-SCNC: 137 MMOL/L (ref 135–145)
TRIGL SERPL-MCNC: 94 MG/DL (ref 0–149)
TSH SERPL DL<=0.005 MIU/L-ACNC: 0.82 UIU/ML (ref 0.38–5.33)

## 2024-01-02 ENCOUNTER — ANESTHESIA EVENT (OUTPATIENT)
Dept: RADIOLOGY | Facility: MEDICAL CENTER | Age: 79
End: 2024-01-02
Payer: MEDICARE

## 2024-01-02 ENCOUNTER — HOSPITAL ENCOUNTER (OUTPATIENT)
Dept: RADIOLOGY | Facility: MEDICAL CENTER | Age: 79
End: 2024-01-02
Attending: FAMILY MEDICINE
Payer: MEDICARE

## 2024-01-02 ENCOUNTER — ANESTHESIA (OUTPATIENT)
Dept: RADIOLOGY | Facility: MEDICAL CENTER | Age: 79
End: 2024-01-02
Payer: MEDICARE

## 2024-01-02 VITALS
OXYGEN SATURATION: 93 % | HEART RATE: 66 BPM | SYSTOLIC BLOOD PRESSURE: 137 MMHG | HEIGHT: 60 IN | TEMPERATURE: 97.3 F | BODY MASS INDEX: 24.8 KG/M2 | WEIGHT: 126.32 LBS | RESPIRATION RATE: 20 BRPM | DIASTOLIC BLOOD PRESSURE: 79 MMHG

## 2024-01-02 DIAGNOSIS — M25.512 CHRONIC LEFT SHOULDER PAIN: ICD-10-CM

## 2024-01-02 DIAGNOSIS — G89.29 CHRONIC LEFT SHOULDER PAIN: ICD-10-CM

## 2024-01-02 PROCEDURE — 4410588 MR-SHOULDER-W/O

## 2024-01-02 PROCEDURE — 700111 HCHG RX REV CODE 636 W/ 250 OVERRIDE (IP): Performed by: ANESTHESIOLOGY

## 2024-01-02 PROCEDURE — 700105 HCHG RX REV CODE 258: Performed by: ANESTHESIOLOGY

## 2024-01-02 RX ORDER — SODIUM CHLORIDE, SODIUM LACTATE, POTASSIUM CHLORIDE, CALCIUM CHLORIDE 600; 310; 30; 20 MG/100ML; MG/100ML; MG/100ML; MG/100ML
INJECTION, SOLUTION INTRAVENOUS
Status: DISCONTINUED | OUTPATIENT
Start: 2024-01-02 | End: 2024-01-02 | Stop reason: SURG

## 2024-01-02 RX ORDER — DIPHENHYDRAMINE HYDROCHLORIDE 50 MG/ML
12.5 INJECTION INTRAMUSCULAR; INTRAVENOUS
Status: DISCONTINUED | OUTPATIENT
Start: 2024-01-02 | End: 2024-01-03 | Stop reason: HOSPADM

## 2024-01-02 RX ORDER — HALOPERIDOL 5 MG/ML
1 INJECTION INTRAMUSCULAR
Status: DISCONTINUED | OUTPATIENT
Start: 2024-01-02 | End: 2024-01-03 | Stop reason: HOSPADM

## 2024-01-02 RX ORDER — ONDANSETRON 2 MG/ML
4 INJECTION INTRAMUSCULAR; INTRAVENOUS
Status: DISCONTINUED | OUTPATIENT
Start: 2024-01-02 | End: 2024-01-03 | Stop reason: HOSPADM

## 2024-01-02 RX ADMIN — SODIUM CHLORIDE, POTASSIUM CHLORIDE, SODIUM LACTATE AND CALCIUM CHLORIDE: 600; 310; 30; 20 INJECTION, SOLUTION INTRAVENOUS at 08:30

## 2024-01-02 RX ADMIN — PROPOFOL 200 MG: 10 INJECTION, EMULSION INTRAVENOUS at 08:34

## 2024-01-02 ASSESSMENT — PAIN SCALES - GENERAL: PAIN_LEVEL: 0

## 2024-01-02 ASSESSMENT — FIBROSIS 4 INDEX: FIB4 SCORE: 0.97

## 2024-01-02 NOTE — DISCHARGE INSTRUCTIONS
MRI ADULT DISCHARGE INSTRUCTIONS    You have been medicated today for your scan. Please follow the instructions below to ensure your safe recovery. If you have any questions or problems, feel free to call us at [phone] or [phone].     1.   Have someone stay with you to assist you as needed.    2.   Do not drive or operate any mechanical devices.    3.   Do not perform any activity that requires concentration. Make no major decisions over the next 24 hours.     4.   Be careful changing positions from laying down to sitting or standing, as you may become dizzy.     5.   Do not drink alcohol for 48 hours.    6.   There are no restrictions for eating your normal meals. Drink fluids.    7.   You may continue your usual medications for pain, tranquilizers, muscle relaxants or sedatives when awake.     8.   Tomorrow, you may continue your normal daily activities.     9.   Pressure dressing on 10 - 15 minutes. If swelling or bleeding occurs when removed, continue placing direct pressure on injection site for another 5 minutes, or until bleeding stops.     I have been informed of and understand the above discharge instructions.

## 2024-01-02 NOTE — ANESTHESIA PROCEDURE NOTES
Airway    Date/Time: 1/2/2024 8:36 AM    Performed by: Cheng Regalado M.D.  Authorized by: Cheng Regalado M.D.    Location:  OR  Urgency:  Elective  Indications for Airway Management:  Anesthesia      Spontaneous Ventilation: absent    Sedation Level:  Deep  Preoxygenated: Yes    Final Airway Type:  Supraglottic airway  Final Supraglottic Airway:  Standard LMA    SGA Size:  3  Number of Attempts at Approach:  1

## 2024-01-02 NOTE — ANESTHESIA TIME REPORT
Anesthesia Start and Stop Event Times       Date Time Event    1/2/2024 0827 Ready for Procedure     0830 Anesthesia Start     0906 Anesthesia Stop          Responsible Staff  01/02/24      Name Role Begin End    Cheng Regalado M.D. Anesth 0830 0906          Overtime Reason:  no overtime (within assigned shift)    Comments:

## 2024-01-02 NOTE — PROGRESS NOTES
MRI NURSING NOTES:    Patient to MRI Outpatient Dept. Patient informed of process and plan of care during this visit. Anesthesiologist, Dr. Regalado spoke with patient and discussed providers plan of care. Patient agreed. MRI Left Shoulder Without IV Contrast completed. Patient awoke from anesthesia s discomfort and /or issues/concerns. Patient tolerated diet and activities once awake and appropriate. DC instructions discussed. Questions encouraged. Questions answered &/ or deferred to provider. Patient DC'd in stable condition c responsible adult Sp Culver (Spouse).

## 2024-01-02 NOTE — ANESTHESIA POSTPROCEDURE EVALUATION
Patient: Makenna Culver    Procedure Summary       Date: 01/02/24 Room / Location: [facility] [address]    Anesthesia Start: 0830 Anesthesia Stop: 0906    Procedure: MR-SHOULDER-W/O Diagnosis:       Chronic left shoulder pain      (pain, decreased ROM)    Scheduled Providers:  Responsible Provider: Cheng Regalado M.D.    Anesthesia Type: general ASA Status: 2            Final Anesthesia Type: general  Last vitals  BP        Temp        Pulse       Resp        SpO2          Anesthesia Post Evaluation    Patient location during evaluation: PACU  Patient participation: complete - patient participated  Level of consciousness: awake and alert  Pain score: 0    Airway patency: patent  Anesthetic complications: no  Cardiovascular status: hemodynamically stable  Respiratory status: acceptable  Hydration status: euvolemic    PONV: none          No notable events documented.     Nurse Pain Score: 0 (NPRS)

## 2024-01-25 PROBLEM — M75.42 IMPINGEMENT SYNDROME OF LEFT SHOULDER: Status: ACTIVE | Noted: 2024-01-25

## 2024-01-26 ENCOUNTER — APPOINTMENT (OUTPATIENT)
Dept: ADMISSIONS | Facility: MEDICAL CENTER | Age: 79
End: 2024-01-26
Attending: SURGERY
Payer: MEDICARE

## 2024-02-06 ENCOUNTER — PRE-ADMISSION TESTING (OUTPATIENT)
Dept: ADMISSIONS | Facility: MEDICAL CENTER | Age: 79
End: 2024-02-06
Attending: SURGERY
Payer: MEDICARE

## 2024-02-06 DIAGNOSIS — Z01.812 PRE-OPERATIVE LABORATORY EXAMINATION: ICD-10-CM

## 2024-02-06 RX ORDER — VITAMIN B COMPLEX
1000 TABLET ORAL EVERY MORNING
COMMUNITY

## 2024-02-08 ENCOUNTER — HOSPITAL ENCOUNTER (OUTPATIENT)
Dept: LAB | Facility: MEDICAL CENTER | Age: 79
End: 2024-02-08
Attending: SURGERY
Payer: MEDICARE

## 2024-02-08 DIAGNOSIS — Z01.812 PRE-OPERATIVE LABORATORY EXAMINATION: ICD-10-CM

## 2024-02-08 LAB
ANION GAP SERPL CALC-SCNC: 14 MMOL/L (ref 7–16)
BUN SERPL-MCNC: 8 MG/DL (ref 8–22)
CALCIUM SERPL-MCNC: 8.8 MG/DL (ref 8.5–10.5)
CHLORIDE SERPL-SCNC: 100 MMOL/L (ref 96–112)
CO2 SERPL-SCNC: 22 MMOL/L (ref 20–33)
CREAT SERPL-MCNC: 0.83 MG/DL (ref 0.5–1.4)
ERYTHROCYTE [DISTWIDTH] IN BLOOD BY AUTOMATED COUNT: 43.2 FL (ref 35.9–50)
GFR SERPLBLD CREATININE-BSD FMLA CKD-EPI: 73 ML/MIN/1.73 M 2
GLUCOSE SERPL-MCNC: 91 MG/DL (ref 65–99)
HCT VFR BLD AUTO: 39.4 % (ref 37–47)
HGB BLD-MCNC: 13.7 G/DL (ref 12–16)
MCH RBC QN AUTO: 30.7 PG (ref 27–33)
MCHC RBC AUTO-ENTMCNC: 34.8 G/DL (ref 32.2–35.5)
MCV RBC AUTO: 88.3 FL (ref 81.4–97.8)
PLATELET # BLD AUTO: 297 K/UL (ref 164–446)
PMV BLD AUTO: 9.4 FL (ref 9–12.9)
POTASSIUM SERPL-SCNC: 4.1 MMOL/L (ref 3.6–5.5)
RBC # BLD AUTO: 4.46 M/UL (ref 4.2–5.4)
SODIUM SERPL-SCNC: 136 MMOL/L (ref 135–145)
WBC # BLD AUTO: 5.8 K/UL (ref 4.8–10.8)

## 2024-02-08 PROCEDURE — 80048 BASIC METABOLIC PNL TOTAL CA: CPT

## 2024-02-08 PROCEDURE — 36415 COLL VENOUS BLD VENIPUNCTURE: CPT

## 2024-02-08 PROCEDURE — 85027 COMPLETE CBC AUTOMATED: CPT

## 2024-02-21 ENCOUNTER — HOSPITAL ENCOUNTER (OUTPATIENT)
Facility: MEDICAL CENTER | Age: 79
End: 2024-02-21
Attending: SURGERY | Admitting: SURGERY
Payer: MEDICARE

## 2024-02-21 ENCOUNTER — ANESTHESIA (OUTPATIENT)
Dept: SURGERY | Facility: MEDICAL CENTER | Age: 79
End: 2024-02-21
Payer: MEDICARE

## 2024-02-21 ENCOUNTER — ANESTHESIA EVENT (OUTPATIENT)
Dept: SURGERY | Facility: MEDICAL CENTER | Age: 79
End: 2024-02-21
Payer: MEDICARE

## 2024-02-21 VITALS
SYSTOLIC BLOOD PRESSURE: 152 MMHG | HEART RATE: 68 BPM | HEIGHT: 61 IN | TEMPERATURE: 98.3 F | BODY MASS INDEX: 23.43 KG/M2 | DIASTOLIC BLOOD PRESSURE: 71 MMHG | OXYGEN SATURATION: 93 % | RESPIRATION RATE: 12 BRPM | WEIGHT: 124.12 LBS

## 2024-02-21 DIAGNOSIS — M75.42 IMPINGEMENT SYNDROME OF LEFT SHOULDER: ICD-10-CM

## 2024-02-21 PROCEDURE — 160048 HCHG OR STATISTICAL LEVEL 1-5: Performed by: SURGERY

## 2024-02-21 PROCEDURE — 29828 SHO ARTHRS SRG BICP TENODSIS: CPT | Mod: LT | Performed by: SURGERY

## 2024-02-21 PROCEDURE — 700101 HCHG RX REV CODE 250: Performed by: ANESTHESIOLOGY

## 2024-02-21 PROCEDURE — 700111 HCHG RX REV CODE 636 W/ 250 OVERRIDE (IP): Mod: JZ | Performed by: SURGERY

## 2024-02-21 PROCEDURE — C1713 ANCHOR/SCREW BN/BN,TIS/BN: HCPCS | Performed by: SURGERY

## 2024-02-21 PROCEDURE — 700105 HCHG RX REV CODE 258: Performed by: ANESTHESIOLOGY

## 2024-02-21 PROCEDURE — 700101 HCHG RX REV CODE 250: Performed by: SURGERY

## 2024-02-21 PROCEDURE — 160029 HCHG SURGERY MINUTES - 1ST 30 MINS LEVEL 4: Performed by: SURGERY

## 2024-02-21 PROCEDURE — 502240 HCHG MISC OR SUPPLY RC 0272: Performed by: SURGERY

## 2024-02-21 PROCEDURE — 160009 HCHG ANES TIME/MIN: Performed by: SURGERY

## 2024-02-21 PROCEDURE — 160002 HCHG RECOVERY MINUTES (STAT): Performed by: SURGERY

## 2024-02-21 PROCEDURE — 700102 HCHG RX REV CODE 250 W/ 637 OVERRIDE(OP): Performed by: ANESTHESIOLOGY

## 2024-02-21 PROCEDURE — 502000 HCHG MISC OR IMPLANTS RC 0278: Performed by: SURGERY

## 2024-02-21 PROCEDURE — 160041 HCHG SURGERY MINUTES - EA ADDL 1 MIN LEVEL 4: Performed by: SURGERY

## 2024-02-21 PROCEDURE — A9270 NON-COVERED ITEM OR SERVICE: HCPCS | Performed by: ANESTHESIOLOGY

## 2024-02-21 PROCEDURE — 700111 HCHG RX REV CODE 636 W/ 250 OVERRIDE (IP): Performed by: ANESTHESIOLOGY

## 2024-02-21 PROCEDURE — 29827 SHO ARTHRS SRG RT8TR CUF RPR: CPT | Mod: 22ROC,LT | Performed by: SURGERY

## 2024-02-21 PROCEDURE — 29822 SHO ARTHRS SRG LMTD DBRDMT: CPT | Mod: 59,LT | Performed by: SURGERY

## 2024-02-21 PROCEDURE — 160035 HCHG PACU - 1ST 60 MINS PHASE I: Performed by: SURGERY

## 2024-02-21 PROCEDURE — 64415 NJX AA&/STRD BRCH PLXS IMG: CPT | Performed by: SURGERY

## 2024-02-21 PROCEDURE — 160025 RECOVERY II MINUTES (STATS): Performed by: SURGERY

## 2024-02-21 PROCEDURE — 160046 HCHG PACU - 1ST 60 MINS PHASE II: Performed by: SURGERY

## 2024-02-21 PROCEDURE — 29826 SHO ARTHRS SRG DECOMPRESSION: CPT | Mod: LT | Performed by: SURGERY

## 2024-02-21 PROCEDURE — 29824 SHO ARTHRS SRG DSTL CLAVICLC: CPT | Mod: LT | Performed by: SURGERY

## 2024-02-21 DEVICE — ANCHOR SUTURE OMEGA PEEK OD4.75 MM 1 ARM KNOTLESS STERILE LATEX FREE DISPOSABLE: Type: IMPLANTABLE DEVICE | Site: SHOULDER | Status: FUNCTIONAL

## 2024-02-21 DEVICE — IMPLANTABLE DEVICE: Type: IMPLANTABLE DEVICE | Site: SHOULDER | Status: FUNCTIONAL

## 2024-02-21 RX ORDER — METOPROLOL TARTRATE 1 MG/ML
1 INJECTION, SOLUTION INTRAVENOUS
Status: DISCONTINUED | OUTPATIENT
Start: 2024-02-21 | End: 2024-02-21 | Stop reason: HOSPADM

## 2024-02-21 RX ORDER — KETOROLAC TROMETHAMINE 15 MG/ML
INJECTION, SOLUTION INTRAMUSCULAR; INTRAVENOUS PRN
Status: DISCONTINUED | OUTPATIENT
Start: 2024-02-21 | End: 2024-02-21 | Stop reason: SURG

## 2024-02-21 RX ORDER — BUPIVACAINE HYDROCHLORIDE 2.5 MG/ML
INJECTION, SOLUTION EPIDURAL; INFILTRATION; INTRACAUDAL PRN
Status: DISCONTINUED | OUTPATIENT
Start: 2024-02-21 | End: 2024-02-21 | Stop reason: HOSPADM

## 2024-02-21 RX ORDER — CEFAZOLIN SODIUM 1 G/3ML
INJECTION, POWDER, FOR SOLUTION INTRAMUSCULAR; INTRAVENOUS PRN
Status: DISCONTINUED | OUTPATIENT
Start: 2024-02-21 | End: 2024-02-21 | Stop reason: SURG

## 2024-02-21 RX ORDER — EPHEDRINE SULFATE 50 MG/ML
5 INJECTION, SOLUTION INTRAVENOUS
Status: DISCONTINUED | OUTPATIENT
Start: 2024-02-21 | End: 2024-02-21 | Stop reason: HOSPADM

## 2024-02-21 RX ORDER — DIPHENHYDRAMINE HYDROCHLORIDE 50 MG/ML
12.5 INJECTION INTRAMUSCULAR; INTRAVENOUS
Status: DISCONTINUED | OUTPATIENT
Start: 2024-02-21 | End: 2024-02-21 | Stop reason: HOSPADM

## 2024-02-21 RX ORDER — HYDROMORPHONE HYDROCHLORIDE 1 MG/ML
0.1 INJECTION, SOLUTION INTRAMUSCULAR; INTRAVENOUS; SUBCUTANEOUS
Status: DISCONTINUED | OUTPATIENT
Start: 2024-02-21 | End: 2024-02-21 | Stop reason: HOSPADM

## 2024-02-21 RX ORDER — EPHEDRINE SULFATE 50 MG/ML
INJECTION, SOLUTION INTRAVENOUS PRN
Status: DISCONTINUED | OUTPATIENT
Start: 2024-02-21 | End: 2024-02-21 | Stop reason: SURG

## 2024-02-21 RX ORDER — EPINEPHRINE 1 MG/ML
INJECTION INTRAMUSCULAR; INTRAVENOUS; SUBCUTANEOUS
Status: DISCONTINUED
Start: 2024-02-21 | End: 2024-02-21 | Stop reason: HOSPADM

## 2024-02-21 RX ORDER — ONDANSETRON 2 MG/ML
INJECTION INTRAMUSCULAR; INTRAVENOUS PRN
Status: DISCONTINUED | OUTPATIENT
Start: 2024-02-21 | End: 2024-02-21 | Stop reason: SURG

## 2024-02-21 RX ORDER — DEXAMETHASONE SODIUM PHOSPHATE 4 MG/ML
INJECTION, SOLUTION INTRA-ARTICULAR; INTRALESIONAL; INTRAMUSCULAR; INTRAVENOUS; SOFT TISSUE PRN
Status: DISCONTINUED | OUTPATIENT
Start: 2024-02-21 | End: 2024-02-21 | Stop reason: SURG

## 2024-02-21 RX ORDER — HALOPERIDOL 5 MG/ML
1 INJECTION INTRAMUSCULAR
Status: DISCONTINUED | OUTPATIENT
Start: 2024-02-21 | End: 2024-02-21 | Stop reason: HOSPADM

## 2024-02-21 RX ORDER — LIDOCAINE HYDROCHLORIDE 20 MG/ML
INJECTION, SOLUTION EPIDURAL; INFILTRATION; INTRACAUDAL; PERINEURAL PRN
Status: DISCONTINUED | OUTPATIENT
Start: 2024-02-21 | End: 2024-02-21 | Stop reason: SURG

## 2024-02-21 RX ORDER — OXYCODONE HCL 5 MG/5 ML
10 SOLUTION, ORAL ORAL
Status: DISCONTINUED | OUTPATIENT
Start: 2024-02-21 | End: 2024-02-21 | Stop reason: HOSPADM

## 2024-02-21 RX ORDER — ACETAMINOPHEN 500 MG
1000 TABLET ORAL ONCE
Status: COMPLETED | OUTPATIENT
Start: 2024-02-21 | End: 2024-02-21

## 2024-02-21 RX ORDER — ONDANSETRON 2 MG/ML
4 INJECTION INTRAMUSCULAR; INTRAVENOUS
Status: DISCONTINUED | OUTPATIENT
Start: 2024-02-21 | End: 2024-02-21 | Stop reason: HOSPADM

## 2024-02-21 RX ORDER — OXYCODONE HCL 5 MG/5 ML
5 SOLUTION, ORAL ORAL
Status: DISCONTINUED | OUTPATIENT
Start: 2024-02-21 | End: 2024-02-21 | Stop reason: HOSPADM

## 2024-02-21 RX ORDER — SODIUM CHLORIDE, SODIUM LACTATE, POTASSIUM CHLORIDE, CALCIUM CHLORIDE 600; 310; 30; 20 MG/100ML; MG/100ML; MG/100ML; MG/100ML
INJECTION, SOLUTION INTRAVENOUS
Status: DISCONTINUED | OUTPATIENT
Start: 2024-02-21 | End: 2024-02-21 | Stop reason: SURG

## 2024-02-21 RX ORDER — HYDROMORPHONE HYDROCHLORIDE 1 MG/ML
0.2 INJECTION, SOLUTION INTRAMUSCULAR; INTRAVENOUS; SUBCUTANEOUS
Status: DISCONTINUED | OUTPATIENT
Start: 2024-02-21 | End: 2024-02-21 | Stop reason: HOSPADM

## 2024-02-21 RX ORDER — CEFAZOLIN SODIUM 1 G/3ML
2 INJECTION, POWDER, FOR SOLUTION INTRAMUSCULAR; INTRAVENOUS ONCE
Status: DISCONTINUED | OUTPATIENT
Start: 2024-02-21 | End: 2024-02-21 | Stop reason: HOSPADM

## 2024-02-21 RX ORDER — ROCURONIUM BROMIDE 10 MG/ML
INJECTION, SOLUTION INTRAVENOUS PRN
Status: DISCONTINUED | OUTPATIENT
Start: 2024-02-21 | End: 2024-02-21 | Stop reason: SURG

## 2024-02-21 RX ORDER — OXYCODONE HYDROCHLORIDE 5 MG/1
5 TABLET ORAL EVERY 4 HOURS PRN
Qty: 30 TABLET | Refills: 0 | Status: SHIPPED | OUTPATIENT
Start: 2024-02-21 | End: 2024-02-28

## 2024-02-21 RX ORDER — LABETALOL HYDROCHLORIDE 5 MG/ML
5 INJECTION, SOLUTION INTRAVENOUS
Status: DISCONTINUED | OUTPATIENT
Start: 2024-02-21 | End: 2024-02-21 | Stop reason: HOSPADM

## 2024-02-21 RX ORDER — HYDROMORPHONE HYDROCHLORIDE 1 MG/ML
0.4 INJECTION, SOLUTION INTRAMUSCULAR; INTRAVENOUS; SUBCUTANEOUS
Status: DISCONTINUED | OUTPATIENT
Start: 2024-02-21 | End: 2024-02-21 | Stop reason: HOSPADM

## 2024-02-21 RX ORDER — HYDRALAZINE HYDROCHLORIDE 20 MG/ML
5 INJECTION INTRAMUSCULAR; INTRAVENOUS
Status: DISCONTINUED | OUTPATIENT
Start: 2024-02-21 | End: 2024-02-21 | Stop reason: HOSPADM

## 2024-02-21 RX ORDER — SODIUM CHLORIDE, SODIUM LACTATE, POTASSIUM CHLORIDE, CALCIUM CHLORIDE 600; 310; 30; 20 MG/100ML; MG/100ML; MG/100ML; MG/100ML
INJECTION, SOLUTION INTRAVENOUS CONTINUOUS
Status: DISCONTINUED | OUTPATIENT
Start: 2024-02-21 | End: 2024-02-21 | Stop reason: HOSPADM

## 2024-02-21 RX ADMIN — ONDANSETRON 4 MG: 2 INJECTION INTRAMUSCULAR; INTRAVENOUS at 11:49

## 2024-02-21 RX ADMIN — EPHEDRINE SULFATE 10 MG: 50 INJECTION, SOLUTION INTRAVENOUS at 11:15

## 2024-02-21 RX ADMIN — PROPOFOL 120 MG: 10 INJECTION, EMULSION INTRAVENOUS at 10:08

## 2024-02-21 RX ADMIN — FENTANYL CITRATE 50 MCG: 50 INJECTION, SOLUTION INTRAMUSCULAR; INTRAVENOUS at 11:21

## 2024-02-21 RX ADMIN — KETOROLAC TROMETHAMINE 15 MG: 15 INJECTION, SOLUTION INTRAMUSCULAR; INTRAVENOUS at 11:49

## 2024-02-21 RX ADMIN — DEXAMETHASONE SODIUM PHOSPHATE 1.3 MG: 4 INJECTION, SOLUTION INTRA-ARTICULAR; INTRALESIONAL; INTRAMUSCULAR; INTRAVENOUS; SOFT TISSUE at 09:52

## 2024-02-21 RX ADMIN — CEFAZOLIN 2 G: 1 INJECTION, POWDER, FOR SOLUTION INTRAMUSCULAR; INTRAVENOUS at 10:08

## 2024-02-21 RX ADMIN — ROCURONIUM BROMIDE 50 MG: 50 INJECTION, SOLUTION INTRAVENOUS at 10:08

## 2024-02-21 RX ADMIN — DEXAMETHASONE SODIUM PHOSPHATE 4 MG: 4 INJECTION INTRA-ARTICULAR; INTRALESIONAL; INTRAMUSCULAR; INTRAVENOUS; SOFT TISSUE at 10:08

## 2024-02-21 RX ADMIN — ROCURONIUM BROMIDE 20 MG: 50 INJECTION, SOLUTION INTRAVENOUS at 11:21

## 2024-02-21 RX ADMIN — SUGAMMADEX 200 MG: 100 INJECTION, SOLUTION INTRAVENOUS at 11:55

## 2024-02-21 RX ADMIN — ACETAMINOPHEN 1000 MG: 500 TABLET ORAL at 08:54

## 2024-02-21 RX ADMIN — FENTANYL CITRATE 50 MCG: 50 INJECTION, SOLUTION INTRAMUSCULAR; INTRAVENOUS at 11:48

## 2024-02-21 RX ADMIN — SODIUM CHLORIDE, POTASSIUM CHLORIDE, SODIUM LACTATE AND CALCIUM CHLORIDE: 600; 310; 30; 20 INJECTION, SOLUTION INTRAVENOUS at 09:59

## 2024-02-21 RX ADMIN — LIDOCAINE HYDROCHLORIDE 80 MG: 20 INJECTION, SOLUTION EPIDURAL; INFILTRATION; INTRACAUDAL at 10:08

## 2024-02-21 RX ADMIN — BUPIVACAINE HYDROCHLORIDE 20 ML: 2.5 INJECTION, SOLUTION EPIDURAL; INFILTRATION; INTRACAUDAL at 09:52

## 2024-02-21 RX ADMIN — FENTANYL CITRATE 50 MCG: 50 INJECTION, SOLUTION INTRAMUSCULAR; INTRAVENOUS at 09:46

## 2024-02-21 ASSESSMENT — PAIN DESCRIPTION - PAIN TYPE
TYPE: SURGICAL PAIN

## 2024-02-21 ASSESSMENT — PAIN SCALES - GENERAL: PAIN_LEVEL: 0

## 2024-02-21 ASSESSMENT — FIBROSIS 4 INDEX: FIB4 SCORE: 1.39

## 2024-02-21 NOTE — OR NURSING
1202 Patient arrived to PACU. Report received from anesthesia and OR RN. Patient on 3L of oxygen via mask. Placed on monitor. Patients surgical site dressing CDI.     1213 Cold pack provided, patient tolerating sips of water. Declines any pain or nausea.     1315 Discharge education provided and questions answered. Educated on medications sent to pharmacy. PIV removed.     1326 Patient discharged with spouse. All belongings gathered and sent with patient.

## 2024-02-21 NOTE — ANESTHESIA PROCEDURE NOTES
Peripheral Block    Date/Time: 2/21/2024 9:47 AM    Performed by: Sanford Ferrari M.D.  Authorized by: Sanford Ferrari M.D.    Patient Location:  Pre-op  Start Time:  2/21/2024 9:47 AM  End Time:  2/21/2024 9:52 AM  Reason for Block: at surgeon's request and post-op pain management ONLY    patient identified, IV checked, site marked, risks and benefits discussed, surgical consent, monitors and equipment checked, pre-op evaluation and timeout performed    Patient Position:  Supine  Prep: ChloraPrep    Monitoring:  Heart rate, continuous pulse ox and cardiac monitor  Block Region:  Upper Extremity  Upper Extremity - Block Type:  BRACHIAL PLEXUS block, Interscalene approach    Laterality:  Left  Procedures: ultrasound guided  Image captured, interpreted and electronically stored.  Local Infiltration:  Lidocaine  Strength:  1 %  Dose:  3 ml  Block Type:  Single-shot  Needle Length:  50mm  Needle Gauge:  22 G  Needle Localization:  Ultrasound guidance  Ultrasound picture in chart  Injection Assessment:  Negative aspiration for heme, no paresthesia on injection, incremental injection and local visualized surrounding nerve on ultrasound  Evidence of intravascular injection: No     US Guided Interscalene Brachial Plexus Block   US transducer placed on the neck in oblique plane approximately at the level of C6.  Anterior and Middle Scalene (MSM) muscles identified with nerve trunks identified between the muscles.  Needle inserted lateral to probe and advanced under direct visualization through the MSM into a perineural position.  After negative aspiration, LA injected with ease and visualized surrounding the nerve trunks.

## 2024-02-21 NOTE — DISCHARGE INSTRUCTIONS
Special Instructions:      Shoulder Surgery Discharge Instructions    ACTIVITY  It is important to move your shoulder, as well as your elbow, wrist, and hand several times daily, starting the day after surgery.      NON-WEIGHT BEARING    Do not lift with your injured arm.  Do not lean into or carry anything in the injured arm.  Do not use your arm to push yourself up in bed or from a chair.  Avoid pulling and pushing with the arm on your affected side.   Follow these restrictions until cleared by your follow-up provider.     SLING / SHOULDER IMMOBILIZER:  The sling should be on at all times, except when bathing and doing your demonstrated exercises.     DRESSING AND WOUND CARE    Keep your shoulder dressing clean and dry after surgery.  Be aware that some leaking of blood or fluid from your dressing can occur and is normal. You may remove your dressing 3 days after the operation.  Notice that you have a single incision and/or several small incisions that have been closed with stitches.  Cover each of these incisions with a light dressing or band-aids.  This keeps the surgical incisions clean, as well as preventing your clothes from spotting with blood or fluid.  Change band-aids or light dressing daily.    SHOWER AND BATHING  Keep the shoulder dry for 3 days after your surgery.  Then, you may shower. You may let soap and water run over skin incisions, but do not immerse your shoulder in water.  No swimming pools, hot tubs, or baths are recommended until after your follow up and then only if cleared by your surgeon.     ICE  Apply an ice pack to your shoulder (15 minutes on the shoulder, 15 minutes off the shoulder), as you feel necessary to help with the pain and swelling.        FOLLOW-UP  Make sure that you have an appointment 7-14 days following surgery.Your procedure/rehabilitation will be discussed and physical therapy may be prescribed at that time.       If any questions arise, call your provider.  If your  provider is not available, please feel free to call the Surgical Center at [phone].    MEDICATIONS: Resume taking daily medication.  Take prescribed pain medication with food.  If no medication is prescribed, you may take non-aspirin pain medication if needed.  PAIN MEDICATION CAN BE VERY CONSTIPATING.  Take a stool softener or laxative such as senokot, pericolace, or milk of magnesia if needed.    Last pain medication given Tylenol at 9am and Toradol (anti-inflammatory similar to Ibuprofen/Motrin) at 12pm    What to Expect Post Anesthesia    Rest and take it easy for the first 24 hours.  A responsible adult is recommended to remain with you during that time.  It is normal to feel sleepy.  We encourage you to not do anything that requires balance, judgment or coordination.    FOR 24 HOURS DO NOT:  Drive, operate machinery or run household appliances.  Drink beer or alcoholic beverages.  Make important decisions or sign legal documents.    To avoid nausea, slowly advance diet as tolerated, avoiding spicy or greasy foods for the first day.  Add more substantial food to your diet according to your provider's instructions.  Babies can be fed formula or breast milk as soon as they are hungry.  INCREASE FLUIDS AND FIBER TO AVOID CONSTIPATION.    MILD FLU-LIKE SYMPTOMS ARE NORMAL.  YOU MAY EXPERIENCE GENERALIZED MUSCLE ACHES, THROAT IRRITATION, HEADACHE AND/OR SOME NAUSEA.

## 2024-02-21 NOTE — ANESTHESIA POSTPROCEDURE EVALUATION
Patient: Makenna Culver    Procedure Summary       Date: 02/21/24 Room / Location: [facility] ROOM 21 / SURGERY SAME DAY [facility]    Anesthesia Start: 0959 Anesthesia Stop: 1203    Procedures:       LEFT SHOULDER  ARTHROSCOPY DEBRIDEMENT, DISTAL CLAVICLE RESECTION, SUBACROMIAL DECOMPRESSION, ROTATOR CUFF REPAIR, BICEPS TENODESIS (Left: Shoulder)      EXCISION, CLAVICLE, DISTAL (Left: Shoulder) Diagnosis:       Impingement syndrome of left shoulder      (Impingement syndrome of left shoulder)    Surgeons: Raul Palomino M.D. Responsible Provider: Sanford Ferrari M.D.    Anesthesia Type: general, peripheral nerve block ASA Status: 2            Final Anesthesia Type: general, peripheral nerve block  Last vitals  BP   Blood Pressure : 135/71    Temp   36.8 °C (98.3 °F)    Pulse   76   Resp   16    SpO2   91 %      Anesthesia Post Evaluation    Patient location during evaluation: PACU  Patient participation: complete - patient participated  Level of consciousness: awake and alert  Pain score: 0    Airway patency: patent  Anesthetic complications: no  Cardiovascular status: hemodynamically stable  Respiratory status: acceptable  Hydration status: euvolemic    PONV: none          No notable events documented.     Nurse Pain Score: 0 (NPRS)

## 2024-02-21 NOTE — ANESTHESIA PREPROCEDURE EVALUATION
Case: [id number] Date/Time: 02/21/24 0915    Procedures:       LEFT SHOULDER  ARTHROSCOPY DEBRIDEMENT, DISTAL CLAVICLE RESECTION, SUBACROMIAL DECOMPRESSION, POSSIBLE ROTATOR CUFF REPAIR, BICEPS TENODESIS, REPAIRS AS INDICATED (Left)      EXCISION, CLAVICLE, DISTAL    Diagnosis: Impingement syndrome of left shoulder [M75.42]    Pre-op diagnosis: Impingement syndrome of left shoulder [M75.42]    Location: [facility] ROOM 21 / SURGERY SAME DAY [facility]    Surgeons: Raul Palomino M.D.            Relevant Problems   PULMONARY   (positive) Other emphysema (HCC)      GI   (positive) GERD (gastroesophageal reflux disease)         (positive) Liver lesion      ENDO   (positive) Hypothyroidism      Other   (positive) Impingement syndrome of left shoulder   (positive) Sacroiliitis, not elsewhere classified (HCC)       Physical Exam    Airway   Mallampati: II  TM distance: >3 FB  Neck ROM: full       Cardiovascular - normal exam  Rhythm: regular  Rate: normal  (-) murmur     Dental - normal exam  (+) upper dentures, lower dentures           Pulmonary - normal exam  Breath sounds clear to auscultation     Abdominal    Neurological - normal exam                   Anesthesia Plan    ASA 2       Plan - general and peripheral nerve block     Peripheral nerve block will be post-op pain control  Airway plan will be ETT          Induction: intravenous    Postoperative Plan: Postoperative administration of opioids is intended.    Pertinent diagnostic labs and testing reviewed    Informed Consent:    Anesthetic plan and risks discussed with patient.    Use of blood products discussed with: patient whom consented to blood products.

## 2024-02-21 NOTE — ANESTHESIA TIME REPORT
Anesthesia Start and Stop Event Times       Date Time Event    2/21/2024 0935 Ready for Procedure     0959 Anesthesia Start     1203 Anesthesia Stop          Responsible Staff  02/21/24      Name Role Begin End    Sanford Ferrari M.D. Anesth 0959 1203          Overtime Reason:  no overtime (within assigned shift)    Comments:

## 2024-02-21 NOTE — ANESTHESIA PROCEDURE NOTES
Airway    Date/Time: 2/21/2024 10:09 AM    Performed by: Sanford Ferrari M.D.  Authorized by: Sanford Ferrari M.D.    Location:  OR  Urgency:  Elective  Indications for Airway Management:  Anesthesia      Spontaneous Ventilation: absent    Sedation Level:  Deep  Preoxygenated: Yes    Patient Position:  Sniffing  Mask Difficulty Assessment:  1 - vent by mask  Final Airway Type:  Endotracheal airway  Final Endotracheal Airway:  ETT  Cuffed: Yes    Technique Used for Successful ETT Placement:  Direct laryngoscopy    Insertion Site:  Oral  Blade Type:  Felisha  Laryngoscope Blade/Videolaryngoscope Blade Size:  3  ETT Size (mm):  7.0  Measured from:  Teeth  ETT to Teeth (cm):  21  Placement Verified by: auscultation and capnometry    Cormack-Lehane Classification:  Grade I - full view of glottis  Number of Attempts at Approach:  1

## 2024-02-21 NOTE — LETTER
January 26, 2024    Patient Name: Makenna Culver  Surgeon Name: Raul Palomino M.D.  Surgery Facility: [facility] ([address] [place] [address])  Surgery Date: 2/21/2024    The time of your surgery is not final and may change up to and until the day of your surgery. You will be contacted 24-48 hours prior to your surgery date with your check-in and surgery time.    If you will not be at one of the below numbers please call the surgery scheduler at [phone]  Preferred Phone: [phone]    BEFORE YOUR SURGERY   Pre Registration and/or Lab Work must be done within and no earlier than 28 days prior to your surgery date. Your scheduled facility will contact you regarding all required preregistration and/or lab work. If you have not been contacted within 7 days of your scheduled procedure please call [facility] at [phone] for an appointment as soon as possible.    Instructions: Bring a list of all medications you are taking including the dosing and frequency.    DAY OF YOUR SURGERY  Nothing to eat or drink after midnight     Refrain from smoking any substance after midnight prior to surgery. Smoking may interfere with the anesthetic and frequently produces nausea during the recovery period.    Continue taking all lifesaving medications. Including the morning of your surgery with small sip of water.        Please do NOT take on the day of surgery:  Diuretics: examples- furosemide (Lasix), spironolactone, hydrochlorothiazide  ACE-inhibitors: examples- lisinopril, ramipril, enalapril  “ARBs”: examples- losartan, Olmesartan, valsartan    Please arrive at the hospital/surgery center at the check-in time provided.     An adult will need to bring you and take you home after your surgery.     AFTER YOUR SURGERY  Post op Appointment:   Date: 4/2   Time: 145PM   With: Raul Palomino MD   Location: [address] [place] [address] [place] [address]    - Post Surgery - You will need someone to drive  you home     TIME OFF WORK  FMLA or Disability forms can be faxed directly to: [phone] or you may drop them off at [address] [place] [address]. Our office charges a $35.00 fee per form. Forms will be completed within 10 business days of drop off and payment received. For the status of your forms you may contact our disability office directly [phone].    MEDICATION INSTRUCTIONS **Please read section completely**  The following medications should be stopped a minimum of 10 days prior to surgery:  All over the counter, Supplements & Herbal medications    Anorectics: Phentermine (Adipex-P, Lomaira and Suprenza), Phentermine-topiramate (Qsymia), Bupropion-naltrexone (Contrave)    **If you are on Bupropion for anxiety/depression, please continue this medication up until the day of surgery.     Opiod Partial Agonists/Opioid Antagonists: Buprenorphine (Suboxone, Belbuca, Butrans, Probuphine Implant, Sublocade), Naltrexone (ReVia, Vivitrol), Naloxone    Amphetamines: Dextroamphetamine/Amphetamine (Adderall, Mydayis), Methylphenidate Hydrochloride (Concerta, Metadate, Methylin, Ritalin)    The following medications should be stopped 5 days prior to surgery:  Blood Thinners: Any Aspirin, Aspirin products, anti-inflammatories such as ibuprofen and any blood thinners such as Coumadin and Plavix. Please consult your prescribing physician if you are on life saving blood thinners, in regards to when to stop medications prior to surgery.     The following medications should be stopped a minimum of 3 days prior to surgery:  PDE-5 inhibitors: Sildenafil (Viagra), Tadalafil (Cialis), Vardenafil (Levitra), Avanafil (Stendra)    MAO Inhibitors: Rasagiline (Azilect), Selegiline (Eldepryl, Emsam, Selapar), Isocarboxazid (Marplan), Phenelzine (Nardil)       COVID and INFLUENZA NOTICE TO PATIENTS    Currently, the [facility] does not routinely test patients for COVID-19 or Influenza prior to their  elective surgery.  However, if patients develop the following symptoms prior to their surgery date, they should voluntarily test for COVID-19 and Influenza and notify the surgical office of their condition and results.  The symptoms warranting testing would be any two of the following:    Fever (Temp above 100.4 F)  Chills  Cough  Shortness of breath or difficulty breathing  Fatigue  Myalgias (muscle or body aches)  Headache  Sore Throat  Congestion or Runny Nose  Nausea or vomiting  Diarrhea  New loss of taste or smell    Having these symptoms prior to surgery can significantly increase your risk of morbidity and mortality under anesthesia, which may compromise your health and require a transfer to a hospital for a higher level of care.  Therefore, it is advisable to notify the surgical team of any illness in order to get information for the appropriate time to delay the surgery to minimize these preventable risks.    Your health and safety are our number one priority at the [facility], and we are thankful that you entrust us with your care.  Please help us ensure the best possible surgical and anesthetic outcome by sharing appropriate health information with our perioperative team and staff.  We look forward to taking great care of you!    Thank you for your time and consideration on this matter.    Efrain Otero MD  Medical Director  Anesthesiologist  [facility]

## 2024-02-22 NOTE — OP REPORT
Surgeon: Raul Palomino MD    Assistants: None    Preoperative Diagnosis: Left shoulder rotator cuff tear, labral tear, subacromial bursitis and impingement, and AC arthritis    Postoperative Diagnosis: Same    Procedure: Left shoulder arthroscopic biceps tenodesis, rotator cuff repair (22 modifier, including subscapularis adding approximately 30 minutes and 30% complexity of the procedure), subacromial decompression acromioplasty, distal clavicle excision, and glenohumeral joint debridement (extensive, synovectomy and capsulectomy the rotator of the labral and biceps anchor complex debridement and debridement of chondromalacia)    Anesthesia: General with upper extremity nerve block for pain control    Anesthesiologist: Sanford Kaur MD    Complications: none noted    Drains: None    Specimens: None    Estimated blood loss: Minimal    Indications: Makenna is a 78-year-old female well-known to me through my outpatient clinic for the above-mentioned diagnosis.  She believes and I would agree she has failed conservative treatment and is a candidate for the above-mentioned procedure. Prior to the procedure, Makenna understood the risks to include but not be limited to the risk of infection requiring repeat surgery bleeding requiring blood transfusion, nerve blood vessel tendon injury requiring repair, failure of the rotator cuff to heal or retear or surgical failure requiring revision surgery or salvage procedure, dissatisfaction with the outcome, unemployment or being fired over the injury or surgery, stiffness, complex regional pain syndrome, acquiring coronavirus its complications, DVT, pulmonary embolism, heart attack, stroke, and even death.  The patient stated despite these risks they wish to proceed with surgery.    Procedure: Makenna was met in the preoperative holding area and the left shoulder was initialed as the correct operative site.  She had opportunity to ask questions and all questions were answered  and informed sent was obtained.    The patient was brought to the operating room and laid supine on the operating room table.  All bony and dependent prominences well-padded.  Upper extremity nerve block and general anesthesia were induced lateral complication.  Ancef was administered for infection prophylaxis.  The patient was placed into a well-padded beachchair position and the left upper extremities prepped and draped in the usual sterile fashion.  A formal timeout was performed in which all parties agreed upon the correct patient procedure and operative site.    Began the procedure by bluntly establishing the posterior portal to glenohumeral joint perform diagnostic arthroscopy.  She had extensive degenerative labral tearing.  She had normal-appearing cartilage on the humeral head and most of the glenoid with mild mild chondromalacia on the glenoid.  She had a significant mount of synovitis in the rotator interval about an upper third subscapularis tear.  I established an anterior portal and performed arthroscopic synovectomy capsulectomy the rotator interval.  Debrided the labrum and biceps anchor complex to a stable rim.  I noted a near full-thickness tear of supraspinatus.  I elected to repair the upper third of subscapularis this added approximately 30 minutes and 30% complexity to the remainder of the rotator cuff repair.  I did this by also make an anterior lateral portal through these ports I placed 1 Erika 4.75 mm alpha vent anchor containing one 2 mm fiber tape and 1 #2 fiber tape suture these were passed in horizontal mattress fashion through the rolled border of the subscapularis tied multiple square knots reducing subscapularis to its footprint I left the tails for later retrieval and reinforcement the supraspinatus repair for double row technique.  I noted the biceps sling was intact and therefore elected to perform a soft tissue biceps tenodesis to address its instability the biceps anchor  complex.  I did this by releasing the labrum off the superior aspect of the glenoid and the cuff of tissue including the biceps to prevent retraction and allow for a Y-shaped (keyhole) biceps tenodesis at the super aspect the humeral head.  I then used a spinal needle and PDS suture to marybeth the supraspinatus tendon tear and lavaged the joint and turned my attention to the subacromial space where I encountered extensive mount of subacromial bursitis.  I established a lateral portal and used the electrocautery device and shaver to perform a arthroscopic subacromial bursectomy.  I used the electrocautery device to release the frayed CA ligament off the undersurface of the acromion to reveal a large downsloping subacromial enthesophyte which I resected with a 5.5 mm resector.  I dissected medially to the AC joint where she had tenderness and arthritis.  Through the anterolateral portal I performed arthroscopic distal clavicle excision, excising approximately 8 mm of the lateral clavicle and 4 mm of the medial acromion.  I took the arm through range of motion there was no further bony impingement at the AC joint.  I then turned my attention to the supraspinatus portion of the rotator cuff repair.  She also had a high-grade bursal sided tear with a few intact deep fibers I took these down debride the greater tuberosity footprint and placed a medial row of a [place] alpha vent anchor containing 2 separate 2 mm fiber tapes.  I passed these in parachute fashion through the rotator cuff and brought them down into a lateral row for suture bridge technique using an 4.75 mm Omega anchor achieving excellent compression of the supraspinatus to its footprint.  I took the arm through range of motion there was no bunching or gapping of the tendon which appeared stable I then lavaged the joint subacromial space and removed all instruments and closed the portals with 3-0 nylon suture, all covered with sterile Xeroform 4 x 4's ABD tape  and a sling.  The patient then awoke from anesthesia without known complication was transferred in stable condition to the PACU where there were no immediate postoperative complaints.    Postoperative Plan: The patient was discharged home per same-day criteria with sedentary use of the hand only and follow-up in clinic in 10 to 14 days for suture removal and wound check.

## 2024-02-26 DIAGNOSIS — E03.9 HYPOTHYROIDISM, UNSPECIFIED TYPE: ICD-10-CM

## 2024-02-27 DIAGNOSIS — R21 RASH: ICD-10-CM

## 2024-02-27 NOTE — TELEPHONE ENCOUNTER
Requested Prescriptions     Pending Prescriptions Disp Refills    levothyroxine (SYNTHROID) 75 MCG Tab 90 Tablet 3     Sig: Take 1 Tablet by mouth every morning on an empty stomach.    omeprazole (PRILOSEC) 40 MG delayed-release capsule 90 Capsule 3     Sig: Take 1 Capsule by mouth every day.      Last office visit: 11/8/23  Last lab: 12/6/23

## 2024-02-27 NOTE — TELEPHONE ENCOUNTER
Requested Prescriptions     Pending Prescriptions Disp Refills    triamcinolone acetonide (KENALOG) 0.1 % Cream 30 g 1     Sig: Apply 1 Application  topically 2 times a day for 14 days.      Last office visit: 11/8/23  Last lab: 12/6/23    Pt requested

## 2024-02-28 RX ORDER — LEVOTHYROXINE SODIUM 0.07 MG/1
75 TABLET ORAL
Qty: 90 TABLET | Refills: 3 | Status: SHIPPED | OUTPATIENT
Start: 2024-02-28

## 2024-02-28 RX ORDER — TRIAMCINOLONE ACETONIDE 1 MG/G
1 CREAM TOPICAL 2 TIMES DAILY
Qty: 30 G | Refills: 1 | Status: SHIPPED | OUTPATIENT
Start: 2024-02-28 | End: 2024-03-13

## 2024-02-28 RX ORDER — OMEPRAZOLE 40 MG/1
40 CAPSULE, DELAYED RELEASE ORAL DAILY
Qty: 90 CAPSULE | Refills: 3 | Status: SHIPPED | OUTPATIENT
Start: 2024-02-28

## 2024-03-14 ENCOUNTER — TELEPHONE (OUTPATIENT)
Dept: HEALTH INFORMATION MANAGEMENT | Facility: OTHER | Age: 79
End: 2024-03-14
Payer: MEDICARE

## 2024-05-12 DIAGNOSIS — E03.9 HYPOTHYROIDISM, UNSPECIFIED TYPE: ICD-10-CM

## 2024-05-14 NOTE — TELEPHONE ENCOUNTER
Received request via: Pharmacy    Was the patient seen in the last year in this department? Yes    Does the patient have an active prescription (recently filled or refills available) for medication(s) requested? No    Pharmacy Name: [facility]   Last OV 11 08 2023    Does the patient have long-term Plus and need 100 day supply (blood pressure, diabetes and cholesterol meds only)? Patient does not have SCP

## 2024-05-15 RX ORDER — LEVOTHYROXINE SODIUM 0.07 MG/1
75 TABLET ORAL
Qty: 90 TABLET | Refills: 3 | Status: SHIPPED | OUTPATIENT
Start: 2024-05-15

## 2024-05-29 DIAGNOSIS — Z79.890 POSTMENOPAUSAL HRT (HORMONE REPLACEMENT THERAPY): ICD-10-CM

## 2024-05-29 NOTE — TELEPHONE ENCOUNTER
Requested Prescriptions     Pending Prescriptions Disp Refills    estradiol (ESTRACE) 1 MG Tab [Pharmacy Med Name: Estradiol 1 MG Oral Tablet] 90 Tablet 0     Sig: Take 1 tablet by mouth once daily      Last office visit: 11/8/23  Last lab: 12/6/23

## 2024-05-30 RX ORDER — ESTRADIOL 1 MG/1
1 TABLET ORAL DAILY
Qty: 90 TABLET | Refills: 3 | Status: SHIPPED | OUTPATIENT
Start: 2024-05-30

## 2024-06-06 RX ORDER — TRAZODONE HYDROCHLORIDE 100 MG/1
100 TABLET ORAL EVERY EVENING
Qty: 90 TABLET | Refills: 3 | Status: SHIPPED | OUTPATIENT
Start: 2024-06-06

## 2024-06-06 NOTE — TELEPHONE ENCOUNTER
Requested Prescriptions     Pending Prescriptions Disp Refills    traZODone (DESYREL) 100 MG Tab [Pharmacy Med Name: traZODone HCl 100 MG Oral Tablet] 90 Tablet 0     Sig: TAKE 1 TABLET BY MOUTH ONCE DAILY IN THE EVENING      Last office visit: 11/8/23  Last lab: 12/6/23

## 2024-06-11 RX ORDER — ALBUTEROL SULFATE 90 UG/1
2 AEROSOL, METERED RESPIRATORY (INHALATION) EVERY 4 HOURS PRN
Qty: 2 EACH | Refills: 5 | Status: SHIPPED | OUTPATIENT
Start: 2024-06-11

## 2024-06-11 NOTE — TELEPHONE ENCOUNTER
Requested Prescriptions     Pending Prescriptions Disp Refills    albuterol 108 (90 Base) MCG/ACT Aero Soln inhalation aerosol 2 Each 3     Sig: Inhale 2 Puffs every four hours as needed for Shortness of Breath.      Last office visit: 11/8/23  Last lab: 12/6/23

## 2024-07-10 DIAGNOSIS — E78.2 MIXED HYPERLIPIDEMIA: ICD-10-CM

## 2024-07-11 RX ORDER — ATORVASTATIN CALCIUM 10 MG/1
10 TABLET, FILM COATED ORAL DAILY
Qty: 100 TABLET | Refills: 3 | Status: SHIPPED | OUTPATIENT
Start: 2024-07-11

## 2024-07-26 ENCOUNTER — OFFICE VISIT (OUTPATIENT)
Dept: URGENT CARE | Facility: PHYSICIAN GROUP | Age: 79
End: 2024-07-26
Payer: MEDICARE

## 2024-07-26 VITALS
TEMPERATURE: 98.7 F | HEIGHT: 61 IN | RESPIRATION RATE: 18 BRPM | BODY MASS INDEX: 22.47 KG/M2 | HEART RATE: 91 BPM | DIASTOLIC BLOOD PRESSURE: 80 MMHG | WEIGHT: 119 LBS | OXYGEN SATURATION: 95 % | SYSTOLIC BLOOD PRESSURE: 120 MMHG

## 2024-07-26 DIAGNOSIS — J01.00 ACUTE NON-RECURRENT MAXILLARY SINUSITIS: ICD-10-CM

## 2024-07-26 DIAGNOSIS — B96.89 BACTERIAL CONJUNCTIVITIS OF BOTH EYES: ICD-10-CM

## 2024-07-26 DIAGNOSIS — H10.9 BACTERIAL CONJUNCTIVITIS OF BOTH EYES: ICD-10-CM

## 2024-07-26 PROCEDURE — 3074F SYST BP LT 130 MM HG: CPT | Performed by: FAMILY MEDICINE

## 2024-07-26 PROCEDURE — 3079F DIAST BP 80-89 MM HG: CPT | Performed by: FAMILY MEDICINE

## 2024-07-26 PROCEDURE — 99214 OFFICE O/P EST MOD 30 MIN: CPT | Performed by: FAMILY MEDICINE

## 2024-07-26 RX ORDER — TOBRAMYCIN 3 MG/ML
1 SOLUTION/ DROPS OPHTHALMIC 4 TIMES DAILY
Qty: 2 ML | Refills: 0 | Status: SHIPPED | OUTPATIENT
Start: 2024-07-26 | End: 2024-08-02

## 2024-07-26 RX ORDER — AMOXICILLIN AND CLAVULANATE POTASSIUM 875; 125 MG/1; MG/1
1 TABLET, FILM COATED ORAL 2 TIMES DAILY
Qty: 20 TABLET | Refills: 0 | Status: SHIPPED | OUTPATIENT
Start: 2024-07-26 | End: 2024-08-05

## 2024-07-26 ASSESSMENT — FIBROSIS 4 INDEX: FIB4 SCORE: 1.41

## 2024-08-10 DIAGNOSIS — Z79.890 POSTMENOPAUSAL HRT (HORMONE REPLACEMENT THERAPY): ICD-10-CM

## 2024-08-10 DIAGNOSIS — E03.9 HYPOTHYROIDISM, UNSPECIFIED TYPE: ICD-10-CM

## 2024-08-13 NOTE — TELEPHONE ENCOUNTER
[id number]  last Ov      Received request via: Patient    Was the patient seen in the last year in this department? Yes    Does the patient have an active prescription (recently filled or refills available) for medication(s) requested? No    Pharmacy Name: [facility]     Does the patient have FDC Plus and need 100-day supply? (This applies to ALL medications) Patient does not have SCP

## 2024-08-14 RX ORDER — LEVOTHYROXINE SODIUM 75 UG/1
75 TABLET ORAL
Qty: 90 TABLET | Refills: 3 | Status: SHIPPED | OUTPATIENT
Start: 2024-08-14

## 2024-08-14 RX ORDER — ESTRADIOL 1 MG/1
1 TABLET ORAL DAILY
Qty: 90 TABLET | Refills: 3 | Status: SHIPPED | OUTPATIENT
Start: 2024-08-14

## 2024-09-20 ENCOUNTER — HOSPITAL ENCOUNTER (OUTPATIENT)
Dept: RADIOLOGY | Facility: MEDICAL CENTER | Age: 79
End: 2024-09-20
Attending: STUDENT IN AN ORGANIZED HEALTH CARE EDUCATION/TRAINING PROGRAM
Payer: MEDICARE

## 2024-09-20 ENCOUNTER — OFFICE VISIT (OUTPATIENT)
Dept: URGENT CARE | Facility: PHYSICIAN GROUP | Age: 79
End: 2024-09-20
Payer: MEDICARE

## 2024-09-20 VITALS
WEIGHT: 119 LBS | TEMPERATURE: 97.3 F | BODY MASS INDEX: 22.47 KG/M2 | OXYGEN SATURATION: 97 % | HEART RATE: 84 BPM | DIASTOLIC BLOOD PRESSURE: 74 MMHG | SYSTOLIC BLOOD PRESSURE: 128 MMHG | RESPIRATION RATE: 16 BRPM | HEIGHT: 61 IN

## 2024-09-20 DIAGNOSIS — S99.921A INJURY OF RIGHT GREAT TOE, INITIAL ENCOUNTER: ICD-10-CM

## 2024-09-20 DIAGNOSIS — S69.92XA INJURY OF LEFT WRIST, INITIAL ENCOUNTER: ICD-10-CM

## 2024-09-20 PROCEDURE — 73110 X-RAY EXAM OF WRIST: CPT | Mod: LT

## 2024-09-20 PROCEDURE — 3074F SYST BP LT 130 MM HG: CPT | Performed by: STUDENT IN AN ORGANIZED HEALTH CARE EDUCATION/TRAINING PROGRAM

## 2024-09-20 PROCEDURE — 3078F DIAST BP <80 MM HG: CPT | Performed by: STUDENT IN AN ORGANIZED HEALTH CARE EDUCATION/TRAINING PROGRAM

## 2024-09-20 PROCEDURE — 99214 OFFICE O/P EST MOD 30 MIN: CPT | Performed by: STUDENT IN AN ORGANIZED HEALTH CARE EDUCATION/TRAINING PROGRAM

## 2024-09-20 PROCEDURE — 73660 X-RAY EXAM OF TOE(S): CPT | Mod: RT

## 2024-09-20 ASSESSMENT — FIBROSIS 4 INDEX: FIB4 SCORE: 1.41

## 2024-09-20 NOTE — PROGRESS NOTES
Subjective:   Makenna Culver is a 79 y.o. female who presents for Fall (Fell over a turtle and fell onto gravel, wants left wrist and right big toe checked/X 1 week )      HPI:  79-year-old female presents to urgent care after sustaining a ground-level fall 1 week ago.  Tripped over a stone tortoise in her yard causing her to fall forward.  Landed on the left wrist as well as hit her right great toe.  Pain is to the distal medial aspect of her right wrist.  Has not had significant improvement since the time of the injury.  No numbness, tingling, or burning.  No radiation of pain up the arm.  States that she has been able to start walking on the toe over the past 48 hours but is still painful.    Medications:    albuterol Aers  atorvastatin Tabs  cyclobenzaprine Tabs  estradiol Tabs  ibuprofen Tabs  levothyroxine Tabs  omeprazole  traMADol Tabs  traZODone Tabs  VITAMIN B-COMPLEX PO  vitamin D3 Tabs    Allergies: Codeine, Morphine, and Vicodin [hydrocodone-acetaminophen]    Problem List: Makenna Culver does not have any pertinent problems on file.    Surgical History:  Past Surgical History:   Procedure Laterality Date    UT SHLDR ARTHROSCOP EXTEN DEBRIDE 3+ Left 2/21/2024    Procedure: LEFT SHOULDER  ARTHROSCOPY DEBRIDEMENT, DISTAL CLAVICLE RESECTION, SUBACROMIAL DECOMPRESSION, ROTATOR CUFF REPAIR, BICEPS TENODESIS;  Surgeon: Raul Palomino M.D.;  Location: SURGERY SAME DAY [facility];  Service: Orthopedics    CLAVICLE DISTAL EXCISION Left 2/21/2024    Procedure: EXCISION, CLAVICLE, DISTAL;  Surgeon: Raul Palomino M.D.;  Location: SURGERY SAME DAY [facility];  Service: Orthopedics    UT FIX LID PTOSIS,LEVATR RESEC,EXTERN N/A 9/14/2023    Procedure: LEFT LEVATOR PLICATION, BILATERAL BLEPHAROPLASTY;  Surgeon: Jose Manuel Trammell Jr., M.D.;  Location: SURGERY SAME DAY [facility];  Service: Plastics    UT NEUROPLASTY & OR TRANSPOS MEDIAN NRV CARPAL CHANDRA Right 12/04/2020    Procedure: RELEASE, CARPAL TUNNEL;  Surgeon:  Pancho Palomino M.D.;  Location: SURGERY SAME DAY [facility];  Service: Orthopedics    PB INCIS TENDON SHEATH,RADIAL STYLOID Right 12/04/2020    Procedure: RELEASE, HAND, FOR DEQUERVAIN'S TENOSYNOVITIS;  Surgeon: Pancho Palomino M.D.;  Location: SURGERY SAME DAY [facility];  Service: Orthopedics    FINGER ARTHROPLASTY Right 12/04/2020    Procedure: ARTHROPLASTY, FINGER-THUMB CARPOMETACARPAL EXCISIONAL;  Surgeon: Pancho Palomino M.D.;  Location: SURGERY SAME DAY [facility];  Service: Orthopedics    AK NEUROPLASTY & OR TRANSPOS MEDIAN NRV CARPAL CHANDRA Left 08/21/2020    Procedure: RELEASE, CARPAL TUNNEL;  Surgeon: Pancho Palomino M.D.;  Location: SURGERY SAME DAY [facility];  Service: Orthopedics    PB INCIS TENDON SHEATH,RADIAL STYLOID Left 08/21/2020    Procedure: RELEASE, HAND, FOR DEQUERVAIN'S TENOSYNOVITIS;  Surgeon: Pancho Palomino M.D.;  Location: SURGERY SAME DAY [facility];  Service: Orthopedics    PB REPAIR INTERCARP/CARP-METACARP JT Left 08/21/2020    Procedure: EXCISION, TRAPEZIUM, WITH LIGAMENT RECONSTRUCTION AND TENDON INTERPOSITION- PARTIAL TRAPEZOID EXCISION FLEXOR CARPI RADIALIS TENDON GRAFT, PLASCENCIA;  Surgeon: Pancho Palomino M.D.;  Location: SURGERY SAME DAY [facility];  Service: Orthopedics    FINGER ARTHROPLASTY Left 08/21/2020    Procedure: ARTHROPLASTY, FINGER- THUMB CARPOMETACARPAL EXCISIONAL;  Surgeon: Pancho Palomino M.D.;  Location: SURGERY SAME DAY [facility];  Service: Orthopedics    BLEPHAROPLASTY Bilateral 04/20/2016    Procedure: BLEPHAROPLASTY - UPPER;  Surgeon: Moshe Sena M.D.;  Location: [facility];  Service:     RECTUS REPAIR Bilateral 07/09/2015    Procedure: RECTUS REPAIR FOR LT SUPERIOR RECESSION & RT INFERIOR RECESSION ;  Surgeon: Leila Villegas M.D.;  Location: SURGERY SAME DAY [facility];  Service:     CATARACT PHACO WITH IOL  03/17/2015    Performed by Derrick Gupta M.D. at [facility]    CATARACT PHACO  "WITH IOL  03/03/2015    Performed by Derrick Gupta M.D. at SURGERY [facility]    TENSILON TEST  11/20/2014    Performed by Maynor San D.O. at ENDOSCOPY [facility]S    CARPAL TUNNEL ENDOSCOPIC  02/08/2013    Performed by Pancho Palomino M.D. at SURGERY SAME DAY [facility] ORS    CARPAL TUNNEL ENDOSCOPIC  11/17/2012    Performed by Pancho Palomino M.D. at SURGERY [facility]S    ABDOMINAL HYSTERECTOMY TOTAL  partial    fibroid tumors, has ovary and tube left    CARPAL TUNNEL RELEASE      HYSTERECTOMY LAPAROSCOPY      NO PERTINENT PAST SURGICAL HISTORY  2013    eyes    OTHER  pore , above the ear    OTHER SURGICAL PROCEDURE      ear surgery as child    THYROIDECTOMY  thyroid ablation with iodine therapy    TONSILLECTOMY      TONSILLECTOMY AND ADENOIDECTOMY         Past Social Hx: Makenna Culver  reports that she quit smoking about 11 years ago. Her smoking use included electronic cigarettes and cigarettes. She started smoking about 59 years ago. She has a 47.5 pack-year smoking history. She has been exposed to tobacco smoke. She has never used smokeless tobacco. She reports current alcohol use of about 0.6 oz of alcohol per week. She reports that she does not use drugs.     Past Family Hx:  Makenna Culver family history includes Alcohol abuse in her father; Cancer in her maternal grandmother; Heart Disease in her father; Hypertension in her brother and father; Lung Cancer in her maternal grandmother; Stroke in her father.     Problem list, medications, and allergies reviewed by myself today in Epic.     Objective:     /74   Pulse 84   Temp 36.3 °C (97.3 °F) (Temporal)   Resp 16   Ht 1.549 m (5' 1\")   Wt 54 kg (119 lb)   LMP 01/01/2004   SpO2 97%   BMI 22.48 kg/m²     Physical Exam  Musculoskeletal:      Comments: Left wrist: Bruising and abrasion present to the distal wrist on the medial aspect.  Bony tenderness to the distal ulna.  No deformity.  Reduced range of " motion due to pain.    Ecchymosis present to the right great toe.  Pain at the base of the right great toe.  No deformity.  Good alignment.         RADIOLOGY RESULTS   DX-WRIST-COMPLETE 3+ LEFT    Result Date: 9/20/2024 9/20/2024 9:23 AM HISTORY/REASON FOR EXAM:  Pain/Deformity Following Trauma; Ground-level fall 1 week ago.  Pain to the distal medial wrist.. TECHNIQUE/EXAM DESCRIPTION AND NUMBER OF VIEWS:  4 views of the LEFT wrist. COMPARISON: Left wrist x-ray 11/22/2019 FINDINGS: There is no fracture. Alignment is normal. Scaphoid/trapezium and 1st carpometacarpal joint degenerative changes are present.     1.  Negative for fracture or malalignment 2.  osteoarthritis of the scaphoid/trapezium articulation and the 1st carpometacarpal joint    DX-TOE(S) 2+ RIGHT    Result Date: 9/20/2024 9/20/2024 9:16 AM HISTORY/REASON FOR EXAM:  Pain/Deformity Following Trauma; Ground-level fall 1 week ago.  Pain to the base of the first toe.  No deformity.. Right great toe injury, pain and swelling TECHNIQUE/EXAM DESCRIPTION AND NUMBER OF VIEWS:  3 views of the RIGHT toes. COMPARISON: None FINDINGS: There is no fracture. There is a bunion. Remainder of the forefoot appears normal Alignment is normal. No degenerative changes are present.     1.  Negative for fracture or malalignment 2.  Bunion           Assessment/Plan:     Diagnosis and associated orders:     1. Injury of left wrist, initial encounter  DX-WRIST-COMPLETE 3+ LEFT      2. Injury of right great toe, initial encounter  DX-TOE(S) 2+ RIGHT         Comments/MDM:     Negative x-rays today for fracture of the toe and wrist.  Most likely soft tissue injury/contusion.  Should be self-limited.  Discussed typical timeframe for resolution of symptoms.  Discussed home supportive care.         Differential diagnosis, natural history, supportive care, and indications for immediate follow-up discussed.    Advised the patient to follow-up with the primary care physician for  recheck, reevaluation, and consideration of further management.    Please note that this dictation was created using voice recognition software. I have made a reasonable attempt to correct obvious errors, but I expect that there are errors of grammar and possibly content that I did not discover before finalizing the note.    Electronically signed by Felipe Car PA-C.

## 2024-10-01 DIAGNOSIS — E78.2 MIXED HYPERLIPIDEMIA: ICD-10-CM

## 2024-10-02 RX ORDER — ATORVASTATIN CALCIUM 10 MG/1
10 TABLET, FILM COATED ORAL DAILY
Qty: 100 TABLET | Refills: 3 | Status: SHIPPED | OUTPATIENT
Start: 2024-10-02

## 2024-10-03 ENCOUNTER — ANESTHESIA EVENT (OUTPATIENT)
Dept: RADIOLOGY | Facility: MEDICAL CENTER | Age: 79
End: 2024-10-03
Payer: MEDICARE

## 2024-10-04 ENCOUNTER — HOSPITAL ENCOUNTER (OUTPATIENT)
Dept: RADIOLOGY | Facility: MEDICAL CENTER | Age: 79
End: 2024-10-04
Attending: SURGERY
Payer: MEDICARE

## 2024-10-04 ENCOUNTER — ANESTHESIA (OUTPATIENT)
Dept: RADIOLOGY | Facility: MEDICAL CENTER | Age: 79
End: 2024-10-04
Payer: MEDICARE

## 2024-10-04 VITALS
HEART RATE: 87 BPM | RESPIRATION RATE: 19 BRPM | WEIGHT: 117.62 LBS | SYSTOLIC BLOOD PRESSURE: 138 MMHG | OXYGEN SATURATION: 97 % | HEIGHT: 61 IN | BODY MASS INDEX: 22.21 KG/M2 | TEMPERATURE: 97.2 F | DIASTOLIC BLOOD PRESSURE: 76 MMHG

## 2024-10-04 DIAGNOSIS — Z98.890 S/P ARTHROSCOPY OF LEFT SHOULDER: ICD-10-CM

## 2024-10-04 PROCEDURE — 700111 HCHG RX REV CODE 636 W/ 250 OVERRIDE (IP): Performed by: ANESTHESIOLOGY

## 2024-10-04 PROCEDURE — 73221 MRI JOINT UPR EXTREM W/O DYE: CPT | Mod: LT

## 2024-10-04 PROCEDURE — 700101 HCHG RX REV CODE 250: Performed by: ANESTHESIOLOGY

## 2024-10-04 RX ORDER — EPHEDRINE SULFATE 50 MG/ML
INJECTION, SOLUTION INTRAVENOUS PRN
Status: DISCONTINUED | OUTPATIENT
Start: 2024-10-04 | End: 2024-10-04 | Stop reason: SURG

## 2024-10-04 RX ADMIN — EPHEDRINE SULFATE 5 MG: 50 INJECTION, SOLUTION INTRAVENOUS at 11:06

## 2024-10-04 RX ADMIN — PROPOFOL 10 MG: 10 INJECTION, EMULSION INTRAVENOUS at 10:52

## 2024-10-04 ASSESSMENT — FIBROSIS 4 INDEX: FIB4 SCORE: 1.41

## 2024-10-04 ASSESSMENT — PAIN SCALES - GENERAL: PAIN_LEVEL: 0

## 2024-10-04 ASSESSMENT — PAIN DESCRIPTION - PAIN TYPE: TYPE: CHRONIC PAIN

## 2024-10-08 RX ORDER — ALBUTEROL SULFATE 90 UG/1
2 INHALANT RESPIRATORY (INHALATION) EVERY 4 HOURS PRN
Qty: 2 EACH | Refills: 5 | Status: SHIPPED | OUTPATIENT
Start: 2024-10-08

## 2024-10-18 ENCOUNTER — DOCUMENTATION (OUTPATIENT)
Dept: HEALTH INFORMATION MANAGEMENT | Facility: OTHER | Age: 79
End: 2024-10-18
Payer: MEDICARE

## 2024-11-07 PROBLEM — G56.02 LEFT CARPAL TUNNEL SYNDROME: Status: ACTIVE | Noted: 2024-11-07

## 2024-11-08 ASSESSMENT — FIBROSIS 4 INDEX: FIB4 SCORE: 1.41

## 2024-11-13 DIAGNOSIS — E03.9 HYPOTHYROIDISM, UNSPECIFIED TYPE: ICD-10-CM

## 2024-11-15 NOTE — TELEPHONE ENCOUNTER
Received request via: Patient    Was the patient seen in the last year in this department? Yes    Does the patient have an active prescription (recently filled or refills available) for medication(s) requested? No    Pharmacy Name: [facility]     Does the patient have FDC Plus and need 100-day supply? (This applies to ALL medications) Patient does not have SCP

## 2024-11-19 RX ORDER — LEVOTHYROXINE SODIUM 75 UG/1
75 TABLET ORAL
Qty: 90 TABLET | Refills: 3 | Status: SHIPPED | OUTPATIENT
Start: 2024-11-19

## 2024-11-20 ENCOUNTER — APPOINTMENT (OUTPATIENT)
Dept: ADMISSIONS | Facility: MEDICAL CENTER | Age: 79
End: 2024-11-20
Attending: SURGERY
Payer: MEDICARE

## 2024-11-27 ENCOUNTER — PRE-ADMISSION TESTING (OUTPATIENT)
Dept: ADMISSIONS | Facility: MEDICAL CENTER | Age: 79
End: 2024-11-27
Attending: SURGERY
Payer: MEDICARE

## 2024-11-27 NOTE — OR NURSING
RN pre admit tele appointment complete.  Medication and fasting instructions given per anesthesia protocol unless otherwise instructed by physician.  Patient stated understanding of all instructions and had no further questions. Encouraged increased oral fluid intake the day prior to procedure/surgery including intake of electrolyte drinks such as sports drink or electrolyte water. Patient may have clear liquids until 2 hours prior to surgery.  Surgery date 12/18/24

## 2024-12-17 ENCOUNTER — ANESTHESIA EVENT (OUTPATIENT)
Dept: SURGERY | Facility: MEDICAL CENTER | Age: 79
End: 2024-12-17
Payer: MEDICARE

## 2024-12-18 ENCOUNTER — HOSPITAL ENCOUNTER (OUTPATIENT)
Facility: MEDICAL CENTER | Age: 79
End: 2024-12-18
Attending: SURGERY | Admitting: SURGERY
Payer: MEDICARE

## 2024-12-18 ENCOUNTER — ANESTHESIA (OUTPATIENT)
Dept: SURGERY | Facility: MEDICAL CENTER | Age: 79
End: 2024-12-18
Payer: MEDICARE

## 2024-12-18 VITALS
BODY MASS INDEX: 22.48 KG/M2 | SYSTOLIC BLOOD PRESSURE: 124 MMHG | HEART RATE: 67 BPM | HEIGHT: 61 IN | RESPIRATION RATE: 20 BRPM | WEIGHT: 119.05 LBS | OXYGEN SATURATION: 93 % | DIASTOLIC BLOOD PRESSURE: 57 MMHG | TEMPERATURE: 97.4 F

## 2024-12-18 DIAGNOSIS — G56.02 LEFT CARPAL TUNNEL SYNDROME: ICD-10-CM

## 2024-12-18 LAB — EKG IMPRESSION: NORMAL

## 2024-12-18 PROCEDURE — 700111 HCHG RX REV CODE 636 W/ 250 OVERRIDE (IP): Mod: JZ | Performed by: STUDENT IN AN ORGANIZED HEALTH CARE EDUCATION/TRAINING PROGRAM

## 2024-12-18 PROCEDURE — 700105 HCHG RX REV CODE 258: Performed by: STUDENT IN AN ORGANIZED HEALTH CARE EDUCATION/TRAINING PROGRAM

## 2024-12-18 PROCEDURE — 160002 HCHG RECOVERY MINUTES (STAT): Performed by: SURGERY

## 2024-12-18 PROCEDURE — 160039 HCHG SURGERY MINUTES - EA ADDL 1 MIN LEVEL 3: Performed by: SURGERY

## 2024-12-18 PROCEDURE — 160025 RECOVERY II MINUTES (STATS): Performed by: SURGERY

## 2024-12-18 PROCEDURE — 700101 HCHG RX REV CODE 250: Performed by: SURGERY

## 2024-12-18 PROCEDURE — 160028 HCHG SURGERY MINUTES - 1ST 30 MINS LEVEL 3: Performed by: SURGERY

## 2024-12-18 PROCEDURE — 160048 HCHG OR STATISTICAL LEVEL 1-5: Performed by: SURGERY

## 2024-12-18 PROCEDURE — 700111 HCHG RX REV CODE 636 W/ 250 OVERRIDE (IP): Performed by: SURGERY

## 2024-12-18 PROCEDURE — 25295 RELEASE WRIST/FOREARM TENDON: CPT | Mod: 59,LT | Performed by: SURGERY

## 2024-12-18 PROCEDURE — A9270 NON-COVERED ITEM OR SERVICE: HCPCS | Performed by: STUDENT IN AN ORGANIZED HEALTH CARE EDUCATION/TRAINING PROGRAM

## 2024-12-18 PROCEDURE — 93005 ELECTROCARDIOGRAM TRACING: CPT | Mod: TC | Performed by: SURGERY

## 2024-12-18 PROCEDURE — 160035 HCHG PACU - 1ST 60 MINS PHASE I: Performed by: SURGERY

## 2024-12-18 PROCEDURE — 160046 HCHG PACU - 1ST 60 MINS PHASE II: Performed by: SURGERY

## 2024-12-18 PROCEDURE — 160009 HCHG ANES TIME/MIN: Performed by: SURGERY

## 2024-12-18 PROCEDURE — 64721 CARPAL TUNNEL SURGERY: CPT | Mod: LT | Performed by: SURGERY

## 2024-12-18 PROCEDURE — 700102 HCHG RX REV CODE 250 W/ 637 OVERRIDE(OP): Performed by: STUDENT IN AN ORGANIZED HEALTH CARE EDUCATION/TRAINING PROGRAM

## 2024-12-18 PROCEDURE — 93010 ELECTROCARDIOGRAM REPORT: CPT | Performed by: INTERNAL MEDICINE

## 2024-12-18 PROCEDURE — 700101 HCHG RX REV CODE 250: Performed by: STUDENT IN AN ORGANIZED HEALTH CARE EDUCATION/TRAINING PROGRAM

## 2024-12-18 RX ORDER — HALOPERIDOL 5 MG/ML
1 INJECTION INTRAMUSCULAR
Status: DISCONTINUED | OUTPATIENT
Start: 2024-12-18 | End: 2024-12-18 | Stop reason: HOSPADM

## 2024-12-18 RX ORDER — SODIUM CHLORIDE 9 MG/ML
INJECTION, SOLUTION INTRAVENOUS
Status: DISCONTINUED | OUTPATIENT
Start: 2024-12-18 | End: 2024-12-18 | Stop reason: SURG

## 2024-12-18 RX ORDER — BUPIVACAINE HYDROCHLORIDE AND EPINEPHRINE 5; 5 MG/ML; UG/ML
INJECTION, SOLUTION EPIDURAL; INTRACAUDAL; PERINEURAL
Status: DISCONTINUED | OUTPATIENT
Start: 2024-12-18 | End: 2024-12-18 | Stop reason: HOSPADM

## 2024-12-18 RX ORDER — OXYCODONE HCL 5 MG/5 ML
10 SOLUTION, ORAL ORAL
Status: DISCONTINUED | OUTPATIENT
Start: 2024-12-18 | End: 2024-12-18 | Stop reason: HOSPADM

## 2024-12-18 RX ORDER — SODIUM CHLORIDE, SODIUM LACTATE, POTASSIUM CHLORIDE, CALCIUM CHLORIDE 600; 310; 30; 20 MG/100ML; MG/100ML; MG/100ML; MG/100ML
INJECTION, SOLUTION INTRAVENOUS CONTINUOUS
Status: DISCONTINUED | OUTPATIENT
Start: 2024-12-18 | End: 2024-12-18 | Stop reason: HOSPADM

## 2024-12-18 RX ORDER — ACETAMINOPHEN 500 MG
1000 TABLET ORAL ONCE
Status: COMPLETED | OUTPATIENT
Start: 2024-12-18 | End: 2024-12-18

## 2024-12-18 RX ORDER — OXYCODONE HCL 5 MG/5 ML
5 SOLUTION, ORAL ORAL
Status: DISCONTINUED | OUTPATIENT
Start: 2024-12-18 | End: 2024-12-18 | Stop reason: HOSPADM

## 2024-12-18 RX ORDER — ONDANSETRON 2 MG/ML
4 INJECTION INTRAMUSCULAR; INTRAVENOUS
Status: DISCONTINUED | OUTPATIENT
Start: 2024-12-18 | End: 2024-12-18 | Stop reason: HOSPADM

## 2024-12-18 RX ORDER — OXYCODONE HYDROCHLORIDE 5 MG/1
5 TABLET ORAL EVERY 4 HOURS PRN
Qty: 12 TABLET | Refills: 0 | Status: SHIPPED | OUTPATIENT
Start: 2024-12-18 | End: 2024-12-21

## 2024-12-18 RX ORDER — CEFAZOLIN SODIUM 1 G/3ML
2 INJECTION, POWDER, FOR SOLUTION INTRAMUSCULAR; INTRAVENOUS ONCE
Status: COMPLETED | OUTPATIENT
Start: 2024-12-18 | End: 2024-12-18

## 2024-12-18 RX ORDER — KETOROLAC TROMETHAMINE 15 MG/ML
15 INJECTION, SOLUTION INTRAMUSCULAR; INTRAVENOUS ONCE
Status: COMPLETED | OUTPATIENT
Start: 2024-12-18 | End: 2024-12-18

## 2024-12-18 RX ORDER — LIDOCAINE HYDROCHLORIDE 20 MG/ML
INJECTION, SOLUTION EPIDURAL; INFILTRATION; INTRACAUDAL; PERINEURAL PRN
Status: DISCONTINUED | OUTPATIENT
Start: 2024-12-18 | End: 2024-12-18 | Stop reason: SURG

## 2024-12-18 RX ORDER — DEXAMETHASONE SODIUM PHOSPHATE 4 MG/ML
INJECTION, SOLUTION INTRA-ARTICULAR; INTRALESIONAL; INTRAMUSCULAR; INTRAVENOUS; SOFT TISSUE PRN
Status: DISCONTINUED | OUTPATIENT
Start: 2024-12-18 | End: 2024-12-18 | Stop reason: SURG

## 2024-12-18 RX ORDER — ONDANSETRON 2 MG/ML
INJECTION INTRAMUSCULAR; INTRAVENOUS PRN
Status: DISCONTINUED | OUTPATIENT
Start: 2024-12-18 | End: 2024-12-18 | Stop reason: SURG

## 2024-12-18 RX ADMIN — ONDANSETRON 4 MG: 2 INJECTION INTRAMUSCULAR; INTRAVENOUS at 13:28

## 2024-12-18 RX ADMIN — PROPOFOL 140 MG: 10 INJECTION, EMULSION INTRAVENOUS at 13:03

## 2024-12-18 RX ADMIN — ACETAMINOPHEN 1000 MG: 500 TABLET ORAL at 12:22

## 2024-12-18 RX ADMIN — SODIUM CHLORIDE: 9 INJECTION, SOLUTION INTRAVENOUS at 12:59

## 2024-12-18 RX ADMIN — FENTANYL CITRATE 50 MCG: 50 INJECTION, SOLUTION INTRAMUSCULAR; INTRAVENOUS at 13:01

## 2024-12-18 RX ADMIN — LIDOCAINE HYDROCHLORIDE 70 MG: 20 INJECTION, SOLUTION EPIDURAL; INFILTRATION; INTRACAUDAL; PERINEURAL at 13:03

## 2024-12-18 RX ADMIN — CEFAZOLIN 2 G: 1 INJECTION, POWDER, FOR SOLUTION INTRAMUSCULAR; INTRAVENOUS at 13:07

## 2024-12-18 RX ADMIN — FENTANYL CITRATE 50 MCG: 50 INJECTION, SOLUTION INTRAMUSCULAR; INTRAVENOUS at 13:24

## 2024-12-18 RX ADMIN — KETOROLAC TROMETHAMINE 15 MG: 15 INJECTION, SOLUTION INTRAMUSCULAR; INTRAVENOUS at 13:48

## 2024-12-18 RX ADMIN — DEXAMETHASONE SODIUM PHOSPHATE 4 MG: 4 INJECTION INTRA-ARTICULAR; INTRALESIONAL; INTRAMUSCULAR; INTRAVENOUS; SOFT TISSUE at 13:09

## 2024-12-18 ASSESSMENT — FIBROSIS 4 INDEX: FIB4 SCORE: 1.41

## 2024-12-18 ASSESSMENT — PAIN DESCRIPTION - PAIN TYPE: TYPE: SURGICAL PAIN

## 2024-12-18 NOTE — H&P
Surgery Orthopedic History & Physical Note    Date  12/18/2024    Primary Care Physician  Frances Laboy M.D.    CC  Pre-Op Diagnosis Codes:      * Left carpal tunnel syndrome [G56.02]    HPI  This is a 79 y.o. female who presented with chronic left hand and wrist pain and finger numbness and tingling.  She has a history of a left endoscopic carpal tunnel release in the remote past by Pancho Kyle with recurrent numbness nerve test positive for carpal tunnel syndrome and requests open carpal tunnel release.  Since her last visit with me in the office and she notes her radial sided wrist pain is worse and is previously prior to her shoulder surgery but diagnosed with DecQuervain's tenosynovitis and would like this addressed today as well.  Overall her pain ranges from 4-8 of 10 severity with numbness and tingling, failed conservative treatment expectant management, rest, ice, over-the-counter medications and splinting.    Past Medical History:   Diagnosis Date    Arthritis     hips, back and shoulders (osteoarthritis)     Back pain     Chickenpox     Colon polyp 12/2011    Constipation     Degeneration of lumbar or lumbosacral intervertebral disc     Dr. Cisneros    Dental disorder     full dentures    Double vision     Ear problems as child had surgery    Thai measles     Graves' disease with exophthalmos 06/13/2014    Being followed by Dr. Han San    Hemorrhoid     internal    High cholesterol     History of carpal tunnel repair 07/31/2013    Dr. Villa repaired both.    History of seasonal allergies     Hyperthyroidism     Menopause 10/14/2009    Morning headache     Osteoarthritis of multiple joints 06/13/2014    Osteoporosis     Pain     wrists    Pain in joint, pelvic region and thigh 2015    bursitis and arthritis in hips-takes ibuprofen and darvacet    Pain in joint, shoulder region 2015    Peptic ulcer     Periodic pelvic examination due    Postcoital bleeding     improved    Rash  12/29/2014    Started about a week ago when she got news about surgery High stress Nothing new Back, stomach, chest, arms Itch No pain    Rheumatoid arthritis (HCC)     Ringing in ears     S/P colonoscopy 12/2011    repeat in 5 years- had polyp, initial 2005.     Screening mammogram  11/2008=normal    Tobacco use disorder     Tonsillitis     Ulcer of the stomach and intestine hx.    stable    Unspecified cataract     bilateral IOL    Unspecified hypothyroidism     Wears glasses        Past Surgical History:   Procedure Laterality Date    [place] SHLDR ARTHROSCOP EXTEN DEBRIDE 3+ Left 2/21/2024    Procedure: LEFT SHOULDER  ARTHROSCOPY DEBRIDEMENT, DISTAL CLAVICLE RESECTION, SUBACROMIAL DECOMPRESSION, ROTATOR CUFF REPAIR, BICEPS TENODESIS;  Surgeon: Raul Palomino M.D.;  Location: SURGERY SAME DAY [facility];  Service: Orthopedics    CLAVICLE DISTAL EXCISION Left 2/21/2024    Procedure: EXCISION, CLAVICLE, DISTAL;  Surgeon: Raul Palomino M.D.;  Location: SURGERY SAME DAY [facility];  Service: Orthopedics    NV FIX LID PTOSIS,LEVATR RESEC,EXTERN N/A 9/14/2023    Procedure: LEFT LEVATOR PLICATION, BILATERAL BLEPHAROPLASTY;  Surgeon: Jose Manuel Trammell Jr., M.D.;  Location: SURGERY SAME DAY [facility];  Service: Plastics    NV NEUROPLASTY & OR TRANSPOS MEDIAN NRV CARPAL CHANDRA Right 12/04/2020    Procedure: RELEASE, CARPAL TUNNEL;  Surgeon: Pancho Palomino M.D.;  Location: SURGERY SAME DAY [facility];  Service: Orthopedics    PB INCIS TENDON SHEATH,RADIAL STYLOID Right 12/04/2020    Procedure: RELEASE, HAND, FOR DEQUERVAIN'S TENOSYNOVITIS;  Surgeon: Pancho Palomino M.D.;  Location: SURGERY SAME DAY [facility];  Service: Orthopedics    FINGER ARTHROPLASTY Right 12/04/2020    Procedure: ARTHROPLASTY, FINGER-THUMB CARPOMETACARPAL EXCISIONAL;  Surgeon: Pancho Palomino M.D.;  Location: SURGERY SAME DAY [facility];  Service: Orthopedics    NV NEUROPLASTY & OR TRANSPOS MEDIAN NRV CARPAL CHANDRA Left 08/21/2020    Procedure:  RELEASE, CARPAL TUNNEL;  Surgeon: Pancho Palomino M.D.;  Location: SURGERY SAME DAY [facility];  Service: Orthopedics    PB INCIS TENDON SHEATH,RADIAL STYLOID Left 08/21/2020    Procedure: RELEASE, HAND, FOR DEQUERVAIN'S TENOSYNOVITIS;  Surgeon: Pancho Palomino M.D.;  Location: SURGERY SAME DAY [facility];  Service: Orthopedics    PB REPAIR INTERCARP/CARP-METACARP JT Left 08/21/2020    Procedure: EXCISION, TRAPEZIUM, WITH LIGAMENT RECONSTRUCTION AND TENDON INTERPOSITION- PARTIAL TRAPEZOID EXCISION FLEXOR CARPI RADIALIS TENDON GRAFT, PLASCENCIA;  Surgeon: Pancho Palomnio M.D.;  Location: SURGERY SAME DAY [facility];  Service: Orthopedics    FINGER ARTHROPLASTY Left 08/21/2020    Procedure: ARTHROPLASTY, FINGER- THUMB CARPOMETACARPAL EXCISIONAL;  Surgeon: Pancho Palomino M.D.;  Location: SURGERY SAME DAY [facility];  Service: Orthopedics    BLEPHAROPLASTY Bilateral 04/20/2016    Procedure: BLEPHAROPLASTY - UPPER;  Surgeon: Moshe Sena M.D.;  Location: SURGERY [facility];  Service:     RECTUS REPAIR Bilateral 07/09/2015    Procedure: RECTUS REPAIR FOR LT SUPERIOR RECESSION & RT INFERIOR RECESSION ;  Surgeon: Leila Villegas M.D.;  Location: SURGERY SAME DAY [facility];  Service:     CATARACT PHACO WITH IOL  03/17/2015    Performed by Derrick Gupta M.D. at [facility]    CATARACT PHACO WITH IOL  03/03/2015    Performed by Derrick Gupta M.D. at [facility]    TENSILON TEST  11/20/2014    Performed by Maynor San D.O. at ENDOSCOPY [facility] ORS    CARPAL TUNNEL ENDOSCOPIC  02/08/2013    Performed by Pancho Palomino M.D. at SURGERY SAME DAY [facility]    CARPAL TUNNEL ENDOSCOPIC  11/17/2012    Performed by Pancho Palomino M.D. at SURGERY [facility]S    ABDOMINAL HYSTERECTOMY TOTAL  partial    fibroid tumors, has ovary and tube left    CARPAL TUNNEL RELEASE      HYSTERECTOMY LAPAROSCOPY      NO PERTINENT PAST SURGICAL HISTORY  2013     eyes    OTHER  pore , above the ear    OTHER SURGICAL PROCEDURE      ear surgery as child    THYROIDECTOMY  thyroid ablation with iodine therapy    TONSILLECTOMY      TONSILLECTOMY AND ADENOIDECTOMY         Current Facility-Administered Medications   Medication Dose Route Frequency Provider Last Rate Last Admin    ceFAZolin (Ancef) injection 2 g  2 g Intravenous Once Raul Palomino M.D.           Social History     Socioeconomic History    Marital status:      Spouse name: Not on file    Number of children: Not on file    Years of education: Not on file    Highest education level: Some college, no degree   Occupational History    Occupation: on social security disability for her back     Employer: OTHER   Tobacco Use    Smoking status: Former     Current packs/day: 0.00     Average packs/day: 1 pack/day for 47.5 years (47.5 ttl pk-yrs)     Types: Electronic Cigarettes, Cigarettes     Start date: 1965     Quit date: 2013     Years since quittin.9     Passive exposure: Past    Smokeless tobacco: Never    Tobacco comments:     use terry-cig electronic cig.   Vaping Use    Vaping status: Former    Passive vaping exposure: Yes   Substance and Sexual Activity    Alcohol use: Yes     Alcohol/week: 0.6 oz     Types: 1 Glasses of wine per week     Comment: one wine 2 twice a month    Drug use: No    Sexual activity: Yes     Partners: Male     Comment:    Other Topics Concern    Not on file   Social History Narrative    - 3 children     Social Drivers of Health     Financial Resource Strain: Low Risk  (2023)    Overall Financial Resource Strain (CARDIA)     Difficulty of Paying Living Expenses: Not hard at all   Food Insecurity: No Food Insecurity (2023)    Hunger Vital Sign     Worried About Running Out of Food in the Last Year: Never true     Ran Out of Food in the Last Year: Never true   Transportation Needs: No Transportation Needs (2023)    PRAPARE -  Transportation     Lack of Transportation (Medical): No     Lack of Transportation (Non-Medical): No   Physical Activity: Insufficiently Active (11/5/2023)    Exercise Vital Sign     Days of Exercise per Week: 3 days     Minutes of Exercise per Session: 20 min   Stress: No Stress Concern Present (11/5/2023)    [place] [employer] - Occupational Stress Questionnaire     Feeling of Stress : Not at all   Social Connections: Unknown (11/5/2023)    Social Connection and Isolation Panel [NHANES]     Frequency of Communication with Friends and Family: More than three times a week     Frequency of Social Gatherings with Friends and Family: Twice a week     Attends Holiness Services: Patient declined     Active Member of Clubs or Organizations: No     Attends Club or Organization Meetings: Never     Marital Status:    Intimate Partner Violence: Not on file   Housing Stability: Low Risk  (11/5/2023)    Housing Stability Vital Sign     Unable to Pay for Housing in the Last Year: No     Number of Places Lived in the Last Year: 1     Unstable Housing in the Last Year: No       Family History   Problem Relation Age of Onset    Hypertension Father     Heart Disease Father         MI age 47 yo    Stroke Father     Alcohol abuse Father         drank everyday    Cancer Maternal Grandmother         part of lung    Lung Cancer Maternal Grandmother     Hypertension Brother     Cancer Neg Hx     Diabetes Neg Hx        Allergies  Codeine, Morphine, and Vicodin [hydrocodone-acetaminophen]    Review of Systems  Negative on a 12 point scale    Physical Exam    Vital Signs  Blood Pressure : (!) 159/69   Temperature: 36.3 °C (97.4 °F)   Pulse: 67   Respiration: 16   Pulse Oximetry: 97 %   General:  Well appearing, in no acute distress. Appropriate and cooperative with the exam.  HEENT: Normocephalic, atraumatic. Cranial nerves II-XII are grossly intact.  Cardiovascular: 2+ distal pulses. No cyanosis, clubbing, or  edema.  Pulmonary: Breathing comfortably on room air without labor.  Abdomen: Soft and unremarkable.  Skin: No rashes, jaundice, or cyanosis.  Lymphatic: No epitrochlear lymphadenopathy.  Spine:  Clinically midline.   Gait:  Patient ambulates without a limp.  Musculoskeletal: Left wrist has a positive Finkelstein test and tenderness palpation of the first extensor compartment  Neurologic: She has a healed endoscopic carpal tunnel incision and positive Durkan's and Phalen's about the carpal tunnel    Labs:                    Radiology:  No orders to display         Assessment/Plan:  Pre-Op Diagnosis Codes:      * Left carpal tunnel syndrome [G56.02] and left wrist DeQuervain's tenosynovitis  Procedure(s):  LEFT OPEN CARPAL TUNNEL RELEASE and left wrist first extensor compartment release

## 2024-12-18 NOTE — LETTER
November 11, 2024    Patient Name: Makenna Culver  Surgeon Name: Raul Bowie M.D.  Surgery Facility: [facility] ([address] [place] [address])  Surgery Date: 12/18/2024    The time of your surgery is not final and may change up to and until the day of your surgery. You will be contacted 24-48 hours prior to your surgery date with your check-in and surgery time.    If you will not be at one of the below numbers please call the surgery scheduler at [phone]  Preferred Phone: [phone]    BEFORE YOUR SURGERY   Pre Registration and/or Lab Work must be done within and no earlier than 28 days prior to your surgery date. Your scheduled facility will contact you regarding all required preregistration and/or lab work. If you have not been contacted within 7 days of your scheduled procedure please call [facility] at [phone] for an appointment as soon as possible.    Instructions: Bring a list of all medications you are taking including the dosing and frequency.    DAY OF YOUR SURGERY  Nothing to eat or drink after midnight     Refrain from smoking any substance after midnight prior to surgery. Smoking may interfere with the anesthetic and frequently produces nausea during the recovery period.    Continue taking all lifesaving medications. Including the morning of your surgery with small sip of water.        Please do NOT take on the day of surgery:  Diuretics: examples- furosemide (Lasix), spironolactone, hydrochlorothiazide  ACE-inhibitors: examples- lisinopril, ramipril, enalapril  “ARBs”: examples- losartan, Olmesartan, valsartan    Please arrive at the hospital/surgery center at the check-in time provided.     An adult will need to bring you and take you home after your surgery.     AFTER YOUR SURGERY    DR JUHI BOWIE MEDICAL ASSISTANT WILL CALL OR MESSAGE YOU WITH YOUR POST OP APPT   - Post Surgery - You will need someone to drive you home     TIME OFF WORK  FMLA or  Disability forms can be faxed directly to: [phone] or you may drop them off at [address] [place] [address]. Our office charges a $35.00 fee per form. Forms will be completed within 10 business days of drop off and payment received. For the status of your forms you may contact our disability office directly [phone].    MEDICATION INSTRUCTIONS **Please read section completely**  The following medications should be stopped a minimum of 10 days prior to surgery:  All over the counter, Supplements & Herbal medications    Anorectics: Phentermine (Adipex-P, Lomaira and Suprenza), Phentermine-topiramate (Qsymia), Bupropion-naltrexone (Contrave)    **If you are on Bupropion for anxiety/depression, please continue this medication up until the day of surgery.     Opiod Partial Agonists/Opioid Antagonists: Buprenorphine (Suboxone, Belbuca, Butrans, Probuphine Implant, Sublocade), Naltrexone (ReVia, Vivitrol), Naloxone    Amphetamines: Dextroamphetamine/Amphetamine (Adderall, Mydayis), Methylphenidate Hydrochloride (Concerta, Metadate, Methylin, Ritalin)    The following medications should be stopped 5 days prior to surgery:  Blood Thinners: Any Aspirin, Aspirin products, anti-inflammatories such as ibuprofen and any blood thinners such as Coumadin and Plavix. Please consult your prescribing physician if you are on life saving blood thinners, in regards to when to stop medications prior to surgery.     The following medications should be stopped a minimum of 3 days prior to surgery:  PDE-5 inhibitors: Sildenafil (Viagra), Tadalafil (Cialis), Vardenafil (Levitra), Avanafil (Stendra)    MAO Inhibitors: Rasagiline (Azilect), Selegiline (Eldepryl, Emsam, Selapar), Isocarboxazid (Marplan), Phenelzine (Nardil)       COVID and INFLUENZA NOTICE TO PATIENTS    Currently, the [facility] does not routinely test patients for COVID-19 or Influenza prior to their elective surgery.  However, if  patients develop the following symptoms prior to their surgery date, they should voluntarily test for COVID-19 and Influenza and notify the surgical office of their condition and results.  The symptoms warranting testing would be any two of the following:    Fever (Temp above 100.4 F)  Chills  Cough  Shortness of breath or difficulty breathing  Fatigue  Myalgias (muscle or body aches)  Headache  Sore Throat  Congestion or Runny Nose  Nausea or vomiting  Diarrhea  New loss of taste or smell    Having these symptoms prior to surgery can significantly increase your risk of morbidity and mortality under anesthesia, which may compromise your health and require a transfer to a hospital for a higher level of care.  Therefore, it is advisable to notify the surgical team of any illness in order to get information for the appropriate time to delay the surgery to minimize these preventable risks.    Your health and safety are our number one priority at the [facility], and we are thankful that you entrust us with your care.  Please help us ensure the best possible surgical and anesthetic outcome by sharing appropriate health information with our perioperative team and staff.  We look forward to taking great care of you!    Thank you for your time and consideration on this matter.    Efrain Otero MD  Medical Director  Anesthesiologist  [facility]

## 2024-12-18 NOTE — DISCHARGE INSTRUCTIONS
What to Expect Post Anesthesia    Rest and take it easy for the first 24 hours.  A responsible adult is recommended to remain with you during that time.  It is normal to feel sleepy.  We encourage you to not do anything that requires balance, judgment or coordination.    FOR 24 HOURS DO NOT:  Drive, operate machinery or run household appliances.  Drink beer or alcoholic beverages.  Make important decisions or sign legal documents.    To avoid nausea, slowly advance diet as tolerated, avoiding spicy or greasy foods for the first day.  Add more substantial food to your diet according to your provider's instructions.  Babies can be fed formula or breast milk as soon as they are hungry.  INCREASE FLUIDS AND FIBER TO AVOID CONSTIPATION.    MILD FLU-LIKE SYMPTOMS ARE NORMAL.  YOU MAY EXPERIENCE GENERALIZED MUSCLE ACHES, THROAT IRRITATION, HEADACHE AND/OR SOME NAUSEA.    If any questions arise, call your provider.  If your provider is not available, please feel free to call the Surgical Center at [phone].    MEDICATIONS: Resume taking daily medication.  Take prescribed pain medication with food.  If no medication is prescribed, you may take non-aspirin pain medication if needed.  PAIN MEDICATION CAN BE VERY CONSTIPATING.  Take a stool softener or laxative such as senokot, pericolace, or milk of magnesia if needed.    Tylenol given at 12:22 PM. You may take another dose of tylenol at 6:22 PM if needed.   Toradol ( ibuprofen-like) was given at 1:45 PM. You may take ibuprofen again at 7:45 PM if needed.     Keep splint on, clean, and dry until clinic follow-up. Elevate above heart and ice frequently for at least 2 weeks. No heavy lifting or grasping for at least 6 weeks. No driving while on narcotic pain medications. Contact auto-insurance carrier for clearance to begin driving again. Call MD or come to ER for any major concerns.

## 2024-12-18 NOTE — OR NURSING
1334 Patient arrived to PACU from OR. Report received from RN and anesthesia. Patient attached to monitoring. VSS. Patient oxygenating well on 6 L mask. dressing assessed on L wrist, CDI. No signs of bleeding.     1340 patient awake. No reports of pain or nausea.     1351 patients  brought to bedside.      1350 Patient meets phase two criteria. Tolerating PO liquids without complication.     1355 RN went over discharge instructions with patient and patient's . All questions answered.     1400 Intact IV removed by RN. Then helped patient dress.    1415 Patient meets discharge criteria. VSS. Pt discharged to a responsible adult via wheelchair by RN. Pt in possession of all personal belongings.

## 2024-12-18 NOTE — ANESTHESIA PROCEDURE NOTES
Airway    Date/Time: 12/18/2024 1:05 PM    Performed by: Sourav Deutsch M.D.  Authorized by: Sourav Deutsch M.D.    Location:  OR  Urgency:  Elective  Indications for Airway Management:  Anesthesia      Spontaneous Ventilation: absent    Sedation Level:  Deep  Preoxygenated: Yes    Final Airway Type:  Supraglottic airway  Final Supraglottic Airway:  Standard LMA    SGA Size:  3  Number of Attempts at Approach:  1

## 2024-12-18 NOTE — ANESTHESIA PREPROCEDURE EVALUATION
Case: [id number] Date/Time: 12/18/24 1245    Procedure: LEFT OPEN CARPAL TUNNEL RELEASE (Left: Wrist)    Anesthesia type: General    Diagnosis: Left carpal tunnel syndrome [G56.02]    Pre-op diagnosis: Left carpal tunnel syndrome [G56.02]    Location: [facility] ROOM 25 / SURGERY SAME DAY [facility]    Surgeons: Raul Palomino M.D.            Relevant Problems   PULMONARY   (positive) Other emphysema (HCC)      GI   (positive) GERD (gastroesophageal reflux disease)         (positive) Liver lesion      ENDO   (positive) Hypothyroidism      Other   (positive) Impingement syndrome of left shoulder       Physical Exam    Airway   Mallampati: II  TM distance: >3 FB  Neck ROM: full       Cardiovascular - normal exam  Rhythm: regular  Rate: normal     Dental - normal exam           Pulmonary - normal exam     Abdominal    Neurological - normal exam                   Anesthesia Plan    ASA 2       Plan - general       Airway plan will be LMA          Induction: intravenous    Postoperative Plan: Postoperative administration of opioids is intended.    Pertinent diagnostic labs and testing reviewed    Informed Consent:    Anesthetic plan and risks discussed with patient.    Use of blood products discussed with: patient whom consented to blood products.

## 2024-12-19 NOTE — ANESTHESIA TIME REPORT
Anesthesia Start and Stop Event Times       Date Time Event    12/18/2024 1259 Ready for Procedure     1259 Anesthesia Start     1336 Anesthesia Stop          Responsible Staff  12/18/24      Name Role Begin End    Sourav Deutsch M.D. Anesth 1259 1336          Overtime Reason:  no overtime (within assigned shift)    Comments:

## 2024-12-19 NOTE — ANESTHESIA POSTPROCEDURE EVALUATION
Patient: Makenna Culver    Procedure Summary       Date: 12/18/24 Room / Location: [facility] ROOM 25 / SURGERY SAME DAY [facility]    Anesthesia Start: 1259 Anesthesia Stop: 1336    Procedure: LEFT OPEN CARPAL TUNNEL RELEASE (Left: Wrist) Diagnosis:       Left carpal tunnel syndrome      (Left carpal tunnel syndrome)    Surgeons: Raul Palomino M.D. Responsible Provider: Sourav Deutsch M.D.    Anesthesia Type: general ASA Status: 2            Final Anesthesia Type: general  Last vitals  BP   Blood Pressure : 124/57    Temp   36.3 °C (97.4 °F)    Pulse   67   Resp   20    SpO2   93 %      Anesthesia Post Evaluation    Patient location during evaluation: PACU  Patient participation: complete - patient participated  Level of consciousness: awake and alert    Airway patency: patent  Anesthetic complications: no  Cardiovascular status: hemodynamically stable  Respiratory status: acceptable  Hydration status: euvolemic    PONV: none          No notable events documented.     Nurse Pain Score: 0 (NPRS)

## 2024-12-19 NOTE — OP REPORT
Surgeon: Raul Palomino MD    Assistants: None    Preoperative Diagnosis: Left wrist Dequervain's tenosynovitis and recurrent carpal tunnel syndrome    Postoperative Diagnosis: same    Procedure: 1.  Left wrist abductor pollicis tenolysis, 2.  Left wrist extensor pollicis brevis tenolysis, 3.  Left open carpal tunnel release    Anesthesia: General     Anesthesiologist: Sourav Deutsch MD    Complications: none noted    Drains: None    Specimens: None    Estimated blood loss: Minimal with tournequet time less than 15 minutes at 250mmHG     Indications: This patient is well known to me through my outpatient clinic for the above mentioned diagnosis.  The patient believes and I would agree that they have failed conservative treatment and are a candidate for the above-mentioned procedure.  Prior to the procedure the patient understood the risk medicine alternatives to surgery.  The patient understood the risks to include but not be limited to the risk of infection requiring repeat surgery, bleeding requiring blood transfusion, nerve blood vessel tendon injury requiring repair, dissatisfaction with the outcome, stiffness, complex regional pain, acquiring coronavirus its complications, DVT, pulmonary embolism, heart attack, stroke, and even death.  The patient stated despite these risks wished to proceed with surgery.    The patient is well-known to Pancho Palomino for a left thumb LRTI and endoscopic carpal tunnel release in the remote past with recurrent carpal tunnel symptoms and positive EMG and the patient request revision open carpal tunnel release.  She understands limitation of revision surgery and that she will likely be left with significant permanent numbness and the results of revision surgery are unpredictable.    Procedure: The patient was met in the preoperative holding area and the left wrist was initialed as the correct operative site.  The patient had an opportunity ask questions all questions were  answered and informed status obtained.    The patient was brought to the operating room and laid supine on the operating room table.  All bony and dependent prominences were well-padded.  General anesthesia was induced without known complication.  Ancef was administered for infection prophylaxis.  A formal timeout was performed in which all parties agreed on the correct patient procedure and operative site.  Given the procedure by using Esmarch to exsanguinate the left upper extremity and inflated the tourniquet to 250 mmHg.  I made an approximately 4 cm longitudinal incision in line with the ring finger of the carpal tunnel.  I dissected down through the palmar aponeurosis were identified the transverse carpal ligament which I released longitudinally in its entirety under direct visualization as well as the distal form antebrachial fascia with tenotomy scissors.  I then made an oblique incision over the wrist first dorsal extensor compartment.  I elevated full-thickness skin flaps and identified and protected dorsal sensory nerves.  Identified the first compartment and incised to daily into the dorsal aspect of the first compartment.  I encountered extensor pollicis brevis and found that it was in its own separate subsheath with a significant of tenosynovitis.  I performed a thorough tenosynovectomy of extensor pollicis brevis.  I then incised through the floor of the subcompartment and found abductor pollicis longus had multiple slips with a significant amount of tenosynovitis.  I performed a thorough tenosynovectomy of abductor pollicis longus with tenotomy scissors.  After a thorough first extensor compartment release I took the wrist through range of motion there was no subluxation of the tendons.  I then deflated the tourniquet and used Bovie cautery to achieve hemostasis.  I copiously gated the wound with normal saline.  I then closed the incision with 3-0 Monocryl suture, benzoin, Steri-Strips, Xeroform, 4 x  4's, and a well-padded splint.  Patient awoke from anesthesia without known complication and was transferred in a stable condition to the PACU where there were no immediate postoperative complaints.    Postoperative Plan: The patient will be discharged home per same-day criteria.  I recommend finger range of motion and sedentary use of the hand.  The will follow up with me in 10 to 14 days for splint removal and wound check.

## 2025-01-08 ENCOUNTER — APPOINTMENT (OUTPATIENT)
Dept: MEDICAL GROUP | Facility: PHYSICIAN GROUP | Age: 80
End: 2025-01-08
Payer: MEDICARE

## 2025-01-08 VITALS
HEART RATE: 77 BPM | DIASTOLIC BLOOD PRESSURE: 76 MMHG | HEIGHT: 61 IN | OXYGEN SATURATION: 96 % | WEIGHT: 121 LBS | BODY MASS INDEX: 22.84 KG/M2 | RESPIRATION RATE: 16 BRPM | SYSTOLIC BLOOD PRESSURE: 120 MMHG | TEMPERATURE: 98.4 F

## 2025-01-08 DIAGNOSIS — Z12.31 ENCOUNTER FOR SCREENING MAMMOGRAM FOR MALIGNANT NEOPLASM OF BREAST: ICD-10-CM

## 2025-01-08 DIAGNOSIS — J43.8 OTHER EMPHYSEMA (HCC): ICD-10-CM

## 2025-01-08 DIAGNOSIS — D75.839 THROMBOCYTOSIS: ICD-10-CM

## 2025-01-08 DIAGNOSIS — E55.9 VITAMIN D DEFICIENCY: ICD-10-CM

## 2025-01-08 DIAGNOSIS — E78.2 MIXED HYPERLIPIDEMIA: ICD-10-CM

## 2025-01-08 DIAGNOSIS — Z87.891 HISTORY OF SMOKING: ICD-10-CM

## 2025-01-08 DIAGNOSIS — E03.9 HYPOTHYROIDISM, UNSPECIFIED TYPE: ICD-10-CM

## 2025-01-08 PROCEDURE — 3078F DIAST BP <80 MM HG: CPT | Performed by: FAMILY MEDICINE

## 2025-01-08 PROCEDURE — 99214 OFFICE O/P EST MOD 30 MIN: CPT | Performed by: FAMILY MEDICINE

## 2025-01-08 PROCEDURE — 3074F SYST BP LT 130 MM HG: CPT | Performed by: FAMILY MEDICINE

## 2025-01-08 RX ORDER — TRIAMCINOLONE ACETONIDE 1 MG/G
CREAM TOPICAL
COMMUNITY
Start: 2024-11-24 | End: 2025-01-08

## 2025-01-08 RX ORDER — TRAZODONE HYDROCHLORIDE 100 MG/1
100 TABLET ORAL EVERY EVENING
COMMUNITY
Start: 2024-11-16 | End: 2025-01-08

## 2025-01-08 ASSESSMENT — FIBROSIS 4 INDEX: FIB4 SCORE: 1.41

## 2025-01-08 ASSESSMENT — PATIENT HEALTH QUESTIONNAIRE - PHQ9: CLINICAL INTERPRETATION OF PHQ2 SCORE: 0

## 2025-01-10 NOTE — PROGRESS NOTES
Subjective:   Makenna Culver is a 79 y.o. female here today for evaluation and management of:     History of Present Illness  The patient is a 79-year-old female who presents for a health maintenance visit.    She underwent arthroscopic shoulder surgery and clavicle repair in February 2024, followed by carpal tunnel surgery on her left wrist on 12/18/2024. The sutures from the carpal tunnel surgery were removed on 12/30/2024. She has been advised to mobilize her thumb but has not received any specific instructions regarding physical therapy or hand surgeon follow-up. She reports sensitivity on the outside of her thigh and numbness in the area. She has declined a referral for physical therapy at this time. She has been applying vitamin E oil as part of her post-surgical care.    She reports no current symptoms of hemoptysis, dyspnea, or changes in her cough. She also reports no history of lung cancer. She is a former smoker and reports no episodes of syncope or chest pain. Her respiratory status remains stable with her emphysema. She uses albuterol as needed and does not require regular refills.    She has a scheduled appointment with her endocrinologist in March 2025 for thyroid follow-up.    She maintains an active lifestyle, including regular exercise, even during periods when she was unable to use her hand. She has declined the influenza vaccine at this time. She has been under the care of a dermatologist for skin cancer screenings and reports dry skin on her face. She has been using a moisturizing sunscreen for this condition. She ensures adequate hydration and sleep, consuming approximately 40 ounces of water daily and achieving around 8 hours of sleep per night.    Supplemental Information  She received nerve block injections targeting the third, fourth, and fifth lumbar vertebrae yesterday, which have provided some relief.    SOCIAL HISTORY  The patient is a former smoker.    FAMILY HISTORY  The patient  reports no history of lung cancer in the family.    MEDICATIONS  Current: albuterol    IMMUNIZATIONS  The patient declined the influenza vaccine.      HCC Gap Form    Diagnosis to address: J43.8 - Other emphysema (HCC)  Assessment and plan: Chronic, stable. Continue with current defined treatment plan: continue on albuterol as needed.  Follow-up at least annually.  Last edited 01/09/25 16:34 PST by Frances Laboy M.D.         Current medicines (including changes today)  Current Outpatient Medications   Medication Sig Dispense Refill    levothyroxine (SYNTHROID) 75 MCG Tab Take 1 Tablet by mouth every morning on an empty stomach. (Patient taking differently: Take 75 mcg by mouth every morning on an empty stomach.       CONTINUE TO TAKE AS PRESCRIBED BEFORE SURGERY ON 12/18/24) 90 Tablet 3    albuterol 108 (90 Base) MCG/ACT Aero Soln inhalation aerosol Inhale 2 Puffs every four hours as needed for Shortness of Breath. (Patient taking differently: Inhale 2 Puffs every four hours as needed for Shortness of Breath.       CONTINUE TO TAKE IF NEEDED BEFORE SURGERY ON 12/18/24) 2 Each 5    atorvastatin (LIPITOR) 10 MG Tab Take 1 Tablet by mouth every day. (Patient taking differently: Take 10 mg by mouth every day.       CONTINUE TO TAKE AS PRESCRIBED BEFORE SURGERY ON 12/18/24) 100 Tablet 3    estradiol (ESTRACE) 1 MG Tab Take 1 Tablet by mouth every day. (Patient taking differently: Take 1 mg by mouth every day.       DO NOT TAKE MORNING OF SURGERY ON 12/18/24) 90 Tablet 3    traZODone (DESYREL) 50 MG Tab       CONTINUE TO TAKE IF NEEDED BEFORE SURGERY ON 12/18/24      cyclobenzaprine (FLEXERIL) 10 mg Tab       CONTINUE TO TAKE IF NEEDED BEFORE SURGERY ON 12/18/24      omeprazole (PRILOSEC) 40 MG delayed-release capsule Take 1 Capsule by mouth every day. (Patient taking differently: Take 40 mg by mouth every day.       CONTINUE TO TAKE AS PRESCRIBED BEFORE SURGERY ON 12/18/24) 90 Capsule 3    vitamin D3 (CHOLECALCIFEROL)  "1000 Unit (25 mcg) Tab Take 1,000 Units by mouth every morning.     DO NOT TAKE FOR 7 DAYS BEFORE SURGERY ON 12/18/24      traMADol (ULTRAM) 50 MG Tab Take 50 mg by mouth at bedtime as needed for Moderate Pain.       CONTINUE TO TAKE IF NEEDED BEFORE SURGERY ON 12/18/24      ibuprofen (MOTRIN) 800 MG Tab TAKE 1 TABLET BY MOUTH THREE TIMES DAILY WITH FOOD (Patient taking differently: Take 400 mg by mouth every 8 hours as needed.       DO NOT TAKE FOR 5 DAYS BEFORE SURGERY ON 12/18/24) 90 Tablet 0    B Complex Vitamins (VITAMIN B-COMPLEX PO) Take 1 Capsule by mouth every morning.       DO NOT TAKE FOR 7 DAYS BEFORE SURGERY ON 12/18/24       No current facility-administered medications for this visit.     She  has a past medical history of Arthritis, Back pain, Chickenpox, Colon polyp (12/2011), Constipation, Degeneration of lumbar or lumbosacral intervertebral disc, Dental disorder, Double vision, Ear problems (as child had surgery), [place] measles, Graves' disease with exophthalmos (06/13/2014), Hemorrhoid, High cholesterol, History of carpal tunnel repair (07/31/2013), History of seasonal allergies, Hyperthyroidism, Menopause (10/14/2009), Morning headache, Osteoarthritis of multiple joints (06/13/2014), Osteoporosis, Pain, Pain in joint, pelvic region and thigh (2015), Pain in joint, shoulder region (2015), Peptic ulcer, Periodic pelvic examination (due), Postcoital bleeding, Rash (12/29/2014), Rheumatoid arthritis (HCC), Ringing in ears, S/P colonoscopy (12/2011), Screening mammogram ( 11/2008=normal), Tobacco use disorder, Tonsillitis, Ulcer of the stomach and intestine (hx.), Unspecified cataract, Unspecified hypothyroidism, and Wears glasses.    ROS  No chest pain, no shortness of breath, no abdominal pain       Objective:     /76 (BP Location: Left arm, Patient Position: Sitting, BP Cuff Size: Adult)   Pulse 77   Temp 36.9 °C (98.4 °F) (Temporal)   Resp 16   Ht 1.549 m (5' 1\")   Wt 54.9 kg (121 lb)  "  SpO2 96%  Body mass index is 22.86 kg/m².   Physical Exam:  Constitutional: Alert, no distress.  Skin: Warm, dry, good turgor, no rashes in visible areas.  Eye: Equal, round and reactive, conjunctiva clear, lids normal.  ENMT: Lips without lesions, good dentition, oropharynx clear.  Neck: Trachea midline, no masses, no thyromegaly. No cervical or supraclavicular lymphadenopathy  Respiratory: Unlabored respiratory effort, lungs clear to auscultation, no wheezes, no ronchi.  Cardiovascular: Normal S1, S2, no murmur, no edema.  Abdomen: Soft, non-tender, no masses, no hepatosplenomegaly.  Psych: Alert and oriented x3, normal affect and mood.       The following treatment plan was discussed  Assessment & Plan  1. Post-operative status following left wrist carpal tunnel surgery.  She had carpal tunnel surgery on 12/18/2024, with stitches removed on 12/30/2024. She reports sensitivity on the outside of the thigh and numbness. She was advised to use vitamin E oil for massage to aid in healing and reduce scar tissue. No physical therapy is requested at this time.    2. Emphysema.  Her breathing status is stable. She uses albuterol as needed and does not require regular refills. If her respiratory status changes, a lung function test will be ordered.    3. Thyroid follow-up.  A thyroid function test will be ordered to monitor her condition. She has an upcoming appointment with her endocrinologist in March 2025.    4. Health maintenance.  Her bone density test was conducted in 2021. She had a normal Cologuard test in 2022 and a normal mammogram in September 2023. She was advised to continue annual mammograms and to use a moisturizing sunscreen for her dry facial skin. She was also encouraged to maintain hydration by drinking 8 glasses of water daily and to ensure 8 hours of sleep per night. She declined the influenza vaccine but was advised to practice regular hand washing. A comprehensive set of laboratory tests will be  ordered, including complete blood count, liver function tests, kidney function tests, cholesterol panel, and vitamin D levels. A referral for lung cancer screening will be made.    Follow-up  The patient will follow up in 6 months for lab review and wellness exam.    PROCEDURE  Arthroscopic shoulder surgery and clavicle repair were performed in February 2024.  Carpal tunnel surgery on the left wrist was performed on 12/18/2024.  Nerve block injections were administered to the third, fourth, and fifth lumbar vertebrae yesterday.    1. Hypothyroidism, unspecified type    2. Mixed hyperlipidemia  - Comp Metabolic Panel; Future  - Lipid Profile; Future    3. Other emphysema (HCC)    4. Thrombocytosis  - CBC WITH DIFFERENTIAL; Future    5. History of smoking  - REFERRAL TO LUNG CANCER SCREENING PROGRAM    6. Vitamin D deficiency  - VITAMIN D,25 HYDROXY (DEFICIENCY); Future    7. Encounter for screening mammogram for malignant neoplasm of breast  - MA-SCREENING MAMMO BILAT W/CAD; Future      Followup: Return in about 6 months (around 7/8/2025) for Lab Review AWV.  Verbal consent was acquired by the patient to use [employer] ambient listening note generation during this visit Yes

## 2025-01-14 ENCOUNTER — TELEPHONE (OUTPATIENT)
Dept: HEALTH INFORMATION MANAGEMENT | Facility: OTHER | Age: 80
End: 2025-01-14
Payer: MEDICARE

## 2025-01-14 NOTE — TELEPHONE ENCOUNTER
Outcome:Pt declined to schedule the appointment and requested to cancel the Referral.      Called to verify program eligibility/ verified eligible/ pt declined to participate in the Program.

## 2025-01-30 PROBLEM — G56.01 RIGHT CARPAL TUNNEL SYNDROME: Status: ACTIVE | Noted: 2025-01-30

## 2025-02-05 DIAGNOSIS — E03.9 HYPOTHYROIDISM, UNSPECIFIED TYPE: ICD-10-CM

## 2025-02-05 DIAGNOSIS — Z79.890 POSTMENOPAUSAL HRT (HORMONE REPLACEMENT THERAPY): ICD-10-CM

## 2025-02-07 RX ORDER — LEVOTHYROXINE SODIUM 75 UG/1
75 TABLET ORAL
Qty: 90 TABLET | Refills: 3 | Status: SHIPPED | OUTPATIENT
Start: 2025-02-07

## 2025-02-07 RX ORDER — ESTRADIOL 1 MG/1
1 TABLET ORAL DAILY
Qty: 90 TABLET | Refills: 3 | Status: SHIPPED | OUTPATIENT
Start: 2025-02-07

## 2025-02-07 NOTE — TELEPHONE ENCOUNTER
Received request via: Patient    Was the patient seen in the last year in this department? Yes    Does the patient have an active prescription (recently filled or refills available) for medication(s) requested? No    Pharmacy Name: [facility]     Does the patient have [facility] Plus and need 100-day supply? (This applies to ALL medications) Patient does have SCP

## 2025-02-18 ENCOUNTER — PATIENT MESSAGE (OUTPATIENT)
Dept: MEDICAL GROUP | Facility: PHYSICIAN GROUP | Age: 80
End: 2025-02-18
Payer: MEDICARE

## 2025-02-19 ENCOUNTER — HOSPITAL ENCOUNTER (OUTPATIENT)
Dept: RADIOLOGY | Facility: MEDICAL CENTER | Age: 80
End: 2025-02-19
Attending: FAMILY MEDICINE
Payer: MEDICARE

## 2025-02-19 RX ORDER — OMEPRAZOLE 40 MG/1
40 CAPSULE, DELAYED RELEASE ORAL DAILY
Qty: 90 CAPSULE | Refills: 3 | Status: SHIPPED | OUTPATIENT
Start: 2025-02-19

## 2025-02-19 NOTE — PATIENT COMMUNICATION
Received request via: Patient    Was the patient seen in the last year in this department? Yes    Does the patient have an active prescription (recently filled or refills available) for medication(s) requested? No    [facility]     Does the patient have long-term Plus and need 100-day supply? (This applies to ALL medications) Patient does not have SCP

## 2025-03-12 ENCOUNTER — TELEPHONE (OUTPATIENT)
Dept: HEALTH INFORMATION MANAGEMENT | Facility: OTHER | Age: 80
End: 2025-03-12
Payer: MEDICARE

## 2025-03-18 ENCOUNTER — APPOINTMENT (OUTPATIENT)
Dept: ADMISSIONS | Facility: MEDICAL CENTER | Age: 80
End: 2025-03-18
Attending: ORTHOPAEDIC SURGERY
Payer: MEDICARE

## 2025-03-24 ENCOUNTER — PRE-ADMISSION TESTING (OUTPATIENT)
Dept: ADMISSIONS | Facility: MEDICAL CENTER | Age: 80
End: 2025-03-24
Attending: ORTHOPAEDIC SURGERY
Payer: MEDICARE

## 2025-03-24 VITALS — WEIGHT: 118 LBS | HEIGHT: 61 IN | BODY MASS INDEX: 22.28 KG/M2

## 2025-03-24 ASSESSMENT — FIBROSIS 4 INDEX: FIB4 SCORE: 1.41

## 2025-03-24 NOTE — PREPROCEDURE INSTRUCTIONS
PreAdmit Appointment completed with pt, including pt HX and medication review. Reviewed the Preparing for your procedure handout with patient, instructed to report any cold/flu symptoms to surgeon. Patient instructed per anesthesia/pharmacy guidelines regarding taking, holding, or contacting provider for instructions on regularly prescribed medications before surgery. Pt instructed to take the following medications the day of surgery:  omeprazole, levothryoxine, cyclobenzaprine as needed, albuterol as needed.   Pt verbalizes understanding of all instructions given.   Emailed AVS forms at pt request, yair melvin

## 2025-03-24 NOTE — PREADMIT AVS NOTE
Current Medications   Medication Instructions    omeprazole (PRILOSEC) 40 MG delayed-release capsule Continue taking medication as prescribed, including morning of procedure     estradiol (ESTRACE) 1 MG Tab Hold medication day of procedure    levothyroxine (SYNTHROID) 75 MCG Tab Continue taking medication as prescribed, including morning of procedure     albuterol 108 (90 Base) MCG/ACT Aero Soln inhalation aerosol Continue taking medication as prescribed, including morning of procedure     atorvastatin (LIPITOR) 10 MG Tab Continue until night before surgery    traZODone (DESYREL) 50 MG Tab Continue until night before surgery    cyclobenzaprine (FLEXERIL) 10 mg Tab As needed medication, may take if needed, including morning of procedure     vitamin D3 (CHOLECALCIFEROL) 1000 Unit (25 mcg) Tab Stop 7 days before surgery         ibuprofen (MOTRIN) 800 MG Tab Stop 5 days before surgery    B Complex Vitamins (VITAMIN B-COMPLEX PO) Stop 7 days before surgery

## 2025-04-16 ENCOUNTER — HOSPITAL ENCOUNTER (OUTPATIENT)
Facility: MEDICAL CENTER | Age: 80
End: 2025-04-16
Attending: SURGERY | Admitting: SURGERY
Payer: MEDICARE

## 2025-04-16 ENCOUNTER — ANESTHESIA (OUTPATIENT)
Dept: SURGERY | Facility: MEDICAL CENTER | Age: 80
End: 2025-04-16
Payer: MEDICARE

## 2025-04-16 ENCOUNTER — PHARMACY VISIT (OUTPATIENT)
Dept: PHARMACY | Facility: MEDICAL CENTER | Age: 80
End: 2025-04-16
Payer: COMMERCIAL

## 2025-04-16 ENCOUNTER — ANESTHESIA EVENT (OUTPATIENT)
Dept: SURGERY | Facility: MEDICAL CENTER | Age: 80
End: 2025-04-16
Payer: MEDICARE

## 2025-04-16 VITALS
DIASTOLIC BLOOD PRESSURE: 77 MMHG | WEIGHT: 118.61 LBS | HEART RATE: 60 BPM | BODY MASS INDEX: 22.39 KG/M2 | SYSTOLIC BLOOD PRESSURE: 114 MMHG | OXYGEN SATURATION: 96 % | TEMPERATURE: 97.3 F | RESPIRATION RATE: 16 BRPM | HEIGHT: 61 IN

## 2025-04-16 DIAGNOSIS — G56.01 RIGHT CARPAL TUNNEL SYNDROME: ICD-10-CM

## 2025-04-16 PROCEDURE — 700102 HCHG RX REV CODE 250 W/ 637 OVERRIDE(OP): Performed by: ANESTHESIOLOGY

## 2025-04-16 PROCEDURE — 700111 HCHG RX REV CODE 636 W/ 250 OVERRIDE (IP)

## 2025-04-16 PROCEDURE — 700105 HCHG RX REV CODE 258: Performed by: ANESTHESIOLOGY

## 2025-04-16 PROCEDURE — 160025 RECOVERY II MINUTES (STATS): Performed by: SURGERY

## 2025-04-16 PROCEDURE — 160046 HCHG PACU - 1ST 60 MINS PHASE II: Performed by: SURGERY

## 2025-04-16 PROCEDURE — 700111 HCHG RX REV CODE 636 W/ 250 OVERRIDE (IP): Performed by: ANESTHESIOLOGY

## 2025-04-16 PROCEDURE — 64721 CARPAL TUNNEL SURGERY: CPT | Mod: RT | Performed by: SURGERY

## 2025-04-16 PROCEDURE — 160036 HCHG PACU - EA ADDL 30 MINS PHASE I: Performed by: SURGERY

## 2025-04-16 PROCEDURE — 160035 HCHG PACU - 1ST 60 MINS PHASE I: Performed by: SURGERY

## 2025-04-16 PROCEDURE — 25295 RELEASE WRIST/FOREARM TENDON: CPT | Mod: 59,RT | Performed by: SURGERY

## 2025-04-16 PROCEDURE — 700111 HCHG RX REV CODE 636 W/ 250 OVERRIDE (IP): Performed by: SURGERY

## 2025-04-16 PROCEDURE — 700101 HCHG RX REV CODE 250: Performed by: ANESTHESIOLOGY

## 2025-04-16 PROCEDURE — RXMED WILLOW AMBULATORY MEDICATION CHARGE: Performed by: SURGERY

## 2025-04-16 PROCEDURE — 160028 HCHG SURGERY MINUTES - 1ST 30 MINS LEVEL 3: Performed by: SURGERY

## 2025-04-16 PROCEDURE — 700111 HCHG RX REV CODE 636 W/ 250 OVERRIDE (IP): Mod: JZ | Performed by: ANESTHESIOLOGY

## 2025-04-16 PROCEDURE — 160002 HCHG RECOVERY MINUTES (STAT): Performed by: SURGERY

## 2025-04-16 PROCEDURE — 160048 HCHG OR STATISTICAL LEVEL 1-5: Performed by: SURGERY

## 2025-04-16 PROCEDURE — 160009 HCHG ANES TIME/MIN: Performed by: SURGERY

## 2025-04-16 PROCEDURE — A9270 NON-COVERED ITEM OR SERVICE: HCPCS | Performed by: ANESTHESIOLOGY

## 2025-04-16 PROCEDURE — 160015 HCHG STAT PREOP MINUTES: Performed by: SURGERY

## 2025-04-16 RX ORDER — SODIUM CHLORIDE, SODIUM LACTATE, POTASSIUM CHLORIDE, CALCIUM CHLORIDE 600; 310; 30; 20 MG/100ML; MG/100ML; MG/100ML; MG/100ML
INJECTION, SOLUTION INTRAVENOUS CONTINUOUS
Status: DISCONTINUED | OUTPATIENT
Start: 2025-04-16 | End: 2025-04-16 | Stop reason: HOSPADM

## 2025-04-16 RX ORDER — KETOROLAC TROMETHAMINE 15 MG/ML
INJECTION, SOLUTION INTRAMUSCULAR; INTRAVENOUS PRN
Status: DISCONTINUED | OUTPATIENT
Start: 2025-04-16 | End: 2025-04-16 | Stop reason: SURG

## 2025-04-16 RX ORDER — EPHEDRINE SULFATE 50 MG/ML
5 INJECTION, SOLUTION INTRAVENOUS
Status: DISCONTINUED | OUTPATIENT
Start: 2025-04-16 | End: 2025-04-16 | Stop reason: HOSPADM

## 2025-04-16 RX ORDER — ONDANSETRON 2 MG/ML
4 INJECTION INTRAMUSCULAR; INTRAVENOUS
Status: DISCONTINUED | OUTPATIENT
Start: 2025-04-16 | End: 2025-04-16 | Stop reason: HOSPADM

## 2025-04-16 RX ORDER — CEFAZOLIN SODIUM 1 G/3ML
2 INJECTION, POWDER, FOR SOLUTION INTRAMUSCULAR; INTRAVENOUS ONCE
Status: COMPLETED | OUTPATIENT
Start: 2025-04-16 | End: 2025-04-16

## 2025-04-16 RX ORDER — HYDROMORPHONE HYDROCHLORIDE 1 MG/ML
0.2 INJECTION, SOLUTION INTRAMUSCULAR; INTRAVENOUS; SUBCUTANEOUS
Status: DISCONTINUED | OUTPATIENT
Start: 2025-04-16 | End: 2025-04-16 | Stop reason: HOSPADM

## 2025-04-16 RX ORDER — BUPIVACAINE HYDROCHLORIDE 5 MG/ML
INJECTION, SOLUTION PERINEURAL
Status: DISCONTINUED | OUTPATIENT
Start: 2025-04-16 | End: 2025-04-16 | Stop reason: HOSPADM

## 2025-04-16 RX ORDER — LIDOCAINE HYDROCHLORIDE 20 MG/ML
INJECTION, SOLUTION EPIDURAL; INFILTRATION; INTRACAUDAL; PERINEURAL PRN
Status: DISCONTINUED | OUTPATIENT
Start: 2025-04-16 | End: 2025-04-16 | Stop reason: SURG

## 2025-04-16 RX ORDER — LABETALOL HYDROCHLORIDE 5 MG/ML
5 INJECTION, SOLUTION INTRAVENOUS
Status: DISCONTINUED | OUTPATIENT
Start: 2025-04-16 | End: 2025-04-16 | Stop reason: HOSPADM

## 2025-04-16 RX ORDER — OXYCODONE HCL 5 MG/5 ML
10 SOLUTION, ORAL ORAL
Status: COMPLETED | OUTPATIENT
Start: 2025-04-16 | End: 2025-04-16

## 2025-04-16 RX ORDER — SODIUM CHLORIDE, SODIUM LACTATE, POTASSIUM CHLORIDE, CALCIUM CHLORIDE 600; 310; 30; 20 MG/100ML; MG/100ML; MG/100ML; MG/100ML
INJECTION, SOLUTION INTRAVENOUS
Status: DISCONTINUED | OUTPATIENT
Start: 2025-04-16 | End: 2025-04-16 | Stop reason: SURG

## 2025-04-16 RX ORDER — DEXAMETHASONE SODIUM PHOSPHATE 4 MG/ML
INJECTION, SOLUTION INTRA-ARTICULAR; INTRALESIONAL; INTRAMUSCULAR; INTRAVENOUS; SOFT TISSUE PRN
Status: DISCONTINUED | OUTPATIENT
Start: 2025-04-16 | End: 2025-04-16 | Stop reason: SURG

## 2025-04-16 RX ORDER — HYDROMORPHONE HYDROCHLORIDE 1 MG/ML
0.1 INJECTION, SOLUTION INTRAMUSCULAR; INTRAVENOUS; SUBCUTANEOUS
Status: DISCONTINUED | OUTPATIENT
Start: 2025-04-16 | End: 2025-04-16 | Stop reason: HOSPADM

## 2025-04-16 RX ORDER — HYDROCODONE BITARTRATE AND ACETAMINOPHEN 5; 325 MG/1; MG/1
1 TABLET ORAL EVERY 4 HOURS PRN
Qty: 12 TABLET | Refills: 0 | Status: SHIPPED | OUTPATIENT
Start: 2025-04-16 | End: 2025-04-19

## 2025-04-16 RX ORDER — ALBUTEROL SULFATE 5 MG/ML
2.5 SOLUTION RESPIRATORY (INHALATION)
Status: DISCONTINUED | OUTPATIENT
Start: 2025-04-16 | End: 2025-04-16 | Stop reason: HOSPADM

## 2025-04-16 RX ORDER — HYDROMORPHONE HYDROCHLORIDE 1 MG/ML
0.4 INJECTION, SOLUTION INTRAMUSCULAR; INTRAVENOUS; SUBCUTANEOUS
Status: DISCONTINUED | OUTPATIENT
Start: 2025-04-16 | End: 2025-04-16 | Stop reason: HOSPADM

## 2025-04-16 RX ORDER — OXYCODONE HCL 5 MG/5 ML
5 SOLUTION, ORAL ORAL
Status: COMPLETED | OUTPATIENT
Start: 2025-04-16 | End: 2025-04-16

## 2025-04-16 RX ORDER — EPHEDRINE SULFATE 50 MG/ML
INJECTION, SOLUTION INTRAVENOUS PRN
Status: DISCONTINUED | OUTPATIENT
Start: 2025-04-16 | End: 2025-04-16 | Stop reason: SURG

## 2025-04-16 RX ORDER — METOPROLOL TARTRATE 1 MG/ML
1 INJECTION, SOLUTION INTRAVENOUS
Status: DISCONTINUED | OUTPATIENT
Start: 2025-04-16 | End: 2025-04-16 | Stop reason: HOSPADM

## 2025-04-16 RX ORDER — HYDRALAZINE HYDROCHLORIDE 20 MG/ML
5 INJECTION INTRAMUSCULAR; INTRAVENOUS
Status: DISCONTINUED | OUTPATIENT
Start: 2025-04-16 | End: 2025-04-16 | Stop reason: HOSPADM

## 2025-04-16 RX ORDER — ONDANSETRON 2 MG/ML
INJECTION INTRAMUSCULAR; INTRAVENOUS PRN
Status: DISCONTINUED | OUTPATIENT
Start: 2025-04-16 | End: 2025-04-16 | Stop reason: SURG

## 2025-04-16 RX ORDER — HALOPERIDOL 5 MG/ML
1 INJECTION INTRAMUSCULAR
Status: DISCONTINUED | OUTPATIENT
Start: 2025-04-16 | End: 2025-04-16 | Stop reason: HOSPADM

## 2025-04-16 RX ADMIN — ONDANSETRON 4 MG: 2 INJECTION INTRAMUSCULAR; INTRAVENOUS at 07:20

## 2025-04-16 RX ADMIN — DEXAMETHASONE SODIUM PHOSPHATE 8 MG: 4 INJECTION INTRA-ARTICULAR; INTRALESIONAL; INTRAMUSCULAR; INTRAVENOUS; SOFT TISSUE at 07:02

## 2025-04-16 RX ADMIN — PROPOFOL 25 MG: 10 INJECTION, EMULSION INTRAVENOUS at 07:03

## 2025-04-16 RX ADMIN — CEFAZOLIN 2 G: 1 INJECTION, POWDER, FOR SOLUTION INTRAMUSCULAR; INTRAVENOUS at 07:02

## 2025-04-16 RX ADMIN — SODIUM CHLORIDE, POTASSIUM CHLORIDE, SODIUM LACTATE AND CALCIUM CHLORIDE: 600; 310; 30; 20 INJECTION, SOLUTION INTRAVENOUS at 06:57

## 2025-04-16 RX ADMIN — FENTANYL CITRATE 50 MCG: 50 INJECTION, SOLUTION INTRAMUSCULAR; INTRAVENOUS at 07:02

## 2025-04-16 RX ADMIN — KETOROLAC TROMETHAMINE 15 MG: 15 INJECTION, SOLUTION INTRAMUSCULAR; INTRAVENOUS at 07:20

## 2025-04-16 RX ADMIN — LIDOCAINE HYDROCHLORIDE 50 MG: 20 INJECTION, SOLUTION EPIDURAL; INFILTRATION; INTRACAUDAL; PERINEURAL at 07:02

## 2025-04-16 RX ADMIN — OXYCODONE HYDROCHLORIDE 10 MG: 5 SOLUTION ORAL at 07:43

## 2025-04-16 RX ADMIN — EPHEDRINE SULFATE 10 MG: 50 INJECTION, SOLUTION INTRAVENOUS at 07:06

## 2025-04-16 RX ADMIN — PROPOFOL 100 MG: 10 INJECTION, EMULSION INTRAVENOUS at 07:02

## 2025-04-16 RX ADMIN — FENTANYL CITRATE 50 MCG: 50 INJECTION, SOLUTION INTRAMUSCULAR; INTRAVENOUS at 07:42

## 2025-04-16 ASSESSMENT — PAIN DESCRIPTION - PAIN TYPE
TYPE: SURGICAL PAIN
TYPE: ACUTE PAIN
TYPE: SURGICAL PAIN

## 2025-04-16 ASSESSMENT — PAIN SCALES - GENERAL: PAIN_LEVEL: 5

## 2025-04-16 ASSESSMENT — FIBROSIS 4 INDEX: FIB4 SCORE: 1.41

## 2025-04-16 NOTE — DISCHARGE INSTRUCTIONS
Surgeon specific instructions  Keep splint on, clean, and dry until clinic follow-up. Elevate above heart and ice frequently for at least 2 weeks.    No heavy lifting or grasping for at least 6 weeks.     No driving while on narcotic pain medications. Contact auto-insurance carrier for clearance to begin driving again.     Call MD or come to ER for any major concerns.         ACTIVITY: Rest and take it easy for the first 24 hours.  A responsible adult is recommended to remain with you during that time.  It is normal to feel sleepy.  We encourage you to not do anything that requires balance, judgment or coordination.    MILD FLU-LIKE SYMPTOMS ARE NORMAL. YOU MAY EXPERIENCE GENERALIZED MUSCLE ACHES, THROAT IRRITATION, HEADACHE AND/OR SOME NAUSEA.    FOR 24 HOURS DO NOT:  Drive, operate machinery or run household appliances.  Drink beer or alcoholic beverages.   Make important decisions or sign legal documents.    DIET: To avoid nausea, slowly advance diet as tolerated, avoiding spicy or greasy foods for the first day.  Add more substantial food to your diet according to your physician's instructions. INCREASE FLUIDS AND FIBER TO AVOID CONSTIPATION.    FOLLOW-UP APPOINTMENT:  A follow-up appointment should be arranged with your doctor; call to schedule.    You should CALL YOUR PHYSICIAN if you develop:  Fever greater than 101 degrees F.  Pain not relieved by medication, or persistent nausea or vomiting.  Excessive bleeding (blood soaking through dressing) or unexpected drainage from the wound.  Extreme redness or swelling around the incision site, drainage of pus or foul smelling drainage.  Inability to urinate or empty your bladder within 8 hours.  Problems with breathing or chest pain.    You should call 911 if you develop problems with breathing or chest pain.  If you are unable to contact your doctor or surgical center, you should go to the nearest emergency room or urgent care center.  Physician's telephone #:  [phone]    If any questions arise, call your doctor.  If your doctor is not available, please feel free to call the Surgical Center at [phone].     A registered nurse may call you a few days after your surgery to see how you are doing after your procedure.    MEDICATIONS: Resume taking daily medication.  Take prescribed pain medication with food.  If no medication is prescribed, you may take non-aspirin pain medication if needed.  PAIN MEDICATION CAN BE VERY CONSTIPATING.  Take a stool softener or laxative such as senokot, pericolace, or milk of magnesia if needed.    Last pain medication given: Oxycodone 10mg at 07:43AM.    If your physician has prescribed pain medication that includes Acetaminophen (Tylenol), do not take additional Acetaminophen (Tylenol) while taking the prescribed medication.

## 2025-04-16 NOTE — OR NURSING
0725 Admitted from OR via gurney. OPA and o2 at 6l via mask.      0730 OPA removed on room air.  0745 o2 sat 90 placed on 2 liters    0800 Unable to call pts  no phone.    0810 verbalized pain improving.    0815 Spoke with  to update pt husbands in the lobby.  0820 Tolerating po fluids and verbalizes pain is tolerable. O2 removed for trial.    0835 Meets criteria for stage 2     0900 Report called to stage 2. Pt ambulated to restroom without difficulty.    0906 Pt transported to stage 2 for discharge.

## 2025-04-16 NOTE — ANESTHESIA PROCEDURE NOTES
Airway    Date/Time: 4/16/2025 7:03 AM    Performed by: Chaparro Richardson D.O.  Authorized by: Chaparro Richardson D.O.    Location:  OR  Urgency:  Elective  Indications for Airway Management:  Anesthesia      Spontaneous Ventilation: absent    Sedation Level:  Deep  Preoxygenated: Yes    Mask Difficulty Assessment:  0 - not attempted  Final Airway Type:  Supraglottic airway  Final Supraglottic Airway:  Standard LMA    SGA Size:  3  Number of Attempts at Approach:  1

## 2025-04-16 NOTE — OP REPORT
Surgeon: Raul Palomino MD    Assistants: None    Preoperative Diagnosis: Right wrist Dequervain's tenosynovitis and carpal tunnel syndrome    Postoperative Diagnosis: same    Procedure: 1.  Right wrist abductor pollicis tenolysis, 2.  Right wrist extensor pollicis brevis tenolysis, 3.  Right open carpal tunnel release    Anesthesia: General     Anesthesiologist: MD Debra    Complications: none noted    Drains: None    Specimens: None    Estimated blood loss: Minimal with tournequet time less than 15 minutes at 250mmHG     Indications: This patient is well known to me through my outpatient clinic for the above mentioned diagnosis.  The patient believes and I would agree that they have failed conservative treatment and are a candidate for the above-mentioned procedure.  Prior to the procedure the patient understood the risk medicine alternatives to surgery.  The patient understood the risks to include but not be limited to the risk of infection requiring repeat surgery, bleeding requiring blood transfusion, nerve blood vessel tendon injury requiring repair, dissatisfaction with the outcome, stiffness, complex regional pain, acquiring coronavirus its complications, DVT, pulmonary embolism, heart attack, stroke, and even death.  The patient stated despite these risks wished to proceed with surgery.    Procedure: The patient was met in the preoperative holding area and the right wrist was initialed as the correct operative site.  The patient had an opportunity ask questions all questions were answered and informed status obtained.    The patient was brought to the operating room and laid supine on the operating room table.  All bony and dependent prominences were well-padded.  General anesthesia was induced without known complication.  Ancef was administered for infection prophylaxis.  A formal timeout was performed in which all parties agreed on the correct patient procedure and operative site.  I began the  procedure by using Esmarch to exsanguinate the right upper extremity and inflated the tourniquet to 250 mmHg.  I then made an approximately 4 cm longitudinal incision in line with the ring finger of the carpal tunnel.  I dissected down through the palmar aponeurosis and significant amount of scar tissue from her remote open carpal tunnel release and there was severe adhesions of the median nerve to the tendons and undersurface of the radial aspect of the transverse carpal ligament.  I performed a thorough neurolysis of the median nerve as well as elevated the distal forearm skin and perform a neurolysis releasing the antebrachial fascia.  I then made an oblique incision over the wrist first dorsal extensor compartment.  I elevated full-thickness skin flaps and identified and protected dorsal sensory nerves.  Identified the first compartment and incised to daily into the dorsal aspect of the first compartment.  I encountered extensor pollicis brevis and found that it was in its own separate subsheath with a significant of tenosynovitis.  I performed a thorough tenosynovectomy of extensor pollicis brevis.  I then incised through the floor of the subcompartment and found abductor pollicis longus had multiple slips with a significant amount of tenosynovitis.  I performed a thorough tenosynovectomy of abductor pollicis longus with tenotomy scissors.  After a thorough first extensor compartment release I took the wrist through range of motion there was no subluxation of the tendons.  I then deflated the tourniquet and used Bovie cautery to achieve hemostasis.  I copiously gated the wound with normal saline.  I then closed the incisions with 4-0 nylon suture, Xeroform, 4 x 4's, and a well-padded splint.  Patient awoke from anesthesia without known complication and was transferred in a stable condition to the PACU where there were no immediate postoperative complaints.    Postoperative Plan: The patient will be discharged  home per same-day criteria.  I recommend finger range of motion and sedentary use of the hand.  The will follow up with me in 10 to 14 days for splint removal and wound check.

## 2025-04-16 NOTE — LETTER
February 3, 2025    Patient Name: Makenna Culver  Surgeon Name: Raul Palomino M.D.  Surgery Facility: [facility] ([address])  Surgery Date: 4/16/2025    The time of your surgery is not final and may change up to and until the day of your surgery. You will be contacted 24-48 hours prior to your surgery date with your check-in and surgery time.    If you will not be at one of the below numbers please call the surgery scheduler at [phone]  Preferred Phone: [phone]    BEFORE YOUR SURGERY   Pre Registration and/or Lab Work must be done within and no earlier than 28 days prior to your surgery date. Your scheduled facility will contact you regarding all required preregistration and/or lab work. If you have not been contacted within 7 days of your scheduled procedure please call [facility] at [phone] for an appointment as soon as possible.    Instructions: Bring a list of all medications you are taking including the dosing and frequency.    DAY OF YOUR SURGERY  Nothing to eat or drink after midnight     Refrain from smoking any substance after midnight prior to surgery. Smoking may interfere with the anesthetic and frequently produces nausea during the recovery period.    Continue taking all lifesaving medications. Including the morning of your surgery with small sip of water.        Please do NOT take on the day of surgery:  Diuretics: examples- furosemide (Lasix), spironolactone, hydrochlorothiazide  ACE-inhibitors: examples- lisinopril, ramipril, enalapril  “ARBs”: examples- losartan, Olmesartan, valsartan    Please arrive at the hospital/surgery center at the check-in time provided.     An adult will need to bring you and take you home after your surgery.     AFTER YOUR SURGERY  Post op Appointment:   Date: 4/29   Time: 10AM   With: Raul Palomino MD   Location: [address] [place] [address] [place] [address]    - Post Surgery -  You will need someone to drive you home  - You must have someone provide transportation post surgery and someone to monitor you for at least 24 hours post-surgery. If you don't have either of these your appointment will be canceled.     TIME OFF WORK  FMLA or Disability forms can be faxed directly to: [phone] or you may drop them off at [address] [place] [address]. Our office charges a $35.00 fee per form. Forms will be completed within 10 business days of drop off and payment received. For the status of your forms you may contact our disability office directly [phone].    MEDICATION INSTRUCTIONS **Please read section completely**  The following medications should be stopped a minimum of 10 days prior to surgery:  All over the counter, Supplements & Herbal medications    Anorectics: Phentermine (Adipex-P, Lomaira and Suprenza), Phentermine-topiramate (Qsymia), Bupropion-naltrexone (Contrave)    **If you are on Bupropion for anxiety/depression, please continue this medication up until the day of surgery.     Opiod Partial Agonists/Opioid Antagonists: Buprenorphine (Suboxone, Belbuca, Butrans, Probuphine Implant, Sublocade), Naltrexone (ReVia, Vivitrol), Naloxone    Amphetamines: Dextroamphetamine/Amphetamine (Adderall, Mydayis), Methylphenidate Hydrochloride (Concerta, Metadate, Methylin, Ritalin)    The following medications should be stopped 5 days prior to surgery:  Blood Thinners: Any Aspirin, Aspirin products, anti-inflammatories such as ibuprofen and any blood thinners such as Coumadin and Plavix. Please consult your prescribing physician if you are on life saving blood thinners, in regards to when to stop medications prior to surgery.     The following medications should be stopped a minimum of 3 days prior to surgery:  PDE-5 inhibitors: Sildenafil (Viagra), Tadalafil (Cialis), Vardenafil (Levitra), Avanafil (Stendra)    MAO Inhibitors: Rasagiline (Azilect), Selegiline (Eldepryl, Emsam,  Selapar), Isocarboxazid (Marplan), Phenelzine (Nardil)       COVID and INFLUENZA NOTICE TO PATIENTS    Currently, the [facility] does not routinely test patients for COVID-19 or Influenza prior to their elective surgery.  However, if patients develop the following symptoms prior to their surgery date, they should voluntarily test for COVID-19 and Influenza and notify the surgical office of their condition and results.  The symptoms warranting testing would be any two of the following:    Fever (Temp above 100.4 F)  Chills  Cough  Shortness of breath or difficulty breathing  Fatigue  Myalgias (muscle or body aches)  Headache  Sore Throat  Congestion or Runny Nose  Nausea or vomiting  Diarrhea  New loss of taste or smell    Having these symptoms prior to surgery can significantly increase your risk of morbidity and mortality under anesthesia, which may compromise your health and require a transfer to a hospital for a higher level of care.  Therefore, it is advisable to notify the surgical team of any illness in order to get information for the appropriate time to delay the surgery to minimize these preventable risks.    Your health and safety are our number one priority at the [facility], and we are thankful that you entrust us with your care.  Please help us ensure the best possible surgical and anesthetic outcome by sharing appropriate health information with our perioperative team and staff.  We look forward to taking great care of you!    Thank you for your time and consideration on this matter.    Efrain Otero MD  Medical Director  Anesthesiologist  [facility]

## 2025-04-16 NOTE — PROGRESS NOTES
0630 : Pt allergies and NPO status verified. Home medications reconciled, preferred pharmacy verified. Belongings secured in locker. Pt verbalizes understanding of pain scale, expected course of stay, and plan of care. Surgical site verified with pt and MD. IV access established. All questions answered. Bed in lowest position, call light within reach.

## 2025-04-16 NOTE — ANESTHESIA POSTPROCEDURE EVALUATION
Patient: Makenna Culver    Procedure Summary       Date: 04/16/25 Room / Location:  OR 03 / SURGERY [facility]    Anesthesia Start: 0657 Anesthesia Stop: 0728    Procedures:       RIGHT WRIST FIRST DORSAL EXTENSOR COMPARTMENT RELEASE, RIGHT OPEN CARPAL TUNNEL RELEASE (Right: Hand)      RELEASE, CARPAL TUNNEL (Right: Wrist) Diagnosis:       Right carpal tunnel syndrome      (Right carpal tunnel syndrome [G56.01])    Surgeons: Raul Palomino M.D. Responsible Provider: Chaparro Richardson D.O.    Anesthesia Type: general ASA Status: 2            Final Anesthesia Type: general  Last vitals  BP   Blood Pressure : 127/61    Temp   36.3 °C (97.3 °F)    Pulse   61   Resp   16    SpO2   96 %      Anesthesia Post Evaluation    Patient location during evaluation: PACU  Patient participation: complete - patient participated  Level of consciousness: awake and alert  Pain score: 5    Airway patency: patent  Anesthetic complications: no  Cardiovascular status: hemodynamically stable  Respiratory status: acceptable  Hydration status: euvolemic    PONV: none          No notable events documented.     Nurse Pain Score: 5 (NPRS)

## 2025-04-16 NOTE — ANESTHESIA PREPROCEDURE EVALUATION
Case: [id number] Date/Time: 04/16/25 0645    Procedures:       RIGHT WRIST FIRST DORSAL EXTENSOR COMPARTMENT RELEASE, RIGHT OPEN CARPAL TUNNEL RELEASE (Right)      RELEASE, CARPAL TUNNEL    Anesthesia type: General    Diagnosis: Right carpal tunnel syndrome [G56.01]    Pre-op diagnosis: Right carpal tunnel syndrome [G56.01]    Location:  OR 03 / SURGERY [facility]    Surgeons: Raul Palomino M.D.            Relevant Problems   PULMONARY   (positive) Other emphysema (HCC)      GI   (positive) GERD (gastroesophageal reflux disease)         (positive) Liver lesion      ENDO   (positive) Hypothyroidism      Other   (positive) Impingement syndrome of left shoulder   (positive) Osteoarthritis of multiple joints   (positive) Right carpal tunnel syndrome       Physical Exam    Airway   Mallampati: II  TM distance: >3 FB  Neck ROM: full       Cardiovascular - normal exam  Rhythm: regular  Rate: normal  (-) murmur     Dental - normal exam           Pulmonary - normal exam  Breath sounds clear to auscultation     Abdominal    Neurological - normal exam                   Anesthesia Plan    ASA 2       Plan - general       Airway plan will be LMA          Induction: intravenous    Postoperative Plan: Postoperative administration of opioids is intended.    Pertinent diagnostic labs and testing reviewed    Informed Consent:    Anesthetic plan and risks discussed with patient.    Use of blood products discussed with: patient whom consented to blood products.

## 2025-04-16 NOTE — H&P
Surgery Orthopedic History & Physical Note    Date  4/16/2025    Primary Care Physician  Frances Laboy M.D.    CC  Pre-Op Diagnosis Codes:      * Right carpal tunnel syndrome [G56.01]    HPI  This is a 79 y.o. female who presented with chronic right wrist pain and hand pain numbness and tingling, failed conservative and expectant management, rest, ice, over-the-counter occasions and splinting and injections.  She has numbness and tingling at night and pain at the radial wrist with grasp, ranging from approximately to 6 out of 10 severity.    Past Medical History:   Diagnosis Date    Arthritis     hips, back and shoulders (osteoarthritis)     Asthma     Back pain     Chickenpox     Colon polyp 12/2011    Constipation     Degeneration of lumbar or lumbosacral intervertebral disc     Dr. Cisneros    Dental disorder     full dentures    Double vision     Ear problems as child had surgery    Turk[place] measles     Graves' disease with exophthalmos 06/13/2014    Being followed by Dr. Han San    Heart burn     Hemorrhoid     internal    High cholesterol     History of carpal tunnel repair 07/31/2013    Dr. Villa repaired both.    History of seasonal allergies     Hyperthyroidism     Menopause 10/14/2009    Morning headache     Osteoarthritis of multiple joints 06/13/2014    Osteoporosis     Pain     wrists    Pain in joint, pelvic region and thigh 2015    bursitis and arthritis in hips-takes ibuprofen and darvacet    Pain in joint, shoulder region 2015    Peptic ulcer     Periodic pelvic examination due    Postcoital bleeding     improved    Rash 12/29/2014    Started about a week ago when she got news about surgery High stress Nothing new Back, stomach, chest, arms Itch No pain    Rheumatoid arthritis (HCC)     Ringing in ears     S/P colonoscopy 12/2011    repeat in 5 years- had polyp, initial 2005.     Screening mammogram  11/2008=normal    Tobacco use disorder     Tonsillitis     Ulcer of the stomach and  intestine hx.    stable    Unspecified cataract     bilateral IOL    Unspecified hypothyroidism     Wears glasses        Past Surgical History:   Procedure Laterality Date    CARPAL TUNNEL RELEASE Left 12/18/2024    Procedure: LEFT OPEN CARPAL TUNNEL RELEASE;  Surgeon: Raul Palomino M.D.;  Location: SURGERY SAME DAY [facility];  Service: Orthopedics    AZ SHLDR ARTHROSCOP EXTEN DEBRIDE 3+ Left 02/21/2024    Procedure: LEFT SHOULDER  ARTHROSCOPY DEBRIDEMENT, DISTAL CLAVICLE RESECTION, SUBACROMIAL DECOMPRESSION, ROTATOR CUFF REPAIR, BICEPS TENODESIS;  Surgeon: Raul Palomino M.D.;  Location: SURGERY SAME DAY [facility];  Service: Orthopedics    CLAVICLE DISTAL EXCISION Left 02/21/2024    Procedure: EXCISION, CLAVICLE, DISTAL;  Surgeon: Raul Palomino M.D.;  Location: SURGERY SAME DAY [facility];  Service: Orthopedics    AZ FIX LID PTOSIS,LEVATR RESEC,EXTERN N/A 09/14/2023    Procedure: LEFT LEVATOR PLICATION, BILATERAL BLEPHAROPLASTY;  Surgeon: Jose Manuel Trammell Jr., M.D.;  Location: SURGERY SAME DAY [facility];  Service: Plastics    AZ NEUROPLASTY & OR TRANSPOS MEDIAN NRV CARPAL CHANDRA Right 12/04/2020    Procedure: RELEASE, CARPAL TUNNEL;  Surgeon: Pancho Palomino M.D.;  Location: SURGERY SAME DAY [facility];  Service: Orthopedics    PB INCIS TENDON SHEATH,RADIAL STYLOID Right 12/04/2020    Procedure: RELEASE, HAND, FOR DEQUERVAIN'S TENOSYNOVITIS;  Surgeon: Pancho Palomino M.D.;  Location: SURGERY SAME DAY [facility];  Service: Orthopedics    FINGER ARTHROPLASTY Right 12/04/2020    Procedure: ARTHROPLASTY, FINGER-THUMB CARPOMETACARPAL EXCISIONAL;  Surgeon: Pancho Palomino M.D.;  Location: SURGERY SAME DAY [facility];  Service: Orthopedics    AZ NEUROPLASTY & OR TRANSPOS MEDIAN NRV CARPAL CHANDRA Left 08/21/2020    Procedure: RELEASE, CARPAL TUNNEL;  Surgeon: Pancho Palomino M.D.;  Location: SURGERY SAME DAY [facility] ORS;  Service: Orthopedics    PB INCIS TENDON SHEATH,RADIAL STYLOID Left 08/21/2020     Procedure: RELEASE, HAND, FOR DEQUERVAIN'S TENOSYNOVITIS;  Surgeon: Pancho Palomino M.D.;  Location: SURGERY SAME DAY [facility];  Service: Orthopedics    PB REPAIR INTERCARP/CARP-METACARP JT Left 08/21/2020    Procedure: EXCISION, TRAPEZIUM, WITH LIGAMENT RECONSTRUCTION AND TENDON INTERPOSITION- PARTIAL TRAPEZOID EXCISION FLEXOR CARPI RADIALIS TENDON GRAFT, PLASCENCIA;  Surgeon: Pancho Palomino M.D.;  Location: SURGERY SAME DAY [facility];  Service: Orthopedics    FINGER ARTHROPLASTY Left 08/21/2020    Procedure: ARTHROPLASTY, FINGER- THUMB CARPOMETACARPAL EXCISIONAL;  Surgeon: Pancho Palomino M.D.;  Location: SURGERY SAME DAY [facility];  Service: Orthopedics    BLEPHAROPLASTY Bilateral 04/20/2016    Procedure: BLEPHAROPLASTY - UPPER;  Surgeon: Moshe Sena M.D.;  Location: SURGERY [facility];  Service:     RECTUS REPAIR Bilateral 07/09/2015    Procedure: RECTUS REPAIR FOR LT SUPERIOR RECESSION & RT INFERIOR RECESSION ;  Surgeon: Leila Villegas M.D.;  Location: SURGERY SAME DAY [facility];  Service:     CATARACT PHACO WITH IOL  03/17/2015    Performed by Derrick Gupta M.D. at [facility]    CATARACT PHACO WITH IOL  03/03/2015    Performed by Derrick Gupta M.D. at [facility]    TENSILON TEST  11/20/2014    Performed by Maynor San D.O. at ENDOSCOPY [facility]    CARPAL TUNNEL ENDOSCOPIC  02/08/2013    Performed by Pancho Palomino M.D. at SURGERY SAME DAY [facility]    CARPAL TUNNEL ENDOSCOPIC  11/17/2012    Performed by Pancho Palomino M.D. at SURGERY [facility] ORS    ABDOMINAL HYSTERECTOMY TOTAL  partial    fibroid tumors, has ovary and tube left    EYE SURGERY Right     EYE SURGERY Left     OTHER SURGICAL PROCEDURE      ear surgery as child    THYROIDECTOMY  thyroid ablation with iodine therapy    TONSILLECTOMY AND ADENOIDECTOMY         Current Facility-Administered Medications   Medication Dose Route Frequency Provider Last  Rate Last Admin    lidocaine (Xylocaine) 1 % injection 0.5 mL  0.5 mL Intradermal Once PRN Raul Palomino M.D.        lactated ringers infusion   Intravenous Continuous Raul Palomino M.D.        ceFAZolin (Ancef) injection 2 g  2 g Intravenous Once Raul Palomino M.D.           Social History     Socioeconomic History    Marital status:      Spouse name: Not on file    Number of children: Not on file    Years of education: Not on file    Highest education level: Some college, no degree   Occupational History    Occupation: on social security disability for her back     Employer: OTHER   Tobacco Use    Smoking status: Former     Current packs/day: 0.00     Average packs/day: 1 pack/day for 47.5 years (47.5 ttl pk-yrs)     Types: Electronic Cigarettes, Cigarettes     Start date: 1965     Quit date: 2013     Years since quittin.2     Passive exposure: Past    Smokeless tobacco: Never    Tobacco comments:     use terry-cig electronic cig.   Vaping Use    Vaping status: Former    Passive vaping exposure: Yes   Substance and Sexual Activity    Alcohol use: Not Currently     Comment: one wine 2 twice a month    Drug use: No    Sexual activity: Yes     Partners: Male     Comment:    Other Topics Concern    Not on file   Social History Narrative    - 3 children     Social Drivers of Health     Financial Resource Strain: Low Risk  (2023)    Overall Financial Resource Strain (CARDIA)     Difficulty of Paying Living Expenses: Not hard at all   Food Insecurity: No Food Insecurity (2023)    Hunger Vital Sign     Worried About Running Out of Food in the Last Year: Never true     Ran Out of Food in the Last Year: Never true   Transportation Needs: No Transportation Needs (2023)    PRAPARE - Transportation     Lack of Transportation (Medical): No     Lack of Transportation (Non-Medical): No   Physical Activity: Insufficiently Active (2023)    Exercise Vital  Sign     Days of Exercise per Week: 3 days     Minutes of Exercise per Session: 20 min   Stress: No Stress Concern Present (11/5/2023)    [employer] [place] [employer] - Occupational Stress Questionnaire     Feeling of Stress : Not at all   Social Connections: Unknown (11/5/2023)    Social Connection and Isolation Panel [NHANES]     Frequency of Communication with Friends and Family: More than three times a week     Frequency of Social Gatherings with Friends and Family: Twice a week     Attends [employer] Services: Patient declined     Active Member of Clubs or Organizations: No     Attends Club or Organization Meetings: Never     Marital Status:    Intimate Partner Violence: Not on file   Housing Stability: Low Risk  (11/5/2023)    Housing Stability Vital Sign     Unable to Pay for Housing in the Last Year: No     Number of Places Lived in the Last Year: 1     Unstable Housing in the Last Year: No       Family History   Problem Relation Age of Onset    Hypertension Father     Heart Disease Father         MI age 47 yo    Stroke Father     Alcohol abuse Father         drank everyday    Cancer Maternal Grandmother         part of lung    Lung Cancer Maternal Grandmother     Hypertension Brother     Cancer Neg Hx     Diabetes Neg Hx        Allergies  Codeine, Vicodin [hydrocodone-acetaminophen], and Morphine    Review of Systems  Negative right 12 point scale    Physical Exam    Vital Signs  Blood Pressure : 138/62   Temperature: 36.2 °C (97.2 °F)   Pulse: 61   Respiration: 16   Pulse Oximetry: 96 %   General:  Well appearing, in no acute distress. Appropriate and cooperative with the exam.  HEENT: Normocephalic, atraumatic. Cranial nerves II-XII are grossly intact.  Cardiovascular: 2+ distal pulses. No cyanosis, clubbing, or edema.  Pulmonary: Breathing comfortably on room air without labor.  Abdomen: Soft and unremarkable.  Skin: No rashes, jaundice, or cyanosis.  Lymphatic: No epitrochlear  lymphadenopathy.  Spine:  Clinically midline.   Gait:  Patient ambulates without a limp.  Musculoskeletal: Right wrist has tenderness palpation about the first extensor compartment positive Finkelstein test  Neurologic positive Durkan's and Phalen's at the carpal tunnel    Labs:                    Radiology:  No orders to display         Assessment/Plan:  Pre-Op Diagnosis Codes:      * Right carpal tunnel syndrome [G56.01] and first extensor compartment tenosynovitis  Procedure(s):  RIGHT WRIST FIRST DORSAL EXTENSOR COMPARTMENT RELEASE, RIGHT OPEN CARPAL TUNNEL RELEASE  RELEASE, CARPAL TUNNEL

## 2025-04-16 NOTE — ANESTHESIA TIME REPORT
Anesthesia Start and Stop Event Times       Date Time Event    4/16/2025 0646 Ready for Procedure     0657 Anesthesia Start     0728 Anesthesia Stop          Responsible Staff  04/16/25      Name Role Begin End    Chaparro Richardson D.O. Anesth 0657 0728          Overtime Reason:  no overtime (within assigned shift)    Comments:

## 2025-06-25 ENCOUNTER — HOSPITAL ENCOUNTER (OUTPATIENT)
Dept: LAB | Facility: MEDICAL CENTER | Age: 80
End: 2025-06-25
Attending: FAMILY MEDICINE
Payer: MEDICARE

## 2025-06-25 DIAGNOSIS — E55.9 VITAMIN D DEFICIENCY: ICD-10-CM

## 2025-06-25 DIAGNOSIS — E78.2 MIXED HYPERLIPIDEMIA: ICD-10-CM

## 2025-06-25 DIAGNOSIS — D75.839 THROMBOCYTOSIS: ICD-10-CM

## 2025-06-25 LAB
25(OH)D3 SERPL-MCNC: 110 NG/ML (ref 30–100)
ALBUMIN SERPL BCP-MCNC: 4.4 G/DL (ref 3.2–4.9)
ALBUMIN/GLOB SERPL: 1.8 G/DL
ALP SERPL-CCNC: 66 U/L (ref 30–99)
ALT SERPL-CCNC: 13 U/L (ref 2–50)
ANION GAP SERPL CALC-SCNC: 14 MMOL/L (ref 7–16)
AST SERPL-CCNC: 20 U/L (ref 12–45)
BASOPHILS # BLD AUTO: 0.1 % (ref 0–1.8)
BASOPHILS # BLD: 0.01 K/UL (ref 0–0.12)
BILIRUB SERPL-MCNC: 0.4 MG/DL (ref 0.1–1.5)
BUN SERPL-MCNC: 14 MG/DL (ref 8–22)
CALCIUM ALBUM COR SERPL-MCNC: 9.3 MG/DL (ref 8.5–10.5)
CALCIUM SERPL-MCNC: 9.6 MG/DL (ref 8.5–10.5)
CHLORIDE SERPL-SCNC: 100 MMOL/L (ref 96–112)
CHOLEST SERPL-MCNC: 233 MG/DL (ref 100–199)
CO2 SERPL-SCNC: 21 MMOL/L (ref 20–33)
CREAT SERPL-MCNC: 0.73 MG/DL (ref 0.5–1.4)
EOSINOPHIL # BLD AUTO: 0 K/UL (ref 0–0.51)
EOSINOPHIL NFR BLD: 0 % (ref 0–6.9)
ERYTHROCYTE [DISTWIDTH] IN BLOOD BY AUTOMATED COUNT: 41.4 FL (ref 35.9–50)
FASTING STATUS PATIENT QL REPORTED: NORMAL
GFR SERPLBLD CREATININE-BSD FMLA CKD-EPI: 84 ML/MIN/1.73 M 2
GLOBULIN SER CALC-MCNC: 2.4 G/DL (ref 1.9–3.5)
GLUCOSE SERPL-MCNC: 137 MG/DL (ref 65–99)
HCT VFR BLD AUTO: 40.3 % (ref 37–47)
HDLC SERPL-MCNC: 111 MG/DL
HGB BLD-MCNC: 13.8 G/DL (ref 12–16)
IMM GRANULOCYTES # BLD AUTO: 0.05 K/UL (ref 0–0.11)
IMM GRANULOCYTES NFR BLD AUTO: 0.4 % (ref 0–0.9)
LDLC SERPL CALC-MCNC: 101 MG/DL
LYMPHOCYTES # BLD AUTO: 0.95 K/UL (ref 1–4.8)
LYMPHOCYTES NFR BLD: 8.2 % (ref 22–41)
MCH RBC QN AUTO: 30.7 PG (ref 27–33)
MCHC RBC AUTO-ENTMCNC: 34.2 G/DL (ref 32.2–35.5)
MCV RBC AUTO: 89.8 FL (ref 81.4–97.8)
MONOCYTES # BLD AUTO: 0.5 K/UL (ref 0–0.85)
MONOCYTES NFR BLD AUTO: 4.3 % (ref 0–13.4)
NEUTROPHILS # BLD AUTO: 10.03 K/UL (ref 1.82–7.42)
NEUTROPHILS NFR BLD: 87 % (ref 44–72)
NRBC # BLD AUTO: 0 K/UL
NRBC BLD-RTO: 0 /100 WBC (ref 0–0.2)
PLATELET # BLD AUTO: 411 K/UL (ref 164–446)
PMV BLD AUTO: 9.2 FL (ref 9–12.9)
POTASSIUM SERPL-SCNC: 4.2 MMOL/L (ref 3.6–5.5)
PROT SERPL-MCNC: 6.8 G/DL (ref 6–8.2)
RBC # BLD AUTO: 4.49 M/UL (ref 4.2–5.4)
SODIUM SERPL-SCNC: 135 MMOL/L (ref 135–145)
TRIGL SERPL-MCNC: 104 MG/DL (ref 0–149)
WBC # BLD AUTO: 11.5 K/UL (ref 4.8–10.8)

## 2025-06-25 PROCEDURE — 82306 VITAMIN D 25 HYDROXY: CPT

## 2025-06-25 PROCEDURE — 85025 COMPLETE CBC W/AUTO DIFF WBC: CPT

## 2025-06-25 PROCEDURE — 80061 LIPID PANEL: CPT

## 2025-06-25 PROCEDURE — 80053 COMPREHEN METABOLIC PANEL: CPT

## 2025-06-25 PROCEDURE — 36415 COLL VENOUS BLD VENIPUNCTURE: CPT

## 2025-07-15 DIAGNOSIS — E03.9 HYPOTHYROIDISM, UNSPECIFIED TYPE: Primary | ICD-10-CM

## 2025-08-06 ENCOUNTER — HOSPITAL ENCOUNTER (OUTPATIENT)
Dept: LAB | Facility: MEDICAL CENTER | Age: 80
End: 2025-08-06
Attending: FAMILY MEDICINE
Payer: MEDICARE

## 2025-08-06 DIAGNOSIS — E03.9 HYPOTHYROIDISM, UNSPECIFIED TYPE: ICD-10-CM

## 2025-08-06 LAB — TSH SERPL DL<=0.005 MIU/L-ACNC: 0.9 UIU/ML (ref 0.38–5.33)

## 2025-08-06 PROCEDURE — 36415 COLL VENOUS BLD VENIPUNCTURE: CPT

## 2025-08-06 PROCEDURE — 84443 ASSAY THYROID STIM HORMONE: CPT

## 2025-08-08 RX ORDER — TRAZODONE HYDROCHLORIDE 100 MG/1
100 TABLET ORAL EVERY EVENING
Qty: 90 TABLET | Refills: 3 | Status: SHIPPED | OUTPATIENT
Start: 2025-08-08

## 2025-08-12 ENCOUNTER — PRE-ADMISSION TESTING (OUTPATIENT)
Dept: ADMISSIONS | Facility: MEDICAL CENTER | Age: 80
End: 2025-08-12
Payer: MEDICARE

## 2025-08-12 ENCOUNTER — APPOINTMENT (OUTPATIENT)
Dept: ADMISSIONS | Facility: MEDICAL CENTER | Age: 80
End: 2025-08-12
Payer: MEDICARE

## 2025-08-13 ENCOUNTER — ANESTHESIA (OUTPATIENT)
Dept: RADIOLOGY | Facility: MEDICAL CENTER | Age: 80
End: 2025-08-13
Payer: MEDICARE

## 2025-08-13 ENCOUNTER — HOSPITAL ENCOUNTER (OUTPATIENT)
Dept: RADIOLOGY | Facility: MEDICAL CENTER | Age: 80
End: 2025-08-13
Attending: SURGERY
Payer: MEDICARE

## 2025-08-13 ENCOUNTER — ANESTHESIA EVENT (OUTPATIENT)
Dept: RADIOLOGY | Facility: MEDICAL CENTER | Age: 80
End: 2025-08-13
Payer: MEDICARE

## 2025-08-13 VITALS
SYSTOLIC BLOOD PRESSURE: 132 MMHG | HEIGHT: 61 IN | BODY MASS INDEX: 21.52 KG/M2 | DIASTOLIC BLOOD PRESSURE: 70 MMHG | TEMPERATURE: 96.9 F | WEIGHT: 113.98 LBS | OXYGEN SATURATION: 99 % | HEART RATE: 70 BPM | RESPIRATION RATE: 16 BRPM

## 2025-08-13 DIAGNOSIS — M75.101 ROTATOR CUFF TEAR ARTHROPATHY OF RIGHT SHOULDER: ICD-10-CM

## 2025-08-13 DIAGNOSIS — M12.811 ROTATOR CUFF TEAR ARTHROPATHY OF RIGHT SHOULDER: ICD-10-CM

## 2025-08-13 DIAGNOSIS — Z98.890 S/P ARTHROSCOPY OF LEFT SHOULDER: ICD-10-CM

## 2025-08-13 PROCEDURE — 73221 MRI JOINT UPR EXTREM W/O DYE: CPT | Mod: RT

## 2025-08-13 PROCEDURE — 73221 MRI JOINT UPR EXTREM W/O DYE: CPT | Mod: LT

## 2025-08-13 ASSESSMENT — PAIN SCALES - GENERAL
PAINLEVEL: NO PAIN

## 2025-08-13 ASSESSMENT — FIBROSIS 4 INDEX: FIB4 SCORE: 1.05

## 2025-11-25 ENCOUNTER — APPOINTMENT (OUTPATIENT)
Dept: MEDICAL GROUP | Facility: PHYSICIAN GROUP | Age: 80
End: 2025-11-25
Payer: MEDICARE

## 2025-12-18 ENCOUNTER — APPOINTMENT (OUTPATIENT)
Dept: MEDICAL GROUP | Facility: PHYSICIAN GROUP | Age: 80
End: 2025-12-18
Payer: MEDICARE

## 2025-12-30 ENCOUNTER — APPOINTMENT (OUTPATIENT)
Dept: MEDICAL GROUP | Facility: PHYSICIAN GROUP | Age: 80
End: 2025-12-30
Payer: MEDICARE

## (undated) DEVICE — STOCKINET BIAS 4 IN STERILE - (20/CA)

## (undated) DEVICE — MANIFOLD NEPTUNE 1 PORT (20/PK)

## (undated) DEVICE — PACK UPPER EXTREMITY (2EA/CA)

## (undated) DEVICE — Device

## (undated) DEVICE — SUTURE 4-0 ETHILON FS-2 18 (36PK/BX)"

## (undated) DEVICE — SUTURE GENERAL

## (undated) DEVICE — CANISTER SUCTION 3000ML MECHANICAL FILTER AUTO SHUTOFF MEDI-VAC NONSTERILE LF DISP  (40EA/CA)

## (undated) DEVICE — KIT  I.V. START (100EA/CA)

## (undated) DEVICE — WATER IRRIGATION STERILE 1000ML (12EA/CA)

## (undated) DEVICE — STOCKINETTE IMPERVIOUS 12X48 - STERILELF (10/CA)"

## (undated) DEVICE — TUBE CONNECTING SUCTION - CLEAR PLASTIC STERILE 72 IN (50EA/CA)

## (undated) DEVICE — SODIUM CHL IRRIGATION 0.9% 1000ML (12EA/CA)

## (undated) DEVICE — GLOVE BIOGEL PI ORTHO SZ 7.5 PF LF (40PR/BX)

## (undated) DEVICE — PACK MINOR BASIN - (2EA/CA)

## (undated) DEVICE — ELECTRODE DUAL RETURN W/ CORD - (50/PK)

## (undated) DEVICE — KIT ANESTHESIA W/CIRCUIT & 3/LT BAG W/FILTER (20EA/CA)

## (undated) DEVICE — IMMOBILIZER ORTHOPEDIC 7IN UNIVERSAL 10.5-17IN SHOULDER ABDUCTION PILLOW QUICK FIT (1EA)

## (undated) DEVICE — SUCTION INSTRUMENT YANKAUER BULBOUS TIP W/O VENT (50EA/CA)

## (undated) DEVICE — SET LEADWIRE 5 LEAD BEDSIDE DISPOSABLE ECG (1SET OF 5/EA)

## (undated) DEVICE — SLING ORTH UNV TIETX VLFM ARM

## (undated) DEVICE — GOWN WARMING STANDARD FLEX - (30/CA)

## (undated) DEVICE — PACK TOTAL HIP - (1/CA)

## (undated) DEVICE — PROTECTOR ULNA NERVE - (36PR/CA)

## (undated) DEVICE — PACK UPPER EXTREMITY SM OR - (3/CA)

## (undated) DEVICE — SLEEVE VASO CALF MED - (10PR/CA)

## (undated) DEVICE — PAD PREP 24 X 48 CUFFED - (100/CA)

## (undated) DEVICE — TUBING CLEARLINK DUO-VENT - C-FLO (48EA/CA)

## (undated) DEVICE — BOVIE NEEDLE TIP 3CM COLORADO

## (undated) DEVICE — DRAPETIBURON SHOULDER W/POUCH - (5EA/CA)

## (undated) DEVICE — COVER LIGHT HANDLE FLEXIBLE - SOFT (2EA/PK 80PK/CA)

## (undated) DEVICE — GLOVE BIOGEL SZ 7.5 SURGICAL PF LTX - (50PR/BX 4BX/CA)

## (undated) DEVICE — MASK ANESTHESIA ADULT  - (100/CA)

## (undated) DEVICE — PADDING CAST 4 IN STERILE - 4 X 4 YDS (24/CA)

## (undated) DEVICE — CANNULA GEMINI PASSPORT 20MM - (5/BX)

## (undated) DEVICE — DRAPE U ORTHOPEDIC - (10/BX)

## (undated) DEVICE — DRESSING XEROFORM 1X8 - (50/BX 4BX/CA)

## (undated) DEVICE — TOWEL STOP TIMEOUT SAFETY FLAG (40EA/CA)

## (undated) DEVICE — TUBING CASSETTE CROSSFLOW INTEGRATED (10EA/CA)

## (undated) DEVICE — SPONGE GAUZESTER 4 X 4 4PLY - (128PK/CA)

## (undated) DEVICE — PACK LOWER EXTREMITY - (2/CA)

## (undated) DEVICE — SLEEVE, VASO, THIGH, MED

## (undated) DEVICE — BLADE SAW 90X25X1.37MM SAGITTAL DUAL CUT

## (undated) DEVICE — CATHETER IV 20 GA X 1-1/4 ---SURG.& SDS ONLY--- (50EA/BX)

## (undated) DEVICE — CANNULA O2 COMFORT SOFT EAR ADULT 7 FT TUBING (50/CA)

## (undated) DEVICE — ELECTRODE 850 FOAM ADHESIVE - HYDROGEL RADIOTRNSPRNT (50/PK)

## (undated) DEVICE — MASK OXYGEN VNYL ADLT MED CONC WITH 7 FOOT TUBING - (50EA/CA)

## (undated) DEVICE — GOWN SURGEONS X-LARGE - DISP. (30/CA)

## (undated) DEVICE — SODIUM CHL. IRRIGATION 0.9% 3000ML (4EA/CA 65CA/PF)

## (undated) DEVICE — MASK OXYGEN VNYL ADLT MED CONC WITH 7 FOOT TUBING  - (50EA/CA)

## (undated) DEVICE — FOAM FACEHOLDER SPIDER (8EA/BX)

## (undated) DEVICE — LACTATED RINGERS INJ 1000 ML - (14EA/CA 60CA/PF)

## (undated) DEVICE — STOCKINETTE, TUBULAR 4 COTTON

## (undated) DEVICE — SLEEVE VASO DVT COMPRESSION CALF MED - (10PR/CA)

## (undated) DEVICE — SENSOR SPO2 NEO LNCS ADHESIVE (20/BX) SEE USER NOTES

## (undated) DEVICE — SUTURE 2-0 MONOCRYL PLUS UNDYED CT-1 1 X 36 (36EA/BX)"

## (undated) DEVICE — DRAPE SURGICAL U 77X120 - (10/CA)

## (undated) DEVICE — GLOVE BIOGEL INDICATOR SZ 8 SURGICAL PF LTX - (50/BX 4BX/CA)

## (undated) DEVICE — DRAPE U SPLIT IMP 54 X 76 - (24/CA)

## (undated) DEVICE — PACK ARTHROSCOPY - (2EA//CA)

## (undated) DEVICE — CANISTER SUCTION RIGID RED 1500CC (40EA/CA)

## (undated) DEVICE — SENSOR OXIMETER ADULT SPO2 RD SET (20EA/BX)

## (undated) DEVICE — TIP INTPLS HFLO ML ORFC BTRY - (12/CS)  FOR SURGILAV

## (undated) DEVICE — SYRINGE NON SAFETY 3 CC 21 GA X 1 1/2 IN (100/BX 8BX/CA)

## (undated) DEVICE — KIT POSITIONING TRIMANO BEACH CHAIR (6EA/BX)

## (undated) DEVICE — PAD EYE GAUZE COVERED OVAL 1 5/8 X 2 5/8" STERILE"

## (undated) DEVICE — DRAPE LARGE 3 QUARTER - (20/CA)

## (undated) DEVICE — GOWN SURGEONS LARGE - (32/CA)

## (undated) DEVICE — DRESSING ABDOMINAL PAD STERILE 8 X 10" (360EA/CA)"

## (undated) DEVICE — CHLORAPREP 26 ML APPLICATOR - ORANGE TINT(25/CA)

## (undated) DEVICE — STAPLER SKIN DISP - (6/BX 10BX/CA) VISISTAT

## (undated) DEVICE — TUBE E-T HI-LO CUFF 7.0MM (10EA/PK)

## (undated) DEVICE — PROBESUCTION  3.5MM 90IN SERFAS ACCELERATOR

## (undated) DEVICE — DECANTER FLD BLS - (50/CA)

## (undated) DEVICE — CANISTER SUCTION 3000ML MECHANICAL FILTER AUTO SHUTOFF MEDI-VAC NONSTERILE LF DISP (40EA/CA)

## (undated) DEVICE — SPLINT PLASTER 3 IN X 15 IN - (50/BX 12BX/CA)

## (undated) DEVICE — SUTURE 5-0 PLAIN GUT PC-1 - (12/BX)

## (undated) DEVICE — HEAD HOLDER JUNIOR/ADULT

## (undated) DEVICE — GLOVE BIOGEL SZ 8 SURGICAL PF LTX - (50PR/BX 4BX/CA)

## (undated) DEVICE — GLOVE BIOGEL PI INDICATOR SZ 8.0 SURGICAL PF LF -(50/BX 4BX/CA)

## (undated) DEVICE — SUTURE 0 ETHIBOND CT-1 - (12/BX) 18 INCH

## (undated) DEVICE — PROTECTOR CORNEAL - (10/BX)

## (undated) DEVICE — BLANKET WARMING LOWER BODY (10EA/CA)

## (undated) DEVICE — SUTURE PASSER SELF CAPTURE

## (undated) DEVICE — SUTURE 0 PDS-2 CTX 36 INCH - (24/BX)

## (undated) DEVICE — TAPE CLOTH MEDIPORE 6 INCH - (12RL/CA)

## (undated) DEVICE — SUTURE 3-0 ETHILON FS-1 - (36/BX) 30 INCH

## (undated) DEVICE — PLUMEPEN ULTRA 3/8 IN X 10 FT HOSE (20EA/CA)

## (undated) DEVICE — TOWELS CLOTH SURGICAL - (4/PK 20PK/CA)

## (undated) DEVICE — SUTURE 5-0 VICRYL PLUSRB-1 - 27 INCH (36/BX)

## (undated) DEVICE — SUTURE 5 TI-CRON HOS-14 - (36/BX)

## (undated) DEVICE — GLOVE SZ 6.5 BIOGEL PI MICRO - PF LF (50PR/BX)

## (undated) DEVICE — DRAPE STRLE REG TOWEL 18X24 - (10/BX 4BX/CA)"

## (undated) DEVICE — SHAVER 5.5 RESECTOR FORMULA (5EA/BX )

## (undated) DEVICE — WASH BABY HEAD-TO-TOE 1.7 OZ. - (144EA/CA)

## (undated) DEVICE — HANDPIECE 10FT INTPLS SCT PLS IRRIGATION HAND CONTROL SET (6/PK)